# Patient Record
Sex: MALE | Race: WHITE | Employment: OTHER | URBAN - METROPOLITAN AREA
[De-identification: names, ages, dates, MRNs, and addresses within clinical notes are randomized per-mention and may not be internally consistent; named-entity substitution may affect disease eponyms.]

---

## 2017-03-15 ENCOUNTER — HOSPITAL ENCOUNTER (OUTPATIENT)
Dept: MRI IMAGING | Age: 82
Discharge: HOME | End: 2017-03-15
Attending: INTERNAL MEDICINE
Payer: MEDICARE

## 2017-03-15 DIAGNOSIS — M54.2 NECK PAIN: ICD-10-CM

## 2017-03-15 PROCEDURE — 72141 MRI NECK SPINE W/O DYE: CPT

## 2017-03-21 PROBLEM — M50.30 DDD (DEGENERATIVE DISC DISEASE), CERVICAL: Status: ACTIVE | Noted: 2017-03-21

## 2017-03-21 NOTE — PROGRESS NOTES
MRI cervical spine shows extensive arthritis. As discussed will have NS give opinion.       Please call patient

## 2017-03-22 NOTE — PROGRESS NOTES
spoke with patient and gave results per Dr. Mitra Heredia. Informed patient of referral to NS and that they are running behind in scheduling. Patient voiced understanding.

## 2017-03-29 ENCOUNTER — HOSPITAL ENCOUNTER (OUTPATIENT)
Dept: LAB | Age: 82
Discharge: HOME | End: 2017-03-29
Payer: MEDICARE

## 2017-03-29 DIAGNOSIS — D69.6 THROMBOCYTOPENIA, UNSPECIFIED (HCC): ICD-10-CM

## 2017-03-29 LAB
ALBUMIN SERPL BCP-MCNC: 3.9 G/DL (ref 3.2–4.6)
ALBUMIN/GLOB SERPL: 1.6 {RATIO} (ref 1.2–3.5)
ALP SERPL-CCNC: 69 U/L (ref 50–136)
ALT SERPL-CCNC: 29 U/L (ref 12–65)
ANION GAP BLD CALC-SCNC: 7 MMOL/L (ref 7–16)
AST SERPL W P-5'-P-CCNC: 22 U/L (ref 15–37)
BASOPHILS # BLD AUTO: 0 K/UL (ref 0–0.2)
BASOPHILS # BLD: 0 % (ref 0–2)
BILIRUB SERPL-MCNC: 1.6 MG/DL (ref 0.2–1.1)
BUN SERPL-MCNC: 14 MG/DL (ref 8–23)
CALCIUM SERPL-MCNC: 9.3 MG/DL (ref 8.3–10.4)
CHLORIDE SERPL-SCNC: 106 MMOL/L (ref 98–107)
CO2 SERPL-SCNC: 28 MMOL/L (ref 23–32)
CREAT SERPL-MCNC: 1.02 MG/DL (ref 0.8–1.5)
DIFFERENTIAL METHOD BLD: ABNORMAL
EOSINOPHIL # BLD: 0.1 K/UL (ref 0–0.8)
EOSINOPHIL NFR BLD: 3 % (ref 0.5–7.8)
ERYTHROCYTE [DISTWIDTH] IN BLOOD BY AUTOMATED COUNT: 13.4 % (ref 11.9–14.6)
GLOBULIN SER CALC-MCNC: 2.4 G/DL (ref 2.3–3.5)
GLUCOSE SERPL-MCNC: 106 MG/DL (ref 65–100)
HCT VFR BLD AUTO: 34.3 % (ref 41.1–50.3)
HGB BLD-MCNC: 12.3 G/DL (ref 13.6–17.2)
LDH SERPL L TO P-CCNC: 172 U/L (ref 110–210)
LYMPHOCYTES # BLD AUTO: 29 % (ref 13–44)
LYMPHOCYTES # BLD: 0.9 K/UL (ref 0.5–4.6)
MCH RBC QN AUTO: 33.2 PG (ref 26.1–32.9)
MCHC RBC AUTO-ENTMCNC: 35.9 G/DL (ref 31.4–35)
MCV RBC AUTO: 92.5 FL (ref 79.6–97.8)
MONOCYTES # BLD: 0.2 K/UL (ref 0.1–1.3)
MONOCYTES NFR BLD AUTO: 8 % (ref 4–12)
NEUTS SEG # BLD: 1.7 K/UL (ref 1.7–8.2)
NEUTS SEG NFR BLD AUTO: 59 % (ref 43–78)
NRBC # BLD: 0 K/UL (ref 0–0.2)
PLATELET # BLD AUTO: 80 K/UL (ref 150–450)
PMV BLD AUTO: 8.4 FL (ref 10.8–14.1)
POTASSIUM SERPL-SCNC: 4.5 MMOL/L (ref 3.5–5.1)
PROT SERPL-MCNC: 6.3 G/DL (ref 6.3–8.2)
RBC # BLD AUTO: 3.71 M/UL (ref 4.23–5.67)
SODIUM SERPL-SCNC: 141 MMOL/L (ref 136–145)
WBC # BLD AUTO: 2.9 K/UL (ref 4.3–11.1)

## 2017-03-29 PROCEDURE — 85025 COMPLETE CBC W/AUTO DIFF WBC: CPT | Performed by: INTERNAL MEDICINE

## 2017-03-29 PROCEDURE — 83615 LACTATE (LD) (LDH) ENZYME: CPT | Performed by: INTERNAL MEDICINE

## 2017-03-29 PROCEDURE — 80053 COMPREHEN METABOLIC PANEL: CPT | Performed by: INTERNAL MEDICINE

## 2017-03-29 PROCEDURE — 36415 COLL VENOUS BLD VENIPUNCTURE: CPT | Performed by: INTERNAL MEDICINE

## 2017-04-10 PROBLEM — J44.9 COPD (CHRONIC OBSTRUCTIVE PULMONARY DISEASE) (HCC): Status: ACTIVE | Noted: 2017-04-10

## 2017-04-11 ENCOUNTER — HOSPITAL ENCOUNTER (OUTPATIENT)
Dept: ULTRASOUND IMAGING | Age: 82
Discharge: HOME | End: 2017-04-11
Attending: INTERNAL MEDICINE
Payer: MEDICARE

## 2017-04-11 DIAGNOSIS — E04.1 THYROID NODULE: ICD-10-CM

## 2017-04-11 PROBLEM — E04.2 MULTIPLE THYROID NODULES: Status: ACTIVE | Noted: 2017-04-11

## 2017-04-11 PROCEDURE — 76536 US EXAM OF HEAD AND NECK: CPT

## 2017-04-11 NOTE — PROGRESS NOTES
Please call patient, several thyroid nodules present that need to be evaluated by endocrinology for biopsy. They are large enough that I think they should be looked at. A referral to Endocrinology will be made to get them checked.

## 2017-06-01 ENCOUNTER — HOSPITAL ENCOUNTER (OUTPATIENT)
Dept: PHYSICAL THERAPY | Age: 82
Discharge: HOME | End: 2017-06-01
Payer: MEDICARE

## 2017-06-01 PROCEDURE — G8982 BODY POS GOAL STATUS: HCPCS

## 2017-06-01 PROCEDURE — 97161 PT EVAL LOW COMPLEX 20 MIN: CPT

## 2017-06-01 PROCEDURE — G8981 BODY POS CURRENT STATUS: HCPCS

## 2017-06-01 PROCEDURE — 97110 THERAPEUTIC EXERCISES: CPT

## 2017-06-01 NOTE — PROGRESS NOTES
Ambulatory/Rehab Services H2 Model Falls Risk Assessment    Risk Factor Pts. ·   Confusion/Disorientation/Impulsivity  []    4 ·   Symptomatic Depression  []   2 ·   Altered Elimination  []   1 ·   Dizziness/Vertigo  []   1 ·   Gender (Male)  [x]   1 ·   Any administered antiepileptics (anticonvulsants):  []   2 ·   Any administered benzodiazepines:  []   1 ·   Visual Impairment (specify):  []   1 ·   Portable Oxygen Use  []   1 ·   Orthostatic ? BP  []   1 ·   History of Recent Falls (within 3 mos.)  []   5     Ability to Rise from Chair (choose one) Pts. ·   Ability to rise in a single movement  [x]   0 ·   Pushes up, successful in one attempt  []   1 ·   Multiple attempts, but successful  []   3 ·   Unable to rise without assistance  []   4   Total: (5 or greater = High Risk) 1     Falls Prevention Plan:   []                Physical Limitations to Exercise (specify):   []                Mobility Assistance Device (type):   []                Exercise/Equipment Adaptation (specify):    ©2010 [facility] [address] Patent #[id number].  Federal Law prohibits the replication, distribution or use without written permission from [email or web address]

## 2017-06-01 NOTE — PROGRESS NOTES
Maximino Cristina  : 1935 [facility] JAZMIN ROJAS  [facility], [address] [place] [address],[address], [place] [address].  [phone]   [phone]         OUTPATIENT PHYSICAL THERAPY:Initial Assessment 2017    ICD-10: Treatment Diagnosis: Pain in thoracic spine (M54.6), Pain in left shoulder (M25.512) , Abnormal posture (R29.3)  Precautions/Allergies:   Iodinated contrast media - oral and iv dye and Sulfa (sulfonamide antibiotics)   Fall Risk Score: 1 (? 5 = High Risk)  MD Orders: Evaluate and treat (17) MEDICAL/REFERRING DIAGNOSIS:  back pain   DATE OF ONSET: approx 1 year ago  REFERRING PHYSICIAN: [place], [id number] [facility] 28: 2017     INITIAL ASSESSMENT:  Mr. Marcin Sauer presents to PT with approximately 1 year history of midback/L shoulder blade pain. Patient's pain limits his endurance with prolonged standing and with prolonged sitting when working at a computer. Patient has positive impingement tests for L shoulder, point tenderness to L periscapular muscles, abnormal posture with increased kyphosis and will benefit from PT intervention to address these impairments and improve patient's quality of life and allow for full return to activity participation. PROBLEM LIST (Impacting functional limitations):  1. Increased Pain  2. Decreased Activity Tolerance  3. Decreased Flexibility/Joint Mobility INTERVENTIONS PLANNED:  1. Home Exercise Program (HEP)  2. Manual Therapy  3. Range of Motion (ROM)  4. Therapeutic Exercise/Strengthening   TREATMENT PLAN:  Effective Dates: 17 TO 8/10/17. Frequency/Duration: 2-3 visits per week for 10 weeks  GOALS: (Goals have been discussed and agreed upon with patient.)  Short-Term Functional Goals: Time Frame: 5 weeks  1. Patient will be independent with initial HEP to improve patient's posture. 2. Patient will report being able to stand for 30 minutes or greater without increased L scapula and upper back pain.   3. Patient will report being able to sit and work at computer for greater than 30 minutes without increased L scapula and upper back pain. Discharge Goals: Time Frame:10 weeks  1. Patient will report >2 weeks without onset of L scapula/upper back pain. 2. Patient will report no limitations with prolonged standing or sitting secondary to L scapula pain to facilitate return to full functional and recreational activity participation. 3. Patient will have no positive L shoulder impingement tests and be able to move L shoulder through 170 degrees of active abduction and active flexion without increased L shoulder discomfort. Rehabilitation Potential For Stated Goals: Good  Regarding Maximino Cristina's therapy, I certify that the treatment plan above will be carried out by a therapist or under their direction. Thank you for this referral,    Ashley Larson PT       Referring Physician Signature:     Remington Suazo MD              Date                    The information in this section was collected on 6/1/17  (except where otherwise noted). HISTORY:   History of Present Injury/Illness (Reason for Referral):  Patient reports that approximately 1 year ago he began developing L sided midback and L shoulder blade pain that will occasionally radiate to his neck and upper back. He initially was concerned it was a cardiac issue, but he has had a nuclear stress test and EKG, which showed no cardiac abnormalities. Patient has also had a cervical MRI which was unremarkable. Patient states that his shoulder blade pain limits him to approximately 10 minutes of prolonged, static standing. He has applied heat pack to the area, which helps, and it feels better to sit with a back support. He also feels pain when sitting at a desk and working on a computer for prolonged periods. Patient denies the pain worsening at any time of day, denies paresthesias, and reports that his pain has occurred with an insidious onset.    Past Medical History/Comorbidities:   Mr. 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 Mr. Jesus Barnes  has a past surgical history that includes cardiac surg procedure unlist (1991); cardiac surg procedure unlist (9/2003); cholecystectomy (9/2000); neurological procedure unlisted (12/1981); hernia repair (09/1993); gi (11/2003); lumbar laminectomy (5/2005); other surgical (05/2002); colonoscopy (02/28/2013); endoscopy (02/28/2013); vascular surgery procedure unlist (1991); orthopaedic (1/2006); orthopaedic (8/2005); orthopaedic (2005); heart catheterization (04/2015); other surgical (11/2014); other surgical; and orthopaedic (03/1955). Social History/Living Environment:    Patient lives with his spouse. He is retired. Prior Level of Function/Work/Activity:  Patient is independent with all activities and mobility. Dominant Side:         RIGHT  Current Medications:       Current Outpatient Prescriptions:     tiotropium-olodaterol (STIOLTO RESPIMAT) 2.5-2.5 mcg/actuation mist, Take 2 Puffs by inhalation daily. , Disp: 3 Inhaler, Rfl: 3    atorvastatin (LIPITOR) 40 mg tablet, Take 1 Tab by mouth daily. , Disp: 90 Tab, Rfl: 3    metoprolol tartrate (LOPRESSOR) 25 mg tablet, Take 1 Tab by mouth two (2) times a day., Disp: 180 Tab, Rfl: 3    amLODIPine (NORVASC) 5 mg tablet, Take 1 Tab by mouth daily. , Disp: 90 Tab, Rfl: 3    fluticasone (FLONASE) 50 mcg/actuation nasal spray, One spray to each nostril daily, Disp: 3 Bottle, Rfl: 3   albuterol sulfate 90 mcg/actuation aepb, Take 2 Puffs by inhalation every four (4) hours as needed. , Disp: , Rfl:     omeprazole (PRILOSEC) 20 mg capsule, Take 1 Cap by mouth daily as needed. , Disp: 90 Cap, Rfl: 3    aspirin 81 mg chewable tablet, Take 81 mg by mouth daily. , Disp: , Rfl:     finasteride (PROSCAR) 5 mg tablet, Take 5 mg by mouth daily. , Disp: , Rfl:     terazosin (HYTRIN) 10 mg capsule, Take 10 mg by mouth nightly., Disp: , Rfl:    Date Last Reviewed:  6/1/17   Number of Personal Factors/Comorbidities that affect the Plan of Care: 1-2: MODERATE COMPLEXITY   EXAMINATION:   Observation/Orthostatic Postural Assessment:          Patient has rounded shoulders, kyphotic posture with moderate forward head positioning. In supine, patient has greater distance from tip of acromion in R shoulder than L, indicating anterior rotation of R UE relative to L. Reduced motion/capsular tightness at R shoulder with grade III glenohumeral joint mobilizations. Palpation:          Pt has increased tenderness and tightness in L upper trapezius and L levator scapulae as well as L periscapular muscles along medial border of L scapula. L biceps tender to palpation. ROM:          R shoulder AROM: Flex 168 degrees,  deg, ER 70 deg (@ 45 deg shoulder abduction); IR WFL        L shoulder AROM: Flex 158 degrees,  deg (w/pain at end-range), ER 70 deg (@45 deg ABD), IR WFL        L shoulder PROM: Flex 161 w/end range pain;  (w/end-range pain)        Cervical ROM WFL all directions           Strength:          Shoulder flex: 5/5 B, Shoulder ABD 5/5 (discomfot on L), Shoulder ER 5/5 (w/discomfort on L), IR 5/5; biceps 5/5 B  Special Tests:          Positive Neer's, Positive Dasilva-Seamus on L (both negative on R); negative empty can/full can on L  Neurological Screen:        Dermatomes:  Normal B UE        Sensation: intact to light touch   Body Structures Involved:  1. Bones  2. Joints  3.  Muscles Body Functions Affected:  1. Sensory/Pain  2. Neuromusculoskeletal  3. Movement Related Activities and Participation Affected:  1. Self Care  2. Domestic Life  3. Interpersonal Interactions and Relationships   Number of elements (examined above) that affect the Plan of Care: 4+: HIGH COMPLEXITY   CLINICAL PRESENTATION:   Presentation: Stable and uncomplicated: LOW COMPLEXITY   CLINICAL DECISION MAKING:   Outcome Measure: Tool Used: Modified Oswestry Low Back Pain Questionnaire  Score:  Initial: 9/50  Most Recent: 9/50 (Date: 6/1/17)   Interpretation of Score: Each section is scored on a 0-5 scale, 5 representing the greatest disability. The scores of each section are added together for a total score of 50. Score 0 1-10 11-20 21-30 31-40 41-49 50   Modifier CH CI CJ CK CL CM CN     ? Changing and Maintaining Body Position:    [id number] - CURRENT STATUS: CJ - 20%-39% impaired, limited or restricted    - GOAL STATUS: CI - 1%-19% impaired, limited or restricted    - D/C STATUS:  ---------------To be determined---------------    Medical Necessity:   · Skilled intervention continues to be required due to discomfort with prolonged sitting and standing and abnormal posture. .  Reason for Services/Other Comments:  · Patient continues to require skilled intervention due to limtiations with activity tolerance secondary to increased pain with prolonged sitting and standing. .   Use of outcome tool(s) and clinical judgement create a POC that gives a: Clear prediction of patient's progress: LOW COMPLEXITY            TREATMENT:   (In addition to Assessment/Re-Assessment sessions the following treatments were rendered)  Pre-treatment Symptoms/Complaints:  Patient reports that his pain is mild today but that if he were to stand and talk he would have to 'fidget' and re-adjust his position secondary to onset of L shoulder blade pain, which would lead him to eventually need to sit.  Stated that in addition to this pain he also has thyroid issues and may have to have his thyroid removed. He stated that he also has pain when sitting for prolonged periods at computer desk. Pain: Initial:     none reported Post Session:  None at end of PT     Therapeutic Exercise: (12 Minutes):  Exercises per grid below to improve strength and posture. Required minimal verbal, manual and tactile cues to promote proper body posture and promote proper body mechanics. Progressed repetitions as indicated. Date:  6/1/17 Date:   Date:     Activity/Exercise Parameters Parameters Parameters   Supine chin tucks 1 x 10     Scapular retractions 1 x 10                                   HEP: Handout of the above exercises provided to patient. Patient performed exercises and demonstrated good understanding and performance of each. Treatment/Session Assessment:    · Response to Treatment:  Patient demonstrated postural deficits and some possible soft tissue irritation to L shoulder. Patient did not experience exacerbation of symptoms during PT evaluation. · Compliance with Program/Exercises: Will assess as treatment progresses. · Recommendations/Intent for next treatment session: \"Next visit will focus on improved posture, manual therapies to adjust point tenderness and improve R shoulder pain-free ROM. \".   Total Treatment Duration: 53 minutes  PT Patient Time In/Time Out  Time In: 1305  Time Out: Zeina 49, PT

## 2017-06-06 ENCOUNTER — HOSPITAL ENCOUNTER (OUTPATIENT)
Dept: PHYSICAL THERAPY | Age: 82
Discharge: HOME | End: 2017-06-06
Payer: MEDICARE

## 2017-06-06 PROCEDURE — 97110 THERAPEUTIC EXERCISES: CPT

## 2017-06-06 PROCEDURE — 97140 MANUAL THERAPY 1/> REGIONS: CPT

## 2017-06-06 NOTE — PROGRESS NOTES
Maximino Cristina  : 1935 [facility] at [facility] JAZMIN ROJAS  [address] [facility], [address] [place] [address],[address], 5[address] [facility]  [phone]   [phone]         OUTPATIENT PHYSICAL THERAPY:Daily Note 2017    ICD-10: Treatment Diagnosis: Pain in thoracic spine (M54.6), Pain in left shoulder (M25.512) , Abnormal posture (R29.3)  Precautions/Allergies:   Iodinated contrast media - oral and iv dye and Sulfa (sulfonamide antibiotics)   Fall Risk Score: 1 (? 5 = High Risk)  MD Orders: Evaluate and treat (17) MEDICAL/REFERRING DIAGNOSIS:  back pain   DATE OF ONSET: approx 1 year ago  REFERRING PHYSICIAN: Sweta Amin, [id number] y 28: 2017     INITIAL ASSESSMENT:  Mr. Jorje Parker presents to PT with approximately 1 year history of midback/L shoulder blade pain. Patient's pain limits his endurance with prolonged standing and with prolonged sitting when working at a computer. Patient has positive impingement tests for L shoulder, point tenderness to L periscapular muscles, abnormal posture with increased kyphosis and will benefit from PT intervention to address these impairments and improve patient's quality of life and allow for full return to activity participation. PROBLEM LIST (Impacting functional limitations):  1. Increased Pain  2. Decreased Activity Tolerance  3. Decreased Flexibility/Joint Mobility INTERVENTIONS PLANNED:  1. Home Exercise Program (HEP)  2. Manual Therapy  3. Range of Motion (ROM)  4. Therapeutic Exercise/Strengthening   TREATMENT PLAN:  Effective Dates: 17 TO 8/10/17. Frequency/Duration: 2-3 visits per week for 10 weeks  GOALS: (Goals have been discussed and agreed upon with patient.)  Short-Term Functional Goals: Time Frame: 5 weeks  1. Patient will be independent with initial HEP to improve patient's posture. 2. Patient will report being able to stand for 30 minutes or greater without increased L scapula and upper back pain.   3. Patient will report being able to sit and work at computer for greater than 30 minutes without increased L scapula and upper back pain. Discharge Goals: Time Frame:10 weeks  1. Patient will report >2 weeks without onset of L scapula/upper back pain. 2. Patient will report no limitations with prolonged standing or sitting secondary to L scapula pain to facilitate return to full functional and recreational activity participation. 3. Patient will have no positive L shoulder impingement tests and be able to move L shoulder through 170 degrees of active abduction and active flexion without increased L shoulder discomfort. Rehabilitation Potential For Stated Goals: Good  Regarding Maximino Cristina's therapy, I certify that the treatment plan above will be carried out by a therapist or under their direction. Thank you for this referral,    Milton Santos PT       Referring Physician Signature:     New Nickerson MD              Date                    The information in this section was collected on 6/1/17  (except where otherwise noted). HISTORY:   History of Present Injury/Illness (Reason for Referral):  Patient reports that approximately 1 year ago he began developing L sided midback and L shoulder blade pain that will occasionally radiate to his neck and upper back. He initially was concerned it was a cardiac issue, but he has had a nuclear stress test and EKG, which showed no cardiac abnormalities. Patient has also had a cervical MRI which was unremarkable. Patient states that his shoulder blade pain limits him to approximately 10 minutes of prolonged, static standing. He has applied heat pack to the area, which helps, and it feels better to sit with a back support. He also feels pain when sitting at a desk and working on a computer for prolonged periods. Patient denies the pain worsening at any time of day, denies paresthesias, and reports that his pain has occurred with an insidious onset.    Past Medical History/Comorbidities:   Mr. 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 Mr. Ioana Ortiz  has a past surgical history that includes cardiac surg procedure unlist (1991); cardiac surg procedure unlist (9/2003); cholecystectomy (9/2000); neurological procedure unlisted (12/1981); hernia repair (09/1993); gi (11/2003); lumbar laminectomy (5/2005); other surgical (05/2002); colonoscopy (02/28/2013); endoscopy (02/28/2013); vascular surgery procedure unlist (1991); orthopaedic (1/2006); orthopaedic (8/2005); orthopaedic (2005); heart catheterization (04/2015); other surgical (11/2014); other surgical; and orthopaedic (03/1955). Social History/Living Environment:    Patient lives with his spouse. He is retired. Prior Level of Function/Work/Activity:  Patient is independent with all activities and mobility. Dominant Side:         RIGHT  Current Medications:       Current Outpatient Prescriptions:     tiotropium-olodaterol (STIOLTO RESPIMAT) 2.5-2.5 mcg/actuation mist, Take 2 Puffs by inhalation daily. , Disp: 3 Inhaler, Rfl: 3    atorvastatin (LIPITOR) 40 mg tablet, Take 1 Tab by mouth daily. , Disp: 90 Tab, Rfl: 3    metoprolol tartrate (LOPRESSOR) 25 mg tablet, Take 1 Tab by mouth two (2) times a day., Disp: 180 Tab, Rfl: 3    amLODIPine (NORVASC) 5 mg tablet, Take 1 Tab by mouth daily. , Disp: 90 Tab, Rfl: 3    fluticasone (FLONASE) 50 mcg/actuation nasal spray, One spray to each nostril daily, Disp: 3 Bottle, Rfl: 3   albuterol sulfate 90 mcg/actuation aepb, Take 2 Puffs by inhalation every four (4) hours as needed. , Disp: , Rfl:     omeprazole (PRILOSEC) 20 mg capsule, Take 1 Cap by mouth daily as needed. , Disp: 90 Cap, Rfl: 3    aspirin 81 mg chewable tablet, Take 81 mg by mouth daily. , Disp: , Rfl:     finasteride (PROSCAR) 5 mg tablet, Take 5 mg by mouth daily. , Disp: , Rfl:     terazosin (HYTRIN) 10 mg capsule, Take 10 mg by mouth nightly., Disp: , Rfl:    Date Last Reviewed:  6/1/17   Number of Personal Factors/Comorbidities that affect the Plan of Care: 1-2: MODERATE COMPLEXITY   EXAMINATION:   Observation/Orthostatic Postural Assessment:          Patient has rounded shoulders, kyphotic posture with moderate forward head positioning. In supine, patient has greater distance from tip of acromion in R shoulder than L, indicating anterior rotation of R UE relative to L. Reduced motion/capsular tightness at R shoulder with grade III glenohumeral joint mobilizations. Palpation:          Pt has increased tenderness and tightness in L upper trapezius and L levator scapulae as well as L periscapular muscles along medial border of L scapula. L biceps tender to palpation. ROM:          R shoulder AROM: Flex 168 degrees,  deg, ER 70 deg (@ 45 deg shoulder abduction); IR WFL        L shoulder AROM: Flex 158 degrees,  deg (w/pain at end-range), ER 70 deg (@45 deg ABD), IR WFL        L shoulder PROM: Flex 161 w/end range pain;  (w/end-range pain)        Cervical ROM WFL all directions           Strength:          Shoulder flex: 5/5 B, Shoulder ABD 5/5 (discomfot on L), Shoulder ER 5/5 (w/discomfort on L), IR 5/5; biceps 5/5 B  Special Tests:          Positive Neer's, Positive Dasilva-Seamus on L (both negative on R); negative empty can/full can on L  Neurological Screen:        Dermatomes:  Normal B UE        Sensation: intact to light touch   Body Structures Involved:  1. Bones  2. Joints  3.  Muscles Body Functions Affected:  1. Sensory/Pain  2. Neuromusculoskeletal  3. Movement Related Activities and Participation Affected:  1. Self Care  2. Domestic Life  3. Interpersonal Interactions and Relationships   Number of elements (examined above) that affect the Plan of Care: 4+: HIGH COMPLEXITY   CLINICAL PRESENTATION:   Presentation: Stable and uncomplicated: LOW COMPLEXITY   CLINICAL DECISION MAKING:   Outcome Measure: Tool Used: Modified Oswestry Low Back Pain Questionnaire  Score:  Initial: 9/50  Most Recent: 9/50 (Date: 6/1/17)   Interpretation of Score: Each section is scored on a 0-5 scale, 5 representing the greatest disability. The scores of each section are added together for a total score of 50. Score 0 1-10 11-20 21-30 31-40 41-49 50   Modifier CH CI CJ CK CL CM CN     ? Changing and Maintaining Body Position:    [id number] - CURRENT STATUS: CJ - 20%-39% impaired, limited or restricted    - GOAL STATUS: CI - 1%-19% impaired, limited or restricted    - D/C STATUS:  ---------------To be determined---------------    Medical Necessity:   · Skilled intervention continues to be required due to discomfort with prolonged sitting and standing and abnormal posture. .  Reason for Services/Other Comments:  · Patient continues to require skilled intervention due to limtiations with activity tolerance secondary to increased pain with prolonged sitting and standing. .   Use of outcome tool(s) and clinical judgement create a POC that gives a: Clear prediction of patient's progress: LOW COMPLEXITY            TREATMENT:   (In addition to Assessment/Re-Assessment sessions the following treatments were rendered)  Pre-treatment Symptoms/Complaints:  Patient stated that he is feeling pretty good today. He did have one onset of pain since his initial evaluation that he stated was painful.   Pain: Initial:  0 out of 10    Post Session:  0 out of 10   Manual Therapy (15 minutes):  Scapular mobilizations to improve L scapular mobility and reduce discomfort; soft tissue mobilizations to L upper trapezius and L periscapular muscles to reduce tissue tightness and discomfort; manual stretching L upper trapezius to improve tissue extensibility      Therapeutic Exercise: ( 28 minutes):  Exercises per grid below to improve strength and posture. Required minimal verbal, manual and tactile cues to promote proper body posture and promote proper body mechanics. Progressed repetitions as indicated. Date:  6/1/17 Date:  6/6/17 Date:     Activity/Exercise Parameters Parameters Parameters   Supine chin tucks 1 x 10 1 x 10    Scapular retractions 1 x 10 Blue theraband; 2 x 15    Prone rows  5# 2 x 10    Posterior pelvic tilts  1  X 15                          Treatment/Session Assessment:    · Response to Treatment:  Patient tolerated therapy well with no increased discomfort during PT. Patient will benefit from continued postural improvements and scapular strengthening to reduce L scapular discomfort and improve L UE function. · Compliance with Program/Exercises: Will assess as treatment progresses. · Recommendations/Intent for next treatment session: \"Next visit will focus on improved posture, manual therapies to adjust point tenderness and improve R shoulder pain-free ROM. \".   Total Treatment Duration: 43 minutes   Time In:1050  Time Out: [address] [place], PT

## 2017-06-06 NOTE — PROGRESS NOTES
Maximino Cristina  : 1935 [facility] at [facility] JAZMIN ROJAS  [address] [facility], [address] [place] [address],[address], [place] [address].  [phone]   [phone]         OUTPATIENT PHYSICAL THERAPY:Daily Note 2017    ICD-10: Treatment Diagnosis: Pain in thoracic spine (M54.6), Pain in left shoulder (M25.512) , Abnormal posture (R29.3)  Precautions/Allergies:   Iodinated contrast media - oral and iv dye and Sulfa (sulfonamide antibiotics)   Fall Risk Score: 1 (? 5 = High Risk)  MD Orders: Evaluate and treat (17) MEDICAL/REFERRING DIAGNOSIS:  back pain   DATE OF ONSET: approx 1 year ago  REFERRING PHYSICIAN: Tod Rodriguez, [id number] y 28: 2017     INITIAL ASSESSMENT:  Mr. Tony Ibanez presents to PT with approximately 1 year history of midback/L shoulder blade pain. Patient's pain limits his endurance with prolonged standing and with prolonged sitting when working at a computer. Patient has positive impingement tests for L shoulder, point tenderness to L periscapular muscles, abnormal posture with increased kyphosis and will benefit from PT intervention to address these impairments and improve patient's quality of life and allow for full return to activity participation. PROBLEM LIST (Impacting functional limitations):  1. Increased Pain  2. Decreased Activity Tolerance  3. Decreased Flexibility/Joint Mobility INTERVENTIONS PLANNED:  1. Home Exercise Program (HEP)  2. Manual Therapy  3. Range of Motion (ROM)  4. Therapeutic Exercise/Strengthening   TREATMENT PLAN:  Effective Dates: 17 TO 8/10/17. Frequency/Duration: 2-3 visits per week for 10 weeks  GOALS: (Goals have been discussed and agreed upon with patient.)  Short-Term Functional Goals: Time Frame: 5 weeks  1. Patient will be independent with initial HEP to improve patient's posture. 2. Patient will report being able to stand for 30 minutes or greater without increased L scapula and upper back pain.   3. Patient will report being able to sit and work at computer for greater than 30 minutes without increased L scapula and upper back pain. Discharge Goals: Time Frame:10 weeks  1. Patient will report >2 weeks without onset of L scapula/upper back pain. 2. Patient will report no limitations with prolonged standing or sitting secondary to L scapula pain to facilitate return to full functional and recreational activity participation. 3. Patient will have no positive L shoulder impingement tests and be able to move L shoulder through 170 degrees of active abduction and active flexion without increased L shoulder discomfort. Rehabilitation Potential For Stated Goals: Good  Regarding Maximino Cristina's therapy, I certify that the treatment plan above will be carried out by a therapist or under their direction. Thank you for this referral,    Shekhar Burrell PT       Referring Physician Signature:     Mercedes Redding MD              Date                    The information in this section was collected on 6/1/17  (except where otherwise noted). HISTORY:   History of Present Injury/Illness (Reason for Referral):  Patient reports that approximately 1 year ago he began developing L sided midback and L shoulder blade pain that will occasionally radiate to his neck and upper back. He initially was concerned it was a cardiac issue, but he has had a nuclear stress test and EKG, which showed no cardiac abnormalities. Patient has also had a cervical MRI which was unremarkable. Patient states that his shoulder blade pain limits him to approximately 10 minutes of prolonged, static standing. He has applied heat pack to the area, which helps, and it feels better to sit with a back support. He also feels pain when sitting at a desk and working on a computer for prolonged periods. Patient denies the pain worsening at any time of day, denies paresthesias, and reports that his pain has occurred with an insidious onset.    Past Medical History/Comorbidities:   Mr. 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 Mr. Fabian Anderson  has a past surgical history that includes cardiac surg procedure unlist (1991); cardiac surg procedure unlist (9/2003); cholecystectomy (9/2000); neurological procedure unlisted (12/1981); hernia repair (09/1993); gi (11/2003); lumbar laminectomy (5/2005); other surgical (05/2002); colonoscopy (02/28/2013); endoscopy (02/28/2013); vascular surgery procedure unlist (1991); orthopaedic (1/2006); orthopaedic (8/2005); orthopaedic (2005); heart catheterization (04/2015); other surgical (11/2014); other surgical; and orthopaedic (03/1955). Social History/Living Environment:    Patient lives with his spouse. He is retired. Prior Level of Function/Work/Activity:  Patient is independent with all activities and mobility. Dominant Side:         RIGHT  Current Medications:       Current Outpatient Prescriptions:     tiotropium-olodaterol (STIOLTO RESPIMAT) 2.5-2.5 mcg/actuation mist, Take 2 Puffs by inhalation daily. , Disp: 3 Inhaler, Rfl: 3    atorvastatin (LIPITOR) 40 mg tablet, Take 1 Tab by mouth daily. , Disp: 90 Tab, Rfl: 3    metoprolol tartrate (LOPRESSOR) 25 mg tablet, Take 1 Tab by mouth two (2) times a day., Disp: 180 Tab, Rfl: 3    amLODIPine (NORVASC) 5 mg tablet, Take 1 Tab by mouth daily. , Disp: 90 Tab, Rfl: 3    fluticasone (FLONASE) 50 mcg/actuation nasal spray, One spray to each nostril daily, Disp: 3 Bottle, Rfl: 3   albuterol sulfate 90 mcg/actuation aepb, Take 2 Puffs by inhalation every four (4) hours as needed. , Disp: , Rfl:     omeprazole (PRILOSEC) 20 mg capsule, Take 1 Cap by mouth daily as needed. , Disp: 90 Cap, Rfl: 3    aspirin 81 mg chewable tablet, Take 81 mg by mouth daily. , Disp: , Rfl:     finasteride (PROSCAR) 5 mg tablet, Take 5 mg by mouth daily. , Disp: , Rfl:     terazosin (HYTRIN) 10 mg capsule, Take 10 mg by mouth nightly., Disp: , Rfl:    Date Last Reviewed:  6/1/17   Number of Personal Factors/Comorbidities that affect the Plan of Care: 1-2: MODERATE COMPLEXITY   EXAMINATION:   Observation/Orthostatic Postural Assessment:          Patient has rounded shoulders, kyphotic posture with moderate forward head positioning. In supine, patient has greater distance from tip of acromion in R shoulder than L, indicating anterior rotation of R UE relative to L. Reduced motion/capsular tightness at R shoulder with grade III glenohumeral joint mobilizations. Palpation:          Pt has increased tenderness and tightness in L upper trapezius and L levator scapulae as well as L periscapular muscles along medial border of L scapula. L biceps tender to palpation. ROM:          R shoulder AROM: Flex 168 degrees,  deg, ER 70 deg (@ 45 deg shoulder abduction); IR WFL        L shoulder AROM: Flex 158 degrees,  deg (w/pain at end-range), ER 70 deg (@45 deg ABD), IR WFL        L shoulder PROM: Flex 161 w/end range pain;  (w/end-range pain)        Cervical ROM WFL all directions           Strength:          Shoulder flex: 5/5 B, Shoulder ABD 5/5 (discomfot on L), Shoulder ER 5/5 (w/discomfort on L), IR 5/5; biceps 5/5 B  Special Tests:          Positive Neer's, Positive Dasilva-Seamus on L (both negative on R); negative empty can/full can on L  Neurological Screen:        Dermatomes:  Normal B UE        Sensation: intact to light touch   Body Structures Involved:  1. Bones  2. Joints  3.  Muscles Body Functions Affected:  1. Sensory/Pain  2. Neuromusculoskeletal  3. Movement Related Activities and Participation Affected:  1. Self Care  2. Domestic Life  3. Interpersonal Interactions and Relationships   Number of elements (examined above) that affect the Plan of Care: 4+: HIGH COMPLEXITY   CLINICAL PRESENTATION:   Presentation: Stable and uncomplicated: LOW COMPLEXITY   CLINICAL DECISION MAKING:   Outcome Measure: Tool Used: Modified Oswestry Low Back Pain Questionnaire  Score:  Initial: 9/50  Most Recent: 9/50 (Date: 6/1/17)   Interpretation of Score: Each section is scored on a 0-5 scale, 5 representing the greatest disability. The scores of each section are added together for a total score of 50. Score 0 1-10 11-20 21-30 31-40 41-49 50   Modifier CH CI CJ CK CL CM CN     ? Changing and Maintaining Body Position:    [id number] - CURRENT STATUS: CJ - 20%-39% impaired, limited or restricted    - GOAL STATUS: CI - 1%-19% impaired, limited or restricted    - D/C STATUS:  ---------------To be determined---------------    Medical Necessity:   · Skilled intervention continues to be required due to discomfort with prolonged sitting and standing and abnormal posture. .  Reason for Services/Other Comments:  · Patient continues to require skilled intervention due to limtiations with activity tolerance secondary to increased pain with prolonged sitting and standing. .   Use of outcome tool(s) and clinical judgement create a POC that gives a: Clear prediction of patient's progress: LOW COMPLEXITY            TREATMENT:   (In addition to Assessment/Re-Assessment sessions the following treatments were rendered)  Pre-treatment Symptoms/Complaints:  Patient reports that his pain is mild today but that if he were to stand and talk he would have to 'fidget' and re-adjust his position secondary to onset of L shoulder blade pain, which would lead him to eventually need to sit.  Stated that in addition to this pain he also has thyroid issues and may have to have his thyroid removed. He stated that he also has pain when sitting for prolonged periods at computer desk. Pain: Initial:     none reported Post Session:  None at end of PT     Therapeutic Exercise: ( ):  Exercises per grid below to improve strength and posture. Required minimal verbal, manual and tactile cues to promote proper body posture and promote proper body mechanics. Progressed repetitions as indicated. Date:  6/1/17 Date:   Date:     Activity/Exercise Parameters Parameters Parameters   Supine chin tucks 1 x 10     Scapular retractions 1 x 10                                   HEP: Handout of the above exercises provided to patient. Patient performed exercises and demonstrated good understanding and performance of each. Treatment/Session Assessment:    · Response to Treatment:  Patient demonstrated postural deficits and some possible soft tissue irritation to L shoulder. Patient did not experience exacerbation of symptoms during PT evaluation. · Compliance with Program/Exercises: Will assess as treatment progresses. · Recommendations/Intent for next treatment session: \"Next visit will focus on improved posture, manual therapies to adjust point tenderness and improve R shoulder pain-free ROM. \".   Total Treatment Duration: 53 minutes       Solange Kraft, PT

## 2017-06-08 ENCOUNTER — HOSPITAL ENCOUNTER (OUTPATIENT)
Dept: PHYSICAL THERAPY | Age: 82
Discharge: HOME | End: 2017-06-08
Payer: MEDICARE

## 2017-06-08 PROCEDURE — 97140 MANUAL THERAPY 1/> REGIONS: CPT

## 2017-06-08 PROCEDURE — 97110 THERAPEUTIC EXERCISES: CPT

## 2017-06-08 NOTE — PROGRESS NOTES
Maximino Cristina  : 1935 [facility] at [facility] JAZMIN ROJAS  [address] [facility], [address] [place] [address],[address], [address] [facility]  [phone]   [phone]         OUTPATIENT PHYSICAL THERAPY:Daily Note 2017    ICD-10: Treatment Diagnosis: Pain in thoracic spine (M54.6), Pain in left shoulder (M25.512) , Abnormal posture (R29.3)  Precautions/Allergies:   Iodinated contrast media - oral and iv dye and Sulfa (sulfonamide antibiotics)   Fall Risk Score: 1 (? 5 = High Risk)  MD Orders: Evaluate and treat (17) MEDICAL/REFERRING DIAGNOSIS:  back pain   DATE OF ONSET: approx 1 year ago  REFERRING PHYSICIAN: Luis Angel Humphries, [phone] [address] 28: 2017     INITIAL ASSESSMENT:  Mr. Doroteo Sharma presents to PT with approximately 1 year history of midback/L shoulder blade pain. Patient's pain limits his endurance with prolonged standing and with prolonged sitting when working at a computer. Patient has positive impingement tests for L shoulder, point tenderness to L periscapular muscles, abnormal posture with increased kyphosis and will benefit from PT intervention to address these impairments and improve patient's quality of life and allow for full return to activity participation. PROBLEM LIST (Impacting functional limitations):  1. Increased Pain  2. Decreased Activity Tolerance  3. Decreased Flexibility/Joint Mobility INTERVENTIONS PLANNED:  1. Home Exercise Program (HEP)  2. Manual Therapy  3. Range of Motion (ROM)  4. Therapeutic Exercise/Strengthening   TREATMENT PLAN:  Effective Dates: 17 TO 8/10/17. Frequency/Duration: 2-3 visits per week for 10 weeks  GOALS: (Goals have been discussed and agreed upon with patient.)  Short-Term Functional Goals: Time Frame: 5 weeks  1. Patient will be independent with initial HEP to improve patient's posture. 2. Patient will report being able to stand for 30 minutes or greater without increased L scapula and upper back pain.   3. Patient will report being able to sit and work at computer for greater than 30 minutes without increased L scapula and upper back pain. Discharge Goals: Time Frame:10 weeks  1. Patient will report >2 weeks without onset of L scapula/upper back pain. 2. Patient will report no limitations with prolonged standing or sitting secondary to L scapula pain to facilitate return to full functional and recreational activity participation. 3. Patient will have no positive L shoulder impingement tests and be able to move L shoulder through 170 degrees of active abduction and active flexion without increased L shoulder discomfort. Rehabilitation Potential For Stated Goals: Good  Regarding Maximino Cristina's therapy, I certify that the treatment plan above will be carried out by a therapist or under their direction. Thank you for this referral,    Allyn Halsted, PT       Referring Physician Signature:     Ella Nettles MD              Date                    The information in this section was collected on 6/1/17  (except where otherwise noted). HISTORY:   History of Present Injury/Illness (Reason for Referral):  Patient reports that approximately 1 year ago he began developing L sided midback and L shoulder blade pain that will occasionally radiate to his neck and upper back. He initially was concerned it was a cardiac issue, but he has had a nuclear stress test and EKG, which showed no cardiac abnormalities. Patient has also had a cervical MRI which was unremarkable. Patient states that his shoulder blade pain limits him to approximately 10 minutes of prolonged, static standing. He has applied heat pack to the area, which helps, and it feels better to sit with a back support. He also feels pain when sitting at a desk and working on a computer for prolonged periods. Patient denies the pain worsening at any time of day, denies paresthesias, and reports that his pain has occurred with an insidious onset.    Past Medical History/Comorbidities:   Mr. 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 Mr. Jerome Infante  has a past surgical history that includes cardiac surg procedure unlist (1991); cardiac surg procedure unlist (9/2003); cholecystectomy (9/2000); neurological procedure unlisted (12/1981); hernia repair (09/1993); gi (11/2003); lumbar laminectomy (5/2005); other surgical (05/2002); colonoscopy (02/28/2013); endoscopy (02/28/2013); vascular surgery procedure unlist (1991); orthopaedic (1/2006); orthopaedic (8/2005); orthopaedic (2005); heart catheterization (04/2015); other surgical (11/2014); other surgical; and orthopaedic (03/1955). Social History/Living Environment:    Patient lives with his spouse. He is retired. Prior Level of Function/Work/Activity:  Patient is independent with all activities and mobility. Dominant Side:         RIGHT  Current Medications:       Current Outpatient Prescriptions:     tiotropium-olodaterol (STIOLTO RESPIMAT) 2.5-2.5 mcg/actuation mist, Take 2 Puffs by inhalation daily. , Disp: 3 Inhaler, Rfl: 3    atorvastatin (LIPITOR) 40 mg tablet, Take 1 Tab by mouth daily. , Disp: 90 Tab, Rfl: 3    metoprolol tartrate (LOPRESSOR) 25 mg tablet, Take 1 Tab by mouth two (2) times a day., Disp: 180 Tab, Rfl: 3    amLODIPine (NORVASC) 5 mg tablet, Take 1 Tab by mouth daily. , Disp: 90 Tab, Rfl: 3    fluticasone (FLONASE) 50 mcg/actuation nasal spray, One spray to each nostril daily, Disp: 3 Bottle, Rfl: 3   albuterol sulfate 90 mcg/actuation aepb, Take 2 Puffs by inhalation every four (4) hours as needed. , Disp: , Rfl:     omeprazole (PRILOSEC) 20 mg capsule, Take 1 Cap by mouth daily as needed. , Disp: 90 Cap, Rfl: 3    aspirin 81 mg chewable tablet, Take 81 mg by mouth daily. , Disp: , Rfl:     finasteride (PROSCAR) 5 mg tablet, Take 5 mg by mouth daily. , Disp: , Rfl:     terazosin (HYTRIN) 10 mg capsule, Take 10 mg by mouth nightly., Disp: , Rfl:    Date Last Reviewed:  6/1/17   Number of Personal Factors/Comorbidities that affect the Plan of Care: 1-2: MODERATE COMPLEXITY   EXAMINATION:   Observation/Orthostatic Postural Assessment:          Patient has rounded shoulders, kyphotic posture with moderate forward head positioning. In supine, patient has greater distance from tip of acromion in R shoulder than L, indicating anterior rotation of R UE relative to L. Reduced motion/capsular tightness at R shoulder with grade III glenohumeral joint mobilizations. Palpation:          Pt has increased tenderness and tightness in L upper trapezius and L levator scapulae as well as L periscapular muscles along medial border of L scapula. L biceps tender to palpation. ROM:          R shoulder AROM: Flex 168 degrees,  deg, ER 70 deg (@ 45 deg shoulder abduction); IR WFL        L shoulder AROM: Flex 158 degrees,  deg (w/pain at end-range), ER 70 deg (@45 deg ABD), IR WFL        L shoulder PROM: Flex 161 w/end range pain;  (w/end-range pain)        Cervical ROM WFL all directions           Strength:          Shoulder flex: 5/5 B, Shoulder ABD 5/5 (discomfot on L), Shoulder ER 5/5 (w/discomfort on L), IR 5/5; biceps 5/5 B  Special Tests:          Positive Neer's, Positive Dasilva-Seamus on L (both negative on R); negative empty can/full can on L  Neurological Screen:        Dermatomes:  Normal B UE        Sensation: intact to light touch   Body Structures Involved:  1. Bones  2. Joints  3.  Muscles Body Functions Affected:  1. Sensory/Pain  2. Neuromusculoskeletal  3. Movement Related Activities and Participation Affected:  1. Self Care  2. Domestic Life  3. Interpersonal Interactions and Relationships   Number of elements (examined above) that affect the Plan of Care: 4+: HIGH COMPLEXITY   CLINICAL PRESENTATION:   Presentation: Stable and uncomplicated: LOW COMPLEXITY   CLINICAL DECISION MAKING:   Outcome Measure: Tool Used: Modified Oswestry Low Back Pain Questionnaire  Score:  Initial: 9/50  Most Recent: 9/50 (Date: 6/1/17)   Interpretation of Score: Each section is scored on a 0-5 scale, 5 representing the greatest disability. The scores of each section are added together for a total score of 50. Score 0 1-10 11-20 21-30 31-40 41-49 50   Modifier CH CI CJ CK CL CM CN     ? Changing and Maintaining Body Position:    [id number] - CURRENT STATUS: CJ - 20%-39% impaired, limited or restricted    - GOAL STATUS: CI - 1%-19% impaired, limited or restricted    - D/C STATUS:  ---------------To be determined---------------    Medical Necessity:   · Skilled intervention continues to be required due to discomfort with prolonged sitting and standing and abnormal posture. .  Reason for Services/Other Comments:  · Patient continues to require skilled intervention due to limtiations with activity tolerance secondary to increased pain with prolonged sitting and standing. .   Use of outcome tool(s) and clinical judgement create a POC that gives a: Clear prediction of patient's progress: LOW COMPLEXITY            TREATMENT:   (In addition to Assessment/Re-Assessment sessions the following treatments were rendered)  Pre-treatment Symptoms/Complaints:  Patient stated that he had increased pain on Wednesday after mowing his lawn.   Pain: Initial:  No pain upon arrival to PT    Post Session:  None reported at conclusion of PT   Manual Therapy (31 minutes):  Grade III-IV thoracic joint mobilizations to improve thoracic joint mobility, scapular mobilizations for improved scapular mobility, L shoulder PROM and grade III joint mobilizations (posterior and inferior) to improve L shoulder ROM, soft tissue mobilizations to improve tissue extensibility and reduce discomfort of periscapular muscles. Therapeutic Exercise: (18 Minutes):  Exercises per grid below to improve strength and posture. Required minimal verbal, manual and tactile cues to promote proper body posture and promote proper body mechanics. Progressed repetitions as indicated. Date:  6/1/17 Date:  6/7/17 Date:  6/8/17   Activity/Exercise Parameters Parameters Parameters   Supine chin tucks 1 x 10 1 x 10 -   Scapular retractions 1 x 10 Blue theraband; 2 x 15 Blue; 2 x 15 with elbows flexed, 2 x15 with elbows extended   Prone rows  5# 2 x 10 5# 2 x 15   Posterior pelvic tilts  1  X 15 1  x15   Bridging   1 x 10   Freemotion push/pull   7# 2 x 10 each           HEP: Provided for posterior pelvic tilts and bridging to improve patient's posture. Treatment/Session Assessment:    · Response to Treatment:  Patient demonstrates reduced thoracic joint mobility and has increased L periscapular muscle tightness. Patient needs continued PT to address thoracic stiffness. · Compliance with Program/Exercises: compliant  · Recommendations/Intent for next treatment session: \"Next visit will focus on improved posture, manual therapies to adjust point tenderness and improve R shoulder pain-free ROM. \".   Total Treatment Duration: 49 minutes  PT Patient Time In/Time Out  Time In: 8219  Time Out: ZANDER Santiago

## 2017-06-19 ENCOUNTER — HOSPITAL ENCOUNTER (OUTPATIENT)
Dept: PHYSICAL THERAPY | Age: 82
Discharge: HOME | End: 2017-06-19
Payer: MEDICARE

## 2017-06-19 PROCEDURE — 97140 MANUAL THERAPY 1/> REGIONS: CPT

## 2017-06-19 PROCEDURE — 97110 THERAPEUTIC EXERCISES: CPT

## 2017-06-19 NOTE — PROGRESS NOTES
Maximino Cristina  : 1935 Therapy Center at [facility] JAZMIN ROJAS  1101 [facility], [address] [place] [address],[address], [place] [address].  [phone]   [phone]         OUTPATIENT PHYSICAL THERAPY:Daily Note 2017    ICD-10: Treatment Diagnosis: Pain in thoracic spine (M54.6), Pain in left shoulder (M25.512) , Abnormal posture (R29.3)  Precautions/Allergies:   Iodinated contrast- oral and iv dye and Sulfa (sulfonamide antibiotics)   Fall Risk Score: 1 (? 5 = High Risk)  MD Orders: Evaluate and treat (17) MEDICAL/REFERRING DIAGNOSIS:  back pain   DATE OF ONSET: approx 1 year ago  REFERRING PHYSICIAN: Inderjit Parker [id number] [facility] 28: 2017     INITIAL ASSESSMENT:  Mr. Christy Ibrahim presents to PT with approximately 1 year history of midback/L shoulder blade pain. Patient's pain limits his endurance with prolonged standing and with prolonged sitting when working at a computer. Patient has positive impingement tests for L shoulder, point tenderness to L periscapular muscles, abnormal posture with increased kyphosis and will benefit from PT intervention to address these impairments and improve patient's quality of life and allow for full return to activity participation. PROBLEM LIST (Impacting functional limitations):  1. Increased Pain  2. Decreased Activity Tolerance  3. Decreased Flexibility/Joint Mobility INTERVENTIONS PLANNED:  1. Home Exercise Program (HEP)  2. Manual Therapy  3. Range of Motion (ROM)  4. Therapeutic Exercise/Strengthening   TREATMENT PLAN:  Effective Dates: 17 TO 8/10/17. Frequency/Duration: 2-3 visits per week for 10 weeks  GOALS: (Goals have been discussed and agreed upon with patient.)  Short-Term Functional Goals: Time Frame: 5 weeks  1. Patient will be independent with initial HEP to improve patient's posture. 2. Patient will report being able to stand for 30 minutes or greater without increased L scapula and upper back pain.   3. Patient will report being able to sit and work at computer for greater than 30 minutes without increased L scapula and upper back pain. Discharge Goals: Time Frame:10 weeks  1. Patient will report >2 weeks without onset of L scapula/upper back pain. 2. Patient will report no limitations with prolonged standing or sitting secondary to L scapula pain to facilitate return to full functional and recreational activity participation. 3. Patient will have no positive L shoulder impingement tests and be able to move L shoulder through 170 degrees of active abduction and active flexion without increased L shoulder discomfort. Rehabilitation Potential For Stated Goals: Good  Regarding Maximino Cristina's therapy, I certify that the treatment plan above will be carried out by a therapist or under their direction. Thank you for this referral,    Solange Kraft PT       Referring Physician Signature:     Kemi Rolle MD              Date                    The information in this section was collected on 6/1/17  (except where otherwise noted). HISTORY:   History of Present Injury/Illness (Reason for Referral):  Patient reports that approximately 1 year ago he began developing L sided midback and L shoulder blade pain that will occasionally radiate to his neck and upper back. He initially was concerned it was a cardiac issue, but he has had a nuclear stress test and EKG, which showed no cardiac abnormalities. Patient has also had a cervical MRI which was unremarkable. Patient states that his shoulder blade pain limits him to approximately 10 minutes of prolonged, static standing. He has applied heat pack to the area, which helps, and it feels better to sit with a back support. He also feels pain when sitting at a desk and working on a computer for prolonged periods. Patient denies the pain worsening at any time of day, denies paresthesias, and reports that his pain has occurred with an insidious onset.    Past Medical History/Comorbidities:   Mr. 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 Mr. Demian Morin  has a past surgical history that includes cardiac surg procedure unlist (1991); cardiac surg procedure unlist (9/2003); cholecystectomy (9/2000); neurological procedure unlisted (12/1981); hernia repair (09/1993); gi (11/2003); lumbar laminectomy (5/2005); other surgical (05/2002); colonoscopy (02/28/2013); endoscopy (02/28/2013); vascular surgery procedure unlist (1991); orthopaedic (1/2006); orthopaedic (8/2005); orthopaedic (2005); heart catheterization (04/2015); other surgical (11/2014); other surgical; and orthopaedic (03/1955). Social History/Living Environment:    Patient lives with his spouse. He is retired. Prior Level of Function/Work/Activity:  Patient is independent with all activities and mobility. Dominant Side:         RIGHT  Current Medications:       Current Outpatient Prescriptions:     tiotropium-olodaterol (STIOLTO RESPIMAT) 2.5-2.5 mcg/actuation mist, Take 2 Puffs by inhalation daily. , Disp: 3 Inhaler, Rfl: 3    atorvastatin (LIPITOR) 40 mg tablet, Take 1 Tab by mouth daily. , Disp: 90 Tab, Rfl: 3    metoprolol tartrate (LOPRESSOR) 25 mg tablet, Take 1 Tab by mouth two (2) times a day., Disp: 180 Tab, Rfl: 3    amLODIPine (NORVASC) 5 mg tablet, Take 1 Tab by mouth daily. , Disp: 90 Tab, Rfl: 3    fluticasone (FLONASE) 50 mcg/actuation nasal spray, One spray to each nostril daily, Disp: 3 Bottle, Rfl: 3   albuterol sulfate 90 mcg/actuation aepb, Take 2 Puffs by inhalation every four (4) hours as needed. , Disp: , Rfl:     omeprazole (PRILOSEC) 20 mg capsule, Take 1 Cap by mouth daily as needed. , Disp: 90 Cap, Rfl: 3    aspirin 81 mg chewable tablet, Take 81 mg by mouth daily. , Disp: , Rfl:     finasteride (PROSCAR) 5 mg tablet, Take 5 mg by mouth daily. , Disp: , Rfl:     terazosin (HYTRIN) 10 mg capsule, Take 10 mg by mouth nightly., Disp: , Rfl:    Date Last Reviewed:  6/1/17   Number of Personal Factors/Comorbidities that affect the Plan of Care: 1-2: MODERATE COMPLEXITY   EXAMINATION:   Observation/Orthostatic Postural Assessment:          Patient has rounded shoulders, kyphotic posture with moderate forward head positioning. In supine, patient has greater distance from tip of acromion in R shoulder than L, indicating anterior rotation of R UE relative to L. Reduced motion/capsular tightness at R shoulder with grade III glenohumeral joint mobilizations. Palpation:          Pt has increased tenderness and tightness in L upper trapezius and L levator scapulae as well as L periscapular muscles along medial border of L scapula. L biceps tender to palpation. ROM:          R shoulder AROM: Flex 168 degrees,  deg, ER 70 deg (@ 45 deg shoulder abduction); IR WFL        L shoulder AROM: Flex 158 degrees,  deg (w/pain at end-range), ER 70 deg (@45 deg ABD), IR WFL        L shoulder PROM: Flex 161 w/end range pain;  (w/end-range pain)        Cervical ROM WFL all directions           Strength:          Shoulder flex: 5/5 B, Shoulder ABD 5/5 (discomfot on L), Shoulder ER 5/5 (w/discomfort on L), IR 5/5; biceps 5/5 B  Special Tests:          Positive Neer's, Positive Dasilva-Seamus on L (both negative on R); negative empty can/full can on L  Neurological Screen:        Dermatomes:  Normal B UE        Sensation: intact to light touch   Body Structures Involved:  1. Bones  2. Joints  3.  Muscles Body Functions Affected:  1. Sensory/Pain  2. Neuromusculoskeletal  3. Movement Related Activities and Participation Affected:  1. Self Care  2. Domestic Life  3. Interpersonal Interactions and Relationships   Number of elements (examined above) that affect the Plan of Care: 4+: HIGH COMPLEXITY   CLINICAL PRESENTATION:   Presentation: Stable and uncomplicated: LOW COMPLEXITY   CLINICAL DECISION MAKING:   Outcome Measure: Tool Used: Modified Oswestry Low Back Pain Questionnaire  Score:  Initial: 9/50  Most Recent: 9/50 (Date: 6/1/17)   Interpretation of Score: Each section is scored on a 0-5 scale, 5 representing the greatest disability. The scores of each section are added together for a total score of 50. Score 0 1-10 11-20 21-30 31-40 41-49 50   Modifier CH CI CJ CK CL CM CN     ? Changing and Maintaining Body Position:    [id number] - CURRENT STATUS: CJ - 20%-39% impaired, limited or restricted    - GOAL STATUS: CI - 1%-19% impaired, limited or restricted    - D/C STATUS:  ---------------To be determined---------------    Medical Necessity:   · Skilled intervention continues to be required due to discomfort with prolonged sitting and standing and abnormal posture. .  Reason for Services/Other Comments:  · Patient continues to require skilled intervention due to limtiations with activity tolerance secondary to increased pain with prolonged sitting and standing. .   Use of outcome tool(s) and clinical judgement create a POC that gives a: Clear prediction of patient's progress: LOW COMPLEXITY            TREATMENT:   (In addition to Assessment/Re-Assessment sessions the following treatments were rendered)  Pre-treatment Symptoms/Complaints:  Patient stated that he may need a new La-Z-Boy as he reports that he has noticed that he tends to sit in it awkwardly which may be contributing to the discomfort in his back/L shoulder blade.  P  Pain: Initial:  No pain upon    Post Session:  None    Manual Therapy (29 minutes):  Grade III-IV thoracic joint mobilizations to improve thoracic joint mobility, scapular mobilizations for improved scapular mobility, L shoulder PROM and grade III joint mobilizations (posterior and inferior) to improve L shoulder ROM, soft tissue mobilizations to improve tissue extensibility and reduce discomfort of periscapular muscles. Therapeutic Exercise: (17 Minutes):  Exercises per grid below to improve strength and posture. Required minimal verbal, manual and tactile cues to promote proper body posture and promote proper body mechanics. Progressed repetitions as indicated. Date:  6/1/17 Date:  6/7/17 Date:  6/8/17 Date:   6/19/17   Activity/Exercise Parameters Parameters Parameters parameters   Supine chin tucks 1 x 10 1 x 10 - Standing 1 x 20   Scapular retractions 1 x 10 Blue theraband; 2 x 15 Blue; 2 x 15 with elbows flexed, 2 x15 with elbows extended Blue, 2 x 15 w/elbows flexed and 2 x 15 w/elbows extended   Prone rows  5# 2 x 10 5# 2 x 15 3# 2 x 15   Posterior pelvic tilts  1  X 15 1  x15 Performed supine swiss ball rolls 1 x 20   Bridging   1 x 10 -   Freemotion push/pull   7# 2 x 10 each Seated w/o resistance 2 x 15   Prone extensions    3# 2 x 15   Prone T's    2 x 15                     Treatment/Session Assessment:    · Response to Treatment:  Patient returned from [place] to visit family for first PT appointment since June 8th. Patient able to perform chin tucks with cervical retraction in standing for improved posture with minimal difficulty. Patient needs continued postural exercise to relieve symptoms. · Compliance with Program/Exercises: compliant  · Recommendations/Intent for next treatment session: \"Next visit will focus on improved posture, manual therapies to adjust point tenderness and improve R shoulder pain-free ROM. \".   Total Treatment Duration: 46 minutes  PT Patient Time In/Time Out  Time In: 1030  Time Out: Denton C[place],[address], PT

## 2017-06-22 ENCOUNTER — HOSPITAL ENCOUNTER (OUTPATIENT)
Dept: PHYSICAL THERAPY | Age: 82
Discharge: HOME | End: 2017-06-22
Payer: MEDICARE

## 2017-06-22 PROCEDURE — 97140 MANUAL THERAPY 1/> REGIONS: CPT

## 2017-06-22 PROCEDURE — 97110 THERAPEUTIC EXERCISES: CPT

## 2017-06-22 NOTE — PROGRESS NOTES
Maximino Cristina  : 1935 [facility] at [facility] JAZMIN ROJAS  [address] [facility], [address] [place] [address],[address], [place] [address].  [phone]   [phone]         OUTPATIENT PHYSICAL THERAPY:Daily Note 2017    ICD-10: Treatment Diagnosis: Pain in thoracic spine (M54.6), Pain in left shoulder (M25.512) , Abnormal posture (R29.3)  Precautions/Allergies:   Iodinated contrast- oral and iv dye and Sulfa (sulfonamide antibiotics)   Fall Risk Score: 1 (? 5 = High Risk)  MD Orders: Evaluate and treat (17) MEDICAL/REFERRING DIAGNOSIS:  back pain   DATE OF ONSET: approx 1 year ago  REFERRING PHYSICIAN: Tod Rodriguez, [id number] y 28: 2017     INITIAL ASSESSMENT:  Mr. Tony Ibanez presents to PT with approximately 1 year history of midback/L shoulder blade pain. Patient's pain limits his endurance with prolonged standing and with prolonged sitting when working at a computer. Patient has positive impingement tests for L shoulder, point tenderness to L periscapular muscles, abnormal posture with increased kyphosis and will benefit from PT intervention to address these impairments and improve patient's quality of life and allow for full return to activity participation. PROBLEM LIST (Impacting functional limitations):  1. Increased Pain  2. Decreased Activity Tolerance  3. Decreased Flexibility/Joint Mobility INTERVENTIONS PLANNED:  1. Home Exercise Program (HEP)  2. Manual Therapy  3. Range of Motion (ROM)  4. Therapeutic Exercise/Strengthening   TREATMENT PLAN:  Effective Dates: 17 TO 8/10/17. Frequency/Duration: 2-3 visits per week for 10 weeks  GOALS: (Goals have been discussed and agreed upon with patient.)  Short-Term Functional Goals: Time Frame: 5 weeks  1. Patient will be independent with initial HEP to improve patient's posture. 2. Patient will report being able to stand for 30 minutes or greater without increased L scapula and upper back pain.   3. Patient will report being able to sit and work at computer for greater than 30 minutes without increased L scapula and upper back pain. Discharge Goals: Time Frame:10 weeks  1. Patient will report >2 weeks without onset of L scapula/upper back pain. 2. Patient will report no limitations with prolonged standing or sitting secondary to L scapula pain to facilitate return to full functional and recreational activity participation. 3. Patient will have no positive L shoulder impingement tests and be able to move L shoulder through 170 degrees of active abduction and active flexion without increased L shoulder discomfort. Rehabilitation Potential For Stated Goals: Good  Regarding Maximino Cristina's therapy, I certify that the treatment plan above will be carried out by a therapist or under their direction. Thank you for this referral,    Heidy Cabrera PT       Referring Physician Signature:     Anitha Monroe MD              Date                    The information in this section was collected on 6/1/17  (except where otherwise noted). HISTORY:   History of Present Injury/Illness (Reason for Referral):  Patient reports that approximately 1 year ago he began developing L sided midback and L shoulder blade pain that will occasionally radiate to his neck and upper back. He initially was concerned it was a cardiac issue, but he has had a nuclear stress test and EKG, which showed no cardiac abnormalities. Patient has also had a cervical MRI which was unremarkable. Patient states that his shoulder blade pain limits him to approximately 10 minutes of prolonged, static standing. He has applied heat pack to the area, which helps, and it feels better to sit with a back support. He also feels pain when sitting at a desk and working on a computer for prolonged periods. Patient denies the pain worsening at any time of day, denies paresthesias, and reports that his pain has occurred with an insidious onset.    Past Medical History/Comorbidities:   Mr. 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 Mr. Charlene Hutn  has a past surgical history that includes cardiac surg procedure unlist (1991); cardiac surg procedure unlist (9/2003); cholecystectomy (9/2000); neurological procedure unlisted (12/1981); hernia repair (09/1993); gi (11/2003); lumbar laminectomy (5/2005); other surgical (05/2002); colonoscopy (02/28/2013); endoscopy (02/28/2013); vascular surgery procedure unlist (1991); orthopaedic (1/2006); orthopaedic (8/2005); orthopaedic (2005); heart catheterization (04/2015); other surgical (11/2014); other surgical; and orthopaedic (03/1955). Social History/Living Environment:    Patient lives with his spouse. He is retired. Prior Level of Function/Work/Activity:  Patient is independent with all activities and mobility. Dominant Side:         RIGHT  Current Medications:       Current Outpatient Prescriptions:     tiotropium-olodaterol (STIOLTO RESPIMAT) 2.5-2.5 mcg/actuation mist, Take 2 Puffs by inhalation daily. , Disp: 3 Inhaler, Rfl: 3    atorvastatin (LIPITOR) 40 mg tablet, Take 1 Tab by mouth daily. , Disp: 90 Tab, Rfl: 3    metoprolol tartrate (LOPRESSOR) 25 mg tablet, Take 1 Tab by mouth two (2) times a day., Disp: 180 Tab, Rfl: 3    amLODIPine (NORVASC) 5 mg tablet, Take 1 Tab by mouth daily. , Disp: 90 Tab, Rfl: 3    fluticasone (FLONASE) 50 mcg/actuation nasal spray, One spray to each nostril daily, Disp: 3 Bottle, Rfl: 3   albuterol sulfate 90 mcg/actuation aepb, Take 2 Puffs by inhalation every four (4) hours as needed. , Disp: , Rfl:     omeprazole (PRILOSEC) 20 mg capsule, Take 1 Cap by mouth daily as needed. , Disp: 90 Cap, Rfl: 3    aspirin 81 mg chewable tablet, Take 81 mg by mouth daily. , Disp: , Rfl:     finasteride (PROSCAR) 5 mg tablet, Take 5 mg by mouth daily. , Disp: , Rfl:     terazosin (HYTRIN) 10 mg capsule, Take 10 mg by mouth nightly., Disp: , Rfl:    Date Last Reviewed:  6/1/17   Number of Personal Factors/Comorbidities that affect the Plan of Care: 1-2: MODERATE COMPLEXITY   EXAMINATION:   Observation/Orthostatic Postural Assessment:          Patient has rounded shoulders, kyphotic posture with moderate forward head positioning. In supine, patient has greater distance from tip of acromion in R shoulder than L, indicating anterior rotation of R UE relative to L. Reduced motion/capsular tightness at R shoulder with grade III glenohumeral joint mobilizations. Palpation:          Pt has increased tenderness and tightness in L upper trapezius and L levator scapulae as well as L periscapular muscles along medial border of L scapula. L biceps tender to palpation. ROM:          R shoulder AROM: Flex 168 degrees,  deg, ER 70 deg (@ 45 deg shoulder abduction); IR WFL        L shoulder AROM: Flex 158 degrees,  deg (w/pain at end-range), ER 70 deg (@45 deg ABD), IR WFL        L shoulder PROM: Flex 161 w/end range pain;  (w/end-range pain)        Cervical ROM WFL all directions           Strength:          Shoulder flex: 5/5 B, Shoulder ABD 5/5 (discomfot on L), Shoulder ER 5/5 (w/discomfort on L), IR 5/5; biceps 5/5 B  Special Tests:          Positive Neer's, Positive Dasilva-Seamus on L (both negative on R); negative empty can/full can on L  Neurological Screen:        Dermatomes:  Normal B UE        Sensation: intact to light touch   Body Structures Involved:  1. Bones  2. Joints  3.  Muscles Body Functions Affected:  1. Sensory/Pain  2. Neuromusculoskeletal  3. Movement Related Activities and Participation Affected:  1. Self Care  2. Domestic Life  3. Interpersonal Interactions and Relationships   Number of elements (examined above) that affect the Plan of Care: 4+: HIGH COMPLEXITY   CLINICAL PRESENTATION:   Presentation: Stable and uncomplicated: LOW COMPLEXITY   CLINICAL DECISION MAKING:   Outcome Measure: Tool Used: Modified Oswestry Low Back Pain Questionnaire  Score:  Initial: 9/50  Most Recent: 9/50 (Date: 6/1/17)   Interpretation of Score: Each section is scored on a 0-5 scale, 5 representing the greatest disability. The scores of each section are added together for a total score of 50. Score 0 1-10 11-20 21-30 31-40 41-49 50   Modifier CH CI CJ CK CL CM CN     ? Changing and Maintaining Body Position:    [id number] - CURRENT STATUS: CJ - 20%-39% impaired, limited or restricted    - GOAL STATUS: CI - 1%-19% impaired, limited or restricted    - D/C STATUS:  ---------------To be determined---------------    Medical Necessity:   · Skilled intervention continues to be required due to discomfort with prolonged sitting and standing and abnormal posture. .  Reason for Services/Other Comments:  · Patient continues to require skilled intervention due to limtiations with activity tolerance secondary to increased pain with prolonged sitting and standing. .   Use of outcome tool(s) and clinical judgement create a POC that gives a: Clear prediction of patient's progress: LOW COMPLEXITY            TREATMENT:   (In addition to Assessment/Re-Assessment sessions the following treatments were rendered)  Pre-treatment Symptoms/Complaints:  Pt stated that he was sitting at computer for too long yesterday and felt pain in his shoulder blade.   Pain: Initial:  No pain at arrival to PT    Post Session:  None    Manual Therapy (21 minutes):  Grade III-IV thoracic joint mobilizations to improve thoracic joint mobility, scapular mobilizations for improved scapular mobility, L shoulder PROM and grade III joint mobilizations (posterior and inferior) to improve L shoulder ROM, soft tissue mobilizations to improve tissue extensibility of cervical paraspinals      Therapeutic Exercise: (28 Minutes):  Exercises per grid below to improve strength and posture. Required minimal verbal, manual and tactile cues to promote proper body posture and promote proper body mechanics. Progressed repetitions as indicated. Date:  6/1/17 Date:  6/7/17 Date:  6/8/17 Date:   6/19/17 Date:  6/22/17   Activity/Exercise Parameters Parameters Parameters parameters parameters   Supine chin tucks 1 x 10 1 x 10 - Standing 1 x 20 Supine 2 x 15   Scapular retractions 1 x 10 Blue theraband; 2 x 15 Blue; 2 x 15 with elbows flexed, 2 x15 with elbows extended Blue, 2 x 15 w/elbows flexed and 2 x 15 w/elbows extended Blue 2 x 15 with elbows flexed and with elbows extended   Prone rows  5# 2 x 10 5# 2 x 15 3# 2 x 15 5# 2 x 15   Posterior pelvic tilts  1  X 15 1  x15 Performed supine swiss ball rolls 1 x 20 Supine ball rolls 1 x 20 back/forth; side to side 1 x 20, 2 x 15 posterior pelvic tilt   Bridging   1 x 10 - -   Freemotion push/pull   7# 2 x 10 each Seated w/o resistance 2 x 15 Seated w/o resistance 2 x 15   Prone extensions    3# 2 x 15    Prone T's    2 x 15    Standing posture     Stood against wall with inion, scapula and buttocks against wall, 5 x               Treatment/Session Assessment:    · Response to Treatment:  Patient demonstrates improving postural control and ability to perform posterior pelvic tilts and chin tucks. Patient has made little to no progress with reports of symptoms. · Compliance with Program/Exercises: compliant  · Recommendations/Intent for next treatment session: \"Next visit will focus on improved posture, manual therapies to adjust point tenderness and improve R shoulder pain-free ROM. \".   Total Treatment Duration: 49 minutes  PT Patient Time In/Time Out  Time In: 1101  Time Out: [facility], PT

## 2017-06-26 ENCOUNTER — HOSPITAL ENCOUNTER (OUTPATIENT)
Dept: PHYSICAL THERAPY | Age: 82
Discharge: HOME | End: 2017-06-26
Payer: MEDICARE

## 2017-06-26 PROCEDURE — 97140 MANUAL THERAPY 1/> REGIONS: CPT

## 2017-06-26 PROCEDURE — 97110 THERAPEUTIC EXERCISES: CPT

## 2017-06-26 NOTE — PROGRESS NOTES
Maximino Cristina  : 1935 [facility] Center at [facility] JAZMIN ROJAS  [address] [facility], [address] [place] [address],[address], [place] [address].  [phone]   [phone]         OUTPATIENT PHYSICAL THERAPY:Daily Note 2017    ICD-10: Treatment Diagnosis: Pain in thoracic spine (M54.6), Pain in left shoulder (M25.512) , Abnormal posture (R29.3)  Precautions/Allergies:   Iodinated contrast- oral and iv dye and Sulfa (sulfonamide antibiotics)   Fall Risk Score: 1 (? 5 = High Risk)  MD Orders: Evaluate and treat (17) MEDICAL/REFERRING DIAGNOSIS:  back pain   DATE OF ONSET: approx 1 year ago  REFERRING PHYSICIAN: Estephania Hernandez, [id number] [facility] 28: 2017     INITIAL ASSESSMENT:  Mr. Demian Morin presents to PT with approximately 1 year history of midback/L shoulder blade pain. Patient's pain limits his endurance with prolonged standing and with prolonged sitting when working at a computer. Patient has positive impingement tests for L shoulder, point tenderness to L periscapular muscles, abnormal posture with increased kyphosis and will benefit from PT intervention to address these impairments and improve patient's quality of life and allow for full return to activity participation. PROBLEM LIST (Impacting functional limitations):  1. Increased Pain  2. Decreased Activity Tolerance  3. Decreased Flexibility/Joint Mobility INTERVENTIONS PLANNED:  1. Home Exercise Program (HEP)  2. Manual Therapy  3. Range of Motion (ROM)  4. Therapeutic Exercise/Strengthening   TREATMENT PLAN:  Effective Dates: 17 TO 8/10/17. Frequency/Duration: 2-3 visits per week for 10 weeks  GOALS: (Goals have been discussed and agreed upon with patient.)  Short-Term Functional Goals: Time Frame: 5 weeks  1. Patient will be independent with initial HEP to improve patient's posture. 2. Patient will report being able to stand for 30 minutes or greater without increased L scapula and upper back pain.   3. Patient will report being able to sit and work at computer for greater than 30 minutes without increased L scapula and upper back pain. Discharge Goals: Time Frame:10 weeks  1. Patient will report >2 weeks without onset of L scapula/upper back pain. 2. Patient will report no limitations with prolonged standing or sitting secondary to L scapula pain to facilitate return to full functional and recreational activity participation. 3. Patient will have no positive L shoulder impingement tests and be able to move L shoulder through 170 degrees of active abduction and active flexion without increased L shoulder discomfort. Rehabilitation Potential For Stated Goals: Good              The information in this section was collected on 6/1/17  (except where otherwise noted). HISTORY:   History of Present Injury/Illness (Reason for Referral):  Patient reports that approximately 1 year ago he began developing L sided midback and L shoulder blade pain that will occasionally radiate to his neck and upper back. He initially was concerned it was a cardiac issue, but he has had a nuclear stress test and EKG, which showed no cardiac abnormalities. Patient has also had a cervical MRI which was unremarkable. Patient states that his shoulder blade pain limits him to approximately 10 minutes of prolonged, static standing. He has applied heat pack to the area, which helps, and it feels better to sit with a back support. He also feels pain when sitting at a desk and working on a computer for prolonged periods. Patient denies the pain worsening at any time of day, denies paresthesias, and reports that his pain has occurred with an insidious onset. Past Medical History/Comorbidities:   Mr. Clint Torres  has a past medical history of Abdominal pain; Acute cervical radiculopathy; Aneurysm ([facility] [address] Arthritis; CAD (coronary artery disease); Cancer [facility]); COPD (chronic obstructive pulmonary disease) ([facility] [address]  Descending thoracic aortic aneurysm ([facility] [address] (10/17/14); GERD (gastroesophageal reflux disease); High cholesterol; Hypertension; Ill-defined condition; Mild degeneration of cervical intervertebral disc; MRSA (methicillin resistant Staphylococcus aureus); Multiple thyroid nodules (4/11/2017); Thrombocytopenia ([facility] [address] and Thyroid disease. Mr. Shiela Rivero  has a past surgical history that includes cardiac surg procedure unlist (1991); cardiac surg procedure unlist (9/2003); cholecystectomy (9/2000); neurological procedure unlisted (12/1981); hernia repair (09/1993); gi (11/2003); lumbar laminectomy (5/2005); other surgical (05/2002); colonoscopy (02/28/2013); endoscopy (02/28/2013); vascular surgery procedure unlist (1991); orthopaedic (1/2006); orthopaedic (8/2005); orthopaedic (2005); heart catheterization (04/2015); other surgical (11/2014); other surgical; and orthopaedic (03/1955). Social History/Living Environment:    Patient lives with his spouse. He is retired. Prior Level of Function/Work/Activity:  Patient is independent with all activities and mobility. Dominant Side:         RIGHT  Current Medications:       Current Outpatient Prescriptions:     tiotropium-olodaterol (STIOLTO RESPIMAT) 2.5-2.5 mcg/actuation mist, Take 2 Puffs by inhalation daily. , Disp: 3 Inhaler, Rfl: 3    atorvastatin (LIPITOR) 40 mg tablet, Take 1 Tab by mouth daily. , Disp: 90 Tab, Rfl: 3    metoprolol tartrate (LOPRESSOR) 25 mg tablet, Take 1 Tab by mouth two (2) times a day., Disp: 180 Tab, Rfl: 3    amLODIPine (NORVASC) 5 mg tablet, Take 1 Tab by mouth daily. , Disp: 90 Tab, Rfl: 3    fluticasone (FLONASE) 50 mcg/actuation nasal spray, One spray to each nostril daily, Disp: 3 Bottle, Rfl: 3    albuterol sulfate 90 mcg/actuation aepb, Take 2 Puffs by inhalation every four (4) hours as needed. , Disp: , Rfl:     omeprazole (PRILOSEC) 20 mg capsule, Take 1 Cap by mouth daily as needed. , Disp: 90 Cap, Rfl: 3    aspirin 81 mg chewable tablet, Take 81 mg by mouth daily. , Disp: , Rfl:    finasteride (PROSCAR) 5 mg tablet, Take 5 mg by mouth daily. , Disp: , Rfl:     terazosin (HYTRIN) 10 mg capsule, Take 10 mg by mouth nightly., Disp: , Rfl:    Date Last Reviewed:  6/1/17   Number of Personal Factors/Comorbidities that affect the Plan of Care: 1-2: MODERATE COMPLEXITY   EXAMINATION:   Observation/Orthostatic Postural Assessment:          Patient has rounded shoulders, kyphotic posture with moderate forward head positioning. In supine, patient has greater distance from tip of acromion in R shoulder than L, indicating anterior rotation of R UE relative to L. Reduced motion/capsular tightness at R shoulder with grade III glenohumeral joint mobilizations. Palpation:          Pt has increased tenderness and tightness in L upper trapezius and L levator scapulae as well as L periscapular muscles along medial border of L scapula. L biceps tender to palpation. ROM:          R shoulder AROM: Flex 168 degrees,  deg, ER 70 deg (@ 45 deg shoulder abduction); IR WFL        L shoulder AROM: Flex 158 degrees,  deg (w/pain at end-range), ER 70 deg (@45 deg ABD), IR WFL        L shoulder PROM: Flex 161 w/end range pain;  (w/end-range pain)        Cervical ROM WFL all directions           Strength:          Shoulder flex: 5/5 B, Shoulder ABD 5/5 (discomfot on L), Shoulder ER 5/5 (w/discomfort on L), IR 5/5; biceps 5/5 B  Special Tests:          Positive Neer's, Positive Dasilva-Seamus on L (both negative on R); negative empty can/full can on L  Neurological Screen:        Dermatomes:  Normal B UE        Sensation: intact to light touch   Body Structures Involved:  1. Bones  2. Joints  3. Muscles Body Functions Affected:  1. Sensory/Pain  2. Neuromusculoskeletal  3. Movement Related Activities and Participation Affected:  1. Self Care  2. Domestic Life  3.  Interpersonal Interactions and Relationships   Number of elements (examined above) that affect the Plan of Care: 4+: HIGH COMPLEXITY CLINICAL PRESENTATION:   Presentation: Stable and uncomplicated: LOW COMPLEXITY   CLINICAL DECISION MAKING:   Outcome Measure: Tool Used: Modified Oswestry Low Back Pain Questionnaire  Score:  Initial: 9/50  Most Recent: 9/50 (Date: 6/1/17)   Interpretation of Score: Each section is scored on a 0-5 scale, 5 representing the greatest disability. The scores of each section are added together for a total score of 50. Score 0 1-10 11-20 21-30 31-40 41-49 50   Modifier CH CI CJ CK CL CM CN     ? Changing and Maintaining Body Position:    [id number] - CURRENT STATUS: CJ - 20%-39% impaired, limited or restricted    - GOAL STATUS: CI - 1%-19% impaired, limited or restricted    - D/C STATUS:  ---------------To be determined---------------    Medical Necessity:   · Skilled intervention continues to be required due to discomfort with prolonged sitting and standing and abnormal posture. .  Reason for Services/Other Comments:  · Patient continues to require skilled intervention due to limtiations with activity tolerance secondary to increased pain with prolonged sitting and standing. .   Use of outcome tool(s) and clinical judgement create a POC that gives a: Clear prediction of patient's progress: LOW COMPLEXITY            TREATMENT:   (In addition to Assessment/Re-Assessment sessions the following treatments were rendered)  Pre-treatment Symptoms/Complaints:  Pt reports that he has no pain today and is doing well. Has had some difficulties with shoulder blade discomfort with prolonged computer use. Pain: Initial:  None upon arrival to PT    Post Session:  None    Manual Therapy (14 minutes):  Cervical manual traction, soft tissue mobilizations to cervical paraspinals and B upper trapezius to reduce muscle tightness and discomfort. Therapeutic Exercise: (28 Minutes):  Exercises per grid below to improve strength and posture.   Required minimal verbal, manual and tactile cues to promote proper body posture and promote proper body mechanics. Progressed repetitions as indicated. Date:  6/1/17 Date:  6/7/17 Date:  6/8/17 Date:   6/19/17 Date:  6/22/17 Date:  6/26/17   Activity/Exercise Parameters Parameters Parameters parameters parameters parameters   Supine chin tucks 1 x 10 1 x 10 - Standing 1 x 20 Supine 2 x 15 2 x 15   Scapular retractions 1 x 10 Blue theraband; 2 x 15 Blue; 2 x 15 with elbows flexed, 2 x15 with elbows extended Blue, 2 x 15 w/elbows flexed and 2 x 15 w/elbows extended Blue 2 x 15 with elbows flexed and with elbows extended Black 2 x 15 with elbows flexed   Prone rows  5# 2 x 10 5# 2 x 15 3# 2 x 15 5# 2 x 15 -   Posterior pelvic tilts  1  X 15 1  x15 Performed supine swiss ball rolls 1 x 20 Supine ball rolls 1 x 20 back/forth; side to side 1 x 20, 2 x 15 posterior pelvic tilt Supine ball rolls 1 x 20; posterior pelvic tilt 1 x 20   Bridging   1 x 10 - - -   Freemotion push/pull   7# 2 x 10 each Seated w/o resistance 2 x 15 Seated w/o resistance 2 x 15 Seated 2 x 15 w/o resistance   Prone extensions    3# 2 x 15  -   Prone T's    2 x 15  -   Standing posture     Stood against wall with inion, scapula and buttocks against wall, 5 x Wall stands with back of head, scapula and buttocks with posterior pelvic tilt x 8 with 10\" hold   Wall push up      2 x 10       Treatment/Session Assessment:    · Response to Treatment:  Patient improving with ability to achieve appropriate posture in standing and improved ability to perform chin tucks. Patient remains limited by on/off symptoms throughout daily activities however. Patient needs continued postural training to improve symptoms. · Compliance with Program/Exercises: compliant  · Recommendations/Intent for next treatment session: \"Next visit will focus on improved posture, manual therapies to adjust point tenderness and improve R shoulder pain-free ROM. \".   Total Treatment Duration: 42 minutes  PT Patient Time In/Time Out  Time In: 1033  Time Out: [address] [place] [address], PT

## 2017-06-28 ENCOUNTER — HOSPITAL ENCOUNTER (OUTPATIENT)
Dept: PHYSICAL THERAPY | Age: 82
Discharge: HOME | End: 2017-06-28
Payer: MEDICARE

## 2017-06-28 PROCEDURE — 97140 MANUAL THERAPY 1/> REGIONS: CPT

## 2017-06-28 PROCEDURE — 97110 THERAPEUTIC EXERCISES: CPT

## 2017-06-28 NOTE — PROGRESS NOTES
Maximino Cristina  : [facility] JAZMIN ROJAS  [address] [facility], [address] [place] [address],[address], [address] [facility]  [phone]   [phone]         OUTPATIENT PHYSICAL THERAPY:Daily Note 2017    ICD-10: Treatment Diagnosis: Pain in thoracic spine (M54.6), Pain in left shoulder (M25.512) , Abnormal posture (R29.3)  Precautions/Allergies:   Iodinated contrast- oral and iv dye and Sulfa (sulfonamide antibiotics)   Fall Risk Score: 1 (? 5 = High Risk)  MD Orders: Evaluate and treat (17) MEDICAL/REFERRING DIAGNOSIS:  back pain   DATE OF ONSET: approx 1 year ago  REFERRING PHYSICIAN: Kasandra Page, [address] [facility] 28: 2017     INITIAL ASSESSMENT:  Mr. Loida Viramontes presents to PT with approximately 1 year history of midback/L shoulder blade pain. Patient's pain limits his endurance with prolonged standing and with prolonged sitting when working at a computer. Patient has positive impingement tests for L shoulder, point tenderness to L periscapular muscles, abnormal posture with increased kyphosis and will benefit from PT intervention to address these impairments and improve patient's quality of life and allow for full return to activity participation. PROBLEM LIST (Impacting functional limitations):  1. Increased Pain  2. Decreased Activity Tolerance  3. Decreased Flexibility/Joint Mobility INTERVENTIONS PLANNED:  1. Home Exercise Program (HEP)  2. Manual Therapy  3. Range of Motion (ROM)  4. Therapeutic Exercise/Strengthening   TREATMENT PLAN:  Effective Dates: 17 TO 8/10/17. Frequency/Duration: 2-3 visits per week for 10 weeks  GOALS: (Goals have been discussed and agreed upon with patient.)  Short-Term Functional Goals: Time Frame: 5 weeks  1. Patient will be independent with initial HEP to improve patient's posture. 2. Patient will report being able to stand for 30 minutes or greater without increased L scapula and upper back pain.   3. Patient will report being able to sit and work at computer for greater than 30 minutes without increased L scapula and upper back pain. Discharge Goals: Time Frame:10 weeks  1. Patient will report >2 weeks without onset of L scapula/upper back pain. 2. Patient will report no limitations with prolonged standing or sitting secondary to L scapula pain to facilitate return to full functional and recreational activity participation. 3. Patient will have no positive L shoulder impingement tests and be able to move L shoulder through 170 degrees of active abduction and active flexion without increased L shoulder discomfort. Rehabilitation Potential For Stated Goals: Good              The information in this section was collected on 6/1/17  (except where otherwise noted). HISTORY:   History of Present Injury/Illness (Reason for Referral):  Patient reports that approximately 1 year ago he began developing L sided midback and L shoulder blade pain that will occasionally radiate to his neck and upper back. He initially was concerned it was a cardiac issue, but he has had a nuclear stress test and EKG, which showed no cardiac abnormalities. Patient has also had a cervical MRI which was unremarkable. Patient states that his shoulder blade pain limits him to approximately 10 minutes of prolonged, static standing. He has applied heat pack to the area, which helps, and it feels better to sit with a back support. He also feels pain when sitting at a desk and working on a computer for prolonged periods. Patient denies the pain worsening at any time of day, denies paresthesias, and reports that his pain has occurred with an insidious onset. Past Medical History/Comorbidities:   Mr. Marisel Benton  has a past medical history of Abdominal pain; Acute cervical radiculopathy; Aneurysm ([facility] [address] Arthritis; CAD (coronary artery disease); Cancer [facility]); COPD (chronic obstructive pulmonary disease) ([facility] [address]  Descending thoracic aortic aneurysm ([facility] [address] (10/17/14); GERD (gastroesophageal reflux disease); High cholesterol; Hypertension; Ill-defined condition; Mild degeneration of cervical intervertebral disc; MRSA (methicillin resistant Staphylococcus aureus); Multiple thyroid nodules (4/11/2017); Thrombocytopenia ([facility] [address] and Thyroid disease. Mr. Pao Lawson  has a past surgical history that includes cardiac surg procedure unlist (1991); cardiac surg procedure unlist (9/2003); cholecystectomy (9/2000); neurological procedure unlisted (12/1981); hernia repair (09/1993); gi (11/2003); lumbar laminectomy (5/2005); other surgical (05/2002); colonoscopy (02/28/2013); endoscopy (02/28/2013); vascular surgery procedure unlist (1991); orthopaedic (1/2006); orthopaedic (8/2005); orthopaedic (2005); heart catheterization (04/2015); other surgical (11/2014); other surgical; and orthopaedic (03/1955). Social History/Living Environment:    Patient lives with his spouse. He is retired. Prior Level of Function/Work/Activity:  Patient is independent with all activities and mobility. Dominant Side:         RIGHT  Current Medications:       Current Outpatient Prescriptions:     tiotropium-olodaterol (STIOLTO RESPIMAT) 2.5-2.5 mcg/actuation mist, Take 2 Puffs by inhalation daily. , Disp: 3 Inhaler, Rfl: 3    atorvastatin (LIPITOR) 40 mg tablet, Take 1 Tab by mouth daily. , Disp: 90 Tab, Rfl: 3    metoprolol tartrate (LOPRESSOR) 25 mg tablet, Take 1 Tab by mouth two (2) times a day., Disp: 180 Tab, Rfl: 3    amLODIPine (NORVASC) 5 mg tablet, Take 1 Tab by mouth daily. , Disp: 90 Tab, Rfl: 3    fluticasone (FLONASE) 50 mcg/actuation nasal spray, One spray to each nostril daily, Disp: 3 Bottle, Rfl: 3    albuterol sulfate 90 mcg/actuation aepb, Take 2 Puffs by inhalation every four (4) hours as needed. , Disp: , Rfl:     omeprazole (PRILOSEC) 20 mg capsule, Take 1 Cap by mouth daily as needed. , Disp: 90 Cap, Rfl: 3    aspirin 81 mg chewable tablet, Take 81 mg by mouth daily. , Disp: , Rfl:    finasteride (PROSCAR) 5 mg tablet, Take 5 mg by mouth daily. , Disp: , Rfl:     terazosin (HYTRIN) 10 mg capsule, Take 10 mg by mouth nightly., Disp: , Rfl:    Date Last Reviewed:  6/1/17   Number of Personal Factors/Comorbidities that affect the Plan of Care: 1-2: MODERATE COMPLEXITY   EXAMINATION:   Observation/Orthostatic Postural Assessment:          Patient has rounded shoulders, kyphotic posture with moderate forward head positioning. In supine, patient has greater distance from tip of acromion in R shoulder than L, indicating anterior rotation of R UE relative to L. Reduced motion/capsular tightness at R shoulder with grade III glenohumeral joint mobilizations. Palpation:          Pt has increased tenderness and tightness in L upper trapezius and L levator scapulae as well as L periscapular muscles along medial border of L scapula. L biceps tender to palpation. ROM:          R shoulder AROM: Flex 168 degrees,  deg, ER 70 deg (@ 45 deg shoulder abduction); IR WFL        L shoulder AROM: Flex 158 degrees,  deg (w/pain at end-range), ER 70 deg (@45 deg ABD), IR WFL        L shoulder PROM: Flex 161 w/end range pain;  (w/end-range pain)        Cervical ROM WFL all directions           Strength:          Shoulder flex: 5/5 B, Shoulder ABD 5/5 (discomfot on L), Shoulder ER 5/5 (w/discomfort on L), IR 5/5; biceps 5/5 B  Special Tests:          Positive Neer's, Positive Dasilva-Seamus on L (both negative on R); negative empty can/full can on L  Neurological Screen:        Dermatomes:  Normal B UE        Sensation: intact to light touch   Body Structures Involved:  1. Bones  2. Joints  3. Muscles Body Functions Affected:  1. Sensory/Pain  2. Neuromusculoskeletal  3. Movement Related Activities and Participation Affected:  1. Self Care  2. Domestic Life  3.  Interpersonal Interactions and Relationships   Number of elements (examined above) that affect the Plan of Care: 4+: HIGH COMPLEXITY CLINICAL PRESENTATION:   Presentation: Stable and uncomplicated: LOW COMPLEXITY   CLINICAL DECISION MAKING:   Outcome Measure: Tool Used: Modified Oswestry Low Back Pain Questionnaire  Score:  Initial: 9/50  Most Recent: 9/50 (Date: 6/1/17)   Interpretation of Score: Each section is scored on a 0-5 scale, 5 representing the greatest disability. The scores of each section are added together for a total score of 50. Score 0 1-10 11-20 21-30 31-40 41-49 50   Modifier CH CI CJ CK CL CM CN     ? Changing and Maintaining Body Position:    [id number] - CURRENT STATUS: CJ - 20%-39% impaired, limited or restricted    - GOAL STATUS: CI - 1%-19% impaired, limited or restricted    - D/C STATUS:  ---------------To be determined---------------    Medical Necessity:   · Skilled intervention continues to be required due to discomfort with prolonged sitting and standing and abnormal posture. .  Reason for Services/Other Comments:  · Patient continues to require skilled intervention due to limtiations with activity tolerance secondary to increased pain with prolonged sitting and standing. .   Use of outcome tool(s) and clinical judgement create a POC that gives a: Clear prediction of patient's progress: LOW COMPLEXITY            TREATMENT:   (In addition to Assessment/Re-Assessment sessions the following treatments were rendered)  Pre-treatment Symptoms/Complaints:  Pt states that he was moving a door and felt increased pain in shoulder blades/midback earlier today. Pain has since resolved. Pain: Initial:  None upon arrival to PT    Post Session:  None    Manual Therapy (16 minutes):  Grade III-IV thoracic joint posterior-anterior mobilizations for improved thoracic mobility, soft tissue mobilizations to L periscapular muscles and paraspinals for improved tissue extensiblity    Therapeutic Exercise: (26 Minutes):  Exercises per grid below to improve strength and posture.   Required minimal verbal, manual and tactile cues to promote proper body posture and promote proper body mechanics. Progressed repetitions as indicated. Date:  6/8/17 Date:   6/19/17 Date:  6/22/17 Date:  6/26/17 Date:  6/28/2017     Activity/Exercise Parameters parameters parameters parameters parameters   Supine chin tucks - Standing 1 x 20 Supine 2 x 15 2 x 15 -   Scapular retractions Blue; 2 x 15 with elbows flexed, 2 x15 with elbows extended Blue, 2 x 15 w/elbows flexed and 2 x 15 w/elbows extended Blue 2 x 15 with elbows flexed and with elbows extended Black 2 x 15 with elbows flexed Black 2 x 15 with flexed elbows, standing T's 2 x 15    Prone rows 5# 2 x 15 3# 2 x 15 5# 2 x 15 - -   Posterior pelvic tilts 1  x15 Performed supine swiss ball rolls 1 x 20 Supine ball rolls 1 x 20 back/forth; side to side 1 x 20, 2 x 15 posterior pelvic tilt Supine ball rolls 1 x 20; posterior pelvic tilt 1 x 20 -   Bridging 1 x 10 - - - -   Freemotion push/pull 7# 2 x 10 each Seated w/o resistance 2 x 15 Seated w/o resistance 2 x 15 Seated 2 x 15 w/o resistance Black bands, 2 x 15 each UE (alternating push/pull)   Prone extensions  3# 2 x 15  - -   Prone T's  2 x 15  - 2 x 15 2#   Standing posture   Stood against wall with inion, scapula and buttocks against wall, 5 x Wall stands with back of head, scapula and buttocks with posterior pelvic tilt x 8 with 10\" hold -   Wall push up    2 x 10 2 x 10   Straight arm lat pulldown     7# 2 x 10   Prone Y's     2 x 15               Treatment/Session Assessment:    · Response to Treatment:  Patient making minimal progress with treatment, as symptoms are not reproduced during PT and he is able to complete all exercises without discomfort. Patient continues to have impaired posture. · Compliance with Program/Exercises: compliant  · Recommendations/Intent for next treatment session: \"Next visit will focus on improved posture, manual therapies to adjust point tenderness and improve R shoulder pain-free ROM. \".   Total Treatment Duration: 46 minutes  PT Patient Time In/Time Out  Time In: 1430  Time Out: 1703 [facility],

## 2017-07-05 ENCOUNTER — HOSPITAL ENCOUNTER (OUTPATIENT)
Dept: PHYSICAL THERAPY | Age: 82
Discharge: HOME | End: 2017-07-05
Payer: MEDICARE

## 2017-07-05 PROCEDURE — 97110 THERAPEUTIC EXERCISES: CPT

## 2017-07-05 PROCEDURE — 97140 MANUAL THERAPY 1/> REGIONS: CPT

## 2017-07-06 NOTE — PROGRESS NOTES
Maxiimno Cristina  : 1935 [facility] JAZMIN ROJAS  [address] [facility], [address] [place] [address],[address], [place] [address].  [phone]   [phone]         OUTPATIENT PHYSICAL THERAPY:Daily Note 2017    ICD-10: Treatment Diagnosis: Pain in thoracic spine (M54.6), Pain in left shoulder (M25.512) , Abnormal posture (R29.3)  Precautions/Allergies:   Iodinated contrast- oral and iv dye and Sulfa (sulfonamide antibiotics)   Fall Risk Score: 1 (? 5 = High Risk)  MD Orders: Evaluate and treat (17) MEDICAL/REFERRING DIAGNOSIS:  back pain   DATE OF ONSET: approx 1 year ago  REFERRING PHYSICIAN: Edilberto Chua [address] [facility] 28: 2017     INITIAL ASSESSMENT:  Mr. Jennifer Manning presents to PT with approximately 1 year history of midback/L shoulder blade pain. Patient's pain limits his endurance with prolonged standing and with prolonged sitting when working at a computer. Patient has positive impingement tests for L shoulder, point tenderness to L periscapular muscles, abnormal posture with increased kyphosis and will benefit from PT intervention to address these impairments and improve patient's quality of life and allow for full return to activity participation. PROBLEM LIST (Impacting functional limitations):  1. Increased Pain  2. Decreased Activity Tolerance  3. Decreased Flexibility/Joint Mobility INTERVENTIONS PLANNED:  1. Home Exercise Program (HEP)  2. Manual Therapy  3. Range of Motion (ROM)  4. Therapeutic Exercise/Strengthening   TREATMENT PLAN:  Effective Dates: 17 TO 8/10/17. Frequency/Duration: 2-3 visits per week for 10 weeks  GOALS: (Goals have been discussed and agreed upon with patient.)  Short-Term Functional Goals: Time Frame: 5 weeks  1. Patient will be independent with initial HEP to improve patient's posture. 2. Patient will report being able to stand for 30 minutes or greater without increased L scapula and upper back pain.   3. Patient will report being able to sit and work at computer for greater than 30 minutes without increased L scapula and upper back pain. Discharge Goals: Time Frame:10 weeks  1. Patient will report >2 weeks without onset of L scapula/upper back pain. 2. Patient will report no limitations with prolonged standing or sitting secondary to L scapula pain to facilitate return to full functional and recreational activity participation. 3. Patient will have no positive L shoulder impingement tests and be able to move L shoulder through 170 degrees of active abduction and active flexion without increased L shoulder discomfort. Rehabilitation Potential For Stated Goals: Good              The information in this section was collected on 6/1/17  (except where otherwise noted). HISTORY:   History of Present Injury/Illness (Reason for Referral):  Patient reports that approximately 1 year ago he began developing L sided midback and L shoulder blade pain that will occasionally radiate to his neck and upper back. He initially was concerned it was a cardiac issue, but he has had a nuclear stress test and EKG, which showed no cardiac abnormalities. Patient has also had a cervical MRI which was unremarkable. Patient states that his shoulder blade pain limits him to approximately 10 minutes of prolonged, static standing. He has applied heat pack to the area, which helps, and it feels better to sit with a back support. He also feels pain when sitting at a desk and working on a computer for prolonged periods. Patient denies the pain worsening at any time of day, denies paresthesias, and reports that his pain has occurred with an insidious onset. Past Medical History/Comorbidities:   Mr. Thuy Monson  has a past medical history of Abdominal pain; Acute cervical radiculopathy; Aneurysm ([facility] [address] Arthritis; CAD (coronary artery disease); Cancer [facility]); COPD (chronic obstructive pulmonary disease) ([facility] [address]  Descending thoracic aortic aneurysm ([facility] [address] (10/17/14); GERD (gastroesophageal reflux disease); High cholesterol; Hypertension; Ill-defined condition; Mild degeneration of cervical intervertebral disc; MRSA (methicillin resistant Staphylococcus aureus); Multiple thyroid nodules (4/11/2017); Thrombocytopenia ([facility] [address] and Thyroid disease. Mr. Susana Velasquez  has a past surgical history that includes cardiac surg procedure unlist (1991); cardiac surg procedure unlist (9/2003); cholecystectomy (9/2000); neurological procedure unlisted (12/1981); hernia repair (09/1993); gi (11/2003); lumbar laminectomy (5/2005); other surgical (05/2002); colonoscopy (02/28/2013); endoscopy (02/28/2013); vascular surgery procedure unlist (1991); orthopaedic (1/2006); orthopaedic (8/2005); orthopaedic (2005); heart catheterization (04/2015); other surgical (11/2014); other surgical; and orthopaedic (03/1955). Social History/Living Environment:    Patient lives with his spouse. He is retired. Prior Level of Function/Work/Activity:  Patient is independent with all activities and mobility. Dominant Side:         RIGHT  Current Medications:       Current Outpatient Prescriptions:     tiotropium-olodaterol (STIOLTO RESPIMAT) 2.5-2.5 mcg/actuation mist, Take 2 Puffs by inhalation daily. , Disp: 3 Inhaler, Rfl: 3    atorvastatin (LIPITOR) 40 mg tablet, Take 1 Tab by mouth daily. , Disp: 90 Tab, Rfl: 3    metoprolol tartrate (LOPRESSOR) 25 mg tablet, Take 1 Tab by mouth two (2) times a day., Disp: 180 Tab, Rfl: 3    amLODIPine (NORVASC) 5 mg tablet, Take 1 Tab by mouth daily. , Disp: 90 Tab, Rfl: 3    fluticasone (FLONASE) 50 mcg/actuation nasal spray, One spray to each nostril daily, Disp: 3 Bottle, Rfl: 3    albuterol sulfate 90 mcg/actuation aepb, Take 2 Puffs by inhalation every four (4) hours as needed. , Disp: , Rfl:     omeprazole (PRILOSEC) 20 mg capsule, Take 1 Cap by mouth daily as needed. , Disp: 90 Cap, Rfl: 3    aspirin 81 mg chewable tablet, Take 81 mg by mouth daily. , Disp: , Rfl:    finasteride (PROSCAR) 5 mg tablet, Take 5 mg by mouth daily. , Disp: , Rfl:     terazosin (HYTRIN) 10 mg capsule, Take 10 mg by mouth nightly., Disp: , Rfl:    Date Last Reviewed:  6/1/17   Number of Personal Factors/Comorbidities that affect the Plan of Care: 1-2: MODERATE COMPLEXITY   EXAMINATION:   Observation/Orthostatic Postural Assessment:          Patient has rounded shoulders, kyphotic posture with moderate forward head positioning. In supine, patient has greater distance from tip of acromion in R shoulder than L, indicating anterior rotation of R UE relative to L. Reduced motion/capsular tightness at R shoulder with grade III glenohumeral joint mobilizations. Palpation:          Pt has increased tenderness and tightness in L upper trapezius and L levator scapulae as well as L periscapular muscles along medial border of L scapula. L biceps tender to palpation. ROM:          R shoulder AROM: Flex 168 degrees,  deg, ER 70 deg (@ 45 deg shoulder abduction); IR WFL        L shoulder AROM: Flex 158 degrees,  deg (w/pain at end-range), ER 70 deg (@45 deg ABD), IR WFL        L shoulder PROM: Flex 161 w/end range pain;  (w/end-range pain)        Cervical ROM WFL all directions           Strength:          Shoulder flex: 5/5 B, Shoulder ABD 5/5 (discomfot on L), Shoulder ER 5/5 (w/discomfort on L), IR 5/5; biceps 5/5 B  Special Tests:          Positive Neer's, Positive Dasilva-Seamus on L (both negative on R); negative empty can/full can on L  Neurological Screen:        Dermatomes:  Normal B UE        Sensation: intact to light touch   Body Structures Involved:  1. Bones  2. Joints  3. Muscles Body Functions Affected:  1. Sensory/Pain  2. Neuromusculoskeletal  3. Movement Related Activities and Participation Affected:  1. Self Care  2. Domestic Life  3.  Interpersonal Interactions and Relationships   Number of elements (examined above) that affect the Plan of Care: 4+: HIGH COMPLEXITY CLINICAL PRESENTATION:   Presentation: Stable and uncomplicated: LOW COMPLEXITY   CLINICAL DECISION MAKING:   Outcome Measure: Tool Used: Modified Oswestry Low Back Pain Questionnaire  Score:  Initial: 9/50  Most Recent: 9/50 (Date: 6/1/17)   Interpretation of Score: Each section is scored on a 0-5 scale, 5 representing the greatest disability. The scores of each section are added together for a total score of 50. Score 0 1-10 11-20 21-30 31-40 41-49 50   Modifier CH CI CJ CK CL CM CN     ? Changing and Maintaining Body Position:    [id number] - CURRENT STATUS: CJ - 20%-39% impaired, limited or restricted    - GOAL STATUS: CI - 1%-19% impaired, limited or restricted    - D/C STATUS:  ---------------To be determined---------------    Medical Necessity:   · Skilled intervention continues to be required due to discomfort with prolonged sitting and standing and abnormal posture. .  Reason for Services/Other Comments:  · Patient continues to require skilled intervention due to limtiations with activity tolerance secondary to increased pain with prolonged sitting and standing. .   Use of outcome tool(s) and clinical judgement create a POC that gives a: Clear prediction of patient's progress: LOW COMPLEXITY            TREATMENT:   (In addition to Assessment/Re-Assessment sessions the following treatments were rendered)  Pre-treatment Symptoms/Complaints:  Pt stated that he was doing rows with bands at home in his garage and experienced increased pain in L scapula. Also stated that he has been doing increased computer work and has been 'slouching'.   Pain: Initial:  5-6 out of 10    Post Session:  Did not provide number; stated that it felt better    Manual Therapy (19 minutes):  Grade III-IV thoracic joint posterior-anterior mobilizations for improved thoracic mobility, soft tissue mobilizations to L periscapular muscles and paraspinals for improved tissue extensiblity, grade III-IV L glenohumeral joint mobilizations (posterior and inferior) for improved L shoulder ROM, cervical manual traction for improved cervical tissue extensibility    Therapeutic Exercise: ( 26 minutes):  Exercises per grid below to improve strength and posture. Required minimal verbal, manual and tactile cues to promote proper body posture and promote proper body mechanics. Progressed repetitions as indicated.      Date:  6/8/17 Date:   6/19/17 Date:  6/22/17 Date:  6/26/17 Date:  6/28/17   Date  7/5/17   Activity/Exercise Parameters parameters parameters parameters parameters    Supine chin tucks - Standing 1 x 20 Supine 2 x 15 2 x 15 - -   Scapular retractions Blue; 2 x 15 with elbows flexed, 2 x15 with elbows extended Blue, 2 x 15 w/elbows flexed and 2 x 15 w/elbows extended Blue 2 x 15 with elbows flexed and with elbows extended Black 2 x 15 with elbows flexed Black 2 x 15 with flexed elbows, standing T's 2 x 15  -   Prone rows 5# 2 x 15 3# 2 x 15 5# 2 x 15 - - -   Posterior pelvic tilts 1  x15 Performed supine swiss ball rolls 1 x 20 Supine ball rolls 1 x 20 back/forth; side to side 1 x 20, 2 x 15 posterior pelvic tilt Supine ball rolls 1 x 20; posterior pelvic tilt 1 x 20 - Supine ball rolls 2 x 15   Bridging 1 x 10 - - - - -   Freemotion push/pull 7# 2 x 10 each Seated w/o resistance 2 x 15 Seated w/o resistance 2 x 15 Seated 2 x 15 w/o resistance Black bands, 2 x 15 each UE (alternating push/pull) -   Prone extensions  3# 2 x 15  - -    Prone T's  2 x 15  - 2 x 15 2#    Standing posture   Stood against wall with inion, scapula and buttocks against wall, 5 x Wall stands with back of head, scapula and buttocks with posterior pelvic tilt x 8 with 10\" hold - Wall stands with 3 point of contact; 5x; 3 point of contact with shoulder abduction x 5   Wall push up    2 x 10 2 x 10 -   Straight arm lat pulldown     7# 2 x 10 10# 2 x 15   Prone Y's     2 x 15 -                Treatment/Session Assessment:     · Response to Treatment:  Patient demonstrated increased muscle tightness/tenderness this PM in L scapula/periscapular muscles. Patient making minimal progress with PT.   · Compliance with Program/Exercises: compliant  · Recommendations/Intent for next treatment session: \"Next visit will focus on improved posture, manual therapies to adjust point tenderness and improve R shoulder pain-free ROM. \".   Total Treatment Duration: 45 minutes  PT Patient Time In/Time Out  Time In: 4494  Time Out: 1017 [facility],

## 2017-07-07 ENCOUNTER — HOSPITAL ENCOUNTER (OUTPATIENT)
Dept: PHYSICAL THERAPY | Age: 82
Discharge: HOME | End: 2017-07-07
Payer: MEDICARE

## 2017-07-07 PROCEDURE — 97140 MANUAL THERAPY 1/> REGIONS: CPT

## 2017-07-07 PROCEDURE — 97110 THERAPEUTIC EXERCISES: CPT

## 2017-07-07 NOTE — PROGRESS NOTES
Maximino Cristina  : [facility]  [address] [place] [address], [place] [address].  [phone]   [phone]         OUTPATIENT PHYSICAL THERAPY:Daily Note 2017    ICD-10: Treatment Diagnosis: Pain in thoracic spine (M54.6), Pain in left shoulder (M25.512) , Abnormal posture (R29.3)  Precautions/Allergies:   Iodinated contrast- oral and iv dye and Sulfa (sulfonamide antibiotics)   Fall Risk Score: 1 (? 5 = High Risk)  MD Orders: Evaluate and treat (17) MEDICAL/REFERRING DIAGNOSIS:  back pain   DATE OF ONSET: approx 1 year ago  REFERRING PHYSICIAN: Edilberto Chua [id number] [facility] 28: 2017     INITIAL ASSESSMENT:  Mr. Jennifer Manning presents to PT with approximately 1 year history of midback/L shoulder blade pain. Patient's pain limits his endurance with prolonged standing and with prolonged sitting when working at a computer. Patient has positive impingement tests for L shoulder, point tenderness to L periscapular muscles, abnormal posture with increased kyphosis and will benefit from PT intervention to address these impairments and improve patient's quality of life and allow for full return to activity participation. PROBLEM LIST (Impacting functional limitations):  1. Increased Pain  2. Decreased Activity Tolerance  3. Decreased Flexibility/Joint Mobility INTERVENTIONS PLANNED:  1. Home Exercise Program (HEP)  2. Manual Therapy  3. Range of Motion (ROM)  4. Therapeutic Exercise/Strengthening   TREATMENT PLAN:  Effective Dates: 17 TO 8/10/17. Frequency/Duration: 2-3 visits per week for 10 weeks  GOALS: (Goals have been discussed and agreed upon with patient.)  Short-Term Functional Goals: Time Frame: 5 weeks  1. Patient will be independent with initial HEP to improve patient's posture. 2. Patient will report being able to stand for 30 minutes or greater without increased L scapula and upper back pain.   3. Patient will report being able to sit and work at computer for greater than 30 minutes without increased L scapula and upper back pain. Discharge Goals: Time Frame:10 weeks  1. Patient will report >2 weeks without onset of L scapula/upper back pain. 2. Patient will report no limitations with prolonged standing or sitting secondary to L scapula pain to facilitate return to full functional and recreational activity participation. 3. Patient will have no positive L shoulder impingement tests and be able to move L shoulder through 170 degrees of active abduction and active flexion without increased L shoulder discomfort. Rehabilitation Potential For Stated Goals: Good              The information in this section was collected on 6/1/17  (except where otherwise noted). HISTORY:   History of Present Injury/Illness (Reason for Referral):  Patient reports that approximately 1 year ago he began developing L sided midback and L shoulder blade pain that will occasionally radiate to his neck and upper back. He initially was concerned it was a cardiac issue, but he has had a nuclear stress test and EKG, which showed no cardiac abnormalities. Patient has also had a cervical MRI which was unremarkable. Patient states that his shoulder blade pain limits him to approximately 10 minutes of prolonged, static standing. He has applied heat pack to the area, which helps, and it feels better to sit with a back support. He also feels pain when sitting at a desk and working on a computer for prolonged periods. Patient denies the pain worsening at any time of day, denies paresthesias, and reports that his pain has occurred with an insidious onset. Past Medical History/Comorbidities:   Mr. Moira Sawyer  has a past medical history of Abdominal pain; Acute cervical radiculopathy; Aneurysm ([facility] [address] Arthritis; CAD (coronary artery disease); [facility]); COPD (chronic obstructive pulmonary disease) ([facility] [address]  Descending thoracic aortic aneurysm ([facility] [address] (10/17/14); GERD (gastroesophageal reflux disease); High cholesterol; Hypertension; Ill-defined condition; Mild degeneration of cervical intervertebral disc; MRSA (methicillin resistant Staphylococcus aureus); Multiple thyroid nodules (4/11/2017); Thrombocytopenia ([address] and Thyroid disease. Mr. Ayesha Pierce  has a past surgical history that includes cardiac surg procedure unlist (1991); cardiac surg procedure unlist (9/2003); cholecystectomy (9/2000); neurological procedure unlisted (12/1981); hernia repair (09/1993); gi (11/2003); lumbar laminectomy (5/2005); other surgical (05/2002); colonoscopy (02/28/2013); endoscopy (02/28/2013); vascular surgery procedure unlist (1991); orthopaedic (1/2006); orthopaedic (8/2005); orthopaedic (2005); heart catheterization (04/2015); other surgical (11/2014); other surgical; and orthopaedic (03/1955). Social History/Living Environment:    Patient lives with his spouse. He is retired. Prior Level of Function/Work/Activity:  Patient is independent with all activities and mobility. Dominant Side:         RIGHT  Current Medications:       Current Outpatient Prescriptions:     tiotropium-olodaterol (STIOLTO RESPIMAT) 2.5-2.5 mcg/actuation mist, Take 2 Puffs by inhalation daily. , Disp: 3 Inhaler, Rfl: 3    atorvastatin (LIPITOR) 40 mg tablet, Take 1 Tab by mouth daily. , Disp: 90 Tab, Rfl: 3    metoprolol tartrate (LOPRESSOR) 25 mg tablet, Take 1 Tab by mouth two (2) times a day., Disp: 180 Tab, Rfl: 3    amLODIPine (NORVASC) 5 mg tablet, Take 1 Tab by mouth daily. , Disp: 90 Tab, Rfl: 3    fluticasone (FLONASE) 50 mcg/actuation nasal spray, One spray to each nostril daily, Disp: 3 Bottle, Rfl: 3    albuterol sulfate 90 mcg/actuation aepb, Take 2 Puffs by inhalation every four (4) hours as needed. , Disp: , Rfl:     omeprazole (PRILOSEC) 20 mg capsule, Take 1 Cap by mouth daily as needed. , Disp: 90 Cap, Rfl: 3    aspirin 81 mg chewable tablet, Take 81 mg by mouth daily. , Disp: , Rfl:    finasteride (PROSCAR) 5 mg tablet, Take 5 mg by mouth daily. , Disp: , Rfl:     terazosin (HYTRIN) 10 mg capsule, Take 10 mg by mouth nightly., Disp: , Rfl:    Date Last Reviewed:  6/1/17   Number of Personal Factors/Comorbidities that affect the Plan of Care: 1-2: MODERATE COMPLEXITY   EXAMINATION:   Observation/Orthostatic Postural Assessment:          Patient has rounded shoulders, kyphotic posture with moderate forward head positioning. In supine, patient has greater distance from tip of acromion in R shoulder than L, indicating anterior rotation of R UE relative to L. Reduced motion/capsular tightness at R shoulder with grade III glenohumeral joint mobilizations. Palpation:          Pt has increased tenderness and tightness in L upper trapezius and L levator scapulae as well as L periscapular muscles along medial border of L scapula. L biceps tender to palpation. ROM:          R shoulder AROM: Flex 168 degrees,  deg, ER 70 deg (@ 45 deg shoulder abduction); IR WFL        L shoulder AROM: Flex 158 degrees,  deg (w/pain at end-range), ER 70 deg (@45 deg ABD), IR WFL        L shoulder PROM: Flex 161 w/end range pain;  (w/end-range pain)        Cervical ROM WFL all directions           Strength:          Shoulder flex: 5/5 B, Shoulder ABD 5/5 (discomfot on L), Shoulder ER 5/5 (w/discomfort on L), IR 5/5; biceps 5/5 B  Special Tests:          Positive Neer's, Positive Dasilva-Seamus on L (both negative on R); negative empty can/full can on L  Neurological Screen:        Dermatomes:  Normal B UE        Sensation: intact to light touch   Body Structures Involved:  1. Bones  2. Joints  3. Muscles Body Functions Affected:  1. Sensory/Pain  2. Neuromusculoskeletal  3. Movement Related Activities and Participation Affected:  1. Self Care  2. Domestic Life  3.  Interpersonal Interactions and Relationships   Number of elements (examined above) that affect the Plan of Care: 4+: HIGH COMPLEXITY CLINICAL PRESENTATION:   Presentation: Stable and uncomplicated: LOW COMPLEXITY   CLINICAL DECISION MAKING:   Outcome Measure: Tool Used: Modified Oswestry Low Back Pain Questionnaire  Score:  Initial: 9/50  Most Recent: 9/50 (Date: 6/1/17)   Interpretation of Score: Each section is scored on a 0-5 scale, 5 representing the greatest disability. The scores of each section are added together for a total score of 50. Score 0 1-10 11-20 21-30 31-40 41-49 50   Modifier CH CI CJ CK CL CM CN     ? Changing and Maintaining Body Position:    [id number] - CURRENT STATUS: CJ - 20%-39% impaired, limited or restricted    - GOAL STATUS: CI - 1%-19% impaired, limited or restricted    - D/C STATUS:  ---------------To be determined---------------    Medical Necessity:   · Skilled intervention continues to be required due to discomfort with prolonged sitting and standing and abnormal posture. .  Reason for Services/Other Comments:  · Patient continues to require skilled intervention due to limtiations with activity tolerance secondary to increased pain with prolonged sitting and standing. .   Use of outcome tool(s) and clinical judgement create a POC that gives a: Clear prediction of patient's progress: LOW COMPLEXITY            TREATMENT:   (In addition to Assessment/Re-Assessment sessions the following treatments were rendered)  Pre-treatment Symptoms/Complaints:  Pt reports walking into [place] and having sharp pain in L side/upper back and into L chest muscle yesterday. Patient also reports increased L hip pain.   Pain: Initial:  5-6 out of 10    Post Session:  Did not provide number; stated that it felt better    Manual Therapy ( 14 minutes):  Grade III-IV thoracic joint posterior-anterior L unilateral mobilizations for improved thoracic mobility, soft tissue mobilizations to L periscapular muscles and paraspinals for improved tissue extensiblity, grade III-IV L glenohumeral joint mobilizations (posterior and inferior) for improved L shoulder ROM, cervical manual traction for improved cervical tissue extensibility    Therapeutic Exercise: ( 24 minutes):  Exercises per grid below to improve strength and posture. Required minimal verbal, manual and tactile cues to promote proper body posture and promote proper body mechanics. Progressed repetitions as indicated. Date:  6/8/17 Date:   6/19/17 Date:  6/22/17 Date:  6/26/17 Date:  6/28/17   Date  7/5/17 Date  7/7/17     Activity/Exercise Parameters parameters parameters parameters parameters     Supine chin tucks - Standing 1 x 20 Supine 2 x 15 2 x 15 - - -   Scapular retractions Blue; 2 x 15 with elbows flexed, 2 x15 with elbows extended Blue, 2 x 15 w/elbows flexed and 2 x 15 w/elbows extended Blue 2 x 15 with elbows flexed and with elbows extended Black 2 x 15 with elbows flexed Black 2 x 15 with flexed elbows, standing T's 2 x 15  - Black 2 x 15 with elbows extended   Prone rows 5# 2 x 15 3# 2 x 15 5# 2 x 15 - - - -   Posterior pelvic tilts 1  x15 Performed supine swiss ball rolls 1 x 20 Supine ball rolls 1 x 20 back/forth; side to side 1 x 20, 2 x 15 posterior pelvic tilt Supine ball rolls 1 x 20; posterior pelvic tilt 1 x 20 - Supine ball rolls 2 x 15 Supine ball rolls 2 x 15   Bridging 1 x 10 - - - - - -   Freemotion push/pull 7# 2 x 10 each Seated w/o resistance 2 x 15 Seated w/o resistance 2 x 15 Seated 2 x 15 w/o resistance Black bands, 2 x 15 each UE (alternating push/pull) - -   Prone extensions  3# 2 x 15  - -  -   Prone T's  2 x 15  - 2 x 15 2#  -   Standing posture   Stood against wall with inion, scapula and buttocks against wall, 5 x Wall stands with back of head, scapula and buttocks with posterior pelvic tilt x 8 with 10\" hold - Wall stands with 3 point of contact; 5x; 3 point of contact with shoulder abduction x 5 Wall stands as 7/5/17, 5 x.  Added shoulder abduction x 5    Wall push up    2 x 10 2 x 10 - -   Straight arm lat pulldown     7# 2 x 10 10# 2 x 15 -   Prone Y's     2 x 15 - -                 Treatment/Session Assessment:     · Response to Treatment:  Patient making minimal progress. Patient demonstrated SI rotation with leg length discrepancy which may contribute to shoulder discomfort. Muscle energy technique performed successfully to correct pelvic obliquity. Patient will benefit from continued core/trunk strengthening. · Compliance with Program/Exercises: compliant  · Recommendations/Intent for next treatment session: \"Next visit will focus on improved posture, manual therapies to adjust point tenderness and improve R shoulder pain-free ROM. \".   Total Treatment Duration: 48 minutes  PT Patient Time In/Time Out  Time In: 0863  Time Out: Renaldo Emery PT

## 2017-07-10 ENCOUNTER — HOSPITAL ENCOUNTER (OUTPATIENT)
Dept: PHYSICAL THERAPY | Age: 82
Discharge: HOME | End: 2017-07-10
Payer: MEDICARE

## 2017-07-10 PROCEDURE — G8982 BODY POS GOAL STATUS: HCPCS

## 2017-07-10 PROCEDURE — 97140 MANUAL THERAPY 1/> REGIONS: CPT

## 2017-07-10 PROCEDURE — G8981 BODY POS CURRENT STATUS: HCPCS

## 2017-07-10 NOTE — PROGRESS NOTES
Maximino Cristina  : 1935 [facility] at [facility] JAZMIN ROJAS  [address] [facility], [address] [place] [address],[address], [place] [address].  [phone]   [phone]         OUTPATIENT PHYSICAL THERAPY:Daily Note and Progress Report 7/10/2017    ICD-10: Treatment Diagnosis: Pain in thoracic spine (M54.6), Pain in left shoulder (M25.512) , Abnormal posture (R29.3)  Precautions/Allergies:   Iodinated contrast- oral and iv dye and Sulfa (sulfonamide antibiotics)   Fall Risk Score: 1 (? 5 = High Risk)  MD Orders: Evaluate and treat (17) MEDICAL/REFERRING DIAGNOSIS:  back pain   DATE OF ONSET: approx 1 year ago  REFERRING PHYSICIAN: Emil Ordonez MD  [address]: 17     INITIAL ASSESSMENT:  Mr. Raghu Hagan reports to PT with ongoing L scapular/mid back pain which limits patient when performing static standing or static standing. Patient has improved L shoulder AROM since beginning PT but continues to demonstrate L upper trapezius and L periscapular muscle tightness and discomfort. Patient will benefit from continued PT to further address the underlying cause for his ongoing discomfort. PROBLEM LIST (Impacting functional limitations):  1. Increased Pain  2. Decreased Activity Tolerance  3. Decreased Flexibility/Joint Mobility INTERVENTIONS PLANNED:  1. Home Exercise Program (HEP)  2. Manual Therapy  3. Range of Motion (ROM)  4. Therapeutic Exercise/Strengthening   TREATMENT PLAN:  Effective Dates: 17 TO 8/10/17. Frequency/Duration: 2-3 visits per week for 10 weeks  GOALS: (Goals have been discussed and agreed upon with patient.)  Short-Term Functional Goals: Time Frame: 5 weeks  1. Patient will be independent with initial HEP to improve patient's posture. MET 7-10-17  2. Patient will report being able to stand for 30 minutes or greater without increased L scapula and upper back pain. Ongoing 7/10/17; after approx 10 minutes and it will bother him  3.  Patient will report being able to sit and work at computer for greater than 30 minutes without increased L scapula and upper back pain. Ongoing 7-10-17  Discharge Goals: Time Frame:10 weeks  1. Patient will report >2 weeks without onset of L scapula/upper back pain. 2. Patient will report no limitations with prolonged standing or sitting secondary to L scapula pain to facilitate return to full functional and recreational activity participation. 3. Patient will have no positive L shoulder impingement tests and be able to move L shoulder through 170 degrees of active abduction and active flexion without increased L shoulder discomfort. Rehabilitation Potential For Stated Goals: Good  Regarding Maximino Cristina's therapy, I certify that the treatment plan above will be carried out by a therapist or under their direction. Thank you for this referral,    Raeann Suarez PT       Referring Physician Signature: Emil Ordonez MD          Date                        The information in this section was collected on 6/1/17  (except where otherwise noted). HISTORY:   History of Present Injury/Illness (Reason for Referral):  Patient reports that approximately 1 year ago he began developing L sided midback and L shoulder blade pain that will occasionally radiate to his neck and upper back. He initially was concerned it was a cardiac issue, but he has had a nuclear stress test and EKG, which showed no cardiac abnormalities. Patient has also had a cervical MRI which was unremarkable. Patient states that his shoulder blade pain limits him to approximately 10 minutes of prolonged, static standing. He has applied heat pack to the area, which helps, and it feels better to sit with a back support. He also feels pain when sitting at a desk and working on a computer for prolonged periods. Patient denies the pain worsening at any time of day, denies paresthesias, and reports that his pain has occurred with an insidious onset.    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 Mr. Demario Hunter  has a past surgical history that includes cardiac surg procedure unlist (1991); cardiac surg procedure unlist (9/2003); cholecystectomy (9/2000); neurological procedure unlisted (12/1981); hernia repair (09/1993); gi (11/2003); lumbar laminectomy (5/2005); other surgical (05/2002); colonoscopy (02/28/2013); endoscopy (02/28/2013); vascular surgery procedure unlist (1991); orthopaedic (1/2006); orthopaedic (8/2005); orthopaedic (2005); heart catheterization (04/2015); other surgical (11/2014); other surgical; and orthopaedic (03/1955). Social History/Living Environment:    Patient lives with his spouse. He is retired. Prior Level of Function/Work/Activity:  Patient is independent with all activities and mobility. Dominant Side:         RIGHT  Current Medications:       Current Outpatient Prescriptions:     tiotropium-olodaterol (STIOLTO RESPIMAT) 2.5-2.5 mcg/actuation mist, Take 2 Puffs by inhalation daily. , Disp: 3 Inhaler, Rfl: 3    atorvastatin (LIPITOR) 40 mg tablet, Take 1 Tab by mouth daily. , Disp: 90 Tab, Rfl: 3    metoprolol tartrate (LOPRESSOR) 25 mg tablet, Take 1 Tab by mouth two (2) times a day., Disp: 180 Tab, Rfl: 3    amLODIPine (NORVASC) 5 mg tablet, Take 1 Tab by mouth daily. , Disp: 90 Tab, Rfl: 3    fluticasone (FLONASE) 50 mcg/actuation nasal spray, One spray to each nostril daily, Disp: 3 Bottle, Rfl: 3    albuterol sulfate 90 mcg/actuation aepb, Take 2 Puffs by inhalation every four (4) hours as needed. , Disp: , Rfl:     omeprazole (PRILOSEC) 20 mg capsule, Take 1 Cap by mouth daily as needed. , Disp: 90 Cap, Rfl: 3    aspirin 81 mg chewable tablet, Take 81 mg by mouth daily. , Disp: , Rfl:     finasteride (PROSCAR) 5 mg tablet, Take 5 mg by mouth daily. , Disp: , Rfl:     terazosin (HYTRIN) 10 mg capsule, Take 10 mg by mouth nightly., Disp: , Rfl:    Date Last Reviewed:  7-10-17   Number of Personal Factors/Comorbidities that affect the Plan of Care: 1-2: MODERATE COMPLEXITY   EXAMINATION:   7-10-17    Observation/Orthostatic Postural Assessment:          Patient demonstrates rounded shoulders bilaterally, forward head posture  Palpation:        Tenderness and tightness to L upper trapezius and L periscapular musculature. ROM:          L AROM: flexion 170 degrees, abduction 178 degrees        Special Tests:          Negative Neers, Negative chapa-mary on L (NT on R); negative cervical compression and Spurling's to L and R  Neurological Screen:        Dermatomes:  Normal B UE        Sensation: intact to light touch   Body Structures Involved:  1. Bones  2. Joints  3. Muscles Body Functions Affected:  1. Sensory/Pain  2. Neuromusculoskeletal  3. Movement Related Activities and Participation Affected:  1. Self Care  2. Domestic Life  3. Interpersonal Interactions and Relationships   Number of elements (examined above) that affect the Plan of Care: 4+: HIGH COMPLEXITY   CLINICAL PRESENTATION:   Presentation: Stable and uncomplicated: LOW COMPLEXITY   CLINICAL DECISION MAKING:   Outcome Measure: Tool Used: Modified Oswestry Low Back Pain Questionnaire  Score:  Initial: 9/50  Most Recent: 13/50 (Date: 7-10-17)   Interpretation of Score: Each section is scored on a 0-5 scale, 5 representing the greatest disability. The scores of each section are added together for a total score of 50.     Score 0 1-10 11-20 21-30 31-40 41-49 50   Modifier CH CI CJ CK CL CM CN     ? Changing and Maintaining Body Position:    [id number] - CURRENT STATUS: CJ - 20%-39% impaired, limited or restricted    - GOAL STATUS: CI - 1%-19% impaired, limited or restricted    - D/C STATUS:  ---------------To be determined---------------    Medical Necessity:   · Skilled intervention continues to be required due to discomfort with prolonged sitting and standing and abnormal posture. .  Reason for Services/Other Comments:  · Patient continues to require skilled intervention due to limtiations with activity tolerance secondary to increased pain with prolonged sitting and standing. .   Use of outcome tool(s) and clinical judgement create a POC that gives a: Clear prediction of patient's progress: LOW COMPLEXITY            TREATMENT:   (In addition to Assessment/Re-Assessment sessions the following treatments were rendered)  Pre-treatment Symptoms/Complaints:  Pt states that he thinks his sitting posture in his recliner chair may be contributing to his pain. Reports that he continues to experience on/off L scapula pain. Pain: Initial:  None upon arrival    Post Session:  No pain at conclusion of PT   Manual Therapy ( 40 minutes): Soft tissue mobilizations to L upper trapezius and cervical musculature for improved tissue extensbility and reduced discomfort, L shoulder PROM for imporved L shoulder ROM. Manual cervical traction to improve tissue extensibility and reduce discomfort. Therapeutic Exercise: ( 0 minutes):  Not performed today.      Date:  6/8/17 Date:   6/19/17 Date:  6/22/17 Date:  6/26/17 Date:  6/28/17   Date  7/5/17 Date  7/7/17     Activity/Exercise Parameters parameters parameters parameters parameters     Supine chin tucks - Standing 1 x 20 Supine 2 x 15 2 x 15 - - -   Scapular retractions Blue; 2 x 15 with elbows flexed, 2 x15 with elbows extended Blue, 2 x 15 w/elbows flexed and 2 x 15 w/elbows extended Blue 2 x 15 with elbows flexed and with elbows extended Black 2 x 15 with elbows flexed Black 2 x 15 with flexed elbows, standing T's 2 x 15  - Black 2 x 15 with elbows extended   Prone rows 5# 2 x 15 3# 2 x 15 5# 2 x 15 - - - -   Posterior pelvic tilts 1  x15 Performed supine swiss ball rolls 1 x 20 Supine ball rolls 1 x 20 back/forth; side to side 1 x 20, 2 x 15 posterior pelvic tilt Supine ball rolls 1 x 20; posterior pelvic tilt 1 x 20 - Supine ball rolls 2 x 15 Supine ball rolls 2 x 15   Bridging 1 x 10 - - - - - -   Freemotion push/pull 7# 2 x 10 each Seated w/o resistance 2 x 15 Seated w/o resistance 2 x 15 Seated 2 x 15 w/o resistance Black bands, 2 x 15 each UE (alternating push/pull) - -   Prone extensions  3# 2 x 15  - -  -   Prone T's  2 x 15  - 2 x 15 2#  -   Standing posture   Stood against wall with inion, scapula and buttocks against wall, 5 x Wall stands with back of head, scapula and buttocks with posterior pelvic tilt x 8 with 10\" hold - Wall stands with 3 point of contact; 5x; 3 point of contact with shoulder abduction x 5 Wall stands as 7/5/17, 5 x. Added shoulder abduction x 5    Wall push up    2 x 10 2 x 10 - -   Straight arm lat pulldown     7# 2 x 10 10# 2 x 15 -   Prone Y's     2 x 15 - -                 Treatment/Session Assessment:     · Response to Treatment:  Patient continues to have limited improvement with original symptoms. Patient continues to be limited by  postural impairments and encouraged to avoid sitting in recliner chair and observe any effects on discomfort. Patient to see MD on 7-13-17 and will progress as MD orders following this follow-up visit. · Compliance with Program/Exercises: compliant  · Recommendations/Intent for next treatment session: \"Next visit will focus on improved posture, manual therapies to adjust point tenderness and improve R shoulder pain-free ROM. \".   Total Treatment Duration: 45 minutes (5 minutes for re-eval untimed)  PT Patient Time In/Time Out  Time In: 1028  Time Out: [address] [facility], PT

## 2017-07-12 ENCOUNTER — HOSPITAL ENCOUNTER (OUTPATIENT)
Dept: PHYSICAL THERAPY | Age: 82
Discharge: HOME | End: 2017-07-12
Payer: MEDICARE

## 2017-07-12 PROCEDURE — 97140 MANUAL THERAPY 1/> REGIONS: CPT

## 2017-07-12 PROCEDURE — 97110 THERAPEUTIC EXERCISES: CPT

## 2017-07-12 NOTE — PROGRESS NOTES
Maximino Cristina  : 1935 [facility] at [facility] JAZMIN ROJAS  [address] [facility], [address] [place] [address],[address], [place] [address].  [phone]   [phone]         OUTPATIENT PHYSICAL THERAPY:Daily Note 2017    ICD-10: Treatment Diagnosis: Pain in thoracic spine (M54.6), Pain in left shoulder (M25.512) , Abnormal posture (R29.3)  Precautions/Allergies:   Iodinated contrast- oral and iv dye and Sulfa (sulfonamide antibiotics)   Fall Risk Score: 1 (? 5 = High Risk)  MD Orders: Evaluate and treat (17) MEDICAL/REFERRING DIAGNOSIS:  back pain   DATE OF ONSET: approx 1 year ago  REFERRING PHYSICIAN: Carmen Luther MD  [address]: 17     INITIAL ASSESSMENT:  Mr. Armando Jane reports to PT with ongoing L scapular/mid back pain which limits patient when performing static standing or static standing. Patient has improved L shoulder AROM since beginning PT but continues to demonstrate L upper trapezius and L periscapular muscle tightness and discomfort. Patient will benefit from continued PT to further address the underlying cause for his ongoing discomfort. PROBLEM LIST (Impacting functional limitations):  1. Increased Pain  2. Decreased Activity Tolerance  3. Decreased Flexibility/Joint Mobility INTERVENTIONS PLANNED:  1. Home Exercise Program (HEP)  2. Manual Therapy  3. Range of Motion (ROM)  4. Therapeutic Exercise/Strengthening   TREATMENT PLAN:  Effective Dates: 17 TO 8/10/17. Frequency/Duration: 2-3 visits per week for 10 weeks  GOALS: (Goals have been discussed and agreed upon with patient.)  Short-Term Functional Goals: Time Frame: 5 weeks  1. Patient will be independent with initial HEP to improve patient's posture. MET 7-10-17  2. Patient will report being able to stand for 30 minutes or greater without increased L scapula and upper back pain. Ongoing 7/10/17; after approx 10 minutes and it will bother him  3.  Patient will report being able to sit and work at computer for greater than 30 minutes without increased L scapula and upper back pain. Ongoing 7-10-17  Discharge Goals: Time Frame:10 weeks  1. Patient will report >2 weeks without onset of L scapula/upper back pain. 2. Patient will report no limitations with prolonged standing or sitting secondary to L scapula pain to facilitate return to full functional and recreational activity participation. 3. Patient will have no positive L shoulder impingement tests and be able to move L shoulder through 170 degrees of active abduction and active flexion without increased L shoulder discomfort. Rehabilitation Potential For Stated Goals: Good                  The information in this section was collected on 6/1/17  (except where otherwise noted). HISTORY:   History of Present Injury/Illness (Reason for Referral):  Patient reports that approximately 1 year ago he began developing L sided midback and L shoulder blade pain that will occasionally radiate to his neck and upper back. He initially was concerned it was a cardiac issue, but he has had a nuclear stress test and EKG, which showed no cardiac abnormalities. Patient has also had a cervical MRI which was unremarkable. Patient states that his shoulder blade pain limits him to approximately 10 minutes of prolonged, static standing. He has applied heat pack to the area, which helps, and it feels better to sit with a back support. He also feels pain when sitting at a desk and working on a computer for prolonged periods. Patient denies the pain worsening at any time of day, denies paresthesias, and reports that his pain has occurred with an insidious onset. Past Medical History/Comorbidities:   Mr. 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 Mr. Monica Puckett  has a past surgical history that includes cardiac surg procedure unlist (1991); cardiac surg procedure unlist (9/2003); cholecystectomy (9/2000); neurological procedure unlisted (12/1981); hernia repair (09/1993); gi (11/2003); lumbar laminectomy (5/2005); other surgical (05/2002); colonoscopy (02/28/2013); endoscopy (02/28/2013); vascular surgery procedure unlist (1991); orthopaedic (1/2006); orthopaedic (8/2005); orthopaedic (2005); heart catheterization (04/2015); other surgical (11/2014); other surgical; and orthopaedic (03/1955). Social History/Living Environment:    Patient lives with his spouse. He is retired. Prior Level of Function/Work/Activity:  Patient is independent with all activities and mobility. Dominant Side:         RIGHT  Current Medications:       Current Outpatient Prescriptions:     tiotropium-olodaterol (STIOLTO RESPIMAT) 2.5-2.5 mcg/actuation mist, Take 2 Puffs by inhalation daily. , Disp: 3 Inhaler, Rfl: 3    atorvastatin (LIPITOR) 40 mg tablet, Take 1 Tab by mouth daily. , Disp: 90 Tab, Rfl: 3    metoprolol tartrate (LOPRESSOR) 25 mg tablet, Take 1 Tab by mouth two (2) times a day., Disp: 180 Tab, Rfl: 3    amLODIPine (NORVASC) 5 mg tablet, Take 1 Tab by mouth daily. , Disp: 90 Tab, Rfl: 3    fluticasone (FLONASE) 50 mcg/actuation nasal spray, One spray to each nostril daily, Disp: 3 Bottle, Rfl: 3    albuterol sulfate 90 mcg/actuation aepb, Take 2 Puffs by inhalation every four (4) hours as needed. , Disp: , Rfl:     omeprazole (PRILOSEC) 20 mg capsule, Take 1 Cap by mouth daily as needed. , Disp: 90 Cap, Rfl: 3    aspirin 81 mg chewable tablet, Take 81 mg by mouth daily. , Disp: , Rfl:     finasteride (PROSCAR) 5 mg tablet, Take 5 mg by mouth daily. , Disp: , Rfl:     terazosin (HYTRIN) 10 mg capsule, Take 10 mg by mouth nightly., Disp: , Rfl:    Date Last Reviewed:  7-10-17   Number of Personal Factors/Comorbidities that affect the Plan of Care: 1-2: MODERATE COMPLEXITY   EXAMINATION:   7-10-17    Observation/Orthostatic Postural Assessment:          Patient demonstrates rounded shoulders bilaterally, forward head posture  Palpation:        Tenderness and tightness to L upper trapezius and L periscapular musculature. ROM:          L AROM: flexion 170 degrees, abduction 178 degrees        Special Tests:          Negative Neers, Negative chapa-mary on L (NT on R); negative cervical compression and Spurling's to L and R  Neurological Screen:        Dermatomes:  Normal B UE        Sensation: intact to light touch   Body Structures Involved:  1. Bones  2. Joints  3. Muscles Body Functions Affected:  1. Sensory/Pain  2. Neuromusculoskeletal  3. Movement Related Activities and Participation Affected:  1. Self Care  2. Domestic Life  3. Interpersonal Interactions and Relationships   Number of elements (examined above) that affect the Plan of Care: 4+: HIGH COMPLEXITY   CLINICAL PRESENTATION:   Presentation: Stable and uncomplicated: LOW COMPLEXITY   CLINICAL DECISION MAKING:   Outcome Measure: Tool Used: Modified Oswestry Low Back Pain Questionnaire  Score:  Initial: 9/50  Most Recent: 13/50 (Date: 7-10-17)   Interpretation of Score: Each section is scored on a 0-5 scale, 5 representing the greatest disability. The scores of each section are added together for a total score of 50. Score 0 1-10 11-20 21-30 31-40 41-49 50   Modifier CH CI CJ CK CL CM CN     ?  Changing and Maintaining Body Position:    [id number] - CURRENT STATUS: CJ - 20%-39% impaired, limited or restricted    - GOAL STATUS: CI - 1%-19% impaired, limited or restricted    - D/C STATUS:  ---------------To be determined---------------    Medical Necessity: · Skilled intervention continues to be required due to discomfort with prolonged sitting and standing and abnormal posture. .  Reason for Services/Other Comments:  · Patient continues to require skilled intervention due to limtiations with activity tolerance secondary to increased pain with prolonged sitting and standing. .   Use of outcome tool(s) and clinical judgement create a POC that gives a: Clear prediction of patient's progress: LOW COMPLEXITY            TREATMENT:   (In addition to Assessment/Re-Assessment sessions the following treatments were rendered)  Pre-treatment Symptoms/Complaints:  Pt states that he thinks his sitting posture in his recliner chair may be contributing to his pain. Reports that he continues to experience on/off L scapula pain. Pain: Initial:  None upon arrival    Post Session:  No pain at conclusion of PT   Manual Therapy ( 33 minutes): Soft tissue mobilizations to L upper trapezius, L thoracic paraspinals/persicapular muscles and cervical musculature for improved tissue extensbility and reduced discomfort. Soft tissue mobilizations to L latissimus dorsi for reduced muscle tightness    Therapeutic Exercise: ( 15 minutes):  Exercises performed per grid below. Patient required verbal and manual cueing for performance of serratus punches.      Date:  6/8/17 Date:   6/19/17 Date:  6/22/17 Date:  6/26/17 Date:  6/28/17   Date  7/5/17 Date  7/7/17   Date  7-12-17   Activity/Exercise Parameters parameters parameters parameters parameters      Supine chin tucks - Standing 1 x 20 Supine 2 x 15 2 x 15 - - - 2 x 15   Scapular retractions Blue; 2 x 15 with elbows flexed, 2 x15 with elbows extended Blue, 2 x 15 w/elbows flexed and 2 x 15 w/elbows extended Blue 2 x 15 with elbows flexed and with elbows extended Black 2 x 15 with elbows flexed Black 2 x 15 with flexed elbows, standing T's 2 x 15  - Black 2 x 15 with elbows extended -   Prone rows 5# 2 x 15 3# 2 x 15 5# 2 x 15 - - - -    Posterior pelvic tilts 1  x15 Performed supine swiss ball rolls 1 x 20 Supine ball rolls 1 x 20 back/forth; side to side 1 x 20, 2 x 15 posterior pelvic tilt Supine ball rolls 1 x 20; posterior pelvic tilt 1 x 20 - Supine ball rolls 2 x 15 Supine ball rolls 2 x 15 Supine ball rolls 2 x 20   Bridging 1 x 10 - - - - - - -   Freemotion push/pull 7# 2 x 10 each Seated w/o resistance 2 x 15 Seated w/o resistance 2 x 15 Seated 2 x 15 w/o resistance Black bands, 2 x 15 each UE (alternating push/pull) - - Seated w/o resistance 2 x 20   Prone extensions  3# 2 x 15  - -  - -   Prone T's  2 x 15  - 2 x 15 2#  - -   Standing posture   Stood against wall with inion, scapula and buttocks against wall, 5 x Wall stands with back of head, scapula and buttocks with posterior pelvic tilt x 8 with 10\" hold - Wall stands with 3 point of contact; 5x; 3 point of contact with shoulder abduction x 5 Wall stands as 7/5/17, 5 x. Added shoulder abduction x 5  -   Wall push up    2 x 10 2 x 10 - - -   Straight arm lat pulldown     7# 2 x 10 10# 2 x 15 - -   Prone Y's     2 x 15 - - -   Serratus punches        Bilateral with cane, 2 x 15       Treatment/Session Assessment:     · Response to Treatment:  Patient continues to need postural strengthening and appears to be making some subjective improvements. Patient has palpable tightness in L thoracic paraspinals which he reports are sore with manual therapy interventions. · Compliance with Program/Exercises: compliant  · Recommendations/Intent for next treatment session: \"Next visit will focus on improved posture, manual therapies to adjust point tenderness and improve R shoulder pain-free ROM. \".   Total Treatment Duration: 48 minutes   PT Patient Time In/Time Out  Time In: 1026  Time Out: Jose 26, PT

## 2017-07-17 ENCOUNTER — HOSPITAL ENCOUNTER (OUTPATIENT)
Dept: PHYSICAL THERAPY | Age: 82
Discharge: HOME | End: 2017-07-17
Payer: MEDICARE

## 2017-07-17 PROCEDURE — 97110 THERAPEUTIC EXERCISES: CPT

## 2017-07-17 PROCEDURE — 97140 MANUAL THERAPY 1/> REGIONS: CPT

## 2017-07-17 NOTE — PROGRESS NOTES
Maximino Cristina  : 1935 [facility] at [facility] JAZMIN ROJAS  [address] [place], [address] [place] [address],[address], [facility] [address].  [phone]   [phone]         OUTPATIENT PHYSICAL THERAPY:Daily Note 2017    ICD-10: Treatment Diagnosis: Pain in thoracic spine (M54.6), Pain in left shoulder (M25.512) , Abnormal posture (R29.3)  Precautions/Allergies:   Iodinated contrast- oral and iv dye and Sulfa (sulfonamide antibiotics)   Fall Risk Score: 1 (? 5 = High Risk)  MD Orders: Evaluate and treat (17) MEDICAL/REFERRING DIAGNOSIS:  back pain   DATE OF ONSET: approx 1 year ago  REFERRING PHYSICIAN: Den Dixon MD  [address]: 17     INITIAL ASSESSMENT:  Mr. Karely Coley reports to PT with ongoing L scapular/mid back pain which limits patient when performing static standing or static standing. Patient has improved L shoulder AROM since beginning PT but continues to demonstrate L upper trapezius and L periscapular muscle tightness and discomfort. Patient will benefit from continued PT to further address the underlying cause for his ongoing discomfort. PROBLEM LIST (Impacting functional limitations):  1. Increased Pain  2. Decreased Activity Tolerance  3. Decreased Flexibility/Joint Mobility INTERVENTIONS PLANNED:  1. Home Exercise Program (HEP)  2. Manual Therapy  3. Range of Motion (ROM)  4. Therapeutic Exercise/Strengthening   TREATMENT PLAN:  Effective Dates: 17 TO 8/10/17. Frequency/Duration: 2-3 visits per week for 10 weeks  GOALS: (Goals have been discussed and agreed upon with patient.)  Short-Term Functional Goals: Time Frame: 5 weeks  1. Patient will be independent with initial HEP to improve patient's posture. MET 7-10-17  2. Patient will report being able to stand for 30 minutes or greater without increased L scapula and upper back pain. Ongoing 7/10/17; after approx 10 minutes and it will bother him  3.  Patient will report being able to sit and work at computer for greater than 30 minutes without increased L scapula and upper back pain. Ongoing 7-10-17  Discharge Goals: Time Frame:10 weeks  1. Patient will report >2 weeks without onset of L scapula/upper back pain. 2. Patient will report no limitations with prolonged standing or sitting secondary to L scapula pain to facilitate return to full functional and recreational activity participation. 3. Patient will have no positive L shoulder impingement tests and be able to move L shoulder through 170 degrees of active abduction and active flexion without increased L shoulder discomfort. Rehabilitation Potential For Stated Goals: Good                  The information in this section was collected on 6/1/17  (except where otherwise noted). HISTORY:   History of Present Injury/Illness (Reason for Referral):  Patient reports that approximately 1 year ago he began developing L sided midback and L shoulder blade pain that will occasionally radiate to his neck and upper back. He initially was concerned it was a cardiac issue, but he has had a nuclear stress test and EKG, which showed no cardiac abnormalities. Patient has also had a cervical MRI which was unremarkable. Patient states that his shoulder blade pain limits him to approximately 10 minutes of prolonged, static standing. He has applied heat pack to the area, which helps, and it feels better to sit with a back support. He also feels pain when sitting at a desk and working on a computer for prolonged periods. Patient denies the pain worsening at any time of day, denies paresthesias, and reports that his pain has occurred with an insidious onset. Past Medical History/Comorbidities:   Mr. 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 Mr. Susana Han  has a past surgical history that includes cardiac surg procedure unlist (1991); cardiac surg procedure unlist (9/2003); cholecystectomy (9/2000); neurological procedure unlisted (12/1981); hernia repair (09/1993); gi (11/2003); lumbar laminectomy (5/2005); other surgical (05/2002); colonoscopy (02/28/2013); endoscopy (02/28/2013); vascular surgery procedure unlist (1991); orthopaedic (1/2006); orthopaedic (8/2005); orthopaedic (2005); heart catheterization (04/2015); other surgical (11/2014); other surgical; and orthopaedic (03/1955). Social History/Living Environment:    Patient lives with his spouse. He is retired. Prior Level of Function/Work/Activity:  Patient is independent with all activities and mobility. Dominant Side:         RIGHT  Current Medications:       Current Outpatient Prescriptions:     tiotropium-olodaterol (STIOLTO RESPIMAT) 2.5-2.5 mcg/actuation mist, Take 2 Puffs by inhalation daily. , Disp: 3 Inhaler, Rfl: 3    atorvastatin (LIPITOR) 40 mg tablet, Take 1 Tab by mouth daily. , Disp: 90 Tab, Rfl: 3    metoprolol tartrate (LOPRESSOR) 25 mg tablet, Take 1 Tab by mouth two (2) times a day., Disp: 180 Tab, Rfl: 3    amLODIPine (NORVASC) 5 mg tablet, Take 1 Tab by mouth daily. , Disp: 90 Tab, Rfl: 3    fluticasone (FLONASE) 50 mcg/actuation nasal spray, One spray to each nostril daily, Disp: 3 Bottle, Rfl: 3    albuterol sulfate 90 mcg/actuation aepb, Take 2 Puffs by inhalation every four (4) hours as needed. , Disp: , Rfl:     omeprazole (PRILOSEC) 20 mg capsule, Take 1 Cap by mouth daily as needed. , Disp: 90 Cap, Rfl: 3    aspirin 81 mg chewable tablet, Take 81 mg by mouth daily. , Disp: , Rfl:     finasteride (PROSCAR) 5 mg tablet, Take 5 mg by mouth daily. , Disp: , Rfl:     terazosin (HYTRIN) 10 mg capsule, Take 10 mg by mouth nightly., Disp: , Rfl:    Date Last Reviewed:  7-10-17   Number of Personal Factors/Comorbidities that affect the Plan of Care: 1-2: MODERATE COMPLEXITY   EXAMINATION:   7-10-17    Observation/Orthostatic Postural Assessment:          Patient demonstrates rounded shoulders bilaterally, forward head posture  Palpation:        Tenderness and tightness to L upper trapezius and L periscapular musculature. ROM:          L AROM: flexion 170 degrees, abduction 178 degrees        Special Tests:          Negative Neers, Negative chapa-mary on L (NT on R); negative cervical compression and Spurling's to L and R  Neurological Screen:        Dermatomes:  Normal B UE        Sensation: intact to light touch   Body Structures Involved:  1. Bones  2. Joints  3. Muscles Body Functions Affected:  1. Sensory/Pain  2. Neuromusculoskeletal  3. Movement Related Activities and Participation Affected:  1. Self Care  2. Domestic Life  3. Interpersonal Interactions and Relationships   Number of elements (examined above) that affect the Plan of Care: 4+: HIGH COMPLEXITY   CLINICAL PRESENTATION:   Presentation: Stable and uncomplicated: LOW COMPLEXITY   CLINICAL DECISION MAKING:   Outcome Measure: Tool Used: Modified Oswestry Low Back Pain Questionnaire  Score:  Initial: 9/50  Most Recent: 13/50 (Date: 7-10-17)   Interpretation of Score: Each section is scored on a 0-5 scale, 5 representing the greatest disability. The scores of each section are added together for a total score of 50. Score 0 1-10 11-20 21-30 31-40 41-49 50   Modifier CH CI CJ CK CL CM CN     ?  Changing and Maintaining Body Position:    [id number] - CURRENT STATUS: CJ - 20%-39% impaired, limited or restricted    - GOAL STATUS: CI - 1%-19% impaired, limited or restricted    - D/C STATUS:  ---------------To be determined---------------    Medical Necessity: · Skilled intervention continues to be required due to discomfort with prolonged sitting and standing and abnormal posture. .  Reason for Services/Other Comments:  · Patient continues to require skilled intervention due to limtiations with activity tolerance secondary to increased pain with prolonged sitting and standing. .   Use of outcome tool(s) and clinical judgement create a POC that gives a: Clear prediction of patient's progress: LOW COMPLEXITY            TREATMENT:   (In addition to Assessment/Re-Assessment sessions the following treatments were rendered)  Pre-treatment Symptoms/Complaints:  Pt states that he had increased pain off and on over the weekend. Pt reported increased cramping in B LEs during PT intermittently. Pain: Initial:  None upon arrival    Post Session:  No pain at conclusion of PT   Manual Therapy ( 28 minutes): Soft tissue mobilizations to L upper trapezius, L thoracic paraspinals/persicapular muscles and cervical musculature for improved tissue extensbility and reduced discomfort. Side-lying lumbar mobilizations and soft tissue mobilizations to L paraspinals for reduced tightness and discomfort. Therapeutic Exercise: ( 15 minutes):  Exercises performed per grid below. Patient required verbal and manual cueing for performance of serratus punches.      Date:  6/26/17 Date:  6/28/17   Date  7/5/17 Date  7/7/17   Date  7-12-17 Date  7-17-17   Activity/Exercise parameters parameters       Supine chin tucks 2 x 15 - - - 2 x 15 -   Scapular retractions Black 2 x 15 with elbows flexed Black 2 x 15 with flexed elbows, standing T's 2 x 15  - Black 2 x 15 with elbows extended - -   Prone rows - - - -  Bentover 4# 2 x 15 each UE   Posterior pelvic tilts Supine ball rolls 1 x 20; posterior pelvic tilt 1 x 20 - Supine ball rolls 2 x 15 Supine ball rolls 2 x 15 Supine ball rolls 2 x 20 Supine ball rolls 2 x 30   Bridging - - - - - -   Freemotion push/pull Seated 2 x 15 w/o resistance Black bands, 2 x 15 each UE (alternating push/pull) - - Seated w/o resistance 2 x 20 Seated w/o resistance 2 x 20   Prone extensions - -  - - -   Prone T's - 2 x 15 2#  - - Bentover, 2 x 10    Standing posture Wall stands with back of head, scapula and buttocks with posterior pelvic tilt x 8 with 10\" hold - Wall stands with 3 point of contact; 5x; 3 point of contact with shoulder abduction x 5 Wall stands as 7/5/17, 5 x. Added shoulder abduction x 5  - -   Wall push up 2 x 10 2 x 10 - - - -   Straight arm lat pulldown  7# 2 x 10 10# 2 x 15 - - --   Prone Y's  2 x 15 - - -    Serratus punches     Bilateral with cane, 2 x 15 -   Lumbar trunk rotations      2 x 15 each       Treatment/Session Assessment:     · Response to Treatment:  Patient experienced cramping in B LEs during PT. Patient has palpable tenderness with lumbar paraspinals on L.  · Compliance with Program/Exercises: compliant  · Recommendations/Intent for next treatment session: \"Next visit will focus on improved posture, manual therapies to adjust point tenderness and improve R shoulder pain-free ROM. \".   Total Treatment Duration: 43 minutes   PT Patient Time In/Time Out  Time In: 1033  Time Out: [facility],[address], PT

## 2017-07-20 ENCOUNTER — HOSPITAL ENCOUNTER (OUTPATIENT)
Dept: PHYSICAL THERAPY | Age: 82
Discharge: HOME | End: 2017-07-20
Payer: MEDICARE

## 2017-07-20 PROCEDURE — 97140 MANUAL THERAPY 1/> REGIONS: CPT

## 2017-07-20 PROCEDURE — 97110 THERAPEUTIC EXERCISES: CPT

## 2017-07-20 NOTE — PROGRESS NOTES
Maximino Cristina  : 1935 [facility] at [facility] JAZMIN ROJAS  [address] [facility], [address] [place] [address],[address], [place] [address].  [phone]   [phone]         OUTPATIENT PHYSICAL THERAPY:Daily Note 2017    ICD-10: Treatment Diagnosis: Pain in thoracic spine (M54.6), Pain in left shoulder (M25.512) , Abnormal posture (R29.3)  Precautions/Allergies:   Iodinated contrast- oral and iv dye and Sulfa (sulfonamide antibiotics)   Fall Risk Score: 1 (? 5 = High Risk)  MD Orders: Evaluate and treat (17) MEDICAL/REFERRING DIAGNOSIS:  back pain   DATE OF ONSET: approx 1 year ago  REFERRING PHYSICIAN: Adriana Velasquez MD  [address]: 17     INITIAL ASSESSMENT:  Mr. Kris Kinney reports to PT with ongoing L scapular/mid back pain which limits patient when performing static standing or static standing. Patient has improved L shoulder AROM since beginning PT but continues to demonstrate L upper trapezius and L periscapular muscle tightness and discomfort. Patient will benefit from continued PT to further address the underlying cause for his ongoing discomfort. PROBLEM LIST (Impacting functional limitations):  1. Increased Pain  2. Decreased Activity Tolerance  3. Decreased Flexibility/Joint Mobility INTERVENTIONS PLANNED:  1. Home Exercise Program (HEP)  2. Manual Therapy  3. Range of Motion (ROM)  4. Therapeutic Exercise/Strengthening   TREATMENT PLAN:  Effective Dates: 17 TO 8/10/17. Frequency/Duration: 2-3 visits per week for 10 weeks  GOALS: (Goals have been discussed and agreed upon with patient.)  Short-Term Functional Goals: Time Frame: 5 weeks  1. Patient will be independent with initial HEP to improve patient's posture. MET 7-10-17  2. Patient will report being able to stand for 30 minutes or greater without increased L scapula and upper back pain. Ongoing 7/10/17; after approx 10 minutes and it will bother him  3.  Patient will report being able to sit and work at computer for greater than 30 minutes without increased L scapula and upper back pain. Ongoing 7-10-17  Discharge Goals: Time Frame:10 weeks  1. Patient will report >2 weeks without onset of L scapula/upper back pain. 2. Patient will report no limitations with prolonged standing or sitting secondary to L scapula pain to facilitate return to full functional and recreational activity participation. 3. Patient will have no positive L shoulder impingement tests and be able to move L shoulder through 170 degrees of active abduction and active flexion without increased L shoulder discomfort. Rehabilitation Potential For Stated Goals: Good                  The information in this section was collected on 6/1/17  (except where otherwise noted). HISTORY:   History of Present Injury/Illness (Reason for Referral):  Patient reports that approximately 1 year ago he began developing L sided midback and L shoulder blade pain that will occasionally radiate to his neck and upper back. He initially was concerned it was a cardiac issue, but he has had a nuclear stress test and EKG, which showed no cardiac abnormalities. Patient has also had a cervical MRI which was unremarkable. Patient states that his shoulder blade pain limits him to approximately 10 minutes of prolonged, static standing. He has applied heat pack to the area, which helps, and it feels better to sit with a back support. He also feels pain when sitting at a desk and working on a computer for prolonged periods. Patient denies the pain worsening at any time of day, denies paresthesias, and reports that his pain has occurred with an insidious onset. Past Medical History/Comorbidities:   Mr. Roro Camp  has a past medical history of Abdominal pain; Acute cervical radiculopathy; Aneurysm ([facility] [address] Arthritis; CAD (coronary artery disease); Cancer [facility]); COPD (chronic obstructive pulmonary disease) ([facility] [address]  Descending thoracic aortic aneurysm (HCC) (10/17/14); GERD (gastroesophageal reflux disease); High cholesterol; Hypertension; Ill-defined condition; Mild degeneration of cervical intervertebral disc; MRSA (methicillin resistant Staphylococcus aureus); Multiple thyroid nodules (4/11/2017); Thrombocytopenia ([address] and Thyroid disease. Mr. Vallorie Babinski  has a past surgical history that includes cardiac surg procedure unlist (1991); cardiac surg procedure unlist (9/2003); cholecystectomy (9/2000); neurological procedure unlisted (12/1981); hernia repair (09/1993); gi (11/2003); lumbar laminectomy (5/2005); other surgical (05/2002); colonoscopy (02/28/2013); endoscopy (02/28/2013); vascular surgery procedure unlist (1991); orthopaedic (1/2006); orthopaedic (8/2005); orthopaedic (2005); heart catheterization (04/2015); other surgical (11/2014); other surgical; and orthopaedic (03/1955). Social History/Living Environment:    Patient lives with his spouse. He is retired. Prior Level of Function/Work/Activity:  Patient is independent with all activities and mobility. Dominant Side:         RIGHT  Current Medications:       Current Outpatient Prescriptions:     tiotropium-olodaterol (STIOLTO RESPIMAT) 2.5-2.5 mcg/actuation mist, Take 2 Puffs by inhalation daily. , Disp: 3 Inhaler, Rfl: 3    atorvastatin (LIPITOR) 40 mg tablet, Take 1 Tab by mouth daily. , Disp: 90 Tab, Rfl: 3    metoprolol tartrate (LOPRESSOR) 25 mg tablet, Take 1 Tab by mouth two (2) times a day., Disp: 180 Tab, Rfl: 3    amLODIPine (NORVASC) 5 mg tablet, Take 1 Tab by mouth daily. , Disp: 90 Tab, Rfl: 3    fluticasone (FLONASE) 50 mcg/actuation nasal spray, One spray to each nostril daily, Disp: 3 Bottle, Rfl: 3    albuterol sulfate 90 mcg/actuation aepb, Take 2 Puffs by inhalation every four (4) hours as needed. , Disp: , Rfl:     omeprazole (PRILOSEC) 20 mg capsule, Take 1 Cap by mouth daily as needed. , Disp: 90 Cap, Rfl: 3    aspirin 81 mg chewable tablet, Take 81 mg by mouth daily. , Disp: , Rfl:     finasteride (PROSCAR) 5 mg tablet, Take 5 mg by mouth daily. , Disp: , Rfl:     terazosin (HYTRIN) 10 mg capsule, Take 10 mg by mouth nightly., Disp: , Rfl:    Date Last Reviewed:  7-10-17   Number of Personal Factors/Comorbidities that affect the Plan of Care: 1-2: MODERATE COMPLEXITY   EXAMINATION:   7-10-17    Observation/Orthostatic Postural Assessment:          Patient demonstrates rounded shoulders bilaterally, forward head posture  Palpation:        Tenderness and tightness to L upper trapezius and L periscapular musculature. ROM:          L AROM: flexion 170 degrees, abduction 178 degrees        Special Tests:          Negative Neers, Negative chapa-mary on L (NT on R); negative cervical compression and Spurling's to L and R  Neurological Screen:        Dermatomes:  Normal B UE        Sensation: intact to light touch   Body Structures Involved:  1. Bones  2. Joints  3. Muscles Body Functions Affected:  1. Sensory/Pain  2. Neuromusculoskeletal  3. Movement Related Activities and Participation Affected:  1. Self Care  2. Domestic Life  3. Interpersonal Interactions and Relationships   Number of elements (examined above) that affect the Plan of Care: 4+: HIGH COMPLEXITY   CLINICAL PRESENTATION:   Presentation: Stable and uncomplicated: LOW COMPLEXITY   CLINICAL DECISION MAKING:   Outcome Measure: Tool Used: Modified Oswestry Low Back Pain Questionnaire  Score:  Initial: 9/50  Most Recent: 13/50 (Date: 7-10-17)   Interpretation of Score: Each section is scored on a 0-5 scale, 5 representing the greatest disability. The scores of each section are added together for a total score of 50. Score 0 1-10 11-20 21-30 31-40 41-49 50   Modifier CH CI CJ CK CL CM CN     ?  Changing and Maintaining Body Position:    [id number] - CURRENT STATUS: CJ - 20%-39% impaired, limited or restricted    - GOAL STATUS: CI - 1%-19% impaired, limited or restricted    - D/C STATUS:  ---------------To be determined---------------    Medical Necessity: · Skilled intervention continues to be required due to discomfort with prolonged sitting and standing and abnormal posture. .  Reason for Services/Other Comments:  · Patient continues to require skilled intervention due to limtiations with activity tolerance secondary to increased pain with prolonged sitting and standing. .   Use of outcome tool(s) and clinical judgement create a POC that gives a: Clear prediction of patient's progress: LOW COMPLEXITY            TREATMENT:   (In addition to Assessment/Re-Assessment sessions the following treatments were rendered)  Pre-treatment Symptoms/Complaints:  Pt states that the pain move around some and is sometimes at the top of his L shoulder blade and is sometimes at the bottom. At its worst, he has pain with deep breaths. Pain: Initial:  None upon arrival    Post Session:  No pain at conclusion of PT   Manual Therapy ( 26 minutes): Soft tissue mobilizations to L upper trapezius, L thoracic paraspinals/persicapular muscles and cervical musculature for improved tissue extensbility and reduced discomfort. Side-lying scapular mobilizations to improve mobility of L scapula. Grade III posterior-anterior thoracic mobilizations to improve thoracic mobility. Manual cervical traction for improved tissue extensibility. Therapeutic Exercise: ( 18 minutes):  Exercises performed per grid below.  Patient required cues for proper hand placement when performing prone Ts     Date:  6/26/17 Date:  6/28/17   Date  7/5/17 Date  7/7/17   Date  7-12-17 Date  7-17-17 Date  7-20-17   Activity/Exercise parameters parameters        Supine chin tucks 2 x 15 - - - 2 x 15 -    Scapular retractions Black 2 x 15 with elbows flexed Black 2 x 15 with flexed elbows, standing T's 2 x 15  - Black 2 x 15 with elbows extended - -    Prone rows - - - -  Bentover 4# 2 x 15 each UE    Posterior pelvic tilts Supine ball rolls 1 x 20; posterior pelvic tilt 1 x 20 - Supine ball rolls 2 x 15 Supine ball rolls 2 x 15 Supine ball rolls 2 x 20 Supine ball rolls 2 x 30    Bridging - - - - - -    Freemotion push/pull Seated 2 x 15 w/o resistance Black bands, 2 x 15 each UE (alternating push/pull) - - Seated w/o resistance 2 x 20 Seated w/o resistance 2 x 20    Prone extensions - -  - - -    Prone T's - 2 x 15 2#  - - Bentover, 2 x 10  Prone, 2 x 15   Standing posture Wall stands with back of head, scapula and buttocks with posterior pelvic tilt x 8 with 10\" hold - Wall stands with 3 point of contact; 5x; 3 point of contact with shoulder abduction x 5 Wall stands as 7/5/17, 5 x. Added shoulder abduction x 5  - -    Wall push up 2 x 10 2 x 10 - - - - 2x15   Straight arm lat pulldown  7# 2 x 10 10# 2 x 15 - - --    Prone Y's  2 x 15 - - -  2 x 15   Serratus punches     Bilateral with cane, 2 x 15 -    Lumbar trunk rotations      2 x 15 each    Thoracic rotations       Arms crossed over chest 2x10   Lower trap wall slides       Yellow 1x10               Modalities (10 minutes): Gameready to L shoulder/scapula to reduce discomfort; x 10 min, 42 degrees with low compression    Treatment/Session Assessment:     · Response to Treatment:  Pt has palpable tightness and reports tenderness with palpation of periscapular muscles on L. Patient has had very minimal symptom change since beginning PT. Patient has decreased thoracic mobility as evidenced by posture and decreased mobility with P-A mobilizations. Patient reported increased discomfort in symptomatic area with wall push-ups. · Compliance with Program/Exercises: compliant  · Recommendations/Intent for next treatment session: \"Next visit will focus on improved posture, manual therapies to adjust point tenderness and improve R shoulder pain-free ROM. \".   Total Treatment Duration: 44 minutes (+10 minutes on ice)  PT Patient Time In/Time Out  Time In: 1510  Time Out: [facility], PT

## 2017-07-24 ENCOUNTER — HOSPITAL ENCOUNTER (OUTPATIENT)
Dept: PHYSICAL THERAPY | Age: 82
Discharge: HOME | End: 2017-07-24
Payer: MEDICARE

## 2017-07-24 PROCEDURE — 97110 THERAPEUTIC EXERCISES: CPT

## 2017-07-24 PROCEDURE — 97140 MANUAL THERAPY 1/> REGIONS: CPT

## 2017-07-24 NOTE — PROGRESS NOTES
Maximino Cristina  : 1935 [facility] at [facility] JAZMIN ROJAS  [address] [facility], [address] [place] [address],[address], [facility] [address].  [phone]   [phone]         OUTPATIENT PHYSICAL THERAPY:Daily Note 2017    ICD-10: Treatment Diagnosis: Pain in thoracic spine (M54.6), Pain in left shoulder (M25.512) , Abnormal posture (R29.3)  Precautions/Allergies:   Iodinated contrast- oral and iv dye and Sulfa (sulfonamide antibiotics)   Fall Risk Score: 1 (? 5 = High Risk)  MD Orders: Evaluate and treat (17) MEDICAL/REFERRING DIAGNOSIS:  back pain   DATE OF ONSET: approx 1 year ago  REFERRING PHYSICIAN: Melly Crowley MD  [address]: 17     INITIAL ASSESSMENT:  Mr. Amrita Mireles reports to PT with ongoing L scapular/mid back pain which limits patient when performing static standing or static standing. Patient has improved L shoulder AROM since beginning PT but continues to demonstrate L upper trapezius and L periscapular muscle tightness and discomfort. Patient will benefit from continued PT to further address the underlying cause for his ongoing discomfort. PROBLEM LIST (Impacting functional limitations):  1. Increased Pain  2. Decreased Activity Tolerance  3. Decreased Flexibility/Joint Mobility INTERVENTIONS PLANNED:  1. Home Exercise Program (HEP)  2. Manual Therapy  3. Range of Motion (ROM)  4. Therapeutic Exercise/Strengthening   TREATMENT PLAN:  Effective Dates: 17 TO 8/10/17. Frequency/Duration: 2-3 visits per week for 10 weeks  GOALS: (Goals have been discussed and agreed upon with patient.)  Short-Term Functional Goals: Time Frame: 5 weeks  1. Patient will be independent with initial HEP to improve patient's posture. MET 7-10-17  2. Patient will report being able to stand for 30 minutes or greater without increased L scapula and upper back pain. Ongoing 7/10/17; after approx 10 minutes and it will bother him  3.  Patient will report being able to sit and work at computer for greater than 30 minutes without increased L scapula and upper back pain. Ongoing 7-10-17  Discharge Goals: Time Frame:10 weeks  1. Patient will report >2 weeks without onset of L scapula/upper back pain. 2. Patient will report no limitations with prolonged standing or sitting secondary to L scapula pain to facilitate return to full functional and recreational activity participation. 3. Patient will have no positive L shoulder impingement tests and be able to move L shoulder through 170 degrees of active abduction and active flexion without increased L shoulder discomfort. Rehabilitation Potential For Stated Goals: Good                  The information in this section was collected on 6/1/17  (except where otherwise noted). HISTORY:   History of Present Injury/Illness (Reason for Referral):  Patient reports that approximately 1 year ago he began developing L sided midback and L shoulder blade pain that will occasionally radiate to his neck and upper back. He initially was concerned it was a cardiac issue, but he has had a nuclear stress test and EKG, which showed no cardiac abnormalities. Patient has also had a cervical MRI which was unremarkable. Patient states that his shoulder blade pain limits him to approximately 10 minutes of prolonged, static standing. He has applied heat pack to the area, which helps, and it feels better to sit with a back support. He also feels pain when sitting at a desk and working on a computer for prolonged periods. Patient denies the pain worsening at any time of day, denies paresthesias, and reports that his pain has occurred with an insidious onset. Past Medical History/Comorbidities:   Mr. Trina Sandhu  has a past medical history of Abdominal pain; Acute cervical radiculopathy; Aneurysm ([facility] [address] Arthritis; CAD (coronary artery disease); Cancer [facility]); COPD (chronic obstructive pulmonary disease) ([facility] [address]  Descending thoracic aortic aneurysm (HCC) (10/17/14); GERD (gastroesophageal reflux disease); High cholesterol; Hypertension; Ill-defined condition; Mild degeneration of cervical intervertebral disc; MRSA (methicillin resistant Staphylococcus aureus); Multiple thyroid nodules (4/11/2017); Thrombocytopenia ([address] and Thyroid disease. Mr. Pao Lawson  has a past surgical history that includes cardiac surg procedure unlist (1991); cardiac surg procedure unlist (9/2003); cholecystectomy (9/2000); neurological procedure unlisted (12/1981); hernia repair (09/1993); gi (11/2003); lumbar laminectomy (5/2005); other surgical (05/2002); colonoscopy (02/28/2013); endoscopy (02/28/2013); vascular surgery procedure unlist (1991); orthopaedic (1/2006); orthopaedic (8/2005); orthopaedic (2005); heart catheterization (04/2015); other surgical (11/2014); other surgical; and orthopaedic (03/1955). Social History/Living Environment:    Patient lives with his spouse. He is retired. Prior Level of Function/Work/Activity:  Patient is independent with all activities and mobility. Dominant Side:         RIGHT  Current Medications:       Current Outpatient Prescriptions:     tiotropium-olodaterol (STIOLTO RESPIMAT) 2.5-2.5 mcg/actuation mist, Take 2 Puffs by inhalation daily. , Disp: 3 Inhaler, Rfl: 3    atorvastatin (LIPITOR) 40 mg tablet, Take 1 Tab by mouth daily. , Disp: 90 Tab, Rfl: 3    metoprolol tartrate (LOPRESSOR) 25 mg tablet, Take 1 Tab by mouth two (2) times a day., Disp: 180 Tab, Rfl: 3    amLODIPine (NORVASC) 5 mg tablet, Take 1 Tab by mouth daily. , Disp: 90 Tab, Rfl: 3    fluticasone (FLONASE) 50 mcg/actuation nasal spray, One spray to each nostril daily, Disp: 3 Bottle, Rfl: 3    albuterol sulfate 90 mcg/actuation aepb, Take 2 Puffs by inhalation every four (4) hours as needed. , Disp: , Rfl:     omeprazole (PRILOSEC) 20 mg capsule, Take 1 Cap by mouth daily as needed. , Disp: 90 Cap, Rfl: 3    aspirin 81 mg chewable tablet, Take 81 mg by mouth daily. , Disp: , Rfl:     finasteride (PROSCAR) 5 mg tablet, Take 5 mg by mouth daily. , Disp: , Rfl:     terazosin (HYTRIN) 10 mg capsule, Take 10 mg by mouth nightly., Disp: , Rfl:    Date Last Reviewed:  7-10-17   Number of Personal Factors/Comorbidities that affect the Plan of Care: 1-2: MODERATE COMPLEXITY   EXAMINATION:   7-10-17    Observation/Orthostatic Postural Assessment:          Patient demonstrates rounded shoulders bilaterally, forward head posture  Palpation:        Tenderness and tightness to L upper trapezius and L periscapular musculature. ROM:          L AROM: flexion 170 degrees, abduction 178 degrees        Special Tests:          Negative Neers, Negative chapa-mary on L (NT on R); negative cervical compression and Spurling's to L and R  Neurological Screen:        Dermatomes:  Normal B UE        Sensation: intact to light touch   Body Structures Involved:  1. Bones  2. Joints  3. Muscles Body Functions Affected:  1. Sensory/Pain  2. Neuromusculoskeletal  3. Movement Related Activities and Participation Affected:  1. Self Care  2. Domestic Life  3. Interpersonal Interactions and Relationships   Number of elements (examined above) that affect the Plan of Care: 4+: HIGH COMPLEXITY   CLINICAL PRESENTATION:   Presentation: Stable and uncomplicated: LOW COMPLEXITY   CLINICAL DECISION MAKING:   Outcome Measure: Tool Used: Modified Oswestry Low Back Pain Questionnaire  Score:  Initial: 9/50  Most Recent: 13/50 (Date: 7-10-17)   Interpretation of Score: Each section is scored on a 0-5 scale, 5 representing the greatest disability. The scores of each section are added together for a total score of 50. Score 0 1-10 11-20 21-30 31-40 41-49 50   Modifier CH CI CJ CK CL CM CN     ?  Changing and Maintaining Body Position:    [id number] - CURRENT STATUS: CJ - 20%-39% impaired, limited or restricted    - GOAL STATUS: CI - 1%-19% impaired, limited or restricted    - D/C STATUS:  ---------------To be determined---------------    Medical Necessity: · Skilled intervention continues to be required due to discomfort with prolonged sitting and standing and abnormal posture. .  Reason for Services/Other Comments:  · Patient continues to require skilled intervention due to limtiations with activity tolerance secondary to increased pain with prolonged sitting and standing. .   Use of outcome tool(s) and clinical judgement create a POC that gives a: Clear prediction of patient's progress: LOW COMPLEXITY            TREATMENT:   (In addition to Assessment/Re-Assessment sessions the following treatments were rendered)  Pre-treatment Symptoms/Complaints:  Pt states that he had a \"rough weekend\". His pain is not in one spot but continues to 'come and go'. Pain: Initial:  None upon arrival    Post Session:  No pain at conclusion of PT   Manual Therapy ( 17 minutes): Soft tissue mobilizations to  L thoracic paraspinals/persicapular muscles  for improved tissue extensbility and reduced discomfort. Grade III posterior-anterior thoracic mobilizations to improve thoracic mobility. Therapeutic Exercise: ( 27 minutes):  Exercises performed per grid below. Patient required cues to round lower back during prayer stretch and for proper scapular muscle activation with quadruped push-up pluses to promote scapular abduction.      Date:  6/26/17 Date:  6/28/17   Date  7/5/17 Date  7/7/17   Date  7-12-17 Date  7-17-17 Date  7-20-17 Date  7-24-17   Activity/Exercise parameters parameters         Supine chin tucks 2 x 15 - - - 2 x 15 -     Scapular retractions Black 2 x 15 with elbows flexed Black 2 x 15 with flexed elbows, standing T's 2 x 15  - Black 2 x 15 with elbows extended - -     Prone rows - - - -  Bentover 4# 2 x 15 each UE  Prone 5# 2 x 15, L UE   Posterior pelvic tilts Supine ball rolls 1 x 20; posterior pelvic tilt 1 x 20 - Supine ball rolls 2 x 15 Supine ball rolls 2 x 15 Supine ball rolls 2 x 20 Supine ball rolls 2 x 30     Bridging - - - - - -     Freemotion push/pull Seated 2 x 15 w/o resistance Black bands, 2 x 15 each UE (alternating push/pull) - - Seated w/o resistance 2 x 20 Seated w/o resistance 2 x 20     Prone extensions - -  - - -     Prone T's - 2 x 15 2#  - - Bentover, 2 x 10  Prone, 2 x 15 L UE Prone 2#    Standing posture Wall stands with back of head, scapula and buttocks with posterior pelvic tilt x 8 with 10\" hold - Wall stands with 3 point of contact; 5x; 3 point of contact with shoulder abduction x 5 Wall stands as 7/5/17, 5 x. Added shoulder abduction x 5  - -     Wall push up 2 x 10 2 x 10 - - - - 2x15 In quadruped 2 x 10    Straight arm lat pulldown  7# 2 x 10 10# 2 x 15 - - --     Prone Y's  2 x 15 - - -  2 x 15    Serratus punches     Bilateral with cane, 2 x 15 -  2 x 15 2# L UE   Lumbar trunk rotations      2 x 15 each     Thoracic rotations       Arms crossed over chest 2x10 Arms crossed over chest, standing 2 x 10 ea   Lower trap wall slides       Yellow 1x10 -   Prayer stretch        5 x 5\"     Modalities (0 minutes): Not performed today secondary to no symptom irritation. Treatment/Session Assessment:     · Response to Treatment:  SpO2 96% with 56 bpm HR; vitals assessed secondary to patient report of increased pain on occasion with deep breaths in L scapula. Patient demonstrates good scapular mobility with push up movements to facilitate serratus anterior and with thoracic mobility drills. Patient did not experience any increased discomfort during therapy today. Pt advised to heat his L midback prior to activity. · Compliance with Program/Exercises: compliant  · Recommendations/Intent for next treatment session: \"Next visit will focus on improved posture, manual therapies to adjust point tenderness and improve R shoulder pain-free ROM. \".   Total Treatment Duration: 44 minutes  PT Patient Time In/Time Out  Time In: 1029  Time Out: [address] [facility], PT

## 2017-07-31 ENCOUNTER — HOSPITAL ENCOUNTER (OUTPATIENT)
Dept: PHYSICAL THERAPY | Age: 82
Discharge: HOME | End: 2017-07-31
Payer: MEDICARE

## 2017-07-31 PROCEDURE — 97140 MANUAL THERAPY 1/> REGIONS: CPT

## 2017-07-31 NOTE — PROGRESS NOTES
Maximino Cristina  : 1935 [facility] at [facility] JAZMIN ROJAS  [address] [facility], [address] [place] [address],[address], [place] [address].  [phone]   [phone]         OUTPATIENT PHYSICAL THERAPY:Daily Note 2017    ICD-10: Treatment Diagnosis: Pain in thoracic spine (M54.6), Pain in left shoulder (M25.512) , Abnormal posture (R29.3)  Precautions/Allergies:   Iodinated contrast- oral and iv dye and Sulfa (sulfonamide antibiotics)   Fall Risk Score: 1 (? 5 = High Risk)  MD Orders: Evaluate and treat (17) MEDICAL/REFERRING DIAGNOSIS:  back pain   DATE OF ONSET: approx 1 year ago  REFERRING PHYSICIAN: Hermelinda Bains MD  [address]: 17     INITIAL ASSESSMENT:  Mr. Quan Arita reports to PT with ongoing L scapular/mid back pain which limits patient when performing static standing or static standing. Patient has improved L shoulder AROM since beginning PT but continues to demonstrate L upper trapezius and L periscapular muscle tightness and discomfort. Patient will benefit from continued PT to further address the underlying cause for his ongoing discomfort. PROBLEM LIST (Impacting functional limitations):  1. Increased Pain  2. Decreased Activity Tolerance  3. Decreased Flexibility/Joint Mobility INTERVENTIONS PLANNED:  1. Home Exercise Program (HEP)  2. Manual Therapy  3. Range of Motion (ROM)  4. Therapeutic Exercise/Strengthening   TREATMENT PLAN:  Effective Dates: 17 TO 8/10/17. Frequency/Duration: 2-3 visits per week for 10 weeks  GOALS: (Goals have been discussed and agreed upon with patient.)  Short-Term Functional Goals: Time Frame: 5 weeks  1. Patient will be independent with initial HEP to improve patient's posture. MET 7-10-17  2. Patient will report being able to stand for 30 minutes or greater without increased L scapula and upper back pain. Ongoing 7/10/17; after approx 10 minutes and it will bother him  3.  Patient will report being able to sit and work at computer for greater than 30 minutes without increased L scapula and upper back pain. Ongoing 7-10-17  Discharge Goals: Time Frame:10 weeks  1. Patient will report >2 weeks without onset of L scapula/upper back pain. 2. Patient will report no limitations with prolonged standing or sitting secondary to L scapula pain to facilitate return to full functional and recreational activity participation. 3. Patient will have no positive L shoulder impingement tests and be able to move L shoulder through 170 degrees of active abduction and active flexion without increased L shoulder discomfort. Rehabilitation Potential For Stated Goals: Good                  The information in this section was collected on 6/1/17  (except where otherwise noted). HISTORY:   History of Present Injury/Illness (Reason for Referral):  Patient reports that approximately 1 year ago he began developing L sided midback and L shoulder blade pain that will occasionally radiate to his neck and upper back. He initially was concerned it was a cardiac issue, but he has had a nuclear stress test and EKG, which showed no cardiac abnormalities. Patient has also had a cervical MRI which was unremarkable. Patient states that his shoulder blade pain limits him to approximately 10 minutes of prolonged, static standing. He has applied heat pack to the area, which helps, and it feels better to sit with a back support. He also feels pain when sitting at a desk and working on a computer for prolonged periods. Patient denies the pain worsening at any time of day, denies paresthesias, and reports that his pain has occurred with an insidious onset. Past Medical History/Comorbidities:   Mr. Vicente Jones  has a past medical history of Abdominal pain; Acute cervical radiculopathy; Aneurysm ([facility] [address] Arthritis; CAD (coronary artery disease); Cancer [facility]); COPD (chronic obstructive pulmonary disease) ([facility] [address]  Descending thoracic aortic aneurysm (HCC) (10/17/14); GERD (gastroesophageal reflux disease); High cholesterol; Hypertension; Ill-defined condition; Mild degeneration of cervical intervertebral disc; MRSA (methicillin resistant Staphylococcus aureus); Multiple thyroid nodules (4/11/2017); Thrombocytopenia ([address] and Thyroid disease. Mr. Katia Early  has a past surgical history that includes cardiac surg procedure unlist (1991); cardiac surg procedure unlist (9/2003); cholecystectomy (9/2000); neurological procedure unlisted (12/1981); hernia repair (09/1993); gi (11/2003); lumbar laminectomy (5/2005); other surgical (05/2002); colonoscopy (02/28/2013); endoscopy (02/28/2013); vascular surgery procedure unlist (1991); orthopaedic (1/2006); orthopaedic (8/2005); orthopaedic (2005); heart catheterization (04/2015); other surgical (11/2014); other surgical; and orthopaedic (03/1955). Social History/Living Environment:    Patient lives with his spouse. He is retired. Prior Level of Function/Work/Activity:  Patient is independent with all activities and mobility. Dominant Side:         RIGHT  Current Medications:       Current Outpatient Prescriptions:     tiotropium-olodaterol (STIOLTO RESPIMAT) 2.5-2.5 mcg/actuation mist, Take 2 Puffs by inhalation daily. , Disp: 3 Inhaler, Rfl: 3    atorvastatin (LIPITOR) 40 mg tablet, Take 1 Tab by mouth daily. , Disp: 90 Tab, Rfl: 3    metoprolol tartrate (LOPRESSOR) 25 mg tablet, Take 1 Tab by mouth two (2) times a day., Disp: 180 Tab, Rfl: 3    amLODIPine (NORVASC) 5 mg tablet, Take 1 Tab by mouth daily. , Disp: 90 Tab, Rfl: 3    fluticasone (FLONASE) 50 mcg/actuation nasal spray, One spray to each nostril daily, Disp: 3 Bottle, Rfl: 3    albuterol sulfate 90 mcg/actuation aepb, Take 2 Puffs by inhalation every four (4) hours as needed. , Disp: , Rfl:     omeprazole (PRILOSEC) 20 mg capsule, Take 1 Cap by mouth daily as needed. , Disp: 90 Cap, Rfl: 3    aspirin 81 mg chewable tablet, Take 81 mg by mouth daily. , Disp: , Rfl:     finasteride (PROSCAR) 5 mg tablet, Take 5 mg by mouth daily. , Disp: , Rfl:     terazosin (HYTRIN) 10 mg capsule, Take 10 mg by mouth nightly., Disp: , Rfl:    Date Last Reviewed:  7-10-17   Number of Personal Factors/Comorbidities that affect the Plan of Care: 1-2: MODERATE COMPLEXITY   EXAMINATION:   7-10-17    Observation/Orthostatic Postural Assessment:          Patient demonstrates rounded shoulders bilaterally, forward head posture  Palpation:        Tenderness and tightness to L upper trapezius and L periscapular musculature. ROM:          L AROM: flexion 170 degrees, abduction 178 degrees        Special Tests:          Negative Neers, Negative chapa-mary on L (NT on R); negative cervical compression and Spurling's to L and R  Neurological Screen:        Dermatomes:  Normal B UE        Sensation: intact to light touch   Body Structures Involved:  1. Bones  2. Joints  3. Muscles Body Functions Affected:  1. Sensory/Pain  2. Neuromusculoskeletal  3. Movement Related Activities and Participation Affected:  1. Self Care  2. Domestic Life  3. Interpersonal Interactions and Relationships   Number of elements (examined above) that affect the Plan of Care: 4+: HIGH COMPLEXITY   CLINICAL PRESENTATION:   Presentation: Stable and uncomplicated: LOW COMPLEXITY   CLINICAL DECISION MAKING:   Outcome Measure: Tool Used: Modified Oswestry Low Back Pain Questionnaire  Score:  Initial: 9/50  Most Recent: 13/50 (Date: 7-10-17)   Interpretation of Score: Each section is scored on a 0-5 scale, 5 representing the greatest disability. The scores of each section are added together for a total score of 50. Score 0 1-10 11-20 21-30 31-40 41-49 50   Modifier CH CI CJ CK CL CM CN     ?  Changing and Maintaining Body Position:    [id number] - CURRENT STATUS: CJ - 20%-39% impaired, limited or restricted    - GOAL STATUS: CI - 1%-19% impaired, limited or restricted    - D/C STATUS:  ---------------To be determined---------------    Medical Necessity: · Skilled intervention continues to be required due to discomfort with prolonged sitting and standing and abnormal posture. .  Reason for Services/Other Comments:  · Patient continues to require skilled intervention due to limtiations with activity tolerance secondary to increased pain with prolonged sitting and standing. .   Use of outcome tool(s) and clinical judgement create a POC that gives a: Clear prediction of patient's progress: LOW COMPLEXITY            TREATMENT:   (In addition to Assessment/Re-Assessment sessions the following treatments were rendered)  Pre-treatment Symptoms/Complaints:  Pt stated that he had increased pain after cutting grass on Saturday; cutting grass seems to really aggravate his symptoms. Pt used heat which he felt relaxed it. Pain: Initial:  0/10    Post Session:  No pain at conclusion of PT   Manual Therapy ( 46 minutes): Grade III thoracic posterior-anterior joint mobilizations for improved thoracic mobility, soft tissue mobilizations to L teres major/minor, latissimus dorsi and thoracic parsapinals performed for reduced discomfort; cervical traction and L upper trapezius stretches performed for reduced discomfort; soft tissue mobilizations to cervical parsapinals for improved muscle tissue extensbility. Therapeutic Exercise: ( 0 minutes):  Not performed today.      Date  7/7/17   Date  7-12-17 Date  7-17-17 Date  7-20-17 Date  7-24-17 Date  7-26-17   Activity/Exercise         Supine chin tucks - 2 x 15 -      Scapular retractions Black 2 x 15 with elbows extended - -      Prone rows -  Bentover 4# 2 x 15 each UE  Prone 5# 2 x 15, L UE    Posterior pelvic tilts Supine ball rolls 2 x 15 Supine ball rolls 2 x 20 Supine ball rolls 2 x 30   Supine ball rolls 2 x 20   Bridging - - -      Freemotion push/pull - Seated w/o resistance 2 x 20 Seated w/o resistance 2 x 20      Prone extensions - - -      Prone T's - - Bentover, 2 x 10  Prone, 2 x 15 L UE Prone 2#     Standing posture Wall stands as 7/5/17, 5 x. Added shoulder abduction x 5  - -      Wall push up - - - 2x15 In quadruped 2 x 10     Straight arm lat pulldown - - --      Prone Y's - -  2 x 15     Serratus punches  Bilateral with cane, 2 x 15 -  2 x 15 2# L UE    Lumbar trunk rotations   2 x 15 each      Thoracic rotations    Arms crossed over chest 2x10 Arms crossed over chest, standing 2 x 10 ea Alternating arm push/pulls 2 x 20   Lower trap wall slides    Yellow 1x10 - Red 1 x 10   Prayer stretch     5 x 5\"      Modalities (0 minutes): Not performed today secondary to no symptom irritation. Treatment/Session Assessment:     · Response to Treatment: Pt experienced increased tightness and discomfort with palpation of L periscapular muscles and L teres major and minor. Treatment centered on manual treatment interventions to address tight and tender musculature this AM.  · Compliance with Program/Exercises: compliant  · Recommendations/Intent for next treatment session: \"Next visit will focus on assessing effectiveness of manual therapy interventions to teres major/minor and periscapular muscles. \".   Total Treatment Duration: 46 minutes  PT Patient Time In/Time Out  Time In: 1029  Time Out: [address], PT

## 2017-08-02 ENCOUNTER — HOSPITAL ENCOUNTER (OUTPATIENT)
Dept: PHYSICAL THERAPY | Age: 82
Discharge: HOME | End: 2017-08-02
Payer: MEDICARE

## 2017-08-02 PROCEDURE — 97110 THERAPEUTIC EXERCISES: CPT

## 2017-08-02 PROCEDURE — 97140 MANUAL THERAPY 1/> REGIONS: CPT

## 2017-08-02 NOTE — PROGRESS NOTES
Maximino Cristina  : 1935 [facility] JAZIMN ROJAS  [address] [facility], [address] [place] [address],[address], [place] [address].  [phone]   [phone]         OUTPATIENT PHYSICAL THERAPY:Daily Note 2017    ICD-10: Treatment Diagnosis: Pain in thoracic spine (M54.6), Pain in left shoulder (M25.512) , Abnormal posture (R29.3)  Precautions/Allergies:   Iodinated contrast- oral and iv dye and Sulfa (sulfonamide antibiotics)   Fall Risk Score: 1 (? 5 = High Risk)  MD Orders: Evaluate and treat (17) MEDICAL/REFERRING DIAGNOSIS:  back pain   DATE OF ONSET: approx 1 year ago  REFERRING PHYSICIAN: Carmen Luther MD  [address]: 17     INITIAL ASSESSMENT:  Mr. Armando Jane reports to PT with ongoing L scapular/mid back pain which limits patient when performing static standing or static standing. Patient has improved L shoulder AROM since beginning PT but continues to demonstrate L upper trapezius and L periscapular muscle tightness and discomfort. Patient will benefit from continued PT to further address the underlying cause for his ongoing discomfort. PROBLEM LIST (Impacting functional limitations):  1. Increased Pain  2. Decreased Activity Tolerance  3. Decreased Flexibility/Joint Mobility INTERVENTIONS PLANNED:  1. Home Exercise Program (HEP)  2. Manual Therapy  3. Range of Motion (ROM)  4. Therapeutic Exercise/Strengthening   TREATMENT PLAN:  Effective Dates: 17 TO 8/10/17. Frequency/Duration: 2-3 visits per week for 10 weeks  GOALS: (Goals have been discussed and agreed upon with patient.)  Short-Term Functional Goals: Time Frame: 5 weeks  1. Patient will be independent with initial HEP to improve patient's posture. MET 7-10-17  2. Patient will report being able to stand for 30 minutes or greater without increased L scapula and upper back pain. Ongoing 7/10/17; after approx 10 minutes and it will bother him  3.  Patient will report being able to sit and work at computer for greater than 30 minutes without increased L scapula and upper back pain. Ongoing 7-10-17  Discharge Goals: Time Frame:10 weeks  1. Patient will report >2 weeks without onset of L scapula/upper back pain. 2. Patient will report no limitations with prolonged standing or sitting secondary to L scapula pain to facilitate return to full functional and recreational activity participation. 3. Patient will have no positive L shoulder impingement tests and be able to move L shoulder through 170 degrees of active abduction and active flexion without increased L shoulder discomfort. Rehabilitation Potential For Stated Goals: Good                  The information in this section was collected on 6/1/17  (except where otherwise noted). HISTORY:   History of Present Injury/Illness (Reason for Referral):  Patient reports that approximately 1 year ago he began developing L sided midback and L shoulder blade pain that will occasionally radiate to his neck and upper back. He initially was concerned it was a cardiac issue, but he has had a nuclear stress test and EKG, which showed no cardiac abnormalities. Patient has also had a cervical MRI which was unremarkable. Patient states that his shoulder blade pain limits him to approximately 10 minutes of prolonged, static standing. He has applied heat pack to the area, which helps, and it feels better to sit with a back support. He also feels pain when sitting at a desk and working on a computer for prolonged periods. Patient denies the pain worsening at any time of day, denies paresthesias, and reports that his pain has occurred with an insidious onset. Past Medical History/Comorbidities:   Mr. 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 Mr. Thuy Monson  has a past surgical history that includes cardiac surg procedure unlist (1991); cardiac surg procedure unlist (9/2003); cholecystectomy (9/2000); neurological procedure unlisted (12/1981); hernia repair (09/1993); gi (11/2003); lumbar laminectomy (5/2005); other surgical (05/2002); colonoscopy (02/28/2013); endoscopy (02/28/2013); vascular surgery procedure unlist (1991); orthopaedic (1/2006); orthopaedic (8/2005); orthopaedic (2005); heart catheterization (04/2015); other surgical (11/2014); other surgical; and orthopaedic (03/1955). Social History/Living Environment:    Patient lives with his spouse. He is retired. Prior Level of Function/Work/Activity:  Patient is independent with all activities and mobility. Dominant Side:         RIGHT  Current Medications:       Current Outpatient Prescriptions:     tiotropium-olodaterol (STIOLTO RESPIMAT) 2.5-2.5 mcg/actuation mist, Take 2 Puffs by inhalation daily. , Disp: 3 Inhaler, Rfl: 3    atorvastatin (LIPITOR) 40 mg tablet, Take 1 Tab by mouth daily. , Disp: 90 Tab, Rfl: 3    metoprolol tartrate (LOPRESSOR) 25 mg tablet, Take 1 Tab by mouth two (2) times a day., Disp: 180 Tab, Rfl: 3    amLODIPine (NORVASC) 5 mg tablet, Take 1 Tab by mouth daily. , Disp: 90 Tab, Rfl: 3    fluticasone (FLONASE) 50 mcg/actuation nasal spray, One spray to each nostril daily, Disp: 3 Bottle, Rfl: 3    albuterol sulfate 90 mcg/actuation aepb, Take 2 Puffs by inhalation every four (4) hours as needed. , Disp: , Rfl:     omeprazole (PRILOSEC) 20 mg capsule, Take 1 Cap by mouth daily as needed. , Disp: 90 Cap, Rfl: 3    aspirin 81 mg chewable tablet, Take 81 mg by mouth daily. , Disp: , Rfl:     finasteride (PROSCAR) 5 mg tablet, Take 5 mg by mouth daily. , Disp: , Rfl:     terazosin (HYTRIN) 10 mg capsule, Take 10 mg by mouth nightly., Disp: , Rfl:    Date Last Reviewed:  7-10-17   Number of Personal Factors/Comorbidities that affect the Plan of Care: 1-2: MODERATE COMPLEXITY   EXAMINATION:   7-10-17    Observation/Orthostatic Postural Assessment:          Patient demonstrates rounded shoulders bilaterally, forward head posture  Palpation:        Tenderness and tightness to L upper trapezius and L periscapular musculature. ROM:          L AROM: flexion 170 degrees, abduction 178 degrees        Special Tests:          Negative Neers, Negative chapa-mary on L (NT on R); negative cervical compression and Spurling's to L and R  Neurological Screen:        Dermatomes:  Normal B UE        Sensation: intact to light touch   Body Structures Involved:  1. Bones  2. Joints  3. Muscles Body Functions Affected:  1. Sensory/Pain  2. Neuromusculoskeletal  3. Movement Related Activities and Participation Affected:  1. Self Care  2. Domestic Life  3. Interpersonal Interactions and Relationships   Number of elements (examined above) that affect the Plan of Care: 4+: HIGH COMPLEXITY   CLINICAL PRESENTATION:   Presentation: Stable and uncomplicated: LOW COMPLEXITY   CLINICAL DECISION MAKING:   Outcome Measure: Tool Used: Modified Oswestry Low Back Pain Questionnaire  Score:  Initial: 9/50  Most Recent: 13/50 (Date: 7-10-17)   Interpretation of Score: Each section is scored on a 0-5 scale, 5 representing the greatest disability. The scores of each section are added together for a total score of 50. Score 0 1-10 11-20 21-30 31-40 41-49 50   Modifier CH CI CJ CK CL CM CN     ?  Changing and Maintaining Body Position:    [id number] - CURRENT STATUS: CJ - 20%-39% impaired, limited or restricted    - GOAL STATUS: CI - 1%-19% impaired, limited or restricted    - D/C STATUS:  ---------------To be determined---------------    Medical Necessity: · Skilled intervention continues to be required due to discomfort with prolonged sitting and standing and abnormal posture. .  Reason for Services/Other Comments:  · Patient continues to require skilled intervention due to limtiations with activity tolerance secondary to increased pain with prolonged sitting and standing. .   Use of outcome tool(s) and clinical judgement create a POC that gives a: Clear prediction of patient's progress: LOW COMPLEXITY            TREATMENT:   (In addition to Assessment/Re-Assessment sessions the following treatments were rendered)e  Pre-treatment Symptoms/Complaints:  Pt stated that he felt that the soft tissue mobilizations performed at last treatment were beneficial, but that he aggravated it doing yardwork [email or web address]  Pain: Initial:  0/10    Post Session:  No pain at conclusion of PT   Manual Therapy ( 34 minutes): Grade III thoracic posterior-anterior joint mobilizations for improved thoracic mobility, soft tissue mobilizations to L teres major/minor, latissimus dorsi and thoracic parsapinals performed for reduced discomfort    Therapeutic Exercise: ( 10 minutes):  Not performed today. Date  7/7/17   Date  7-12-17 Date  7-17-17 Date  7-20-17 Date  7-24-17 Date  7-26-17 Date  8-2-17   Activity/Exercise          Supine chin tucks - 2 x 15 -       Scapular retractions Black 2 x 15 with elbows extended - -       Prone rows -  Bentover 4# 2 x 15 each UE  Prone 5# 2 x 15, L UE  8# 2 x 12, L UE   Posterior pelvic tilts Supine ball rolls 2 x 15 Supine ball rolls 2 x 20 Supine ball rolls 2 x 30   Supine ball rolls 2 x 20    Bridging - - -       Freemotion push/pull - Seated w/o resistance 2 x 20 Seated w/o resistance 2 x 20       Prone extensions - - -       Prone T's - - Bentover, 2 x 10  Prone, 2 x 15 L UE Prone 2#   L UE, 2 x 12   Standing posture Wall stands as 7/5/17, 5 x.  Added shoulder abduction x 5  - -       Wall push up - - - 2x15 In quadruped 2 x 10      Straight arm lat pulldown - - --       Prone Y's - -  2 x 15   L UE, 2 x 12   Serratus punches  Bilateral with cane, 2 x 15 -  2 x 15 2# L UE     Lumbar trunk rotations   2 x 15 each       Thoracic rotations    Arms crossed over chest 2x10 Arms crossed over chest, standing 2 x 10 ea Alternating arm push/pulls 2 x 20    Lower trap wall slides    Yellow 1x10 - Red 1 x 10    Prayer stretch     5 x 5\"       Modalities (0 minutes): Not performed today secondary to no symptom irritation. Treatment/Session Assessment:     · Response to Treatment: Pt has significant periscapular tightness along teres major/minor and latissimus dorsi. Patient able to perform therapeutic exercises without increase in discomfort. Patient has significantly more discomfort with palpation of L teres major/minor and latissimus dorsi than on R side. · Compliance with Program/Exercises: compliant  · Recommendations/Intent for next treatment session: \"Next visit will focus on recertification for continued PT intervention and assessing patient symptoms. \".   Total Treatment Duration: 44 minutes  PT Patient Time In/Time Out  Time In: 1030  Time Out: ZANDER Quach

## 2017-08-10 ENCOUNTER — HOSPITAL ENCOUNTER (OUTPATIENT)
Dept: PHYSICAL THERAPY | Age: 82
Discharge: HOME | End: 2017-08-10
Payer: MEDICARE

## 2017-08-10 PROCEDURE — 97110 THERAPEUTIC EXERCISES: CPT

## 2017-08-10 PROCEDURE — 97140 MANUAL THERAPY 1/> REGIONS: CPT

## 2017-08-10 PROCEDURE — G8982 BODY POS GOAL STATUS: HCPCS

## 2017-08-10 PROCEDURE — G8981 BODY POS CURRENT STATUS: HCPCS

## 2017-08-10 NOTE — PROGRESS NOTES
Maximino Cristina  : 1935 [facility] JAZMIN ROJAS  [address] [facility], [address] [place] [address],[address], [facility] [address]  [phone]   [phone]         OUTPATIENT PHYSICAL THERAPY:Daily Note, Progress Report and Recertification     ICD-10: Treatment Diagnosis: Pain in thoracic spine (M54.6), Pain in left shoulder (M25.512) , Abnormal posture (R29.3)  Precautions/Allergies:   Iodinated contrast- oral and iv dye and Sulfa (sulfonamide antibiotics)   Fall Risk Score: 1 (? 5 = High Risk)  MD Orders: Evaluate and treat (17) MEDICAL/REFERRING DIAGNOSIS:  back pain   DATE OF ONSET: approx 1 year ago  REFERRING PHYSICIAN: Rhiannon Hodge MD  [address]: 8/23/17     8-10-17 ASSESSMENT:  Mr. Ayesha Pierce continues to report ongoing discomfort in L mid-back that is worsened by prolonged standing and walking. Patient continues to have difficulty with scapular/thoracic pain that continues to reach around to his anterior chest wall on L side. Patient has improved with performance of therapeutic exercises and expressed interest in continuing physical therapy for improved postural strengthening and will benefit from continued therapy to better understand underlying causes of ongoing discomfort. PROBLEM LIST (Impacting functional limitations):  1. Increased Pain  2. Decreased Activity Tolerance  3. Decreased Flexibility/Joint Mobility INTERVENTIONS PLANNED:  1. Home Exercise Program (HEP)  2. Manual Therapy  3. Range of Motion (ROM)  4. Therapeutic Exercise/Strengthening   TREATMENT PLAN:  Effective Dates: 8-10-17 TO 10-19-17. Frequency/Duration: 2 visits per week for 10 weeks  GOALS: (Goals have been discussed and agreed upon with patient.)  Short-Term Functional Goals: Time Frame: 5 weeks  1. Patient will be independent with initial HEP to improve patient's posture. MET 7-10-17  2.  Patient will report being able to stand for 30 minutes or greater without increased L scapula and upper back pain. Ongoing 8/10/17  3. Patient will report being able to sit and work at computer for greater than 30 minutes without increased L scapula and upper back pain. Ongoing 8-10-17  Discharge Goals: Time Frame:10 weeks  1. Patient will report >2 weeks without onset of L scapula/upper back pain. Not met 8-10-17  2. Patient will report no limitations with prolonged standing or sitting secondary to L scapula pain to facilitate return to full functional and recreational activity participation. Not met 8-10-17  3. Patient will have no positive L shoulder impingement tests and be able to move L shoulder through 170 degrees of active abduction and active flexion without increased L shoulder discomfort. Not met for ROM; but met for special tests 8-10-17    Rehabilitation Potential For Stated Goals: Good      Regarding Maximino Cristina's therapy, I certify that the treatment plan above will be carried out by a therapist or under their direction. Thank you for this referral,      Boni Dunaway PT     Referring Physician Signature:     Nuha Cooper MD          Date                      The information in this section was collected on 6/1/17  (except where otherwise noted). HISTORY:   History of Present Injury/Illness (Reason for Referral):  Patient reports that approximately 1 year ago he began developing L sided midback and L shoulder blade pain that will occasionally radiate to his neck and upper back. He initially was concerned it was a cardiac issue, but he has had a nuclear stress test and EKG, which showed no cardiac abnormalities. Patient has also had a cervical MRI which was unremarkable. Patient states that his shoulder blade pain limits him to approximately 10 minutes of prolonged, static standing. He has applied heat pack to the area, which helps, and it feels better to sit with a back support. He also feels pain when sitting at a desk and working on a computer for prolonged periods.  Patient denies the pain worsening at any time of day, denies paresthesias, and reports that his pain has occurred with an insidious onset. Past Medical History/Comorbidities:   Mr. Quan Arita  has a past medical history of Abdominal pain; Acute cervical radiculopathy; Aneurysm ([facility] [address] Arthritis; CAD (coronary artery disease); Cancer [facility]); COPD (chronic obstructive pulmonary disease) (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 [address] and Thyroid disease. Mr. Quan Arita  has a past surgical history that includes cardiac surg procedure unlist (1991); cardiac surg procedure unlist (9/2003); cholecystectomy (9/2000); neurological procedure unlisted (12/1981); hernia repair (09/1993); gi (11/2003); lumbar laminectomy (5/2005); other surgical (05/2002); colonoscopy (02/28/2013); endoscopy (02/28/2013); vascular surgery procedure unlist (1991); orthopaedic (1/2006); orthopaedic (8/2005); orthopaedic (2005); heart catheterization (04/2015); other surgical (11/2014); other surgical; and orthopaedic (03/1955). Social History/Living Environment:    Patient lives with his spouse. He is retired. Prior Level of Function/Work/Activity:  Patient is independent with all activities and mobility. Dominant Side:         RIGHT  Current Medications:       Current Outpatient Prescriptions:     tiotropium-olodaterol (STIOLTO RESPIMAT) 2.5-2.5 mcg/actuation mist, Take 2 Puffs by inhalation daily. , Disp: 3 Inhaler, Rfl: 3    atorvastatin (LIPITOR) 40 mg tablet, Take 1 Tab by mouth daily. , Disp: 90 Tab, Rfl: 3    metoprolol tartrate (LOPRESSOR) 25 mg tablet, Take 1 Tab by mouth two (2) times a day., Disp: 180 Tab, Rfl: 3    amLODIPine (NORVASC) 5 mg tablet, Take 1 Tab by mouth daily. , Disp: 90 Tab, Rfl: 3    fluticasone (FLONASE) 50 mcg/actuation nasal spray, One spray to each nostril daily, Disp: 3 Bottle, Rfl: 3    albuterol sulfate 90 mcg/actuation aepb, Take 2 Puffs by inhalation every four (4) hours as needed. , Disp: , Rfl:     omeprazole (PRILOSEC) 20 mg capsule, Take 1 Cap by mouth daily as needed. , Disp: 90 Cap, Rfl: 3    aspirin 81 mg chewable tablet, Take 81 mg by mouth daily. , Disp: , Rfl:     finasteride (PROSCAR) 5 mg tablet, Take 5 mg by mouth daily. , Disp: , Rfl:     terazosin (HYTRIN) 10 mg capsule, Take 10 mg by mouth nightly., Disp: , Rfl:    Date Last Reviewed:  8-10-17   Number of Personal Factors/Comorbidities that affect the Plan of Care: 1-2: MODERATE COMPLEXITY   EXAMINATION:   8-10-17    Palpation:        Tenderness and tightness to L teres major, L teres minor, L latissimus dorsi. Normal sensation to light touch along thoracic dermatomes in region where patient experiences discomfort  ROM:          L AROM: flexion 170 degrees, abduction 178 degrees        Special Tests:    Negative special tests for L shoulder impingement  Neurological Screen:        Dermatomes:  Normal B UE        Sensation: intact to light touch   Body Structures Involved:  1. Bones  2. Joints  3. Muscles Body Functions Affected:  1. Sensory/Pain  2. Neuromusculoskeletal  3. Movement Related Activities and Participation Affected:  1. Self Care  2. Domestic Life  3. Interpersonal Interactions and Relationships   Number of elements (examined above) that affect the Plan of Care: 4+: HIGH COMPLEXITY   CLINICAL PRESENTATION:   Presentation: Stable and uncomplicated: LOW COMPLEXITY   CLINICAL DECISION MAKING:   Outcome Measure: Tool Used: Modified Oswestry Low Back Pain Questionnaire  Score:  Initial: 9/50  Most Recent: 13/50 (Date: 7-10-17)     Most Recent:  13/50 (Date: 8-10-17)   Interpretation of Score: Each section is scored on a 0-5 scale, 5 representing the greatest disability.   The scores of each section are added together for a total score of 50. Score 0 1-10 11-20 21-30 31-40 41-49 50   Modifier CH CI CJ CK CL CM CN     ? Changing and Maintaining Body Position:    [id number] - CURRENT STATUS: CJ - 20%-39% impaired, limited or restricted    - GOAL STATUS: CI - 1%-19% impaired, limited or restricted    - D/C STATUS:  ---------------To be determined---------------    Medical Necessity:   · Skilled intervention continues to be required due to discomfort with prolonged sitting and standing and abnormal posture. .  Reason for Services/Other Comments:  · Patient continues to require skilled intervention due to limtiations with activity tolerance secondary to increased pain with prolonged sitting and standing. .   Use of outcome tool(s) and clinical judgement create a POC that gives a: Clear prediction of patient's progress: LOW COMPLEXITY            TREATMENT:   (In addition to Assessment/Re-Assessment sessions the following treatments were rendered)e  Pre-treatment Symptoms/Complaints:  Pt stated that he had one \"bad spell\" over the weekend. Had flare up after going to the gym which extended from around L side from shoulder blade to his chest. Has appointment with cardiologist on August 14th. Has noticed tendency to have increased dizziness when standing from sitting position. Pain: Initial:  0/10    Post Session:  0/10   Manual Therapy ( 15 minutes): Soft tissue mobilizations to L teres major and teres minor, lattisimus dorsi and L thoracic paraspinals. Grade III thoracic mobilizations (P-A) for improved thoracic mobility.     Therapeutic Exercise: ( 27 minutes):  Exercises performed per grid below for improved thoracic mobility in order to reduce discomfort in L thoracic region and L anterior chest.     Date  7-20-17 Date  7-24-17 Date  7-26-17 Date  8-2-17 Date  8-10-17   Activity/Exercise        Supine chin tucks        Scapular retractions        Prone rows  Prone 5# 2 x 15, L UE  8# 2 x 12, L UE    Posterior pelvic tilts   Supine ball rolls 2 x 20  Ball rolls 2 x 20, PPT 2 x 10 with 5\" holds   Bridging        Freemotion push/pull        Prone extensions        Prone T's Prone, 2 x 15 L UE Prone 2#   L UE, 2 x 12    Standing posture        Wall push up 2x15 In quadruped 2 x 10       Straight arm lat pulldown        Prone Y's 2 x 15   L UE, 2 x 12    Serratus punches  2 x 15 2# L UE      Lumbar trunk rotations        Thoracic rotations Arms crossed over chest 2x10 Arms crossed over chest, standing 2 x 10 ea Alternating arm push/pulls 2 x 20     Lower trap wall slides Yellow 1x10 - Red 1 x 10     Prayer stretch  5 x 5\"      Cat/camel     2 x 10 with cues for improved thoracic rounding     Modalities (0 minutes): Not performed today secondary to no symptom irritation. Treatment/Session Assessment:     · Response to Treatment: Pt experienced no discomfort with performance of therapeutic exercises today. Patient demonstrated improved performance with posterior pelvic tilts. Patient demonstrated normal sensation in T2-T5 dermatomes. · Compliance with Program/Exercises: compliant  · Recommendations/Intent for next treatment session: \"Next visit will focus on assessing etiology of symptoms further. patient will be undergoing surgery for thyroid on 8/24 and may have to move and change appointments around this. \".   Total Treatment Duration: 42 minutes (+7 minutes for re-eval tests and measures)  PT Patient Time In/Time Out  Time In: 3819  Time Out: [address] [facility], PT

## 2017-08-15 ENCOUNTER — HOSPITAL ENCOUNTER (OUTPATIENT)
Dept: PHYSICAL THERAPY | Age: 82
Discharge: HOME | End: 2017-08-15
Payer: MEDICARE

## 2017-08-15 PROCEDURE — 97110 THERAPEUTIC EXERCISES: CPT

## 2017-08-15 PROCEDURE — 97140 MANUAL THERAPY 1/> REGIONS: CPT

## 2017-08-15 NOTE — PROGRESS NOTES
Maximino Cristina  : 1935 [facility] JAZMIN ROJAS  [address] [facility], [address] [place] [address],[address], [facility] [address].  [phone]   [phone]         OUTPATIENT PHYSICAL THERAPY:Daily Note 8/15/2017    ICD-10: Treatment Diagnosis: Pain in thoracic spine (M54.6), Pain in left shoulder (M25.512) , Abnormal posture (R29.3)  Precautions/Allergies:   Iodinated contrast- oral and iv dye and Sulfa (sulfonamide antibiotics)   Fall Risk Score: 1 (? 5 = High Risk)  MD Orders: Evaluate and treat (17) MEDICAL/REFERRING DIAGNOSIS:  back pain   DATE OF ONSET: approx 1 year ago  REFERRING PHYSICIAN: Reyna Dupont MD  [address] [facility] [address]: 8/23/17     8-10-17 ASSESSMENT:  Mr. Trina Sandhu continues to report ongoing discomfort in L mid-back that is worsened by prolonged standing and walking. Patient continues to have difficulty with scapular/thoracic pain that continues to reach around to his anterior chest wall on L side. Patient has improved with performance of therapeutic exercises and expressed interest in continuing physical therapy for improved postural strengthening and will benefit from continued therapy to better understand underlying causes of ongoing discomfort. PROBLEM LIST (Impacting functional limitations):  1. Increased Pain  2. Decreased Activity Tolerance  3. Decreased Flexibility/Joint Mobility INTERVENTIONS PLANNED:  1. Home Exercise Program (HEP)  2. Manual Therapy  3. Range of Motion (ROM)  4. Therapeutic Exercise/Strengthening   TREATMENT PLAN:  Effective Dates: 8-10-17 TO 10-19-17. Frequency/Duration: 2 visits per week for 10 weeks  GOALS: (Goals have been discussed and agreed upon with patient.)  Short-Term Functional Goals: Time Frame: 5 weeks  1. Patient will be independent with initial HEP to improve patient's posture. MET 7-10-17  2. Patient will report being able to stand for 30 minutes or greater without increased L scapula and upper back pain.  Ongoing 8/10/17  3. Patient will report being able to sit and work at computer for greater than 30 minutes without increased L scapula and upper back pain. Ongoing 8-10-17  Discharge Goals: Time Frame:10 weeks  1. Patient will report >2 weeks without onset of L scapula/upper back pain. Not met 8-10-17  2. Patient will report no limitations with prolonged standing or sitting secondary to L scapula pain to facilitate return to full functional and recreational activity participation. Not met 8-10-17  3. Patient will have no positive L shoulder impingement tests and be able to move L shoulder through 170 degrees of active abduction and active flexion without increased L shoulder discomfort. Not met for ROM; but met for special tests 8-10-17    Rehabilitation Potential For Stated Goals: Good      Regarding Maximino Cristina's therapy, I certify that the treatment plan above will be carried out by a therapist or under their direction. Thank you for this referral,      Víctor Krishnamurthy PT     Referring Physician Signature:     Gayle Ventura MD          Date                      The information in this section was collected on 6/1/17  (except where otherwise noted). HISTORY:   History of Present Injury/Illness (Reason for Referral):  Patient reports that approximately 1 year ago he began developing L sided midback and L shoulder blade pain that will occasionally radiate to his neck and upper back. He initially was concerned it was a cardiac issue, but he has had a nuclear stress test and EKG, which showed no cardiac abnormalities. Patient has also had a cervical MRI which was unremarkable. Patient states that his shoulder blade pain limits him to approximately 10 minutes of prolonged, static standing. He has applied heat pack to the area, which helps, and it feels better to sit with a back support. He also feels pain when sitting at a desk and working on a computer for prolonged periods.  Patient denies the pain worsening at any time of day, denies paresthesias, and reports that his pain has occurred with an insidious onset. Past Medical History/Comorbidities:   Mr. 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 Mr. Vicente Jones  has a past surgical history that includes cardiac surg procedure unlist (1991); cardiac surg procedure unlist (9/2003); cholecystectomy (9/2000); neurological procedure unlisted (12/1981); hernia repair (09/1993); gi (11/2003); lumbar laminectomy (5/2005); other surgical (05/2002); colonoscopy (02/28/2013); endoscopy (02/28/2013); vascular surgery procedure unlist (1991); orthopaedic (1/2006); orthopaedic (8/2005); orthopaedic (2005); heart catheterization (04/2015); other surgical (11/2014); other surgical; and orthopaedic (03/1955). Social History/Living Environment:    Patient lives with his spouse. He is retired. Prior Level of Function/Work/Activity:  Patient is independent with all activities and mobility. Dominant Side:         RIGHT  Current Medications:       Current Outpatient Prescriptions:     tiotropium-olodaterol (STIOLTO RESPIMAT) 2.5-2.5 mcg/actuation mist, Take 2 Puffs by inhalation daily. , Disp: 3 Inhaler, Rfl: 3    atorvastatin (LIPITOR) 40 mg tablet, Take 1 Tab by mouth daily. , Disp: 90 Tab, Rfl: 3    metoprolol tartrate (LOPRESSOR) 25 mg tablet, Take 1 Tab by mouth two (2) times a day., Disp: 180 Tab, Rfl: 3    amLODIPine (NORVASC) 5 mg tablet, Take 1 Tab by mouth daily. , Disp: 90 Tab, Rfl: 3   fluticasone (FLONASE) 50 mcg/actuation nasal spray, One spray to each nostril daily, Disp: 3 Bottle, Rfl: 3    albuterol sulfate 90 mcg/actuation aepb, Take 2 Puffs by inhalation every four (4) hours as needed. , Disp: , Rfl:     omeprazole (PRILOSEC) 20 mg capsule, Take 1 Cap by mouth daily as needed. , Disp: 90 Cap, Rfl: 3    aspirin 81 mg chewable tablet, Take 81 mg by mouth daily. , Disp: , Rfl:     finasteride (PROSCAR) 5 mg tablet, Take 5 mg by mouth daily. , Disp: , Rfl:     terazosin (HYTRIN) 10 mg capsule, Take 10 mg by mouth nightly., Disp: , Rfl:    Date Last Reviewed:  8-10-17   Number of Personal Factors/Comorbidities that affect the Plan of Care: 1-2: MODERATE COMPLEXITY   EXAMINATION:   8-10-17    Palpation:        Tenderness and tightness to L teres major, L teres minor, L latissimus dorsi. Normal sensation to light touch along thoracic dermatomes in region where patient experiences discomfort  ROM:          L AROM: flexion 170 degrees, abduction 178 degrees        Special Tests:    Negative special tests for L shoulder impingement  Neurological Screen:        Dermatomes:  Normal B UE        Sensation: intact to light touch   Body Structures Involved:  1. Bones  2. Joints  3. Muscles Body Functions Affected:  1. Sensory/Pain  2. Neuromusculoskeletal  3. Movement Related Activities and Participation Affected:  1. Self Care  2. Domestic Life  3. Interpersonal Interactions and Relationships   Number of elements (examined above) that affect the Plan of Care: 4+: HIGH COMPLEXITY   CLINICAL PRESENTATION:   Presentation: Stable and uncomplicated: LOW COMPLEXITY   CLINICAL DECISION MAKING:   Outcome Measure: Tool Used: Modified Oswestry Low Back Pain Questionnaire  Score:  Initial: 9/50  Most Recent: 13/50 (Date: 7-10-17)     Most Recent:  13/50 (Date: 8-10-17)   Interpretation of Score: Each section is scored on a 0-5 scale, 5 representing the greatest disability.   The scores of each section are added together for a total score of 50. Score 0 1-10 11-20 21-30 31-40 41-49 50   Modifier CH CI CJ CK CL CM CN     ? Changing and Maintaining Body Position:    [id number] - CURRENT STATUS: CJ - 20%-39% impaired, limited or restricted    - GOAL STATUS: CI - 1%-19% impaired, limited or restricted    - D/C STATUS:  ---------------To be determined---------------    Medical Necessity:   · Skilled intervention continues to be required due to discomfort with prolonged sitting and standing and abnormal posture. .  Reason for Services/Other Comments:  · Patient continues to require skilled intervention due to limtiations with activity tolerance secondary to increased pain with prolonged sitting and standing. .   Use of outcome tool(s) and clinical judgement create a POC that gives a: Clear prediction of patient's progress: LOW COMPLEXITY            TREATMENT:   (In addition to Assessment/Re-Assessment sessions the following treatments were rendered)e  Pre-treatment Symptoms/Complaints:  Pt stated that he had onset of 'cramping' sensation in back since last PT treatment. Stated that his cardiologist evaluated him and found no issues. Stated that he was told previously that he may have cervical stenosis. Pain: Initial:  0/10    Post Session:  0/10   Manual Therapy ( 17 minutes): Cervical manual traction for improved cervical and upper thoracic joint distraction; soft tissue mobilizations to thoracic paraspinals and L periscapular muscles.     Therapeutic Exercise: ( 28 minutes):  Exercises performed per grid below for improved thoracic mobility in order to reduce discomfort in L thoracic region and L anterior chest.     Date  7-20-17 Date  7-24-17 Date  7-26-17 Date  8-2-17 Date  8-10-17 Date  8-15-17   Activity/Exercise         Supine chin tucks         Scapular retractions         Prone rows  Prone 5# 2 x 15, L UE  8# 2 x 12, L UE     Posterior pelvic tilts   Supine ball rolls 2 x 20  Ball rolls 2 x 20, PPT 2 x 10 with 5\" holds PPT x 15, ball rolls x 20, hip adductor ball squeezes x 20   Bridging         Freemotion push/pull         Prone extensions         Prone T's Prone, 2 x 15 L UE Prone 2#   L UE, 2 x 12  B 2 x 10    Standing posture         Wall push up 2x15 In quadruped 2 x 10        Straight arm lat pulldown         Prone Y's 2 x 15   L UE, 2 x 12     Serratus punches  2 x 15 2# L UE       Lumbar trunk rotations      With feet on ball, x 15 ea side   Thoracic rotations Arms crossed over chest 2x10 Arms crossed over chest, standing 2 x 10 ea Alternating arm push/pulls 2 x 20   Alternating arm push/pulls 2 x 20   Lower trap wall slides Yellow 1x10 - Red 1 x 10      Prayer stretch  5 x 5\"       Cat/camel     2 x 10 with cues for improved thoracic rounding    Thoracic extensions      Supine on half roll at mid back w/B shoulder wand flexion for increased thoracic mobility 2 x 10   Prone Y's      B 2 x 10     Modalities (0 minutes): Not performed today secondary to no symptom irritation. Treatment/Session Assessment:     · Response to Treatment: Pt denies discomfort with therapeutic exercises today. Patient continues to have minimal changes in symptoms but reports less upper thoracic/cervical discomfort than he previously exhibited. · Compliance with Program/Exercises: compliant  · Recommendations/Intent for next treatment session: \"Next visit will focus on assessing any changes in symptoms following increased focus on periscapular muscles and posture today. patient will be undergoing surgery for thyroid on 8/24 and may have to move and change appointments around this. \".   Total Treatment Duration: 45 minutes   PT Patient Time In/Time Out  Time In: 1015  Time Out: [facility], PT

## 2017-08-18 ENCOUNTER — HOSPITAL ENCOUNTER (OUTPATIENT)
Dept: PHYSICAL THERAPY | Age: 82
Discharge: HOME | End: 2017-08-18
Payer: MEDICARE

## 2017-08-18 PROCEDURE — 97110 THERAPEUTIC EXERCISES: CPT

## 2017-08-18 NOTE — PROGRESS NOTES
Maximino Cristina  : 1935 [facility] JAZMIN ROJAS  [address] [facility], [address] [place] [address],[address], [place] [address].  [phone]   [phone]         OUTPATIENT PHYSICAL THERAPY:Daily Note 2017    ICD-10: Treatment Diagnosis: Pain in thoracic spine (M54.6), Pain in left shoulder (M25.512) , Abnormal posture (R29.3)  Precautions/Allergies:   Iodinated contrast- oral and iv dye and Sulfa (sulfonamide antibiotics)   Fall Risk Score: 1 (? 5 = High Risk)  MD Orders: Evaluate and treat (17) MEDICAL/REFERRING DIAGNOSIS:  back pain   DATE OF ONSET: approx 1 year ago  REFERRING PHYSICIAN: Leslie Castro MD  [address] [facility] [address]: 8/23/17     8-10-17 ASSESSMENT:  Mr. Ernestine Sunshine continues to report ongoing discomfort in L mid-back that is worsened by prolonged standing and walking. Patient continues to have difficulty with scapular/thoracic pain that continues to reach around to his anterior chest wall on L side. Patient has improved with performance of therapeutic exercises and expressed interest in continuing physical therapy for improved postural strengthening and will benefit from continued therapy to better understand underlying causes of ongoing discomfort. PROBLEM LIST (Impacting functional limitations):  1. Increased Pain  2. Decreased Activity Tolerance  3. Decreased Flexibility/Joint Mobility INTERVENTIONS PLANNED:  1. Home Exercise Program (HEP)  2. Manual Therapy  3. Range of Motion (ROM)  4. Therapeutic Exercise/Strengthening   TREATMENT PLAN:  Effective Dates: 8-10-17 TO 10-19-17. Frequency/Duration: 2 visits per week for 10 weeks  GOALS: (Goals have been discussed and agreed upon with patient.)  Short-Term Functional Goals: Time Frame: 5 weeks  1. Patient will be independent with initial HEP to improve patient's posture. MET 7-10-17  2. Patient will report being able to stand for 30 minutes or greater without increased L scapula and upper back pain.  Ongoing 8/10/17  3. Patient will report being able to sit and work at computer for greater than 30 minutes without increased L scapula and upper back pain. Ongoing 8-10-17  Discharge Goals: Time Frame:10 weeks  1. Patient will report >2 weeks without onset of L scapula/upper back pain. Not met 8-10-17  2. Patient will report no limitations with prolonged standing or sitting secondary to L scapula pain to facilitate return to full functional and recreational activity participation. Not met 8-10-17  3. Patient will have no positive L shoulder impingement tests and be able to move L shoulder through 170 degrees of active abduction and active flexion without increased L shoulder discomfort. Not met for ROM; but met for special tests 8-10-17    Rehabilitation Potential For Stated Goals: Good      Regarding Maximino Cristina's therapy, I certify that the treatment plan above will be carried out by a therapist or under their direction. Thank you for this referral,      Kvng Armstrong PT     Referring Physician Signature:     Leslie Castro MD          Date                      The information in this section was collected on 6/1/17  (except where otherwise noted). HISTORY:   History of Present Injury/Illness (Reason for Referral):  Patient reports that approximately 1 year ago he began developing L sided midback and L shoulder blade pain that will occasionally radiate to his neck and upper back. He initially was concerned it was a cardiac issue, but he has had a nuclear stress test and EKG, which showed no cardiac abnormalities. Patient has also had a cervical MRI which was unremarkable. Patient states that his shoulder blade pain limits him to approximately 10 minutes of prolonged, static standing. He has applied heat pack to the area, which helps, and it feels better to sit with a back support. He also feels pain when sitting at a desk and working on a computer for prolonged periods.  Patient denies the pain worsening at any time of day, denies paresthesias, and reports that his pain has occurred with an insidious onset. Past Medical History/Comorbidities:   Mr. 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 Mr. Shani Hudson  has a past surgical history that includes cardiac surg procedure unlist (1991); cardiac surg procedure unlist (9/2003); cholecystectomy (9/2000); neurological procedure unlisted (12/1981); hernia repair (09/1993); gi (11/2003); lumbar laminectomy (5/2005); other surgical (05/2002); colonoscopy (02/28/2013); endoscopy (02/28/2013); vascular surgery procedure unlist (1991); orthopaedic (1/2006); orthopaedic (8/2005); orthopaedic (2005); heart catheterization (04/2015); other surgical (11/2014); other surgical; and orthopaedic (03/1955). Social History/Living Environment:    Patient lives with his spouse. He is retired. Prior Level of Function/Work/Activity:  Patient is independent with all activities and mobility. Dominant Side:         RIGHT  Current Medications:       Current Outpatient Prescriptions:     tiotropium-olodaterol (STIOLTO RESPIMAT) 2.5-2.5 mcg/actuation mist, Take 2 Puffs by inhalation daily. , Disp: 3 Inhaler, Rfl: 3    atorvastatin (LIPITOR) 40 mg tablet, Take 1 Tab by mouth daily. , Disp: 90 Tab, Rfl: 3    metoprolol tartrate (LOPRESSOR) 25 mg tablet, Take 1 Tab by mouth two (2) times a day., Disp: 180 Tab, Rfl: 3    amLODIPine (NORVASC) 5 mg tablet, Take 1 Tab by mouth daily. , Disp: 90 Tab, Rfl: 3   fluticasone (FLONASE) 50 mcg/actuation nasal spray, One spray to each nostril daily, Disp: 3 Bottle, Rfl: 3    albuterol sulfate 90 mcg/actuation aepb, Take 2 Puffs by inhalation every four (4) hours as needed. , Disp: , Rfl:     omeprazole (PRILOSEC) 20 mg capsule, Take 1 Cap by mouth daily as needed. , Disp: 90 Cap, Rfl: 3    aspirin 81 mg chewable tablet, Take 81 mg by mouth daily. , Disp: , Rfl:     finasteride (PROSCAR) 5 mg tablet, Take 5 mg by mouth daily. , Disp: , Rfl:     terazosin (HYTRIN) 10 mg capsule, Take 10 mg by mouth nightly., Disp: , Rfl:    Date Last Reviewed:  8-10-17   Number of Personal Factors/Comorbidities that affect the Plan of Care: 1-2: MODERATE COMPLEXITY   EXAMINATION:   8-10-17    Palpation:        Tenderness and tightness to L teres major, L teres minor, L latissimus dorsi. Normal sensation to light touch along thoracic dermatomes in region where patient experiences discomfort  ROM:          L AROM: flexion 170 degrees, abduction 178 degrees        Special Tests:    Negative special tests for L shoulder impingement  Neurological Screen:        Dermatomes:  Normal B UE        Sensation: intact to light touch   Body Structures Involved:  1. Bones  2. Joints  3. Muscles Body Functions Affected:  1. Sensory/Pain  2. Neuromusculoskeletal  3. Movement Related Activities and Participation Affected:  1. Self Care  2. Domestic Life  3. Interpersonal Interactions and Relationships   Number of elements (examined above) that affect the Plan of Care: 4+: HIGH COMPLEXITY   CLINICAL PRESENTATION:   Presentation: Stable and uncomplicated: LOW COMPLEXITY   CLINICAL DECISION MAKING:   Outcome Measure: Tool Used: Modified Oswestry Low Back Pain Questionnaire  Score:  Initial: 9/50  Most Recent: 13/50 (Date: 7-10-17)     Most Recent:  13/50 (Date: 8-10-17)   Interpretation of Score: Each section is scored on a 0-5 scale, 5 representing the greatest disability.   The scores of each section are added together for a total score of 50. Score 0 1-10 11-20 21-30 31-40 41-49 50   Modifier CH CI CJ CK CL CM CN     ? Changing and Maintaining Body Position:    [id number] - CURRENT STATUS: CJ - 20%-39% impaired, limited or restricted    - GOAL STATUS: CI - 1%-19% impaired, limited or restricted    - D/C STATUS:  ---------------To be determined---------------    Medical Necessity:   · Skilled intervention continues to be required due to discomfort with prolonged sitting and standing and abnormal posture. .  Reason for Services/Other Comments:  · Patient continues to require skilled intervention due to limtiations with activity tolerance secondary to increased pain with prolonged sitting and standing. .   Use of outcome tool(s) and clinical judgement create a POC that gives a: Clear prediction of patient's progress: LOW COMPLEXITY            TREATMENT:   (In addition to Assessment/Re-Assessment sessions the following treatments were rendered)  Pre-treatment Symptoms/Complaints:  Pt stated that helped move a recliner into his house a few days ago and did not experience any pain, but did later that day when standing at home. Patient   Pain: Initial:  0/10    Post Session:  0/10   Manual Therapy ( 0 minutes): none performed today    Therapeutic Exercise: ( 32 minutes):  Exercises performed per grid below for improved thoracic mobility and to relieve discomfort in L upper back. 6 minutes on UBE untimed.      Date  7-20-17 Date  7-24-17 Date  7-26-17 Date  8-2-17 Date  8-10-17 Date  8-15-17 Date  8-18-17   Activity/Exercise          Supine chin tucks          Scapular retractions          Prone rows  Prone 5# 2 x 15, L UE  8# 2 x 12, L UE      Posterior pelvic tilts   Supine ball rolls 2 x 20  Ball rolls 2 x 20, PPT 2 x 10 with 5\" holds PPT x 15, ball rolls x 20, hip adductor ball squeezes x 20 PPT 2 x 15   Bridging          Freemotion push/pull          Prone extensions          Prone T's Prone, 2 x 15 L UE Prone 2#   L UE, 2 x 12  B 2 x 10     Standing posture          Wall push up 2x15 In quadruped 2 x 10      1 x 20   Straight arm lat pulldown          Prone Y's 2 x 15   L UE, 2 x 12      Serratus punches  2 x 15 2# L UE        Lumbar trunk rotations      With feet on ball, x 15 ea side 2 x 20 ea   Thoracic rotations Arms crossed over chest 2x10 Arms crossed over chest, standing 2 x 10 ea Alternating arm push/pulls 2 x 20   Alternating arm push/pulls 2 x 20    Lower trap wall slides Yellow 1x10 - Red 1 x 10       Prayer stretch  5 x 5\"     10 x 10\"   Cat/camel     2 x 10 with cues for improved thoracic rounding     Thoracic extensions      Supine on half roll at mid back w/B shoulder wand flexion for increased thoracic mobility 2 x 10    Prone Y's      B 2 x 10    Single knee to chest stretch       10 x 10\" each   Upper body ergometer       3 min forward, 3 min backward               Modalities (0 minutes): Not performed today secondary to no symptom irritation. Treatment/Session Assessment:     · Response to Treatment: Pt did not experience any discomfort with therapeutic exercises today. Patient has significant bruising to L UE to forearm. Patient does not know how it occurred. · Compliance with Program/exercises: compliant  · Recommendations/Intent for next treatment session: \"Patient cancelled visits for next week as he will have surgery and will speak with his referring physician regarding continued PT and potential for imaging to thoracic spine. \".   Total Treatment Duration: 32 minutes   PT Patient Time In/Time Out  Time In: 1015  Time Out: [address] [facility],

## 2017-08-21 ENCOUNTER — APPOINTMENT (OUTPATIENT)
Dept: PHYSICAL THERAPY | Age: 82
End: 2017-08-21
Payer: MEDICARE

## 2017-08-23 ENCOUNTER — APPOINTMENT (OUTPATIENT)
Dept: PHYSICAL THERAPY | Age: 82
End: 2017-08-23
Payer: MEDICARE

## 2017-10-10 PROBLEM — J44.9 COPD (CHRONIC OBSTRUCTIVE PULMONARY DISEASE) (HCC): Chronic | Status: ACTIVE | Noted: 2017-04-10

## 2017-10-18 NOTE — PROGRESS NOTES
Maximino Cristina  : 1935 [facility] JAZMIN ROJAS  [address] [facility], [address] [place] [address],[address], [place] [address].  [phone]   [phone]         OUTPATIENT PHYSICAL THERAPY:Discontinuation Summary 10/18/2017    ICD-10: Treatment Diagnosis: Pain in thoracic spine (M54.6), Pain in left shoulder (M25.512) , Abnormal posture (R29.3)  Precautions/Allergies:   Iodinated contrast- oral and iv dye and Sulfa (sulfonamide antibiotics)   Fall Risk Score: 1 (? 5 = High Risk)  MD Orders: Evaluate and treat (17) MEDICAL/REFERRING DIAGNOSIS:  back pain   DATE OF ONSET: approx 1 year ago  REFERRING PHYSICIAN: Avtar Fofana MD  [address]: 17      DISCONTINUATION ASSESSMENT:   Maximino Critsina has been seen in physical therapy from 17 to 17 for 20 visits. Treatment has been discontinued at this time due to Expiration of plan of care without further referral by ordering physician. The below goals were met prior to discontinuation. Some goals were not met due to lingering nature of thoracic pain symptoms. Thank you for this referral.        PROBLEM LIST (Impacting functional limitations):  1. Increased Pain  2. Decreased Activity Tolerance  3. Decreased Flexibility/Joint Mobility INTERVENTIONS PLANNED:  1. Home Exercise Program (HEP)  2. Manual Therapy  3. Range of Motion (ROM)  4. Therapeutic Exercise/Strengthening     GOALS: (Goals have been discussed and agreed upon with patient.)  Short-Term Functional Goals: Time Frame: 5 weeks  1. Patient will be independent with initial HEP to improve patient's posture. MET  2. Patient will report being able to stand for 30 minutes or greater without increased L scapula and upper back pain. Not met  3. Patient will report being able to sit and work at computer for greater than 30 minutes without increased L scapula and upper back pain. Not met  Discharge Goals: Time Frame:10 weeks  1.  Patient will report >2 weeks without onset of L scapula/upper back pain. Not met  2. Patient will report no limitations with prolonged standing or sitting secondary to L scapula pain to facilitate return to full functional and recreational activity participation. Not met   3. Patient will have no positive L shoulder impingement tests and be able to move L shoulder through 170 degrees of active abduction and active flexion without increased L shoulder discomfort. Not met for ROM; but met for special tests    Tashi Viera, PT   10/18/2017                The information in this section was collected on 6/1/17  (except where otherwise noted). HISTORY:   History of Present Injury/Illness (Reason for Referral):  Patient reports that approximately 1 year ago he began developing L sided midback and L shoulder blade pain that will occasionally radiate to his neck and upper back. He initially was concerned it was a cardiac issue, but he has had a nuclear stress test and EKG, which showed no cardiac abnormalities. Patient has also had a cervical MRI which was unremarkable. Patient states that his shoulder blade pain limits him to approximately 10 minutes of prolonged, static standing. He has applied heat pack to the area, which helps, and it feels better to sit with a back support. He also feels pain when sitting at a desk and working on a computer for prolonged periods. Patient denies the pain worsening at any time of day, denies paresthesias, and reports that his pain has occurred with an insidious onset. Past Medical History/Comorbidities:   Mr. 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 Mr. Jason Devries  has a past surgical history that includes cardiac surg procedure unlist (1991); cardiac surg procedure unlist (9/2003); cholecystectomy (9/2000); neurological procedure unlisted (12/1981); hernia repair (09/1993); gi (11/2003); lumbar laminectomy (5/2005); other surgical (05/2002); colonoscopy (02/28/2013); endoscopy (02/28/2013); vascular surgery procedure unlist (1991); orthopaedic (1/2006); orthopaedic (8/2005); orthopaedic (2005); heart catheterization (04/2015); other surgical (11/2014); other surgical; orthopaedic (03/1955); and thyroidectomy (08/24/2017). Social History/Living Environment:    Patient lives with his spouse. He is retired. Prior Level of Function/Work/Activity:  Patient is independent with all activities and mobility. Dominant Side:         RIGHT  Current Medications:       Current Outpatient Prescriptions:     levothyroxine (SYNTHROID) 125 mcg tablet, Take  by mouth Daily (before breakfast). , Disp: , Rfl:     fluticasone (FLONASE) 50 mcg/actuation nasal spray, One spray to each nostril daily, Disp: 3 Bottle, Rfl: 3    tiotropium-olodaterol (STIOLTO RESPIMAT) 2.5-2.5 mcg/actuation mist, Take 2 Puffs by inhalation daily. , Disp: 3 Inhaler, Rfl: 3    atorvastatin (LIPITOR) 40 mg tablet, Take 1 Tab by mouth daily. , Disp: 90 Tab, Rfl: 3    metoprolol tartrate (LOPRESSOR) 25 mg tablet, Take 1 Tab by mouth two (2) times a day., Disp: 180 Tab, Rfl: 3    amLODIPine (NORVASC) 5 mg tablet, Take 1 Tab by mouth daily. , Disp: 90 Tab, Rfl: 3    albuterol sulfate 90 mcg/actuation aepb, Take 2 Puffs by inhalation every four (4) hours as needed. , Disp: , Rfl:     omeprazole (PRILOSEC) 20 mg capsule, Take 1 Cap by mouth daily as needed. , Disp: 90 Cap, Rfl: 3    aspirin 81 mg chewable tablet, Take 81 mg by mouth daily. , Disp: , Rfl:     finasteride (PROSCAR) 5 mg tablet, Take 5 mg by mouth daily. , Disp: , Rfl:     terazosin (HYTRIN) 10 mg capsule, Take 10 mg by mouth nightly., Disp: , Rfl:    Date Last Reviewed:  8-10-17   Number of Personal Factors/Comorbidities that affect the Plan of Care: 1-2: MODERATE COMPLEXITY   EXAMINATION:   8-10-17    Palpation:        Tenderness and tightness to L teres major, L teres minor, L latissimus dorsi. Normal sensation to light touch along thoracic dermatomes in region where patient experiences discomfort  ROM:          L AROM: flexion 170 degrees, abduction 178 degrees        Special Tests:    Negative special tests for L shoulder impingement  Neurological Screen:        Dermatomes:  Normal B UE        Sensation: intact to light touch   Body Structures Involved:  1. Bones  2. Joints  3. Muscles Body Functions Affected:  1. Sensory/Pain  2. Neuromusculoskeletal  3. Movement Related Activities and Participation Affected:  1. Self Care  2. Domestic Life  3. Interpersonal Interactions and Relationships   Number of elements (examined above) that affect the Plan of Care: 4+: HIGH COMPLEXITY   CLINICAL PRESENTATION:   Presentation: Stable and uncomplicated: LOW COMPLEXITY   CLINICAL DECISION MAKING:   Outcome Measure: Tool Used: Modified Oswestry Low Back Pain Questionnaire  Score:  Initial: 9/50  Most Recent: 13/50 (Date: 7-10-17)     Most Recent:  13/50 (Date: 8-10-17)   Interpretation of Score: Each section is scored on a 0-5 scale, 5 representing the greatest disability. The scores of each section are added together for a total score of 50. Score 0 1-10 11-20 21-30 31-40 41-49 50   Modifier CH CI CJ CK CL CM CN     ?  Changing and Maintaining Body Position:    [id number] - CURRENT STATUS: CJ - 20%-39% impaired, limited or restricted    - GOAL STATUS: CI - 1%-19% impaired, limited or restricted    - D/C STATUS:  ---------------To be determined---------------    Medical Necessity:   · No longer recommended as patient is being discontinued from current PT treatment  Reason for Services/Other Comments:  · No longer needs skilled PT services as current episode is being discontinued   Use of outcome tool(s) and clinical judgement create a POC that gives a: Clear prediction of patient's progress: LOW COMPLEXITY

## 2017-10-27 PROBLEM — E89.0 POSTSURGICAL HYPOTHYROIDISM: Status: ACTIVE | Noted: 2017-04-11

## 2018-04-23 ENCOUNTER — HOSPITAL ENCOUNTER (OUTPATIENT)
Dept: LAB | Age: 83
Discharge: HOME | End: 2018-04-23
Payer: MEDICARE

## 2018-04-23 DIAGNOSIS — D69.6 THROMBOCYTOPENIA, UNSPECIFIED (HCC): ICD-10-CM

## 2018-04-23 LAB
ALBUMIN SERPL-MCNC: 4.1 G/DL (ref 3.2–4.6)
ALBUMIN/GLOB SERPL: 1.6 {RATIO} (ref 1.2–3.5)
ALP SERPL-CCNC: 70 U/L (ref 50–136)
ALT SERPL-CCNC: 28 U/L (ref 12–65)
ANION GAP SERPL CALC-SCNC: 6 MMOL/L (ref 7–16)
AST SERPL-CCNC: 25 U/L (ref 15–37)
BASOPHILS # BLD: 0 K/UL (ref 0–0.2)
BASOPHILS NFR BLD: 1 % (ref 0–2)
BILIRUB SERPL-MCNC: 2 MG/DL (ref 0.2–1.1)
BUN SERPL-MCNC: 18 MG/DL (ref 8–23)
CALCIUM SERPL-MCNC: 8.9 MG/DL (ref 8.3–10.4)
CHLORIDE SERPL-SCNC: 99 MMOL/L (ref 98–107)
CO2 SERPL-SCNC: 28 MMOL/L (ref 21–32)
CREAT SERPL-MCNC: 1.05 MG/DL (ref 0.8–1.5)
DIFFERENTIAL METHOD BLD: ABNORMAL
EOSINOPHIL # BLD: 0.1 K/UL (ref 0–0.8)
EOSINOPHIL NFR BLD: 2 % (ref 0.5–7.8)
ERYTHROCYTE [DISTWIDTH] IN BLOOD BY AUTOMATED COUNT: 13.5 % (ref 11.9–14.6)
GLOBULIN SER CALC-MCNC: 2.5 G/DL (ref 2.3–3.5)
GLUCOSE SERPL-MCNC: 104 MG/DL (ref 65–100)
HCT VFR BLD AUTO: 31.9 % (ref 41.1–50.3)
HGB BLD-MCNC: 11.9 G/DL (ref 13.6–17.2)
LDH SERPL L TO P-CCNC: 195 U/L (ref 110–210)
LYMPHOCYTES # BLD: 0.8 K/UL (ref 0.5–4.6)
LYMPHOCYTES NFR BLD: 23 % (ref 13–44)
MCH RBC QN AUTO: 36 PG (ref 26.1–32.9)
MCHC RBC AUTO-ENTMCNC: 37.3 G/DL (ref 31.4–35)
MCV RBC AUTO: 96.4 FL (ref 79.6–97.8)
MONOCYTES # BLD: 0.3 K/UL (ref 0.1–1.3)
MONOCYTES NFR BLD: 9 % (ref 4–12)
NEUTS SEG # BLD: 2.3 K/UL (ref 1.7–8.2)
NEUTS SEG NFR BLD: 67 % (ref 43–78)
NRBC # BLD: 0 K/UL (ref 0–0.2)
PLATELET # BLD AUTO: 97 K/UL (ref 150–450)
PMV BLD AUTO: 8.4 FL (ref 10.8–14.1)
POTASSIUM SERPL-SCNC: 4.4 MMOL/L (ref 3.5–5.1)
PROT SERPL-MCNC: 6.6 G/DL (ref 6.3–8.2)
RBC # BLD AUTO: 3.31 M/UL (ref 4.23–5.67)
SODIUM SERPL-SCNC: 133 MMOL/L (ref 136–145)
WBC # BLD AUTO: 3.5 K/UL (ref 4.3–11.1)

## 2018-04-23 PROCEDURE — 36415 COLL VENOUS BLD VENIPUNCTURE: CPT | Performed by: INTERNAL MEDICINE

## 2018-04-23 PROCEDURE — 83615 LACTATE (LD) (LDH) ENZYME: CPT | Performed by: INTERNAL MEDICINE

## 2018-04-23 PROCEDURE — 80053 COMPREHEN METABOLIC PANEL: CPT | Performed by: INTERNAL MEDICINE

## 2018-04-23 PROCEDURE — 85025 COMPLETE CBC W/AUTO DIFF WBC: CPT | Performed by: INTERNAL MEDICINE

## 2018-05-10 ENCOUNTER — HOSPITAL ENCOUNTER (OUTPATIENT)
Dept: LAB | Age: 83
Discharge: HOME | End: 2018-05-10
Payer: MEDICARE

## 2018-05-10 DIAGNOSIS — I10 ESSENTIAL HYPERTENSION WITH GOAL BLOOD PRESSURE LESS THAN 130/85: ICD-10-CM

## 2018-05-10 DIAGNOSIS — I25.709 CORONARY ARTERY DISEASE INVOLVING CORONARY BYPASS GRAFT OF NATIVE HEART WITH ANGINA PECTORIS (HCC): ICD-10-CM

## 2018-05-10 DIAGNOSIS — J44.9 CHRONIC OBSTRUCTIVE PULMONARY DISEASE, UNSPECIFIED COPD TYPE (HCC): Chronic | ICD-10-CM

## 2018-05-10 DIAGNOSIS — E78.5 DYSLIPIDEMIA: ICD-10-CM

## 2018-05-10 LAB
ANION GAP SERPL CALC-SCNC: 2 MMOL/L
BASOPHILS # BLD: 0 K/UL (ref 0–0.2)
BASOPHILS NFR BLD: 0 % (ref 0–2)
BUN SERPL-MCNC: 15 MG/DL (ref 8–23)
CALCIUM SERPL-MCNC: 9 MG/DL (ref 8.3–10.4)
CHLORIDE SERPL-SCNC: 106 MMOL/L (ref 98–107)
CO2 SERPL-SCNC: 30 MMOL/L (ref 21–32)
CREAT SERPL-MCNC: 1 MG/DL (ref 0.8–1.5)
DIFFERENTIAL METHOD BLD: ABNORMAL
EOSINOPHIL # BLD: 0.1 K/UL (ref 0–0.8)
EOSINOPHIL NFR BLD: 2 % (ref 0.5–7.8)
ERYTHROCYTE [DISTWIDTH] IN BLOOD BY AUTOMATED COUNT: 13.5 % (ref 11.9–14.6)
GLUCOSE SERPL-MCNC: 84 MG/DL (ref 65–100)
HCT VFR BLD AUTO: 34.6 % (ref 41.1–50.3)
HGB BLD-MCNC: 12.5 G/DL (ref 13.6–17.2)
INR PPP: 1
LYMPHOCYTES # BLD: 0.7 K/UL (ref 0.5–4.6)
LYMPHOCYTES NFR BLD: 21 % (ref 13–44)
MCH RBC QN AUTO: 35.8 PG (ref 26.1–32.9)
MCHC RBC AUTO-ENTMCNC: 36.1 G/DL (ref 31.4–35)
MCV RBC AUTO: 99.1 FL (ref 79.6–97.8)
MONOCYTES # BLD: 0.4 K/UL (ref 0.1–1.3)
MONOCYTES NFR BLD: 11 % (ref 4–12)
NEUTS SEG # BLD: 2.4 K/UL (ref 1.7–8.2)
NEUTS SEG NFR BLD: 66 % (ref 43–78)
PLATELET # BLD AUTO: 110 K/UL (ref 150–450)
PMV BLD AUTO: 8.5 FL (ref 10.8–14.1)
POTASSIUM SERPL-SCNC: 4.3 MMOL/L (ref 3.5–5.1)
PROTHROMBIN TIME: 13.9 SEC (ref 11.5–14.5)
RBC # BLD AUTO: 3.49 M/UL (ref 4.23–5.67)
SODIUM SERPL-SCNC: 138 MMOL/L (ref 136–145)
WBC # BLD AUTO: 3.5 K/UL (ref 4.3–11.1)

## 2018-05-10 PROCEDURE — 85610 PROTHROMBIN TIME: CPT | Performed by: INTERNAL MEDICINE

## 2018-05-10 PROCEDURE — 85025 COMPLETE CBC W/AUTO DIFF WBC: CPT | Performed by: INTERNAL MEDICINE

## 2018-05-10 PROCEDURE — 80048 BASIC METABOLIC PNL TOTAL CA: CPT | Performed by: INTERNAL MEDICINE

## 2018-05-10 PROCEDURE — 36415 COLL VENOUS BLD VENIPUNCTURE: CPT | Performed by: INTERNAL MEDICINE

## 2018-05-23 NOTE — PROGRESS NOTES
Called to pre-assess for [facility] poss with Dr Olimpia Ramsey , Scheduled 8-13-47. No answer & message left.

## 2018-05-23 NOTE — PROGRESS NOTES
Patient pre-assessment complete for [facility] poss with Dr Clark Gomez scheduled for 18 at 8am, arrival time 6am. Patient verified using . Patient instructed to bring all home medications in labeled bottles on the day of procedure. NPO status reinforced. Patient informed to take a full dose aspirin 325mg  or 81 mg x 4 on the day of procedure. . Instructed they can take all other medications excluding vitamins & supplements. Patient verbalizes understanding of all instructions & denies any questions at this time.

## 2018-05-24 ENCOUNTER — HOSPITAL ENCOUNTER (OUTPATIENT)
Dept: CARDIAC CATH/INVASIVE PROCEDURES | Age: 83
Discharge: HOME | End: 2018-05-24
Attending: INTERNAL MEDICINE | Admitting: INTERNAL MEDICINE
Payer: MEDICARE

## 2018-05-24 VITALS
SYSTOLIC BLOOD PRESSURE: 173 MMHG | DIASTOLIC BLOOD PRESSURE: 81 MMHG | TEMPERATURE: 98.1 F | HEIGHT: 71 IN | RESPIRATION RATE: 16 BRPM | OXYGEN SATURATION: 95 % | BODY MASS INDEX: 30.38 KG/M2 | WEIGHT: 217 LBS | HEART RATE: 56 BPM

## 2018-05-24 LAB
ALBUMIN SERPL-MCNC: 3.7 G/DL (ref 3.2–4.6)
ALBUMIN/GLOB SERPL: 1.4 {RATIO} (ref 1.2–3.5)
ALP SERPL-CCNC: 62 U/L (ref 50–136)
ALT SERPL-CCNC: 23 U/L (ref 12–65)
ANION GAP SERPL CALC-SCNC: 8 MMOL/L (ref 7–16)
AST SERPL-CCNC: 22 U/L (ref 15–37)
ATRIAL RATE: 60 BPM
BILIRUB SERPL-MCNC: 1.9 MG/DL (ref 0.2–1.1)
BUN SERPL-MCNC: 16 MG/DL (ref 8–23)
CALCIUM SERPL-MCNC: 8.7 MG/DL (ref 8.3–10.4)
CALCULATED P AXIS, ECG09: 50 DEGREES
CALCULATED R AXIS, ECG10: -13 DEGREES
CALCULATED T AXIS, ECG11: 80 DEGREES
CHLORIDE SERPL-SCNC: 105 MMOL/L (ref 98–107)
CO2 SERPL-SCNC: 26 MMOL/L (ref 21–32)
CREAT SERPL-MCNC: 0.95 MG/DL (ref 0.8–1.5)
DIAGNOSIS, 93000: NORMAL
ERYTHROCYTE [DISTWIDTH] IN BLOOD BY AUTOMATED COUNT: 13.6 % (ref 11.9–14.6)
GLOBULIN SER CALC-MCNC: 2.6 G/DL (ref 2.3–3.5)
GLUCOSE SERPL-MCNC: 97 MG/DL (ref 65–100)
HCT VFR BLD AUTO: 32.4 % (ref 41.1–50.3)
HGB BLD-MCNC: 11.7 G/DL (ref 13.6–17.2)
INR PPP: 1.1
MCH RBC QN AUTO: 34.5 PG (ref 26.1–32.9)
MCHC RBC AUTO-ENTMCNC: 36.1 G/DL (ref 31.4–35)
MCV RBC AUTO: 95.6 FL (ref 79.6–97.8)
P-R INTERVAL, ECG05: 246 MS
PLATELET # BLD AUTO: 96 K/UL (ref 150–450)
PMV BLD AUTO: 9.2 FL (ref 10.8–14.1)
POTASSIUM SERPL-SCNC: 3.9 MMOL/L (ref 3.5–5.1)
PROT SERPL-MCNC: 6.3 G/DL (ref 6.3–8.2)
PROTHROMBIN TIME: 13.9 SEC (ref 11.5–14.5)
Q-T INTERVAL, ECG07: 444 MS
QRS DURATION, ECG06: 94 MS
QTC CALCULATION (BEZET), ECG08: 444 MS
RBC # BLD AUTO: 3.39 M/UL (ref 4.23–5.67)
SODIUM SERPL-SCNC: 139 MMOL/L (ref 136–145)
VENTRICULAR RATE, ECG03: 60 BPM
WBC # BLD AUTO: 3 K/UL (ref 4.3–11.1)

## 2018-05-24 PROCEDURE — 99153 MOD SED SAME PHYS/QHP EA: CPT

## 2018-05-24 PROCEDURE — 77030019569 HC BND COMPR RAD TERU -B

## 2018-05-24 PROCEDURE — 93459 L HRT ART/GRFT ANGIO: CPT

## 2018-05-24 PROCEDURE — C1760 CLOSURE DEV, VASC: HCPCS

## 2018-05-24 PROCEDURE — 99152 MOD SED SAME PHYS/QHP 5/>YRS: CPT

## 2018-05-24 PROCEDURE — 77030013687 HC GD NDL BARD -B

## 2018-05-24 PROCEDURE — 85610 PROTHROMBIN TIME: CPT | Performed by: INTERNAL MEDICINE

## 2018-05-24 PROCEDURE — C1769 GUIDE WIRE: HCPCS

## 2018-05-24 PROCEDURE — 77030004534 HC CATH ANGI DX INFN CARD -A

## 2018-05-24 PROCEDURE — 74011250636 HC RX REV CODE- 250/636: Performed by: INTERNAL MEDICINE

## 2018-05-24 PROCEDURE — 80053 COMPREHEN METABOLIC PANEL: CPT | Performed by: INTERNAL MEDICINE

## 2018-05-24 PROCEDURE — 74011636320 HC RX REV CODE- 636/320: Performed by: INTERNAL MEDICINE

## 2018-05-24 PROCEDURE — 74011250636 HC RX REV CODE- 250/636

## 2018-05-24 PROCEDURE — 74011000250 HC RX REV CODE- 250: Performed by: INTERNAL MEDICINE

## 2018-05-24 PROCEDURE — 76937 US GUIDE VASCULAR ACCESS: CPT

## 2018-05-24 PROCEDURE — 77030004558 HC CATH ANGI DX SUPR TORQ CARD -A

## 2018-05-24 PROCEDURE — 77030004559 HC CATH ANGI DX SUPT CARD -B

## 2018-05-24 PROCEDURE — 85027 COMPLETE CBC AUTOMATED: CPT | Performed by: INTERNAL MEDICINE

## 2018-05-24 PROCEDURE — C1894 INTRO/SHEATH, NON-LASER: HCPCS

## 2018-05-24 PROCEDURE — 93005 ELECTROCARDIOGRAM TRACING: CPT | Performed by: INTERNAL MEDICINE

## 2018-05-24 RX ORDER — MIDAZOLAM HYDROCHLORIDE 1 MG/ML
.5-5 INJECTION, SOLUTION INTRAMUSCULAR; INTRAVENOUS
Status: DISCONTINUED | OUTPATIENT
Start: 2018-05-24 | End: 2018-05-24 | Stop reason: HOSPADM

## 2018-05-24 RX ORDER — GUAIFENESIN 100 MG/5ML
81-324 LIQUID (ML) ORAL ONCE
Status: DISCONTINUED | OUTPATIENT
Start: 2018-05-24 | End: 2018-05-24 | Stop reason: HOSPADM

## 2018-05-24 RX ORDER — SODIUM CHLORIDE 9 MG/ML
75 INJECTION, SOLUTION INTRAVENOUS CONTINUOUS
Status: DISCONTINUED | OUTPATIENT
Start: 2018-05-24 | End: 2018-05-24 | Stop reason: HOSPADM

## 2018-05-24 RX ORDER — HEPARIN SODIUM 200 [USP'U]/100ML
3 INJECTION, SOLUTION INTRAVENOUS CONTINUOUS
Status: DISCONTINUED | OUTPATIENT
Start: 2018-05-24 | End: 2018-05-24 | Stop reason: HOSPADM

## 2018-05-24 RX ORDER — HYDROCORTISONE SODIUM SUCCINATE 100 MG/2ML
100 INJECTION, POWDER, FOR SOLUTION INTRAMUSCULAR; INTRAVENOUS ONCE
Status: COMPLETED | OUTPATIENT
Start: 2018-05-24 | End: 2018-05-24

## 2018-05-24 RX ORDER — DIAZEPAM 5 MG/1
5 TABLET ORAL ONCE
Status: DISCONTINUED | OUTPATIENT
Start: 2018-05-24 | End: 2018-05-24 | Stop reason: HOSPADM

## 2018-05-24 RX ORDER — FENTANYL CITRATE 50 UG/ML
25-100 INJECTION, SOLUTION INTRAMUSCULAR; INTRAVENOUS
Status: DISCONTINUED | OUTPATIENT
Start: 2018-05-24 | End: 2018-05-24 | Stop reason: HOSPADM

## 2018-05-24 RX ORDER — HYDROCODONE BITARTRATE AND ACETAMINOPHEN 5; 325 MG/1; MG/1
1 TABLET ORAL
Status: DISCONTINUED | OUTPATIENT
Start: 2018-05-24 | End: 2018-05-24 | Stop reason: HOSPADM

## 2018-05-24 RX ORDER — SODIUM CHLORIDE 0.9 % (FLUSH) 0.9 %
5-10 SYRINGE (ML) INJECTION AS NEEDED
Status: DISCONTINUED | OUTPATIENT
Start: 2018-05-24 | End: 2018-05-24 | Stop reason: HOSPADM

## 2018-05-24 RX ORDER — LIDOCAINE HYDROCHLORIDE 20 MG/ML
1-20 INJECTION, SOLUTION INFILTRATION; PERINEURAL
Status: DISCONTINUED | OUTPATIENT
Start: 2018-05-24 | End: 2018-05-24 | Stop reason: HOSPADM

## 2018-05-24 RX ORDER — ONDANSETRON 2 MG/ML
4 INJECTION INTRAMUSCULAR; INTRAVENOUS
Status: DISCONTINUED | OUTPATIENT
Start: 2018-05-24 | End: 2018-05-24 | Stop reason: HOSPADM

## 2018-05-24 RX ORDER — ACETAMINOPHEN 325 MG/1
650 TABLET ORAL
Status: DISCONTINUED | OUTPATIENT
Start: 2018-05-24 | End: 2018-05-24 | Stop reason: HOSPADM

## 2018-05-24 RX ORDER — SODIUM CHLORIDE 0.9 % (FLUSH) 0.9 %
5-10 SYRINGE (ML) INJECTION EVERY 8 HOURS
Status: DISCONTINUED | OUTPATIENT
Start: 2018-05-24 | End: 2018-05-24 | Stop reason: HOSPADM

## 2018-05-24 RX ADMIN — MIDAZOLAM HYDROCHLORIDE 2 MG: 1 INJECTION, SOLUTION INTRAMUSCULAR; INTRAVENOUS at 08:50

## 2018-05-24 RX ADMIN — LIDOCAINE HYDROCHLORIDE 140 MG: 20 INJECTION, SOLUTION INFILTRATION; PERINEURAL at 08:30

## 2018-05-24 RX ADMIN — FENTANYL CITRATE 25 MCG: 50 INJECTION, SOLUTION INTRAMUSCULAR; INTRAVENOUS at 08:24

## 2018-05-24 RX ADMIN — HEPARIN SODIUM 2 ML: 10000 INJECTION, SOLUTION INTRAVENOUS; SUBCUTANEOUS at 08:53

## 2018-05-24 RX ADMIN — HEPARIN SODIUM 3 ML/HR: 200 INJECTION, SOLUTION INTRAVENOUS at 08:21

## 2018-05-24 RX ADMIN — MIDAZOLAM HYDROCHLORIDE 2 MG: 1 INJECTION, SOLUTION INTRAMUSCULAR; INTRAVENOUS at 08:24

## 2018-05-24 RX ADMIN — HYDROCORTISONE SODIUM SUCCINATE 100 MG: 100 INJECTION, POWDER, FOR SOLUTION INTRAMUSCULAR; INTRAVENOUS at 07:46

## 2018-05-24 RX ADMIN — LIDOCAINE HYDROCHLORIDE 60 MG: 20 INJECTION, SOLUTION INFILTRATION; PERINEURAL at 08:50

## 2018-05-24 RX ADMIN — IOPAMIDOL 190 ML: 755 INJECTION, SOLUTION INTRAVENOUS at 09:07

## 2018-05-24 NOTE — PROGRESS NOTES
Patient up to bedside, vital signs stable. Patient ambulated to bathroom without difficulty. Patient voided without difficulty. 1119 Discharge instructions and home medications reviewed with patient. Time allowed for questions and answers. 1130  Peripheral IV site dc'd without difficulty with tip intact. 1140 Patient discharged to home with family.

## 2018-05-24 NOTE — PROGRESS NOTES
TR band release completed; no bleeding no hematoma noted; site cleaned with chloraprep and opsite applied using sterile technique. +2 left radial noted

## 2018-05-24 NOTE — PROGRESS NOTES
Report received from Berta Elizondo Cath Lab RN. Procedural findings communicated. Intra procedural  medication administration reviewed. Progression of care discussed. Patient received into [id number] [facility] 6 post sheath removal.     Access site without bleeding or swelling yes    Dressing dry and intact yes    Patient instructed to limit movement to right lower extremity and left upper extremity.     Routine post procedural vital signs and site assessment initiated yes

## 2018-05-24 NOTE — PROCEDURES
Brief Cardiac Procedure Note    Patient: Maximino Whiting MRN: [id number]  SSN: [id number]    YOB: 1935  Age: [de-identified] y.o. Sex: male      Date of Procedure: 5/24/2018     Pre-procedure Diagnosis: Typical Angina    Post-procedure Diagnosis: Coronary Artery Disease    Procedure: Left Heart Catheterization    Brief Description of Procedure: As above    Performed By: Justyn Haskins MD     Assistants: None    Anesthesia: Moderate Sedation    Estimated Blood Loss: Less than 10 mL      Specimens: None    Implants: None    Findings: Patent grafts. Small vessel disease. Normal LV function. Difficult cath due to aortic and subclavian tortuosity. Left radial and right femoral access. Mynx closure. Complications: None    Recommendations: Continue medical therapy.     Signed By: Justyn Haskins MD     May 24, 2018

## 2018-05-24 NOTE — DISCHARGE INSTRUCTIONS
HEART CATHETERIZATION/ANGIOGRAPHY DISCHARGE INSTRUCTIONS    1. Check puncture site frequently for swelling or bleeding. If there is any bleeding, lie down and apply pressure over the area with a clean towel or washcloth. If bleeding does not stop quickly, call 911 and hold pressure until EMS arrives. Notify your doctor for any redness, swelling, drainage, or oozing from the puncture site. Notify your doctor for any fever or chills. 2. If the extremity becomes cold, numb, or painful call Dr. Olimpia Ramsey at [phone].  3. Activity should be limited for the next 48 hours. Climb stairs as little as possible and avoid any stooping, bending, or strenuous activity for 48 hours. No heavy lifting (anything over 10 pounds) for 3 days. No strenuous activity with left arm until left wrist site heals. 4. You may resume your usual diet. Drink more fluids than usual.  5. Have a responsible person drive you home and stay with you for at least 24 hours after your heart catheterization/angiography. 6. Remove bandage from your left wrist and right groin in 24 hours. You may shower in 24 hours. No tub baths, hot tubs, or swimming for 1 week. Do not place any lotions, creams, powders, or ointments over puncture site for 1 week. Keep your left wrist and right groin puncture sites clean, dry, and open to air until sites heal.  I have read the above instructions and have had the opportunity to ask questions.       Patient: ________________________   Date: 5/24/2018    Witness: _______________________   Date: 5/24/2018

## 2018-05-24 NOTE — PROGRESS NOTES
Pt arrived, ambulated to room with no visible problems, planned C for Dr Jake Rivera. Consent signed, Procedure discussed with pt all questions answered voiced understanding. Medications and history discussed with pt.     Pt prepped per ordersThe patient has a fraility score of 4-VULNERABLE, based on ability to complete ADLs without problems but experiencing heaviness in chest with exertion      Patient took Aspirin 324mg  today at 0430 prior to arrival.

## 2018-05-24 NOTE — PROGRESS NOTES
TRANSFER - OUT REPORT:    Verbal report given to kathya rn(name) on Maximino Jin  being transferred to cpru(unit) for routine progression of care       Report consisted of patients Situation, Background, Assessment and   Recommendations(SBAR). Information from the following report(s) SBAR was reviewed with the receiving nurse. Lines:   Peripheral IV 05/24/18 Left Forearm (Active)        Opportunity for questions and clarification was provided.       Patient transported with:   Registered Nurse   [facility] Dr Karely Kunz  No intervention  Left wrist tr band 12ml  Right groin mynx closure  No bleeding or hematoma  Versed 4mg  Fentanyl 25mcg

## 2018-05-24 NOTE — PROCEDURES
[address]  MR#: [id number]  : 1935  ACCOUNT #: [de-identified]   DATE OF SERVICE: 2018    PROCEDURES:    1. Left heart catheterization, selective coronary aortography, left ventriculogram via the right femoral and left radial approach. 2.  Saphenous vein graft arteriography. 3.  Left internal mammary artery arteriography. INDICATION:  Recurrent exertional chest pain in a patient on appropriate antianginal therapy. Anginal-limiting lifestyle. The patient currently on Lopressor 25 and amlodipine 5 mg a day. TECHNICAL FACTORS:  After informed consent was obtained, the patient brought to cardiac catheterization lab. The right femoral area was prepped and draped in the usual sterile fashion. Using [employer] ultrasound, the right common femoral artery was identified. A 6-[place] Terumo slender sheath was placed under ultrasound guidance. A JR4 catheter was used to selectively engage the ostium of the right coronary artery. Selective injections in various projections were performed. A multipurpose catheter was used to engage the sequential saphenous vein graft to right PDA and posterolateral, sequential saphenous vein graft to ramus and obtuse marginal and saphenous vein graft to diagonal.  Selective injections in various projections were performed. A JL5 catheter was used to selectively engage the ostium of the left main coronary artery. Selective injection in various projections were performed. A pigtail catheter was used to cross the aortic valve and enter the left ventricle. Hemodynamic measurements and left ventriculogram were obtained. Left ventricular aortic pressure gradient was obtained by pullback technique. The LIMA could not be selectively engaged from the right femoral artery due to significant subclavian tortuosity and recent thoracic endograft placement. Catheter manipulation was not possible.   At this point, the left radial artery was prepped and draped in the usual sterile fashion. Using a modified Seldinger technique and a micropuncture kit, the left radial artery was entered. A 6-[place] Terumo slender sheath was placed without difficulty. A radial cocktail consisting of 2000 units of heparin, 2 mg of verapamil and 200 mcg of nitroglycerin was administered. A 5-[place] LIMA catheter was used to selectively engage the ostium of the MARTINEZ to LAD. Selective injections in various projections were performed. At the conclusion of the diagnostic procedure, a right femoral arteriogram was performed that showed that the sheath was in the right common femoral artery. A 6-[place] Mynx vascular closure device was deployed by standard technique. Good hemostasis was achieved. The radial sheath was removed and a pneumatic band was placed with excellent hemostasis. SEDATION:  The patient received 4 mg of Versed and 25 mcg of fentanyl for moderate supervised conscious sedation. Sedation start time was 8:24, with the procedure completion time of 9:02. Sedation was administered by Gene Bradford RN, under my supervision. HEMODYNAMICS:  1. Aortic pressure 157/68. 2.  Left ventricular end-diastolic pressure is 10.  3. There was no significant gradient across the aortic valve. CONTRAST:  Isovue 190. ANGIOGRAPHIC RESULTS:  1. Left main coronary artery:  Large caliber vessel. Heavily disease. Severe calcification in the mid distal segment with 50% stenosis. 2.  LAD:  Heavily diseased with 100% proximal stenosis. 3.  Left circumflex:  Medium caliber vessel proximally, becomes smaller caliber in the distal vessel. There is a 40% mid stenosis. 4.  First obtuse marginal versus ramus:  100% occluded mid segment. 5.  Second obtuse marginal:  There is competitive filling from the saphenous vein graft. 6.  Right coronary artery is heavily diseased with severe calcification.   70% proximal, 80% mid and 100% distal occlusion. BYPASS GRAFT ANATOMY:  1. Saphenous vein graft to the right posterolateral and right PDA. Large caliber vessel. 20% proximal and 20% mid stenosis. It attaches to a small caliber right posterolateral branch, and then the ongoing vein graft has a 40% mid stenosis and attached to a very small caliber distal PDA. 2.  Saphenous vein graft to diagonal:  Large caliber vessel. 20% to 30% proximal.  Attached to first diagonal artery, which is patent distal to the anastomosis and retrograde fills the proximal LAD. 3.  Sequential saphenous vein graft to ramus and first obtuse marginal:  Large caliber vessel. 10% to 20% mid stenosis. It attached to a small caliber ramus versus first obtuse marginal, and then a medium caliber second obtuse marginal.  Both vessels are patent distal to the anastomosis. 4.  LIMA to LAD was engaged from the left radial approach. Left subclavian is very tortuous. The LIMA is small caliber but is patent. Attached to the distal LAD. The distal LAD contains 40% stenosis. The apical LAD contains a 70% stenosis. 5.  Left ventriculogram performed in the GLASER projection shows normal left ventricular systolic function, EF 13% to 60%. No focal segmental wall motion abnormalities. Aortic root is nondilated. CONCLUSIONS:  1. Severe multivessel coronary artery disease. 2.  Six of 6 bypass grafts patent. 3.  Normal left ventricular systolic function. 4.  Small vessel disease. PLAN:  Ongoing medical therapy. No targets for revascularization via surgical or percutaneous approach.       MD MARYLIN Jeronimo / DAYRON  D: 05/24/2018 09:19     T: 05/24/2018 10:04  JOB #: [id number]  CC: Thang Rice MD

## 2018-05-24 NOTE — IP AVS SNAPSHOT
[address] 
[phone] Patient: Cirss Lind MRN: [id number] CZE:[id number]/[id number] Discharge Summary 5/24/2018 Maximino Dominguez MRN[de-identified]  [id number] Admission Information Provider Pager Service Admission Date Expected D/C Date Clare Guthrie MD  CARDIAC CATH LAB 5/24/2018 Actual LOS Patient Class 0 days OUTPATIENT Follow-up Information Follow up With Details Comments Contact Info Clare Guthrie MD  Friday, June 29, 2018, at 12:15 p.m. [address] [facility] [address] 
[phone] My Medications ASK your physician about these medications Instructions Each Dose to Equal  
 Morning Noon Evening Bedtime  
 amLODIPine 5 mg tablet Commonly known as:  Reese Burkett Your last dose was: Your next dose is: TAKE 1 TABLET BY MOUTH  DAILY  
     
   
   
   
  
 aspirin 81 mg chewable tablet Your last dose was: Your next dose is: Take 81 mg by mouth daily. 81 mg  
    
   
   
   
  
 atorvastatin 40 mg tablet Commonly known as:  LIPITOR Your last dose was: Your next dose is: TAKE 1 TABLET BY MOUTH  DAILY  
     
   
   
   
  
 esomeprazole 40 mg capsule Commonly known as:  Roselyn Ramona Your last dose was: Your next dose is: Take  by mouth daily. finasteride 5 mg tablet Commonly known as:  PROSCAR Your last dose was: Your next dose is: Take 5 mg by mouth daily. 5 mg  
    
   
   
   
  
 fluticasone 50 mcg/actuation nasal spray Commonly known as:  Tamamadeo Brownlee Your last dose was: Your next dose is: One spray to each nostril daily  
     
   
   
   
  
 gabapentin 100 mg capsule Commonly known as:  NEURONTIN Your last dose was: Your next dose is:    
   
   
 200-400 mg three (3) times daily. 200-400 mg  
    
   
   
   
  
 metoprolol tartrate 25 mg tablet Commonly known as:  LOPRESSOR Your last dose was: Your next dose is: TAKE 1 TABLET BY MOUTH TWO  TIMES DAILY PROAIR HFA 90 mcg/actuation inhaler Generic drug:  albuterol Your last dose was: Your next dose is: Take 2 Puffs by inhalation every four (4) hours as needed for Wheezing. 2 Puff SYNTHROID 137 mcg tablet Generic drug:  levothyroxine Your last dose was: Your next dose is: Take 137 mcg by mouth Daily (before breakfast). 137 mcg  
    
   
   
   
  
 terazosin 10 mg capsule Commonly known as:  HYTRIN Your last dose was: Your next dose is: Take 10 mg by mouth nightly. 10 mg  
    
   
   
   
  
 tiotropium-olodaterol 2.5-2.5 mcg/actuation Mist  
Commonly known as:  Everlyn Conn Your last dose was: Your next dose is: Take 2 Puffs by inhalation daily. 2 Puff General Information Please provide this summary of care documentation to your next provider. Allergies Unspecified:  Iodinated Contrast- Oral And Iv Dye;  Sulfa (Sulfonamide Antibiotics) Current Immunizations  Reviewed on 5/1/2018 Name Date Influenza High Dose Vaccine PF 8/31/2017, 8/23/2016 Influenza Vaccine 9/9/2015, 9/1/2014 Pneumococcal Conjugate (PCV-13) 6/25/2015 Pneumococcal Polysaccharide (PPSV-23) 3/2/2017, 6/25/2015 Tdap 8/1/2010 Discharge Instructions Discharge Instructions HEART CATHETERIZATION/ANGIOGRAPHY DISCHARGE INSTRUCTIONS 1. Check puncture site frequently for swelling or bleeding.  If there is any bleeding, lie down and apply pressure over the area with a clean towel or washcloth. If bleeding does not stop quickly, call 911 and hold pressure until EMS arrives. Notify your doctor for any redness, swelling, drainage, or oozing from the puncture site. Notify your doctor for any fever or chills. 2. If the extremity becomes cold, numb, or painful call Dr. Detsiny Cunningham at [phone]. 
3. Activity should be limited for the next 48 hours. Climb stairs as little as possible and avoid any stooping, bending, or strenuous activity for 48 hours. No heavy lifting (anything over 10 pounds) for 3 days. No strenuous activity with left arm until left wrist site heals. 4. You may resume your usual diet. Drink more fluids than usual. 
5. Have a responsible person drive you home and stay with you for at least 24 hours after your heart catheterization/angiography. 6. Remove bandage from your left wrist and right groin in 24 hours. You may shower in 24 hours. No tub baths, hot tubs, or swimming for 1 week. Do not place any lotions, creams, powders, or ointments over puncture site for 1 week. Keep your left wrist and right groin puncture sites clean, dry, and open to air until sites heal. 
I have read the above instructions and have had the opportunity to ask questions. Patient: ________________________   Date: 5/24/2018 Witness: _______________________   Date: 5/24/2018 Discharge Orders None  
  
` Patient Signature:  ____________________________________________________________ Date:  ____________________________________________________________  
  
 Janice Glover Provider Signature:  ____________________________________________________________ Date:  ____________________________________________________________

## 2019-01-22 ENCOUNTER — HOSPITAL ENCOUNTER (OUTPATIENT)
Dept: MRI IMAGING | Age: 84
Discharge: HOME | End: 2019-01-22
Attending: INTERNAL MEDICINE
Payer: MEDICARE

## 2019-01-22 DIAGNOSIS — G89.29 CHRONIC LEFT-SIDED THORACIC BACK PAIN: ICD-10-CM

## 2019-01-22 DIAGNOSIS — M54.6 CHRONIC LEFT-SIDED THORACIC BACK PAIN: ICD-10-CM

## 2019-01-22 PROCEDURE — 72146 MRI CHEST SPINE W/O DYE: CPT

## 2019-02-01 ENCOUNTER — HOSPITAL ENCOUNTER (OUTPATIENT)
Dept: GENERAL RADIOLOGY | Age: 84
Discharge: HOME | End: 2019-02-01
Attending: NURSE PRACTITIONER
Payer: MEDICARE

## 2019-02-01 PROCEDURE — 73080 X-RAY EXAM OF ELBOW: CPT

## 2019-04-30 ENCOUNTER — HOSPITAL ENCOUNTER (OUTPATIENT)
Dept: LAB | Age: 84
Discharge: HOME | End: 2019-04-30
Payer: MEDICARE

## 2019-04-30 DIAGNOSIS — D69.6 THROMBOCYTOPENIA, UNSPECIFIED (HCC): ICD-10-CM

## 2019-04-30 LAB
ALBUMIN SERPL-MCNC: 4 G/DL (ref 3.2–4.6)
ALBUMIN/GLOB SERPL: 1.5 {RATIO} (ref 1.2–3.5)
ALP SERPL-CCNC: 66 U/L (ref 50–136)
ALT SERPL-CCNC: 27 U/L (ref 12–65)
ANION GAP SERPL CALC-SCNC: 5 MMOL/L (ref 7–16)
AST SERPL-CCNC: 20 U/L (ref 15–37)
BASOPHILS # BLD: 0 K/UL (ref 0–0.2)
BASOPHILS NFR BLD: 0 % (ref 0–2)
BILIRUB SERPL-MCNC: 1.8 MG/DL (ref 0.2–1.1)
BUN SERPL-MCNC: 17 MG/DL (ref 8–23)
CALCIUM SERPL-MCNC: 9.1 MG/DL (ref 8.3–10.4)
CHLORIDE SERPL-SCNC: 104 MMOL/L (ref 98–107)
CO2 SERPL-SCNC: 28 MMOL/L (ref 21–32)
CREAT SERPL-MCNC: 1.07 MG/DL (ref 0.8–1.5)
DIFFERENTIAL METHOD BLD: ABNORMAL
EOSINOPHIL # BLD: 0.1 K/UL (ref 0–0.8)
EOSINOPHIL NFR BLD: 2 % (ref 0.5–7.8)
ERYTHROCYTE [DISTWIDTH] IN BLOOD BY AUTOMATED COUNT: 13.5 % (ref 11.9–14.6)
GLOBULIN SER CALC-MCNC: 2.7 G/DL (ref 2.3–3.5)
GLUCOSE SERPL-MCNC: 132 MG/DL (ref 65–100)
HCT VFR BLD AUTO: 33.1 % (ref 41.1–50.3)
HGB BLD-MCNC: 11.9 G/DL (ref 13.6–17.2)
IMM GRANULOCYTES # BLD AUTO: 0 K/UL (ref 0–0.5)
IMM GRANULOCYTES NFR BLD AUTO: 0 % (ref 0–5)
LDH SERPL L TO P-CCNC: 165 U/L (ref 110–210)
LYMPHOCYTES # BLD: 0.7 K/UL (ref 0.5–4.6)
LYMPHOCYTES NFR BLD: 23 % (ref 13–44)
MCH RBC QN AUTO: 35.2 PG (ref 26.1–32.9)
MCHC RBC AUTO-ENTMCNC: 36 G/DL (ref 31.4–35)
MCV RBC AUTO: 97.9 FL (ref 79.6–97.8)
MONOCYTES # BLD: 0.3 K/UL (ref 0.1–1.3)
MONOCYTES NFR BLD: 9 % (ref 4–12)
NEUTS SEG # BLD: 1.9 K/UL (ref 1.7–8.2)
NEUTS SEG NFR BLD: 66 % (ref 43–78)
NRBC # BLD: 0 K/UL (ref 0–0.2)
PLATELET # BLD AUTO: 91 K/UL (ref 150–450)
PMV BLD AUTO: 8.5 FL (ref 9.4–12.3)
POTASSIUM SERPL-SCNC: 4.2 MMOL/L (ref 3.5–5.1)
PROT SERPL-MCNC: 6.7 G/DL (ref 6.3–8.2)
RBC # BLD AUTO: 3.38 M/UL (ref 4.23–5.67)
SODIUM SERPL-SCNC: 137 MMOL/L (ref 136–145)
WBC # BLD AUTO: 2.9 K/UL (ref 4.3–11.1)

## 2019-04-30 PROCEDURE — 85025 COMPLETE CBC W/AUTO DIFF WBC: CPT

## 2019-04-30 PROCEDURE — 80053 COMPREHEN METABOLIC PANEL: CPT

## 2019-04-30 PROCEDURE — 83615 LACTATE (LD) (LDH) ENZYME: CPT

## 2019-04-30 PROCEDURE — 36415 COLL VENOUS BLD VENIPUNCTURE: CPT

## 2019-05-06 ENCOUNTER — HOSPITAL ENCOUNTER (OUTPATIENT)
Dept: GENERAL RADIOLOGY | Age: 84
Discharge: HOME | End: 2019-05-06
Payer: MEDICARE

## 2019-05-06 DIAGNOSIS — M54.6 CHRONIC LEFT-SIDED THORACIC BACK PAIN: ICD-10-CM

## 2019-05-06 DIAGNOSIS — R07.89 CHEST WALL PAIN: ICD-10-CM

## 2019-05-06 DIAGNOSIS — G89.29 CHRONIC LEFT-SIDED THORACIC BACK PAIN: ICD-10-CM

## 2019-05-06 PROCEDURE — 71100 X-RAY EXAM RIBS UNI 2 VIEWS: CPT

## 2019-05-06 PROCEDURE — 71046 X-RAY EXAM CHEST 2 VIEWS: CPT

## 2019-06-14 ENCOUNTER — APPOINTMENT (OUTPATIENT)
Dept: CT IMAGING | Age: 84
DRG: 329 | End: 2019-06-14
Attending: EMERGENCY MEDICINE
Payer: MEDICARE

## 2019-06-14 ENCOUNTER — HOSPITAL ENCOUNTER (INPATIENT)
Age: 84
LOS: 12 days | Discharge: HOME | DRG: 329 | End: 2019-06-26
Attending: EMERGENCY MEDICINE | Admitting: SURGERY
Payer: MEDICARE

## 2019-06-14 ENCOUNTER — ANESTHESIA (OUTPATIENT)
Dept: SURGERY | Age: 84
DRG: 329 | End: 2019-06-14
Payer: MEDICARE

## 2019-06-14 ENCOUNTER — ANESTHESIA EVENT (OUTPATIENT)
Dept: SURGERY | Age: 84
DRG: 329 | End: 2019-06-14
Payer: MEDICARE

## 2019-06-14 DIAGNOSIS — R10.9 ACUTE ABDOMINAL PAIN: Primary | ICD-10-CM

## 2019-06-14 DIAGNOSIS — K56.609 SMALL BOWEL OBSTRUCTION (HCC): ICD-10-CM

## 2019-06-14 PROBLEM — K63.89 PNEUMATOSIS INTESTINALIS: Status: ACTIVE | Noted: 2019-06-14

## 2019-06-14 PROBLEM — K43.2 VENTRAL INCISIONAL HERNIA: Status: ACTIVE | Noted: 2019-06-14

## 2019-06-14 PROBLEM — D72.829 LEUKOCYTOSIS: Status: ACTIVE | Noted: 2019-06-14

## 2019-06-14 PROBLEM — R10.84 GENERALIZED ABDOMINAL PAIN: Status: ACTIVE | Noted: 2019-06-14

## 2019-06-14 PROBLEM — D69.6 THROMBOCYTOPENIA (HCC): Status: ACTIVE | Noted: 2019-06-14

## 2019-06-14 LAB
ABO + RH BLD: NORMAL
ALBUMIN SERPL-MCNC: 4 G/DL (ref 3.2–4.6)
ALBUMIN/GLOB SERPL: 1.5 {RATIO} (ref 1.2–3.5)
ALP SERPL-CCNC: 65 U/L (ref 50–136)
ALT SERPL-CCNC: 27 U/L (ref 12–65)
ANION GAP SERPL CALC-SCNC: 6 MMOL/L (ref 7–16)
AST SERPL-CCNC: 20 U/L (ref 15–37)
BASOPHILS # BLD: 0 K/UL (ref 0–0.2)
BASOPHILS NFR BLD: 0 % (ref 0–2)
BILIRUB SERPL-MCNC: 1.5 MG/DL (ref 0.2–1.1)
BLOOD GROUP ANTIBODIES SERPL: NORMAL
BUN SERPL-MCNC: 21 MG/DL (ref 8–23)
CALCIUM SERPL-MCNC: 9 MG/DL (ref 8.3–10.4)
CHLORIDE SERPL-SCNC: 100 MMOL/L (ref 98–107)
CO2 SERPL-SCNC: 29 MMOL/L (ref 21–32)
CREAT SERPL-MCNC: 1.04 MG/DL (ref 0.8–1.5)
DIFFERENTIAL METHOD BLD: ABNORMAL
EOSINOPHIL # BLD: 0.1 K/UL (ref 0–0.8)
EOSINOPHIL NFR BLD: 3 % (ref 0.5–7.8)
ERYTHROCYTE [DISTWIDTH] IN BLOOD BY AUTOMATED COUNT: 13.4 % (ref 11.9–14.6)
GLOBULIN SER CALC-MCNC: 2.6 G/DL (ref 2.3–3.5)
GLUCOSE SERPL-MCNC: 134 MG/DL (ref 65–100)
HCT VFR BLD AUTO: 35 % (ref 41.1–50.3)
HGB BLD-MCNC: 12.5 G/DL (ref 13.6–17.2)
IMM GRANULOCYTES # BLD AUTO: 0 K/UL (ref 0–0.5)
IMM GRANULOCYTES NFR BLD AUTO: 0 % (ref 0–5)
LYMPHOCYTES # BLD: 0.8 K/UL (ref 0.5–4.6)
LYMPHOCYTES NFR BLD: 19 % (ref 13–44)
MCH RBC QN AUTO: 34.8 PG (ref 26.1–32.9)
MCHC RBC AUTO-ENTMCNC: 35.7 G/DL (ref 31.4–35)
MCV RBC AUTO: 97.5 FL (ref 79.6–97.8)
MONOCYTES # BLD: 0.3 K/UL (ref 0.1–1.3)
MONOCYTES NFR BLD: 8 % (ref 4–12)
NEUTS SEG # BLD: 2.9 K/UL (ref 1.7–8.2)
NEUTS SEG NFR BLD: 70 % (ref 43–78)
NRBC # BLD: 0 K/UL (ref 0–0.2)
PLATELET # BLD AUTO: 82 K/UL (ref 150–450)
PMV BLD AUTO: 8.6 FL (ref 9.4–12.3)
POTASSIUM SERPL-SCNC: 4 MMOL/L (ref 3.5–5.1)
PROT SERPL-MCNC: 6.6 G/DL (ref 6.3–8.2)
RBC # BLD AUTO: 3.59 M/UL (ref 4.23–5.6)
SODIUM SERPL-SCNC: 135 MMOL/L (ref 136–145)
SPECIMEN EXP DATE BLD: NORMAL
WBC # BLD AUTO: 4.2 K/UL (ref 4.3–11.1)

## 2019-06-14 PROCEDURE — 86900 BLOOD TYPING SEROLOGIC ABO: CPT

## 2019-06-14 PROCEDURE — 88307 TISSUE EXAM BY PATHOLOGIST: CPT

## 2019-06-14 PROCEDURE — 76060000042 HC ANESTHESIA 5.5 TO 6 HR: Performed by: SURGERY

## 2019-06-14 PROCEDURE — 85025 COMPLETE CBC W/AUTO DIFF WBC: CPT

## 2019-06-14 PROCEDURE — 77030020263 HC SOL INJ SOD CL0.9% LFCR 1000ML

## 2019-06-14 PROCEDURE — 96375 TX/PRO/DX INJ NEW DRUG ADDON: CPT | Performed by: EMERGENCY MEDICINE

## 2019-06-14 PROCEDURE — 74011250637 HC RX REV CODE- 250/637: Performed by: SURGERY

## 2019-06-14 PROCEDURE — 77030011266 HC ELECTRD BLD INSL COVD -A: Performed by: SURGERY

## 2019-06-14 PROCEDURE — 74011250636 HC RX REV CODE- 250/636: Performed by: EMERGENCY MEDICINE

## 2019-06-14 PROCEDURE — 65610000001 HC ROOM ICU GENERAL

## 2019-06-14 PROCEDURE — 77030018836 HC SOL IRR NACL ICUM -A: Performed by: SURGERY

## 2019-06-14 PROCEDURE — 77030036731 HC STPLR ENDOSC J&J -F: Performed by: SURGERY

## 2019-06-14 PROCEDURE — 77030039425 HC BLD LARYNG TRULITE DISP TELE -A: Performed by: ANESTHESIOLOGY

## 2019-06-14 PROCEDURE — 74011250636 HC RX REV CODE- 250/636

## 2019-06-14 PROCEDURE — 77030011278 HC ELECTRD LIG IMPT COVD -F: Performed by: SURGERY

## 2019-06-14 PROCEDURE — 77030009979 HC RELD STPLR TCR J&J -C: Performed by: SURGERY

## 2019-06-14 PROCEDURE — 74011250636 HC RX REV CODE- 250/636: Performed by: NURSE PRACTITIONER

## 2019-06-14 PROCEDURE — 77030002966 HC SUT PDS J&J -A: Performed by: SURGERY

## 2019-06-14 PROCEDURE — 77030020255 HC SOL INJ LR 1000ML BG

## 2019-06-14 PROCEDURE — 77030003029 HC SUT VCRL J&J -B: Performed by: SURGERY

## 2019-06-14 PROCEDURE — 77030019940 HC BLNKT HYPOTHRM STRY -B: Performed by: ANESTHESIOLOGY

## 2019-06-14 PROCEDURE — 0WPG0JZ REMOVAL OF SYNTHETIC SUBSTITUTE FROM PERITONEAL CAVITY, OPEN APPROACH: ICD-10-PCS | Performed by: SURGERY

## 2019-06-14 PROCEDURE — 77030031139 HC SUT VCRL2 J&J -A: Performed by: SURGERY

## 2019-06-14 PROCEDURE — 76010000178 HC OR TIME 5.5 TO 6 HR INTENSV-TIER 1: Performed by: SURGERY

## 2019-06-14 PROCEDURE — 77030034849: Performed by: SURGERY

## 2019-06-14 PROCEDURE — 77030032490 HC SLV COMPR SCD KNE COVD -B: Performed by: SURGERY

## 2019-06-14 PROCEDURE — 96366 THER/PROPH/DIAG IV INF ADDON: CPT | Performed by: EMERGENCY MEDICINE

## 2019-06-14 PROCEDURE — 0DQ80ZZ REPAIR SMALL INTESTINE, OPEN APPROACH: ICD-10-PCS | Performed by: SURGERY

## 2019-06-14 PROCEDURE — 80053 COMPREHEN METABOLIC PANEL: CPT

## 2019-06-14 PROCEDURE — 74011000258 HC RX REV CODE- 258: Performed by: SURGERY

## 2019-06-14 PROCEDURE — 0DN80ZZ RELEASE SMALL INTESTINE, OPEN APPROACH: ICD-10-PCS | Performed by: SURGERY

## 2019-06-14 PROCEDURE — 77030008467 HC STPLR SKN COVD -B: Performed by: SURGERY

## 2019-06-14 PROCEDURE — 77030037088 HC TUBE ENDOTRACH ORAL NSL COVD-A: Performed by: ANESTHESIOLOGY

## 2019-06-14 PROCEDURE — 0DB80ZZ EXCISION OF SMALL INTESTINE, OPEN APPROACH: ICD-10-PCS | Performed by: SURGERY

## 2019-06-14 PROCEDURE — 77030019908 HC STETH ESOPH SIMS -A: Performed by: ANESTHESIOLOGY

## 2019-06-14 PROCEDURE — 96365 THER/PROPH/DIAG IV INF INIT: CPT | Performed by: EMERGENCY MEDICINE

## 2019-06-14 PROCEDURE — 74176 CT ABD & PELVIS W/O CONTRAST: CPT

## 2019-06-14 PROCEDURE — 77030008771 HC TU NG SALEM SUMP -A: Performed by: EMERGENCY MEDICINE

## 2019-06-14 PROCEDURE — 77030011265 HC ELECTRD BLD HEX COVD -A: Performed by: SURGERY

## 2019-06-14 PROCEDURE — 0DC80ZZ EXTIRPATION OF MATTER FROM SMALL INTESTINE, OPEN APPROACH: ICD-10-PCS | Performed by: SURGERY

## 2019-06-14 PROCEDURE — 74011000250 HC RX REV CODE- 250

## 2019-06-14 PROCEDURE — 77030013567 HC DRN WND RESERV BARD -A: Performed by: SURGERY

## 2019-06-14 PROCEDURE — 0WQF0ZZ REPAIR ABDOMINAL WALL, OPEN APPROACH: ICD-10-PCS | Performed by: SURGERY

## 2019-06-14 PROCEDURE — 74011250636 HC RX REV CODE- 250/636: Performed by: SURGERY

## 2019-06-14 PROCEDURE — 77030002996 HC SUT SLK J&J -A: Performed by: SURGERY

## 2019-06-14 PROCEDURE — 76210000006 HC OR PH I REC 0.5 TO 1 HR: Performed by: SURGERY

## 2019-06-14 PROCEDURE — 99285 EMERGENCY DEPT VISIT HI MDM: CPT | Performed by: EMERGENCY MEDICINE

## 2019-06-14 RX ORDER — ROCURONIUM BROMIDE 10 MG/ML
INJECTION, SOLUTION INTRAVENOUS AS NEEDED
Status: DISCONTINUED | OUTPATIENT
Start: 2019-06-14 | End: 2019-06-14 | Stop reason: HOSPADM

## 2019-06-14 RX ORDER — ONDANSETRON 2 MG/ML
4 INJECTION INTRAMUSCULAR; INTRAVENOUS
Status: COMPLETED | OUTPATIENT
Start: 2019-06-14 | End: 2019-06-14

## 2019-06-14 RX ORDER — FAMOTIDINE 10 MG/ML
20 INJECTION INTRAVENOUS EVERY 12 HOURS
Status: DISCONTINUED | OUTPATIENT
Start: 2019-06-14 | End: 2019-06-25

## 2019-06-14 RX ORDER — SODIUM CHLORIDE 0.9 % (FLUSH) 0.9 %
5-40 SYRINGE (ML) INJECTION AS NEEDED
Status: DISCONTINUED | OUTPATIENT
Start: 2019-06-14 | End: 2019-06-14 | Stop reason: HOSPADM

## 2019-06-14 RX ORDER — SODIUM CHLORIDE, SODIUM LACTATE, POTASSIUM CHLORIDE, CALCIUM CHLORIDE 600; 310; 30; 20 MG/100ML; MG/100ML; MG/100ML; MG/100ML
INJECTION, SOLUTION INTRAVENOUS
Status: DISCONTINUED | OUTPATIENT
Start: 2019-06-14 | End: 2019-06-14 | Stop reason: HOSPADM

## 2019-06-14 RX ORDER — ACETAMINOPHEN 500 MG
1000 TABLET ORAL
Status: DISCONTINUED | OUTPATIENT
Start: 2019-06-14 | End: 2019-06-26 | Stop reason: HOSPADM

## 2019-06-14 RX ORDER — HYDROMORPHONE HYDROCHLORIDE 2 MG/ML
0.5 INJECTION, SOLUTION INTRAMUSCULAR; INTRAVENOUS; SUBCUTANEOUS
Status: COMPLETED | OUTPATIENT
Start: 2019-06-14 | End: 2019-06-14

## 2019-06-14 RX ORDER — LIDOCAINE HYDROCHLORIDE 20 MG/ML
INJECTION, SOLUTION EPIDURAL; INFILTRATION; INTRACAUDAL; PERINEURAL AS NEEDED
Status: DISCONTINUED | OUTPATIENT
Start: 2019-06-14 | End: 2019-06-14 | Stop reason: HOSPADM

## 2019-06-14 RX ORDER — SODIUM CHLORIDE, SODIUM LACTATE, POTASSIUM CHLORIDE, CALCIUM CHLORIDE 600; 310; 30; 20 MG/100ML; MG/100ML; MG/100ML; MG/100ML
75 INJECTION, SOLUTION INTRAVENOUS CONTINUOUS
Status: DISCONTINUED | OUTPATIENT
Start: 2019-06-14 | End: 2019-06-14 | Stop reason: HOSPADM

## 2019-06-14 RX ORDER — HYDROMORPHONE HYDROCHLORIDE 2 MG/ML
0.5 INJECTION, SOLUTION INTRAMUSCULAR; INTRAVENOUS; SUBCUTANEOUS
Status: DISCONTINUED | OUTPATIENT
Start: 2019-06-14 | End: 2019-06-14 | Stop reason: HOSPADM

## 2019-06-14 RX ORDER — SODIUM CHLORIDE, SODIUM LACTATE, POTASSIUM CHLORIDE, CALCIUM CHLORIDE 600; 310; 30; 20 MG/100ML; MG/100ML; MG/100ML; MG/100ML
125 INJECTION, SOLUTION INTRAVENOUS CONTINUOUS
Status: DISCONTINUED | OUTPATIENT
Start: 2019-06-14 | End: 2019-06-17

## 2019-06-14 RX ORDER — MORPHINE SULFATE 10 MG/ML
2-5 INJECTION, SOLUTION INTRAMUSCULAR; INTRAVENOUS
Status: DISCONTINUED | OUTPATIENT
Start: 2019-06-14 | End: 2019-06-20

## 2019-06-14 RX ORDER — ACETAMINOPHEN 10 MG/ML
INJECTION, SOLUTION INTRAVENOUS AS NEEDED
Status: DISCONTINUED | OUTPATIENT
Start: 2019-06-14 | End: 2019-06-14 | Stop reason: HOSPADM

## 2019-06-14 RX ORDER — SODIUM CHLORIDE, SODIUM LACTATE, POTASSIUM CHLORIDE, CALCIUM CHLORIDE 600; 310; 30; 20 MG/100ML; MG/100ML; MG/100ML; MG/100ML
200 INJECTION, SOLUTION INTRAVENOUS ONCE
Status: COMPLETED | OUTPATIENT
Start: 2019-06-14 | End: 2019-06-14

## 2019-06-14 RX ORDER — OXYCODONE AND ACETAMINOPHEN 5; 325 MG/1; MG/1
1 TABLET ORAL AS NEEDED
Status: DISCONTINUED | OUTPATIENT
Start: 2019-06-14 | End: 2019-06-14 | Stop reason: HOSPADM

## 2019-06-14 RX ORDER — HYDROMORPHONE HYDROCHLORIDE 2 MG/ML
INJECTION, SOLUTION INTRAMUSCULAR; INTRAVENOUS; SUBCUTANEOUS AS NEEDED
Status: DISCONTINUED | OUTPATIENT
Start: 2019-06-14 | End: 2019-06-14 | Stop reason: HOSPADM

## 2019-06-14 RX ORDER — SODIUM CHLORIDE 0.9 % (FLUSH) 0.9 %
5-40 SYRINGE (ML) INJECTION EVERY 8 HOURS
Status: DISCONTINUED | OUTPATIENT
Start: 2019-06-14 | End: 2019-06-14 | Stop reason: HOSPADM

## 2019-06-14 RX ORDER — PROPOFOL 10 MG/ML
INJECTION, EMULSION INTRAVENOUS AS NEEDED
Status: DISCONTINUED | OUTPATIENT
Start: 2019-06-14 | End: 2019-06-14 | Stop reason: HOSPADM

## 2019-06-14 RX ORDER — ONDANSETRON 2 MG/ML
4 INJECTION INTRAMUSCULAR; INTRAVENOUS
Status: DISCONTINUED | OUTPATIENT
Start: 2019-06-14 | End: 2019-06-26 | Stop reason: HOSPADM

## 2019-06-14 RX ORDER — NALOXONE HYDROCHLORIDE 0.4 MG/ML
0.2 INJECTION, SOLUTION INTRAMUSCULAR; INTRAVENOUS; SUBCUTANEOUS AS NEEDED
Status: DISCONTINUED | OUTPATIENT
Start: 2019-06-14 | End: 2019-06-14 | Stop reason: HOSPADM

## 2019-06-14 RX ORDER — DEXAMETHASONE SODIUM PHOSPHATE 4 MG/ML
INJECTION, SOLUTION INTRA-ARTICULAR; INTRALESIONAL; INTRAMUSCULAR; INTRAVENOUS; SOFT TISSUE AS NEEDED
Status: DISCONTINUED | OUTPATIENT
Start: 2019-06-14 | End: 2019-06-14 | Stop reason: HOSPADM

## 2019-06-14 RX ORDER — ONDANSETRON 2 MG/ML
INJECTION INTRAMUSCULAR; INTRAVENOUS AS NEEDED
Status: DISCONTINUED | OUTPATIENT
Start: 2019-06-14 | End: 2019-06-14 | Stop reason: HOSPADM

## 2019-06-14 RX ORDER — CEFAZOLIN SODIUM 1 G/3ML
INJECTION, POWDER, FOR SOLUTION INTRAMUSCULAR; INTRAVENOUS AS NEEDED
Status: DISCONTINUED | OUTPATIENT
Start: 2019-06-14 | End: 2019-06-14 | Stop reason: HOSPADM

## 2019-06-14 RX ORDER — SODIUM CHLORIDE 9 MG/ML
15 INJECTION, SOLUTION INTRAVENOUS ONCE
Status: COMPLETED | OUTPATIENT
Start: 2019-06-14 | End: 2019-06-14

## 2019-06-14 RX ORDER — FENTANYL CITRATE 50 UG/ML
INJECTION, SOLUTION INTRAMUSCULAR; INTRAVENOUS AS NEEDED
Status: DISCONTINUED | OUTPATIENT
Start: 2019-06-14 | End: 2019-06-14 | Stop reason: HOSPADM

## 2019-06-14 RX ADMIN — SODIUM CHLORIDE, SODIUM LACTATE, POTASSIUM CHLORIDE, AND CALCIUM CHLORIDE 125 ML/HR: 600; 310; 30; 20 INJECTION, SOLUTION INTRAVENOUS at 17:50

## 2019-06-14 RX ADMIN — DEXAMETHASONE SODIUM PHOSPHATE 10 MG: 4 INJECTION, SOLUTION INTRA-ARTICULAR; INTRALESIONAL; INTRAMUSCULAR; INTRAVENOUS; SOFT TISSUE at 09:52

## 2019-06-14 RX ADMIN — SODIUM CHLORIDE 1000 ML: 900 INJECTION, SOLUTION INTRAVENOUS at 03:37

## 2019-06-14 RX ADMIN — ROCURONIUM BROMIDE 10 MG: 10 INJECTION, SOLUTION INTRAVENOUS at 12:27

## 2019-06-14 RX ADMIN — SODIUM CHLORIDE 15 ML/HR: 900 INJECTION, SOLUTION INTRAVENOUS at 18:00

## 2019-06-14 RX ADMIN — MORPHINE SULFATE 4 MG: 10 INJECTION, SOLUTION INTRAMUSCULAR; INTRAVENOUS at 22:28

## 2019-06-14 RX ADMIN — SODIUM CHLORIDE, SODIUM LACTATE, POTASSIUM CHLORIDE, AND CALCIUM CHLORIDE 200 ML/HR: 600; 310; 30; 20 INJECTION, SOLUTION INTRAVENOUS at 05:30

## 2019-06-14 RX ADMIN — PIPERACILLIN SODIUM,TAZOBACTAM SODIUM 3.38 G: 3; .375 INJECTION, POWDER, FOR SOLUTION INTRAVENOUS at 17:46

## 2019-06-14 RX ADMIN — ROCURONIUM BROMIDE 20 MG: 10 INJECTION, SOLUTION INTRAVENOUS at 10:56

## 2019-06-14 RX ADMIN — PIPERACILLIN SODIUM,TAZOBACTAM SODIUM 3.38 G: 3; .375 INJECTION, POWDER, FOR SOLUTION INTRAVENOUS at 08:35

## 2019-06-14 RX ADMIN — ROCURONIUM BROMIDE 10 MG: 10 INJECTION, SOLUTION INTRAVENOUS at 13:07

## 2019-06-14 RX ADMIN — HYDROMORPHONE HYDROCHLORIDE 0.2 MG: 2 INJECTION, SOLUTION INTRAMUSCULAR; INTRAVENOUS; SUBCUTANEOUS at 14:22

## 2019-06-14 RX ADMIN — FENTANYL CITRATE 50 MCG: 50 INJECTION, SOLUTION INTRAMUSCULAR; INTRAVENOUS at 10:54

## 2019-06-14 RX ADMIN — PIPERACILLIN SODIUM,TAZOBACTAM SODIUM 3.38 G: 3; .375 INJECTION, POWDER, FOR SOLUTION INTRAVENOUS at 23:23

## 2019-06-14 RX ADMIN — PROPOFOL 150 MG: 10 INJECTION, EMULSION INTRAVENOUS at 09:22

## 2019-06-14 RX ADMIN — FENTANYL CITRATE 50 MCG: 50 INJECTION, SOLUTION INTRAMUSCULAR; INTRAVENOUS at 11:25

## 2019-06-14 RX ADMIN — ROCURONIUM BROMIDE 20 MG: 10 INJECTION, SOLUTION INTRAVENOUS at 10:32

## 2019-06-14 RX ADMIN — WHITE PETROLATUM MINERAL OIL: 150; 830 OINTMENT OPHTHALMIC at 20:05

## 2019-06-14 RX ADMIN — ACETAMINOPHEN 1000 MG: 10 INJECTION, SOLUTION INTRAVENOUS at 13:06

## 2019-06-14 RX ADMIN — LIDOCAINE HYDROCHLORIDE 80 MG: 20 INJECTION, SOLUTION EPIDURAL; INFILTRATION; INTRACAUDAL; PERINEURAL at 09:22

## 2019-06-14 RX ADMIN — CEFAZOLIN SODIUM 2 G: 1 INJECTION, POWDER, FOR SOLUTION INTRAMUSCULAR; INTRAVENOUS at 12:35

## 2019-06-14 RX ADMIN — FENTANYL CITRATE 100 MCG: 50 INJECTION, SOLUTION INTRAMUSCULAR; INTRAVENOUS at 09:22

## 2019-06-14 RX ADMIN — SODIUM CHLORIDE, SODIUM LACTATE, POTASSIUM CHLORIDE, CALCIUM CHLORIDE: 600; 310; 30; 20 INJECTION, SOLUTION INTRAVENOUS at 10:35

## 2019-06-14 RX ADMIN — HYDROMORPHONE HYDROCHLORIDE 0.5 MG: 2 INJECTION INTRAMUSCULAR; INTRAVENOUS; SUBCUTANEOUS at 04:04

## 2019-06-14 RX ADMIN — SODIUM CHLORIDE, SODIUM LACTATE, POTASSIUM CHLORIDE, CALCIUM CHLORIDE: 600; 310; 30; 20 INJECTION, SOLUTION INTRAVENOUS at 09:15

## 2019-06-14 RX ADMIN — ONDANSETRON 4 MG: 2 INJECTION INTRAMUSCULAR; INTRAVENOUS at 14:17

## 2019-06-14 RX ADMIN — ROCURONIUM BROMIDE 50 MG: 10 INJECTION, SOLUTION INTRAVENOUS at 09:22

## 2019-06-14 RX ADMIN — SODIUM CHLORIDE, SODIUM LACTATE, POTASSIUM CHLORIDE, CALCIUM CHLORIDE: 600; 310; 30; 20 INJECTION, SOLUTION INTRAVENOUS at 09:24

## 2019-06-14 RX ADMIN — FAMOTIDINE 20 MG: 10 INJECTION, SOLUTION INTRAVENOUS at 08:34

## 2019-06-14 RX ADMIN — ROCURONIUM BROMIDE 10 MG: 10 INJECTION, SOLUTION INTRAVENOUS at 10:08

## 2019-06-14 RX ADMIN — HYDROMORPHONE HYDROCHLORIDE 0.4 MG: 2 INJECTION, SOLUTION INTRAMUSCULAR; INTRAVENOUS; SUBCUTANEOUS at 13:05

## 2019-06-14 RX ADMIN — SODIUM CHLORIDE, SODIUM LACTATE, POTASSIUM CHLORIDE, CALCIUM CHLORIDE: 600; 310; 30; 20 INJECTION, SOLUTION INTRAVENOUS at 12:58

## 2019-06-14 RX ADMIN — HYDROMORPHONE HYDROCHLORIDE 0.4 MG: 2 INJECTION, SOLUTION INTRAMUSCULAR; INTRAVENOUS; SUBCUTANEOUS at 14:07

## 2019-06-14 RX ADMIN — FAMOTIDINE 20 MG: 10 INJECTION, SOLUTION INTRAVENOUS at 20:06

## 2019-06-14 RX ADMIN — ONDANSETRON 4 MG: 2 INJECTION INTRAMUSCULAR; INTRAVENOUS at 03:36

## 2019-06-14 NOTE — BRIEF OP NOTE
BRIEF OPERATIVE NOTE    Date of Procedure:  6/14/2019    Preoperative Diagnosis:   SBO with pneumatosis on CT   Age 82  S/p open AAA repair  S/p TAA stent  S/p expl lap for SBO  Ventral Hernia Incarceration    Postoperative Diagnosis:   same    Dense intra-abd fibrosis   Acutely and chronically ischemic small bowel  Small bowel Bezoar (Large quantity of impacted slaw)  Acute and chronic small bowel ischemia    Procedure:   Expl lap with approx 5hr enterolysis and multiple enterorrhaphies   85cm small bowel resection and anast  Bezoar removal from small bowel  Drain placement  Repair of Incarcerated ventral hernia (no mesh)  Removal of peritoneal foreign bodies (old dacron and prolene suture material from prior surgery)    Surgeon(s) and Role:     * Soraya Montalvo MD - Primary  Anesthesia: General  Estimated Blood Loss: 400cc  Specimens:   ID Type Source Tests Collected by Time Destination   1 : Ischemic small bowel Fresh Small Bowel  Soraya Montalvo MD 6/14/2019 1257 Pathology     Findings: see op note   Complications: none  Implants: * No implants in log *

## 2019-06-14 NOTE — PERIOP NOTES
TRANSFER - OUT REPORT:    Verbal report given to Rebecca PATEL(name) on Maximino Mitchell  being transferred to [facility](unit) for routine progression of care       Report consisted of patients Situation, Background, Assessment and   Recommendations(SBAR). Information from the following report(s) SBAR, Kardex, OR Summary, Intake/Output, MAR and Cardiac Rhythm NSR was reviewed with the receiving nurse. Lines:   Peripheral IV 06/14/19 Right Forearm (Active)   Site Assessment Clean, dry, & intact 6/14/2019  3:35 PM   Phlebitis Assessment 0 6/14/2019  3:35 PM   Infiltration Assessment 0 6/14/2019  3:35 PM   Dressing Status Clean, dry, & intact 6/14/2019  3:35 PM   Dressing Type Transparent;Tape 6/14/2019  3:35 PM   Hub Color/Line Status Green; Infusing 6/14/2019  3:35 PM       Peripheral IV 06/14/19 Left Antecubital (Active)   Site Assessment Clean, dry, & intact 6/14/2019  3:35 PM   Phlebitis Assessment 0 6/14/2019  3:35 PM   Infiltration Assessment 0 6/14/2019  3:35 PM   Dressing Status Clean, dry, & intact 6/14/2019  3:35 PM   Dressing Type Transparent;Tape 6/14/2019  3:35 PM   Hub Color/Line Status Pink;Capped 6/14/2019  3:35 PM        Opportunity for questions and clarification was provided.       Patient transported with:   Monitor  O2 @ 3 liters

## 2019-06-14 NOTE — ED TRIAGE NOTES
Pt c/o abd pain since yesterday afternoon, states it feels like the last time he had a bowel obstruction.

## 2019-06-14 NOTE — ED PROVIDER NOTES
26-year-old male 12 hour history waxing and waning abdominal pain that worsened at 8 PM.  Nausea but no real vomiting. No diarrhea bleeding. History of bowel obstruction is feels similar. History of cholecystectomy, previous bowel obstruction, aneurysm repair. No fever chest pain shortness of breath. No back pain    The history is provided by the patient. Abdominal Pain    This is a new problem. The problem occurs constantly. The problem has not changed since onset. The pain is located in the generalized abdominal region. The quality of the pain is dull and cramping. The pain is moderate. Associated symptoms include nausea. Pertinent negatives include no fever, no diarrhea, no flatus, no hematochezia, no melena, no vomiting, no constipation, no dysuria, no chest pain and no back pain. Nothing worsens the pain. The pain is relieved by nothing. The patient's surgical history includes cholecystectomy. 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   Left lobe: At least 4 heterogeneous nodules in the left lobe. The largest measures 2.9 x 1.1 x 2.4 cm in the lower pole, with mild peripheral color Doppler flow and possible tiny internal calcifications.     Thrombocytopenia (HCC)     marrow normal slightly big spleen low mpv    Thyroid disease     hypothyroidism       Past Surgical History:   Procedure Laterality Date    CARDIAC SURG PROCEDURE UNLIST  1991    AAA    CARDIAC SURG PROCEDURE UNLIST  9/2003     6 bypass surgeries    HX CHOLECYSTECTOMY  9/2000    HX COLONOSCOPY  02/28/2013    10 polyps/ fu in 3 yrs    HX ENDOSCOPY  02/28/2013    1 biopsy    HX GI  11/2003    small bowel obstruction    HX HEART CATHETERIZATION  04/2015    HX HEENT      thyroidectomy    HX HERNIA REPAIR  09/1993    Insisional    HX LUMBAR LAMINECTOMY  5/2005    L3, L4    HX ORTHOPAEDIC  1/2006    right total knee replacement    HX ORTHOPAEDIC  8/2005     left knee replacement    HX ORTHOPAEDIC  2005    laminectomy L3-l4    HX ORTHOPAEDIC  03/1955    right knee surgery    HX OTHER SURGICAL  05/2002    upper endoscopic retrograde cholangiopancreatogram    HX OTHER SURGICAL  11/2014    removal squamous cell carcinoma    HX OTHER SURGICAL      MRSA lower back -3/2005    HX THYROIDECTOMY  08/24/2017    Dr Renata Stevens    HX THYROIDECTOMY  08/24/2017    Dr. Shannen Amador  12/1981     ruptured disc in back    VASCULAR SURGERY PROCEDURE UNLIST  1991    abd aortic aneurysm repair         Family History:   Problem Relation Age of Onset    Heart Disease Father     Cancer Mother         colon    Cancer Sister     Cancer Maternal Aunt     Cancer Paternal Grandmother     Diabetes Paternal Grandmother     Thyroid Disease Neg Hx     Thyroid Cancer Neg Hx        Social History     Socioeconomic History    Marital status:      Spouse name: Not on file    Number of children: Not on file    Years of education: Not on file    Highest education level: Not on file   Occupational History    Not on file   Social Needs    Financial resource strain: Not on file    Food insecurity:     Worry: Not on file     Inability: Not on file    Transportation needs:     Medical: Not on file Non-medical: Not on file   Tobacco Use    Smoking status: Former Smoker     Packs/day: 1.00     Years: 25.00     Pack years: 25.00     Last attempt to quit: 12/3/1987     Years since quittin.5    Smokeless tobacco: Never Used   Substance and Sexual Activity    Alcohol use: Yes     Comment: occasional glass wine    Drug use: No    Sexual activity: Not on file   Lifestyle    Physical activity:     Days per week: Not on file     Minutes per session: Not on file    Stress: Not on file   Relationships    Social connections:     Talks on phone: Not on file     Gets together: Not on file     Attends Rastafari service: Not on file     Active member of club or organization: Not on file     Attends meetings of clubs or organizations: Not on file     Relationship status: Not on file    Intimate partner violence:     Fear of current or ex partner: Not on file     Emotionally abused: Not on file     Physically abused: Not on file     Forced sexual activity: Not on file   Other Topics Concern    Not on file   Social History Narrative    Not on file         ALLERGIES: Diphenhydramine hcl; Iodinated contrast- oral and iv dye; and Sulfa (sulfonamide antibiotics)    Review of Systems   Constitutional: Negative for chills and fever. Respiratory: Negative for cough and shortness of breath. Cardiovascular: Negative for chest pain and palpitations. Gastrointestinal: Positive for abdominal pain and nausea. Negative for constipation, diarrhea, flatus, hematochezia, melena and vomiting. Genitourinary: Negative for dysuria and flank pain. Musculoskeletal: Negative for back pain and neck pain. Skin: Negative for color change and rash. Neurological: Negative for syncope. All other systems reviewed and are negative. Vitals:    19 0323 19 0324   BP: 168/73    Pulse:  74   Resp:  18   SpO2:  99%            Physical Exam   Constitutional: He is oriented to person, place, and time.  He appears well-developed and well-nourished. No distress. HENT:   Head: Normocephalic and atraumatic. Right Ear: External ear normal.   Left Ear: External ear normal.   Mouth/Throat: Oropharynx is clear and moist. No oropharyngeal exudate. Eyes: Pupils are equal, round, and reactive to light. Conjunctivae and EOM are normal.   Neck: Normal range of motion. Neck supple. Cardiovascular: Normal rate, regular rhythm and intact distal pulses. No murmur heard. Pulmonary/Chest: Breath sounds normal. No respiratory distress. Abdominal: Soft. He exhibits distension. He exhibits no mass. Bowel sounds are increased. There is generalized tenderness. There is no guarding. No hernia. Tinkling bowel sounds   Neurological: He is alert and oriented to person, place, and time. Gait normal.   Nl speech   Skin: Skin is warm and dry. Psychiatric: He has a normal mood and affect. His speech is normal.   Nursing note and vitals reviewed. MDM  Number of Diagnoses or Management Options  Diagnosis management comments: Suspect bowel obstruction. Pain and nausea control.   CT scan       Amount and/or Complexity of Data Reviewed  Clinical lab tests: ordered and reviewed  Tests in the radiology section of CPT®: ordered and reviewed  Independent visualization of images, tracings, or specimens: yes    Risk of Complications, Morbidity, and/or Mortality  Presenting problems: moderate  Diagnostic procedures: minimal  Management options: low    Patient Progress  Patient progress: stable         Procedures    Results Include:    Recent Results (from the past 24 hour(s))   METABOLIC PANEL, COMPREHENSIVE    Collection Time: 06/14/19  3:35 AM   Result Value Ref Range    Sodium 135 (L) 136 - 145 mmol/L    Potassium 4.0 3.5 - 5.1 mmol/L    Chloride 100 98 - 107 mmol/L    CO2 29 21 - 32 mmol/L    Anion gap 6 (L) 7 - 16 mmol/L    Glucose 134 (H) 65 - 100 mg/dL    BUN 21 8 - 23 MG/DL    Creatinine 1.04 0.8 - 1.5 MG/DL    GFR est AA >60 >60 ml/min/1.73m2    GFR est non-AA >60 >60 ml/min/1.73m2    Calcium 9.0 8.3 - 10.4 MG/DL    Bilirubin, total 1.5 (H) 0.2 - 1.1 MG/DL    ALT (SGPT) 27 12 - 65 U/L    AST (SGOT) 20 15 - 37 U/L    Alk. phosphatase 65 50 - 136 U/L    Protein, total 6.6 6.3 - 8.2 g/dL    Albumin 4.0 3.2 - 4.6 g/dL    Globulin 2.6 2.3 - 3.5 g/dL    A-G Ratio 1.5 1.2 - 3.5     CBC WITH AUTOMATED DIFF    Collection Time: 06/14/19  3:35 AM   Result Value Ref Range    WBC 4.2 (L) 4.3 - 11.1 K/uL    RBC 3.59 (L) 4.23 - 5.6 M/uL    HGB 12.5 (L) 13.6 - 17.2 g/dL    HCT 35.0 (L) 41.1 - 50.3 %    MCV 97.5 79.6 - 97.8 FL    MCH 34.8 (H) 26.1 - 32.9 PG    MCHC 35.7 (H) 31.4 - 35.0 g/dL    RDW 13.4 11.9 - 14.6 %    PLATELET 82 (L) 299 - 450 K/uL    MPV 8.6 (L) 9.4 - 12.3 FL    ABSOLUTE NRBC 0.00 0.0 - 0.2 K/uL    DF AUTOMATED      NEUTROPHILS 70 43 - 78 %    LYMPHOCYTES 19 13 - 44 %    MONOCYTES 8 4.0 - 12.0 %    EOSINOPHILS 3 0.5 - 7.8 %    BASOPHILS 0 0.0 - 2.0 %    IMMATURE GRANULOCYTES 0 0.0 - 5.0 %    ABS. NEUTROPHILS 2.9 1.7 - 8.2 K/UL    ABS. LYMPHOCYTES 0.8 0.5 - 4.6 K/UL    ABS. MONOCYTES 0.3 0.1 - 1.3 K/UL    ABS. EOSINOPHILS 0.1 0.0 - 0.8 K/UL    ABS. BASOPHILS 0.0 0.0 - 0.2 K/UL    ABS. IMM. GRANS. 0.0 0.0 - 0.5 K/UL     Ct Abd Pelv Wo Cont    Result Date: 6/14/2019  EXAM: Noncontrast CT abdomen and pelvis. INDICATION: Abdominal pain. COMPARISON: October 20, 2011. TECHNIQUE: Axial CT images of the abdomen and pelvis were obtained without IV contrast. Radiation dose reduction techniques were used for this study. Our CT scanners use one or all of the following:  Automated exposure control, adjustment of the mA or kV according to patient size, iterative reconstruction. FINDINGS: - Liver: Within normal limits. - Gallbladder and bile ducts: The gallbladder is absent, without biliary tract dilatation. - Spleen: Within normal limits. - Urinary tract: Again noted is a 5.3 cm right kidney cyst. The left kidney is unremarkable.  No urinary tract stone or hydronephrosis is identified. There are bilateral inguinal hernias, containing fat on the left and a small portion of the urinary bladder on the right. - Adrenals: Within normal limits. - Pancreas: Within normal limits. - Gastrointestinal tract: In the umbilicus region there is a ventral 2.8 cm fat-containing hernia, with adjacent stranding which may relate to incarceration. There is a mid small bowel obstruction, whose transition point is not clearly identified, but may lie in the region of the ventral hernia although no bowel is seen within the hernia itself. There are a few questionable areas of small bowel wall pneumatosis. The colon is unremarkable except for diverticulosis. No free air or free fluid is seen. - Retroperitoneum: Within normal limits. - Peritoneal cavity and abdominal wall: Within normal limits. - Pelvis: Within normal limits. - Spine/bones: No acute process. - Other comments: Partially visualized is a descending thoracic aortic stent graft. IMPRESSION: Mid small bowel obstruction. The transition point is not clearly identified, but may lie in the region of a small periumbilical fat-containing ventral hernia which may be incarcerated given the associated fat stranding. Of note, no bowel is seen within the hernia. Additionally, there are a few questionable areas of small bowel wall pneumatosis concerning for ischemia. ER physician notified. Discussed with general surgery. IV fluids and pain control. They will see him after first surgery this morning.

## 2019-06-14 NOTE — ANESTHESIA POSTPROCEDURE EVALUATION
Procedure(s):  EXPLORATORY LAPAROTOMY   Mult enterorrhaphies approx 85cm small bowel resect and anast drain placement hernia repair (no mesh) removal peritoneal FB Removal of Bozoar materail in SB.    general    Anesthesia Post Evaluation      Multimodal analgesia: multimodal analgesia used between 6 hours prior to anesthesia start to PACU discharge  Patient location during evaluation: PACU  Patient participation: complete - patient participated  Level of consciousness: awake  Pain management: adequate  Airway patency: patent  Cardiovascular status: acceptable  Respiratory status: acceptable  Hydration status: acceptable  Post anesthesia nausea and vomiting:  none      Vitals Value Taken Time   /79 6/14/2019  3:35 PM   Temp 37.3 °C (99.1 °F) 6/14/2019  2:49 PM   Pulse 78 6/14/2019  3:35 PM   Resp 16 6/14/2019  3:35 PM   SpO2 96 % 6/14/2019  3:35 PM

## 2019-06-14 NOTE — ANESTHESIA PREPROCEDURE EVALUATION
Relevant Problems   No relevant active problems       Anesthetic History   No history of anesthetic complications            Review of Systems / Medical History  Patient summary reviewed and pertinent labs reviewed    Pulmonary    COPD               Neuro/Psych   Within defined limits           Cardiovascular    Hypertension: well controlled          CAD, CABG and hyperlipidemia      Comments: 1990s s/p open AAA in [place]  2003 CABG x 6  2016 s/p thoracic aneurysm stent [facility]       GI/Hepatic/Renal     GERD          Comments: Exp lap for free air Endo/Other      Hypothyroidism: well controlled  Arthritis     Other Findings              Physical Exam    Airway  Mallampati: II  TM Distance: 4 - 6 cm  Neck ROM: normal range of motion   Mouth opening: Normal     Cardiovascular    Rhythm: regular           Dental    Dentition: Edentulous     Pulmonary  Breath sounds clear to auscultation               Abdominal  GI exam deferred       Other Findings            Anesthetic Plan    ASA: 3, emergent  Anesthesia type: general            Anesthetic plan and risks discussed with: Patient

## 2019-06-14 NOTE — H&P
H&P/Consult Note/Progress Note/Office Note:   Maximino Fox  MRN: [id number]  URY:4/87/5536  Age:83 y.o.    HPI: Luis Fernando Bar is a 82 y.o. male who came tot he ER on 6/14/19   with a 1 day h/o progressive, intermittent, generalized abd pain without radiation which worsened acutely around 8PM on 6/13/19  Nothing made pain better or worse. +Nausea; -V  No diarrhea or bleeding. No F/C/chest pain back pain    1990s s/p open AAA in [place] s/p lap eloise in [place]  S/p expl lap for SBO (no resection) [place] CABG x 6 2016 s/p thoracic aneurysm stent at [facility]    Pertinent negatives include no fever, no diarrhea, no flatus, no hematochezia, no melena, no vomiting, no constipation, no dysuria, no chest pain and no back pain. Surgery was consulted after below CT without contrast showed SBO findings concerning for pneumatosis       4/13/16 s/p colonoscopy; Dr Doreen Padgett:  SESSILE SERRATED POLYPS. FRAGMENT OF BENIGN COLONIC MUCOSA. Electronically signed out on 4/14/2016 11:51 by Silke Nice MD   Microscopic Description   The biopsy demonstrates serrated hypermucinous colonic glands imbedded in a nonfibrotic lamina propria with focal crypt dilatation and widening of the crypt bases. An additional fragment demonstrates no adenomatous or hyperplastic change. Dr. Oliverio Jorge concurs. 6/14/19 CT abd/pelvis without contrast  - Liver: Within normal limits. - Gallbladder and bile ducts: The gallbladder is absent, without biliary tract  dilatation.  - Spleen: Within normal limits. - Urinary tract: Again noted is a 5.3 cm right kidney cyst. The left kidney is  unremarkable. No urinary tract stone or hydronephrosis is identified. There are  bilateral inguinal hernias, containing fat on the left and a small portion of  the urinary bladder on the right. - Adrenals: Within normal limits. - Pancreas: Within normal limits. - Gastrointestinal tract:  In the umbilicus region there is a ventral 2.8 cm  fat-containing hernia, with adjacent stranding which may relate to  incarceration. There is a mid small bowel obstruction, whose transition point is  not clearly identified, but may lie in the region of the ventral hernia although  no bowel is seen within the hernia itself. There are a few questionable areas of  small bowel wall pneumatosis. The colon is unremarkable except for  diverticulosis. No free air or free fluid is seen. - Retroperitoneum: Within normal limits. - Peritoneal cavity and abdominal wall: Within normal limits. - Pelvis: Within normal limits. - Spine/bones: No acute process. - Other comments: Partially visualized is a descending thoracic aortic stent  graft.     IMPRESSION: Mid small bowel obstruction. The transition point is not clearly  identified, but may lie in the region of a small periumbilical fat-containing  ventral hernia which may be incarcerated given the associated fat stranding. Of  note, no bowel is seen within the hernia. Additionally, there are a few  questionable areas of small bowel wall pneumatosis concerning for ischemia. ER  physician notified.             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   Left lobe:  At least 4 heterogeneous nodules in the left lobe. The largest measures 2.9 x 1.1 x 2.4 cm in the lower pole, with mild peripheral color Doppler flow and possible tiny internal calcifications.  Thrombocytopenia ([address]     marrow normal slightly big spleen low mpv    Thyroid disease     hypothyroidism     Past Surgical History:   Procedure Laterality Date    CARDIAC SURG PROCEDURE UNLIST  1991    AAA    CARDIAC SURG PROCEDURE UNLIST  9/2003     6 bypass surgeries    HX CHOLECYSTECTOMY  9/2000    HX COLONOSCOPY  02/28/2013    10 polyps/ fu in 3 yrs    HX ENDOSCOPY  02/28/2013    1 biopsy    HX GI  11/2003    small bowel obstruction    HX HEART CATHETERIZATION  04/2015    HX HEENT      thyroidectomy    HX HERNIA REPAIR  09/1993    Insisional    HX LUMBAR LAMINECTOMY  5/2005    L3, L4    HX ORTHOPAEDIC  1/2006    right total knee replacement    HX ORTHOPAEDIC  8/2005     left knee replacement    HX ORTHOPAEDIC  2005    laminectomy L3-l4    HX ORTHOPAEDIC  03/1955    right knee surgery    HX OTHER SURGICAL  05/2002    upper endoscopic retrograde cholangiopancreatogram    HX OTHER SURGICAL  11/2014    removal squamous cell carcinoma    HX OTHER SURGICAL      MRSA lower back -3/2005    HX THYROIDECTOMY  08/24/2017    Dr Brian Schneider    HX THYROIDECTOMY  08/24/2017    Dr. Debbi Triplett  12/1981     ruptured disc in back   [facility]    abd aortic aneurysm repair     Current Facility-Administered Medications   Medication Dose Route Frequency    piperacillin-tazobactam (ZOSYN) 3.375 g in 0.9% sodium chloride (MBP/ADV) 100 mL  3.375 g IntraVENous Q8H    famotidine (PF) (PEPCID) injection 20 mg  20 mg IntraVENous Q12H     Current Outpatient Medications   Medication Sig    albuterol sulfate (PROAIR RESPICLICK) 90 mcg/actuation aepb Take 2 Puffs by inhalation four (4) times daily as needed (Wheeze).     levothyroxine (SYNTHROID) 150 mcg tablet Take by mouth Daily (before breakfast).  omeprazole (PRILOSEC) 40 mg capsule Take 40 mg by mouth daily.  raNITIdine (ZANTAC) 150 mg tablet Take 150 mg by mouth nightly.  atorvastatin (LIPITOR) 40 mg tablet TAKE 1 TABLET BY MOUTH  DAILY    fluticasone (FLONASE) 50 mcg/actuation nasal spray 2 Sprays by Both Nostrils route daily.  amLODIPine (NORVASC) 5 mg tablet TAKE 1 TABLET BY MOUTH  DAILY    metoprolol tartrate (LOPRESSOR) 25 mg tablet TAKE 1 TABLET BY MOUTH TWO  TIMES DAILY    gabapentin (NEURONTIN) 100 mg capsule 200-400 mg three (3) times daily.  tiotropium-olodaterol (STIOLTO RESPIMAT) 2.5-2.5 mcg/actuation mist Take 2 Puffs by inhalation daily.  aspirin 81 mg chewable tablet Take 81 mg by mouth daily.  finasteride (PROSCAR) 5 mg tablet Take 5 mg by mouth daily.  terazosin (HYTRIN) 10 mg capsule Take 10 mg by mouth nightly. Diphenhydramine hcl;  Iodinated contrast- oral and iv dye; and Sulfa (sulfonamide antibiotics)  Social History     Socioeconomic History    Marital status:      Spouse name: Not on file    Number of children: Not on file    Years of education: Not on file    Highest education level: Not on file   Tobacco Use    Smoking status: Former Smoker     Packs/day: 1.00     Years: 25.00     Pack years: 25.00     Last attempt to quit: 12/3/1987     Years since quittin.5    Smokeless tobacco: Never Used   Substance and Sexual Activity    Alcohol use: Yes     Comment: occasional glass wine    Drug use: No     Social History     Tobacco Use   Smoking Status Former Smoker    Packs/day: 1.00    Years: 25.00    Pack years: 25.00    Last attempt to quit: 12/3/1987    Years since quittin.5   Smokeless Tobacco Never Used     Family History   Problem Relation Age of Onset    Heart Disease Father     Cancer Mother         colon    Cancer Sister     Cancer Maternal Aunt     Cancer Paternal Grandmother     Diabetes Paternal Grandmother     Thyroid Disease Neg Hx     Thyroid Cancer Neg Hx      ROS: The patient has no difficulty with chest pain or shortness of breath. No fever or chills. Comprehensive review of systems was otherwise unremarkable except as noted above. Physical Exam:   Visit Vitals  /63   Pulse 74   Resp 18   Ht 6' (1.829 m)   Wt 213 lb (96.6 kg)   SpO2 94%   BMI 28.89 kg/m²     Vitals:    06/14/19 0400 06/14/19 0527 06/14/19 0530 06/14/19 0600   BP: (!) 151/96 (!) 162/109 163/73 126/63   Pulse:       Resp:       SpO2: 97%  98% 94%   Weight:       Height:         No intake/output data recorded. 06/12 1901 - 06/14 0700  In: 1000 [I.V.:1000]  Out: -     Constitutional: Alert, oriented, cooperative patient in no acute distress; appears stated age    Eyes:Sclera are clear. EOMs intact  ENMT: no external lesions gross hearing normal; no obvious neck masses, no ear or lip lesions, nares normal; +NGT  CV: RRR. Normal perfusion  Resp: No JVD. Breathing is  non-labored; no audible wheezing. GI: tight and distended despite NGT; medicated with narcotics but reports pain is coming back; Large healed fibrotic midline incision     Musculoskeletal: unremarkable with normal function. No embolic signs or cyanosis.   Palpable pedal pulses bilat   Neuro:  Oriented; moves all 4; no focal deficits  Psychiatric: normal affect and mood, no memory impairment    Recent vitals (if inpt):  Patient Vitals for the past 24 hrs:   BP Pulse Resp SpO2 Height Weight   06/14/19 0600 126/63   94 %     06/14/19 0530 163/73   98 %     06/14/19 0527 (!) 162/109        06/14/19 0400 (!) 151/96   97 %     06/14/19 0354     6' (1.829 m) 213 lb (96.6 kg)   06/14/19 0330 146/80   98 %     06/14/19 0324  74 18 99 %     06/14/19 0323 168/73            Labs:  Recent Labs     06/14/19  0335   WBC 4.2*   HGB 12.5*   PLT 82*   *   K 4.0      CO2 29   BUN 21   CREA 1.04   *   TBILI 1.5*   SGOT 20   ALT 27   AP 65       Lab Results   Component Value Date/Time    WBC 4.2 (L) 06/14/2019 03:35 AM    HGB 12.5 (L) 06/14/2019 03:35 AM    PLATELET 82 (L) 30/12/2629 03:35 AM    Sodium 135 (L) 06/14/2019 03:35 AM    Potassium 4.0 06/14/2019 03:35 AM    Chloride 100 06/14/2019 03:35 AM    CO2 29 06/14/2019 03:35 AM    BUN 21 06/14/2019 03:35 AM    Creatinine 1.04 06/14/2019 03:35 AM    Glucose 134 (H) 06/14/2019 03:35 AM    INR 1.1 05/24/2018 07:05 AM    aPTT 26.5 04/07/2015 04:25 PM    Bilirubin, total 1.5 (H) 06/14/2019 03:35 AM    AST (SGOT) 20 06/14/2019 03:35 AM    ALT (SGPT) 27 06/14/2019 03:35 AM    Alk. phosphatase 65 06/14/2019 03:35 AM    Troponin-I, Qt. <0.01 04/06/2017 10:58 AM       CT Results  (Last 48 hours)               06/14/19 0426  CT ABD PELV WO CONT Final result    Impression:  IMPRESSION: Mid small bowel obstruction. The transition point is not clearly   identified, but may lie in the region of a small periumbilical fat-containing   ventral hernia which may be incarcerated given the associated fat stranding. Of   note, no bowel is seen within the hernia. Additionally, there are a few   questionable areas of small bowel wall pneumatosis concerning for ischemia. ER   physician notified. Narrative:  EXAM: Noncontrast CT abdomen and pelvis. INDICATION: Abdominal pain. COMPARISON: October 20, 2011. TECHNIQUE: Axial CT images of the abdomen and pelvis were obtained without IV   contrast. Radiation dose reduction techniques were used for this study. Our CT   scanners use one or all of the following:  Automated exposure control,   adjustment of the mA or kV according to patient size, iterative reconstruction. FINDINGS:       - Liver: Within normal limits. - Gallbladder and bile ducts: The gallbladder is absent, without biliary tract   dilatation.   - Spleen: Within normal limits. - Urinary tract: Again noted is a 5.3 cm right kidney cyst. The left kidney is   unremarkable.  No urinary tract stone or hydronephrosis is identified. There are   bilateral inguinal hernias, containing fat on the left and a small portion of   the urinary bladder on the right. - Adrenals: Within normal limits. - Pancreas: Within normal limits. - Gastrointestinal tract: In the umbilicus region there is a ventral 2.8 cm   fat-containing hernia, with adjacent stranding which may relate to   incarceration. There is a mid small bowel obstruction, whose transition point is   not clearly identified, but may lie in the region of the ventral hernia although   no bowel is seen within the hernia itself. There are a few questionable areas of   small bowel wall pneumatosis. The colon is unremarkable except for   diverticulosis. No free air or free fluid is seen. - Retroperitoneum: Within normal limits. - Peritoneal cavity and abdominal wall: Within normal limits. - Pelvis: Within normal limits. - Spine/bones: No acute process. - Other comments: Partially visualized is a descending thoracic aortic stent   graft. chest X-ray      I reviewed recent labs, recent radiologic studies, and pertinent records including other doctor notes if needed. I independently reviewed radiology images for studies I described above or studies I have ordered.    Admission date (for inpatients): 6/14/2019   * No surgery found *  Procedure(s):  LAPAROTOMY EXPLORATORY // BOWEL OBSTRUCTION WITH PNEUMATOSIS    ASSESSMENT/PLAN:  Problem List  Date Reviewed: 6/14/2019          Codes Class Noted    SBO (small bowel obstruction) ([facility] ICD-10-CM: Z07.737  ICD-9-CM: 560.9  6/14/2019        Generalized abdominal pain ICD-10-CM: R10.84  ICD-9-CM: 789.07  6/14/2019        Pneumatosis intestinalis ICD-10-CM: K63.89  ICD-9-CM: 569.89  6/14/2019        Thrombocytopenia ([facility] ICD-10-CM: D69.6  ICD-9-CM: 287.5  6/14/2019        Ventral incisional hernia ICD-10-CM: K43.2  ICD-9-CM: 553.21  6/14/2019        Hypertension ICD-10-CM: I10  ICD-9-CM: 401.9  Unknown        High cholesterol ICD-10-CM: E78.00  ICD-9-CM: 272.0  Unknown        GERD (gastroesophageal reflux disease) ICD-10-CM: K21.9  ICD-9-CM: 530.81  Unknown        Arthritis ICD-10-CM: M19.90  ICD-9-CM: 716.90  Unknown        Postsurgical hypothyroidism ICD-10-CM: E89.0  ICD-9-CM: 244.0  4/11/2017        Chronic obstructive pulmonary disease (HCC) (Chronic) ICD-10-CM: J44.9  ICD-9-CM: 343  4/10/2017        DDD (degenerative disc disease), cervical ICD-10-CM: M50.30  ICD-9-CM: 722.4  3/21/2017        S/P CABG (coronary artery bypass graft) ICD-10-CM: Z95.1  ICD-9-CM: V45.81  12/29/2016        Coronary artery disease of bypass graft of native heart with stable angina pectoris [facility]) ICD-10-CM: B51.213  ICD-9-CM: 414.05, 413.9  8/23/2016    Overview Addendum 6/29/2018 12:32 PM by Hakeem Clark MD     1. CABG (9/2003): Transmyocardial laser revascularization to the inferior wall of the left ventricle. Coronary artery bypass grafting x6. LIMA to LAD: Sequential SVG to ramus and obtuse marginal.  Sequential SVG to posterolateral and right PDA, SVG to diagonal.  2.  Cardiolite( 11/2012): Diaphragmatic attenuation. No ischemia. Normal LV systolic function. EF 63%. 3.  Stress Cardiolite (1/16/15):  EF 52%. Normal perfusion. 4.  LHC (4/13/15):  6 of 6 bypass grafts patent. Small vessel disease involving the apical LAD. Normal LV systolic function. LVEDP 27  5. LHC (5/24/18): Severe multivessel CAD. 6 of 6 grafts patent. Small vessel disease not amendable to PCI. EF 55-60%.               Chronic seasonal allergic rhinitis due to pollen (Chronic) ICD-10-CM: J30.1  ICD-9-CM: 477.0  8/23/2016        Dyslipidemia ICD-10-CM: E78.5  ICD-9-CM: 272.4  8/23/2016        Essential hypertension with goal blood pressure less than 130/85 ICD-10-CM: I10  ICD-9-CM: 401.9  8/23/2016        Diverticulitis of colon (without mention of hemorrhage)(562.11) ICD-10-CM: K57.32  ICD-9-CM: 562.11  12/3/2013 AAA [facility]abdominal aortic aneurysm) [facility] ICD-10-CM: I71.4  ICD-9-CM: 441.4  12/3/2013        DDD (degenerative disc disease), lumbar ICD-10-CM: M51.36  ICD-9-CM: 722.52  12/3/2013        Renal cyst, right ICD-10-CM: N28.1  ICD-9-CM: 753.10  12/3/2013        Leukopenia ICD-10-CM: D72.819  ICD-9-CM: 288.50  11/13/2012    Overview Addendum 11/19/2012 11:54 AM by Betsy Israel       There are no focal lesions in the liver or spleen. 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 ICD-10-CM: D69.6  ICD-9-CM: 287.5  11/13/2012            Active Problems: Thrombocytopenia, unspecified ([facility] (11/13/2012)      SBO (small bowel obstruction) ([facility] (6/14/2019)      Generalized abdominal pain (6/14/2019)      Pneumatosis intestinalis (6/14/2019)      Thrombocytopenia ([address] (6/14/2019)      Ventral incisional hernia (6/14/2019)       SBO with CT findings concerning for pneumatosis  I reviewed his CT images. Radiology has concerns for pneumatosis  His abd is very tight and tender despite NGT and narcotics    I offered expl lap and possible bowel resection and discussed possible hernia repair also  I discussed the patient's condition with the patient at length and treatment options. I discussed risks of surgery in language the patient could understand including bleeding, infection, need for further surgery, abscess, fistula, SBO, bowel injury, fistula, need for further surgery, blood clots, MI, stroke, renal failure, respiratory failure, ventilatory dependence, and death. The patient voiced understanding of all this and all questions were answered. Alternatives to surgery were discussed also and risks of the alternatives. The patient requested that we proceed with surgery. Informed consent was obtained.      OR notified for emergent surgery      Ventral Hernia on CT  Likely Repair without mesh    Thrombocytopenia  Chronic problem  Monitor  Transfuse if needed    Abd pain  Intervention for the SBO  Prn pain meds               I have personally performed a face-to-face diagnostic evaluation and management  service on this patient. I have independently seen the patient. I have independently obtained the above history from the patient/family. I have independently examined the patient with above findings. I have independently reviewed data/labs for this patient and developed the above plan of care (MDM). Signed: Brandie Jimenez.  Juana Marroquin MD, FACS

## 2019-06-14 NOTE — PROGRESS NOTES
TRANSFER - IN REPORT:    Verbal report received from [address] on [address]  being received from PACU for routine progression of care      Report consisted of patients Situation, Background, Assessment and   Recommendations(SBAR). Information from the following report(s) SBAR, Kardex, Procedure Summary, Intake/Output, MAR, Recent Results, Med Rec Status and Cardiac Rhythm NSR was reviewed with the receiving nurse. Opportunity for questions and clarification was provided. Assessment completed upon patients arrival to unit and care assumed.

## 2019-06-15 LAB
ANION GAP SERPL CALC-SCNC: 7 MMOL/L (ref 7–16)
BUN SERPL-MCNC: 18 MG/DL (ref 8–23)
CALCIUM SERPL-MCNC: 8.1 MG/DL (ref 8.3–10.4)
CHLORIDE SERPL-SCNC: 104 MMOL/L (ref 98–107)
CO2 SERPL-SCNC: 26 MMOL/L (ref 21–32)
CREAT SERPL-MCNC: 1 MG/DL (ref 0.8–1.5)
ERYTHROCYTE [DISTWIDTH] IN BLOOD BY AUTOMATED COUNT: 13.7 % (ref 11.9–14.6)
GLUCOSE SERPL-MCNC: 156 MG/DL (ref 65–100)
HCT VFR BLD AUTO: 29.3 % (ref 41.1–50.3)
HGB BLD-MCNC: 10.3 G/DL (ref 13.6–17.2)
MCH RBC QN AUTO: 34.8 PG (ref 26.1–32.9)
MCHC RBC AUTO-ENTMCNC: 35.2 G/DL (ref 31.4–35)
MCV RBC AUTO: 99 FL (ref 79.6–97.8)
NRBC # BLD: 0 K/UL (ref 0–0.2)
PLATELET # BLD AUTO: 70 K/UL (ref 150–450)
PMV BLD AUTO: 8.7 FL (ref 9.4–12.3)
POTASSIUM SERPL-SCNC: 4.4 MMOL/L (ref 3.5–5.1)
RBC # BLD AUTO: 2.96 M/UL (ref 4.23–5.6)
SODIUM SERPL-SCNC: 137 MMOL/L (ref 136–145)
WBC # BLD AUTO: 7.5 K/UL (ref 4.3–11.1)

## 2019-06-15 PROCEDURE — 77030027138 HC INCENT SPIROMETER -A

## 2019-06-15 PROCEDURE — 74011000258 HC RX REV CODE- 258: Performed by: SURGERY

## 2019-06-15 PROCEDURE — 83735 ASSAY OF MAGNESIUM: CPT

## 2019-06-15 PROCEDURE — 85027 COMPLETE CBC AUTOMATED: CPT

## 2019-06-15 PROCEDURE — 86580 TB INTRADERMAL TEST: CPT | Performed by: SURGERY

## 2019-06-15 PROCEDURE — 84443 ASSAY THYROID STIM HORMONE: CPT

## 2019-06-15 PROCEDURE — 85025 COMPLETE CBC W/AUTO DIFF WBC: CPT

## 2019-06-15 PROCEDURE — 84484 ASSAY OF TROPONIN QUANT: CPT

## 2019-06-15 PROCEDURE — 36415 COLL VENOUS BLD VENIPUNCTURE: CPT

## 2019-06-15 PROCEDURE — 80048 BASIC METABOLIC PNL TOTAL CA: CPT

## 2019-06-15 PROCEDURE — 77030020255 HC SOL INJ LR 1000ML BG

## 2019-06-15 PROCEDURE — 65610000001 HC ROOM ICU GENERAL

## 2019-06-15 PROCEDURE — 83605 ASSAY OF LACTIC ACID: CPT

## 2019-06-15 PROCEDURE — 74011250636 HC RX REV CODE- 250/636: Performed by: NURSE PRACTITIONER

## 2019-06-15 PROCEDURE — 74011000250 HC RX REV CODE- 250: Performed by: SURGERY

## 2019-06-15 PROCEDURE — 74011250636 HC RX REV CODE- 250/636: Performed by: SURGERY

## 2019-06-15 PROCEDURE — 74011000302 HC RX REV CODE- 302: Performed by: SURGERY

## 2019-06-15 PROCEDURE — 77010033678 HC OXYGEN DAILY

## 2019-06-15 PROCEDURE — 84100 ASSAY OF PHOSPHORUS: CPT

## 2019-06-15 RX ORDER — ENOXAPARIN SODIUM 100 MG/ML
40 INJECTION SUBCUTANEOUS EVERY 24 HOURS
Status: DISCONTINUED | OUTPATIENT
Start: 2019-06-16 | End: 2019-06-15

## 2019-06-15 RX ORDER — METOPROLOL TARTRATE 5 MG/5ML
5 INJECTION INTRAVENOUS ONCE
Status: COMPLETED | OUTPATIENT
Start: 2019-06-15 | End: 2019-06-15

## 2019-06-15 RX ORDER — DILTIAZEM HYDROCHLORIDE 5 MG/ML
5 INJECTION INTRAVENOUS ONCE
Status: COMPLETED | OUTPATIENT
Start: 2019-06-16 | End: 2019-06-16

## 2019-06-15 RX ORDER — DILTIAZEM HCL/D5W 125 MG/125
0-15 PLASTIC BAG, INJECTION (ML) INTRAVENOUS
Status: DISCONTINUED | OUTPATIENT
Start: 2019-06-16 | End: 2019-06-20

## 2019-06-15 RX ORDER — ALBUTEROL SULFATE 0.83 MG/ML
1.25 SOLUTION RESPIRATORY (INHALATION)
Status: DISCONTINUED | OUTPATIENT
Start: 2019-06-15 | End: 2019-06-16

## 2019-06-15 RX ORDER — HEPARIN SODIUM 5000 [USP'U]/ML
5000 INJECTION, SOLUTION INTRAVENOUS; SUBCUTANEOUS EVERY 8 HOURS
Status: DISCONTINUED | OUTPATIENT
Start: 2019-06-16 | End: 2019-06-18

## 2019-06-15 RX ADMIN — METOPROLOL TARTRATE 5 MG: 5 INJECTION INTRAVENOUS at 22:48

## 2019-06-15 RX ADMIN — PIPERACILLIN SODIUM,TAZOBACTAM SODIUM 3.38 G: 3; .375 INJECTION, POWDER, FOR SOLUTION INTRAVENOUS at 22:48

## 2019-06-15 RX ADMIN — MORPHINE SULFATE 5 MG: 10 INJECTION, SOLUTION INTRAMUSCULAR; INTRAVENOUS at 15:04

## 2019-06-15 RX ADMIN — MORPHINE SULFATE 4 MG: 10 INJECTION, SOLUTION INTRAMUSCULAR; INTRAVENOUS at 06:10

## 2019-06-15 RX ADMIN — SODIUM CHLORIDE, SODIUM LACTATE, POTASSIUM CHLORIDE, AND CALCIUM CHLORIDE 125 ML/HR: 600; 310; 30; 20 INJECTION, SOLUTION INTRAVENOUS at 01:32

## 2019-06-15 RX ADMIN — PIPERACILLIN SODIUM,TAZOBACTAM SODIUM 3.38 G: 3; .375 INJECTION, POWDER, FOR SOLUTION INTRAVENOUS at 15:06

## 2019-06-15 RX ADMIN — MORPHINE SULFATE 4 MG: 10 INJECTION, SOLUTION INTRAMUSCULAR; INTRAVENOUS at 21:34

## 2019-06-15 RX ADMIN — FAMOTIDINE 20 MG: 10 INJECTION, SOLUTION INTRAVENOUS at 21:10

## 2019-06-15 RX ADMIN — TUBERCULIN PURIFIED PROTEIN DERIVATIVE 5 UNITS: 5 INJECTION, SOLUTION INTRADERMAL at 12:06

## 2019-06-15 RX ADMIN — MORPHINE SULFATE 4 MG: 10 INJECTION, SOLUTION INTRAMUSCULAR; INTRAVENOUS at 17:42

## 2019-06-15 RX ADMIN — SODIUM CHLORIDE, SODIUM LACTATE, POTASSIUM CHLORIDE, AND CALCIUM CHLORIDE 125 ML/HR: 600; 310; 30; 20 INJECTION, SOLUTION INTRAVENOUS at 17:25

## 2019-06-15 RX ADMIN — FAMOTIDINE 20 MG: 10 INJECTION, SOLUTION INTRAVENOUS at 09:34

## 2019-06-15 RX ADMIN — MORPHINE SULFATE 4 MG: 10 INJECTION, SOLUTION INTRAMUSCULAR; INTRAVENOUS at 09:37

## 2019-06-15 RX ADMIN — PIPERACILLIN SODIUM,TAZOBACTAM SODIUM 3.38 G: 3; .375 INJECTION, POWDER, FOR SOLUTION INTRAVENOUS at 06:49

## 2019-06-15 NOTE — PROGRESS NOTES
Called pharmacy regarding IV synthroid dose. They stated there was a hold and med could not be started until tomorrow.

## 2019-06-15 NOTE — PROGRESS NOTES
Bedside, Verbal and Written shift change report given to [place] Corinne (oncoming nurse) by Iris Whaley (offgoing nurse). Report included the following information SBAR, Kardex, Procedure Summary, Intake/Output, MAR, Recent Results, Med Rec Status, Cardiac Rhythm SR and Alarm Parameters .  \

## 2019-06-15 NOTE — PROGRESS NOTES
Pt complaining of abdominal and back pain. Abdominal pain described as aching and sore. Back pain centered between scapulae and sharp. Pt notes hx of surgery in that area. Rates pain 8/10. Administered 4mg morphine per PRN order.

## 2019-06-15 NOTE — PROGRESS NOTES
Notified Dr. Germaine Pineda that patients platelets were 70 this morning. OK with her to switch to SQ heparin per pharmacist recommendation.

## 2019-06-15 NOTE — PROGRESS NOTES
Claribel Knowles MD   Bariatric & Advanced Laparoscopic Surgery & Endoscopy  [facility] [address], [address] [facility]  Phone [phone]   Fax [phone]      Date of visit: 6/15/2019          Name: Anna Nieves      MRN: [id number]       : 1935       Age: 82 y.o. Sex: male          Subjective:     Maximino Mccall is a 82 y.o. male s/p Procedure(s):  EXPLORATORY LAPAROTOMY   Mult enterorrhaphies approx 85cm small bowel resect and anast drain placement hernia repair (no mesh) removal peritoneal FB Removal of Bozoar materail in SB 1 Day Post-Op    06/15/2019 - Felling better this AM. Talkative, oriented. Laying in bed comfortably. Complaining of some gas in his abdomen but no major pain. UMER drain in place with bloody output. MEDS:    Current Facility-Administered Medications   Medication    [START ON 2019] levothyroxine (SYNTHROID) injection 75 mcg    [START ON 2019] enoxaparin (LOVENOX) injection 40 mg    albuterol (PROVENTIL VENTOLIN) nebulizer solution 1.25 mg    piperacillin-tazobactam (ZOSYN) 3.375 g in 0.9% sodium chloride (MBP/ADV) 100 mL    famotidine (PF) (PEPCID) injection 20 mg    morphine 10 mg/ml injection 2-5 mg    acetaminophen (TYLENOL) tablet 1,000 mg    ondansetron (ZOFRAN) injection 4 mg    lactated Ringers infusion    white petrolatum-mineral oil (AKWA TEARS) 83-15 % ophthalmic ointment       ALLERGIES:       Allergies   Allergen Reactions    Diphenhydramine Hcl Other (comments)     Hyperactivity, anxiousness    Iodinated Contrast- Oral And Iv Dye Rash     Only dye used in eye drops at eye doctors office. Pt states that the contrast was an injection into the arm at his eye doctors but has not had a problem with any contrast since then.      Sulfa (Sulfonamide Antibiotics) Rash       I/O:      Intake/Output Summary (Last 24 hours) at 6/15/2019 0833  Last data filed at 6/15/2019 0629  Gross per 24 hour   Intake 5894.25 ml   Output 2300 ml   Net 3594.25 ml           Physical Exam:     Visit Vitals  /62   Pulse 82   Temp 98.8 °F (37.1 °C)   Resp 16   Ht 6' (1.829 m)   Wt 213 lb (96.6 kg)   SpO2 96%   BMI 28.89 kg/m²       General:  Well-developed, well-nourished, no distress. Psych:  Cooperative, good insight and judgement. Neuro:  Alert, oriented to person, place and time. HEENT:  Normocephalic, atraumatic. Sclera clear. Lungs:  Unlabored breathing. Symmetrical chest expansion. Heart:  Regular rate and rhythm. No JVD. Abdomen:  Soft, mild tenderness at incision sites as expected. Distended. UMER drain inleft side with bloody output. Midline incision covered with dressing - minimal fluid through. Extremities:  Extremities normal, atraumatic, no cyanosis or edema. Skin:  Skin color, texture, turgor normal. No rashes. Labs: All recent labs were reviewed. Normal WBC. Hgb 10.3 from 12.5 - monitor. Thrombocytopenia. Cr stable.   Recent Results (from the past 24 hour(s))   TYPE & SCREEN    Collection Time: 06/14/19  9:40 AM   Result Value Ref Range    Crossmatch Expiration 06/17/2019     ABO/Rh(D) A POSITIVE     Antibody screen NEG    CBC W/O DIFF    Collection Time: 06/15/19  3:38 AM   Result Value Ref Range    WBC 7.5 4.3 - 11.1 K/uL    RBC 2.96 (L) 4.23 - 5.6 M/uL    HGB 10.3 (L) 13.6 - 17.2 g/dL    HCT 29.3 (L) 41.1 - 50.3 %    MCV 99.0 (H) 79.6 - 97.8 FL    MCH 34.8 (H) 26.1 - 32.9 PG    MCHC 35.2 (H) 31.4 - 35.0 g/dL    RDW 13.7 11.9 - 14.6 %    PLATELET 70 (L) 927 - 450 K/uL    MPV 8.7 (L) 9.4 - 12.3 FL    ABSOLUTE NRBC 0.00 0.0 - 0.2 K/uL   METABOLIC PANEL, BASIC    Collection Time: 06/15/19  3:38 AM   Result Value Ref Range    Sodium 137 136 - 145 mmol/L    Potassium 4.4 3.5 - 5.1 mmol/L    Chloride 104 98 - 107 mmol/L    CO2 26 21 - 32 mmol/L    Anion gap 7 7 - 16 mmol/L    Glucose 156 (H) 65 - 100 mg/dL    BUN 18 8 - 23 MG/DL    Creatinine 1.00 0.8 - 1.5 MG/DL    GFR est AA >60 >60 ml/min/1.73m2    GFR est non-AA >60 >60 ml/min/1.73m2    Calcium 8.1 (L) 8.3 - 10.4 MG/DL         Assessment/Plan:  Maximino Motta is a 82 y.o. male s/p Procedure(s):  EXPLORATORY LAPAROTOMY   Mult enterorrhaphies approx 85cm small bowel resect and anast drain placement hernia repair (no mesh) removal peritoneal FB Removal of Bozoar materail in SB    Pain control  DIET NPO - Cont NGT to LWIS for now - 50 cc output  Await return of bowel function - no flatus or BM as of now  Wound - C/D/I, remove dressing tomorrow  UOP good, Cr stable  Cont zosyn   Hgb 10.3 - monitor, HDS  UMER drain bloody 275 cc - monitor any change in output  OOB as tolerated to chair  DVT proph - SCD's or Sequential Compression Device, Lovenox  GI Proph - Pepcid BID      Signed: Torin Fine MD  Bariatric & Minimally Invasive Surgery  6/15/2019 8:33 AM

## 2019-06-15 NOTE — PROGRESS NOTES
Problem: Falls - Risk of  Goal: *Absence of Falls  Description  Document Wilbert Reyna Fall Risk and appropriate interventions in the flowsheet. 6/15/2019 0058 by [employer]  Outcome: Progressing Towards Goal  6/15/2019 0048 by Finis Million  Outcome: Progressing Towards Goal     Problem: Patient Education: Go to Patient Education Activity  Goal: Patient/Family Education  6/15/2019 0058 by Tere Trejo  Outcome: Progressing Towards Goal  6/15/2019 0048 by Finis Million  Outcome: Progressing Towards Goal     Problem: Pressure Injury - Risk of  Goal: *Prevention of pressure injury  Description  Document Tim Scale and appropriate interventions in the flowsheet.   6/15/2019 0058 by Jaclyn PEREZ  Outcome: Progressing Towards Goal  6/15/2019 0048 by Finis Million  Outcome: Progressing Towards Goal     Problem: Patient Education: Go to Patient Education Activity  Goal: Patient/Family Education  6/15/2019 0058 by Jaclyn PEREZ  Outcome: Progressing Towards Goal  6/15/2019 0048 by Finis Million  Outcome: Progressing Towards Goal     Problem: Infection - Risk of, Surgical Site Infection  Goal: *Absence of surgical site infection signs and symptoms  Outcome: Progressing Towards Goal     Problem: Patient Education: Go to Patient Education Activity  Goal: Patient/Family Education  Outcome: Progressing Towards Goal

## 2019-06-15 NOTE — PROGRESS NOTES
Bedside shift change report given to Taye Lin (oncoming nurse) by Terell Faith (offgoing nurse). Report included the following information SBAR, Kardex, OR Summary, Intake/Output, MAR, Recent Results and Cardiac Rhythm NSR. Pt assessment completed. Pt in bed alert and oriented. Incision site inspected and dressing drainage noted. UMER drain charged and patient. Pt states he has moderate soreness pain in abdomen. Pt in NAD and VSS.

## 2019-06-15 NOTE — PROGRESS NOTES
Bedside shift change report given to George Gastelum (oncoming nurse) by Deepali Cook RN (offgoing nurse). Report included the following information SBAR, OR Summary, Intake/Output, MAR, Recent Results and Cardiac Rhythm ST. Pt assessment completed. Pt in bed, alert and oriented. Incision site inspected. No new drainage. UMER charged and patent with sanguinous drainage. Bowel sounds active in all quadrants. Lung fields clear. Pt states has moderate to strong \"gas\" pain located around the midline inguinal area.

## 2019-06-15 NOTE — PROGRESS NOTES
Pt complaining of \"gas pain\" in incision area. Dressing drainage unchanged. Abdomen tender to touch but patient states its not painful. Administered 4 mg morphine per PRN order.

## 2019-06-15 NOTE — PROGRESS NOTES
Care Management Interventions  PCP Verified by CM: Yes  Mode of Transport at Discharge: Other (see comment)  Transition of Care Consult (CM Consult): Other  Current Support Network: Own Home, Lives with Spouse  Confirm Follow Up Transport: Family  Plan discussed with Pt/Family/Caregiver: Yes  Freedom of Choice Offered: Yes  Discharge Location  Discharge Placement: Home with family assistance  Visited with pt regarding plans for discharge, pt lives at home with wife/inde with ADL's, had cane & walker if needed/is current with PCP, PPD orders placed. PT will be ordered when more appropriate.

## 2019-06-15 NOTE — PROGRESS NOTES
06/15/19 0749   Oxygen Therapy   O2 Sat (%) 96 %   Pulse via Oximetry 84 beats per minute   O2 Device Nasal cannula   O2 Flow Rate (L/min) 2 l/min   decreased oxygen from 3 to 2 lpm

## 2019-06-16 PROBLEM — I48.91 ATRIAL FIBRILLATION WITH RAPID VENTRICULAR RESPONSE (HCC): Status: ACTIVE | Noted: 2019-06-16

## 2019-06-16 LAB
ANION GAP SERPL CALC-SCNC: 5 MMOL/L (ref 7–16)
BASOPHILS # BLD: 0 K/UL (ref 0–0.2)
BASOPHILS NFR BLD: 0 % (ref 0–2)
BUN SERPL-MCNC: 17 MG/DL (ref 8–23)
CALCIUM SERPL-MCNC: 8.3 MG/DL (ref 8.3–10.4)
CHLORIDE SERPL-SCNC: 105 MMOL/L (ref 98–107)
CO2 SERPL-SCNC: 28 MMOL/L (ref 21–32)
CREAT SERPL-MCNC: 0.96 MG/DL (ref 0.8–1.5)
DIFFERENTIAL METHOD BLD: ABNORMAL
EOSINOPHIL # BLD: 0 K/UL (ref 0–0.8)
EOSINOPHIL NFR BLD: 1 % (ref 0.5–7.8)
ERYTHROCYTE [DISTWIDTH] IN BLOOD BY AUTOMATED COUNT: 13.6 % (ref 11.9–14.6)
GLUCOSE SERPL-MCNC: 118 MG/DL (ref 65–100)
HCT VFR BLD AUTO: 28.8 % (ref 41.1–50.3)
HGB BLD-MCNC: 10.2 G/DL (ref 13.6–17.2)
IMM GRANULOCYTES # BLD AUTO: 0 K/UL (ref 0–0.5)
IMM GRANULOCYTES NFR BLD AUTO: 1 % (ref 0–5)
LACTATE SERPL-SCNC: 0.9 MMOL/L (ref 0.4–2)
LYMPHOCYTES # BLD: 0.4 K/UL (ref 0.5–4.6)
LYMPHOCYTES NFR BLD: 7 % (ref 13–44)
MAGNESIUM SERPL-MCNC: 1.9 MG/DL (ref 1.8–2.4)
MCH RBC QN AUTO: 35.2 PG (ref 26.1–32.9)
MCHC RBC AUTO-ENTMCNC: 35.4 G/DL (ref 31.4–35)
MCV RBC AUTO: 99.3 FL (ref 79.6–97.8)
MM INDURATION POC: 0 MM (ref 0–5)
MONOCYTES # BLD: 0.3 K/UL (ref 0.1–1.3)
MONOCYTES NFR BLD: 6 % (ref 4–12)
NEUTS SEG # BLD: 5.1 K/UL (ref 1.7–8.2)
NEUTS SEG NFR BLD: 86 % (ref 43–78)
NRBC # BLD: 0 K/UL (ref 0–0.2)
PHOSPHATE SERPL-MCNC: 2.6 MG/DL (ref 2.3–3.7)
PLATELET # BLD AUTO: 70 K/UL (ref 150–450)
PMV BLD AUTO: 8.5 FL (ref 9.4–12.3)
POTASSIUM SERPL-SCNC: 4.1 MMOL/L (ref 3.5–5.1)
PPD POC: NEGATIVE NEGATIVE
RBC # BLD AUTO: 2.9 M/UL (ref 4.23–5.6)
SODIUM SERPL-SCNC: 138 MMOL/L (ref 136–145)
TROPONIN I SERPL-MCNC: 0.02 NG/ML (ref 0.02–0.05)
TSH SERPL DL<=0.005 MIU/L-ACNC: 5.51 UIU/ML
WBC # BLD AUTO: 6 K/UL (ref 4.3–11.1)

## 2019-06-16 PROCEDURE — 77010033678 HC OXYGEN DAILY

## 2019-06-16 PROCEDURE — 74011250636 HC RX REV CODE- 250/636: Performed by: HOSPITALIST

## 2019-06-16 PROCEDURE — 65610000001 HC ROOM ICU GENERAL

## 2019-06-16 PROCEDURE — 74011000258 HC RX REV CODE- 258: Performed by: SURGERY

## 2019-06-16 PROCEDURE — 94640 AIRWAY INHALATION TREATMENT: CPT

## 2019-06-16 PROCEDURE — 77030020255 HC SOL INJ LR 1000ML BG

## 2019-06-16 PROCEDURE — 74011000250 HC RX REV CODE- 250: Performed by: HOSPITALIST

## 2019-06-16 PROCEDURE — 74011000250 HC RX REV CODE- 250: Performed by: SURGERY

## 2019-06-16 PROCEDURE — 74011250636 HC RX REV CODE- 250/636: Performed by: SURGERY

## 2019-06-16 PROCEDURE — 74011250636 HC RX REV CODE- 250/636: Performed by: NURSE PRACTITIONER

## 2019-06-16 PROCEDURE — 74011000250 HC RX REV CODE- 250: Performed by: INTERNAL MEDICINE

## 2019-06-16 RX ORDER — ALBUTEROL SULFATE 0.83 MG/ML
SOLUTION RESPIRATORY (INHALATION)
Status: DISCONTINUED
Start: 2019-06-16 | End: 2019-06-16 | Stop reason: WASHOUT

## 2019-06-16 RX ORDER — METOPROLOL TARTRATE 25 MG/1
25 TABLET, FILM COATED ORAL 2 TIMES DAILY
Status: DISCONTINUED | OUTPATIENT
Start: 2019-06-16 | End: 2019-06-16

## 2019-06-16 RX ORDER — METOPROLOL TARTRATE 5 MG/5ML
5 INJECTION INTRAVENOUS EVERY 6 HOURS
Status: DISCONTINUED | OUTPATIENT
Start: 2019-06-16 | End: 2019-06-26 | Stop reason: HOSPADM

## 2019-06-16 RX ORDER — LORAZEPAM 2 MG/ML
1 INJECTION INTRAMUSCULAR ONCE
Status: COMPLETED | OUTPATIENT
Start: 2019-06-17 | End: 2019-06-17

## 2019-06-16 RX ORDER — MAGNESIUM SULFATE 1 G/100ML
INJECTION INTRAVENOUS
Status: ACTIVE
Start: 2019-06-16 | End: 2019-06-16

## 2019-06-16 RX ORDER — ALBUTEROL SULFATE 0.83 MG/ML
2.5 SOLUTION RESPIRATORY (INHALATION)
Status: DISCONTINUED | OUTPATIENT
Start: 2019-06-16 | End: 2019-06-26 | Stop reason: HOSPADM

## 2019-06-16 RX ORDER — MAGNESIUM SULFATE 1 G/100ML
1 INJECTION INTRAVENOUS ONCE
Status: COMPLETED | OUTPATIENT
Start: 2019-06-16 | End: 2019-06-16

## 2019-06-16 RX ADMIN — METOPROLOL TARTRATE 5 MG: 5 INJECTION INTRAVENOUS at 17:16

## 2019-06-16 RX ADMIN — PIPERACILLIN SODIUM,TAZOBACTAM SODIUM 3.38 G: 3; .375 INJECTION, POWDER, FOR SOLUTION INTRAVENOUS at 23:45

## 2019-06-16 RX ADMIN — HEPARIN SODIUM 5000 UNITS: 5000 INJECTION INTRAVENOUS; SUBCUTANEOUS at 15:02

## 2019-06-16 RX ADMIN — SODIUM CHLORIDE, SODIUM LACTATE, POTASSIUM CHLORIDE, AND CALCIUM CHLORIDE 125 ML/HR: 600; 310; 30; 20 INJECTION, SOLUTION INTRAVENOUS at 19:06

## 2019-06-16 RX ADMIN — MORPHINE SULFATE 3 MG: 10 INJECTION, SOLUTION INTRAMUSCULAR; INTRAVENOUS at 07:05

## 2019-06-16 RX ADMIN — HEPARIN SODIUM 5000 UNITS: 5000 INJECTION INTRAVENOUS; SUBCUTANEOUS at 23:45

## 2019-06-16 RX ADMIN — Medication 5 MG/HR: at 10:31

## 2019-06-16 RX ADMIN — PIPERACILLIN SODIUM,TAZOBACTAM SODIUM 3.38 G: 3; .375 INJECTION, POWDER, FOR SOLUTION INTRAVENOUS at 08:27

## 2019-06-16 RX ADMIN — ALBUTEROL SULFATE 2.5 MG: 2.5 SOLUTION RESPIRATORY (INHALATION) at 10:35

## 2019-06-16 RX ADMIN — METOPROLOL TARTRATE 5 MG: 5 INJECTION INTRAVENOUS at 23:45

## 2019-06-16 RX ADMIN — MAGNESIUM SULFATE HEPTAHYDRATE 1 G: 1 INJECTION, SOLUTION INTRAVENOUS at 01:11

## 2019-06-16 RX ADMIN — FAMOTIDINE 20 MG: 10 INJECTION, SOLUTION INTRAVENOUS at 08:26

## 2019-06-16 RX ADMIN — ONDANSETRON 4 MG: 2 INJECTION INTRAMUSCULAR; INTRAVENOUS at 10:31

## 2019-06-16 RX ADMIN — SODIUM CHLORIDE 500 ML: 900 INJECTION, SOLUTION INTRAVENOUS at 02:00

## 2019-06-16 RX ADMIN — METOPROLOL TARTRATE 5 MG: 5 INJECTION INTRAVENOUS at 11:34

## 2019-06-16 RX ADMIN — HEPARIN SODIUM 5000 UNITS: 5000 INJECTION INTRAVENOUS; SUBCUTANEOUS at 08:26

## 2019-06-16 RX ADMIN — PIPERACILLIN SODIUM,TAZOBACTAM SODIUM 3.38 G: 3; .375 INJECTION, POWDER, FOR SOLUTION INTRAVENOUS at 15:02

## 2019-06-16 RX ADMIN — Medication 5 MG/HR: at 00:02

## 2019-06-16 RX ADMIN — DILTIAZEM HYDROCHLORIDE 5 MG: 5 INJECTION INTRAVENOUS at 00:01

## 2019-06-16 RX ADMIN — MORPHINE SULFATE 4 MG: 10 INJECTION, SOLUTION INTRAMUSCULAR; INTRAVENOUS at 05:29

## 2019-06-16 RX ADMIN — FAMOTIDINE 20 MG: 10 INJECTION, SOLUTION INTRAVENOUS at 21:17

## 2019-06-16 RX ADMIN — ONDANSETRON 4 MG: 2 INJECTION INTRAMUSCULAR; INTRAVENOUS at 14:59

## 2019-06-16 RX ADMIN — SODIUM CHLORIDE, SODIUM LACTATE, POTASSIUM CHLORIDE, AND CALCIUM CHLORIDE 125 ML/HR: 600; 310; 30; 20 INJECTION, SOLUTION INTRAVENOUS at 01:51

## 2019-06-16 NOTE — CONSULTS
Hospitalist H&P/Consult Note     Admit Date:  2019  3:14 AM   Name:  Rajinder Greene   Age:  82 y.o.  :  1935   MRN:  [id number]   PCP:  Victor Manuel Russell MD  Treatment Team: Attending Provider: Karen Garza MD; Consulting Provider: Karen Garza MD; Utilization Review: Arnol Moy RN; Care Manager: Elif Dai RN; Consulting Provider: Allegra Palmer MD    HPI:   Patient is an 81 y/o male with hx CAD, CABG, COPD, HTN, HLD who underwent expl lap yesterday for SBO. He went into new onset rapid afib tonight at approx 1020pm with rate to 150s-160s. He denies chest pain or shortness of breath. He believes he may have had an episode of afib when he had his 6 v CABG in . Hospitalist service consulted for new onset rapid atrial fibrillation. 10 systems reviewed and negative except as noted in HPI. 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   Left lobe: At least 4 heterogeneous nodules in the left lobe. The largest measures 2.9 x 1.1 x 2.4 cm in the lower pole, with mild peripheral color Doppler flow and possible tiny internal calcifications.     Thrombocytopenia (HCC)     marrow normal slightly big spleen low mpv    Thyroid disease     hypothyroidism      Past Surgical History:   Procedure Laterality Date    CARDIAC SURG PROCEDURE UNLIST      AAA    CARDIAC SURG PROCEDURE UNLIST  2003     6 bypass surgeries    HX CHOLECYSTECTOMY  2000    HX COLONOSCOPY  2013    10 polyps/ fu in 3 yrs    HX ENDOSCOPY  2013    1 biopsy    HX GI  2003    small bowel obstruction    HX HEART CATHETERIZATION  2015    HX HEENT      thyroidectomy    HX HERNIA REPAIR  1993    Insisional    HX LUMBAR LAMINECTOMY  2005    L3, L4    HX ORTHOPAEDIC  2006    right total knee replacement    HX ORTHOPAEDIC  2005     left knee replacement    HX ORTHOPAEDIC      laminectomy L3-l4    HX ORTHOPAEDIC  1955    right knee surgery    HX OTHER SURGICAL  2002    upper endoscopic retrograde cholangiopancreatogram    HX OTHER SURGICAL  2014    removal squamous cell carcinoma    HX OTHER SURGICAL      MRSA lower back -3/2005    HX THYROIDECTOMY  2017    Dr Kaushal Schaefer    HX THYROIDECTOMY  2017    Dr. Pat Martínez  1981     ruptured disc in back   [address]    abd aortic aneurysm repair      Prior to Admission Medications   Prescriptions Last Dose Informant Patient Reported? Taking? albuterol sulfate (PROAIR RESPICLICK) 90 mcg/actuation aepb   No No   Sig: Take 2 Puffs by inhalation four (4) times daily as needed (Wheeze). amLODIPine (NORVASC) 5 mg tablet   No No   Sig: TAKE 1 TABLET BY MOUTH  DAILY   aspirin 81 mg chewable tablet   Yes No   Sig: Take 81 mg by mouth daily. atorvastatin (LIPITOR) 40 mg tablet   No No   Sig: TAKE 1 TABLET BY MOUTH  DAILY   finasteride (PROSCAR) 5 mg tablet   Yes No   Sig: Take 5 mg by mouth daily. fluticasone (FLONASE) 50 mcg/actuation nasal spray   No No   Si Sprays by Both Nostrils route daily.    gabapentin (NEURONTIN) 100 mg capsule   Yes No   Si-400 mg three (3) times daily. levothyroxine (SYNTHROID) 150 mcg tablet   Yes No   Sig: Take  by mouth Daily (before breakfast). metoprolol tartrate (LOPRESSOR) 25 mg tablet   No No   Sig: TAKE 1 TABLET BY MOUTH TWO  TIMES DAILY   omeprazole (PRILOSEC) 40 mg capsule   Yes No   Sig: Take 40 mg by mouth daily. raNITIdine (ZANTAC) 150 mg tablet   Yes No   Sig: Take 150 mg by mouth nightly. terazosin (HYTRIN) 10 mg capsule   Yes No   Sig: Take 10 mg by mouth nightly. tiotropium-olodaterol (STIOLTO RESPIMAT) 2.5-2.5 mcg/actuation mist   No No   Sig: Take 2 Puffs by inhalation daily. Facility-Administered Medications: None     Allergies   Allergen Reactions    Diphenhydramine Hcl Other (comments)     Hyperactivity, anxiousness    Iodinated Contrast- Oral And Iv Dye Rash     Only dye used in eye drops at eye doctors office. Pt states that the contrast was an injection into the arm at his eye doctors but has not had a problem with any contrast since then.      Sulfa (Sulfonamide Antibiotics) Rash      Social History     Tobacco Use    Smoking status: Former Smoker     Packs/day: 1.00     Years: 25.00     Pack years: 25.00     Last attempt to quit: 12/3/1987     Years since quittin.5    Smokeless tobacco: Never Used   Substance Use Topics    Alcohol use: Yes     Comment: occasional glass wine      Family History   Problem Relation Age of Onset    Heart Disease Father     Cancer Mother         colon    Cancer Sister     Cancer Maternal Aunt     Cancer Paternal Grandmother     Diabetes Paternal Grandmother     Thyroid Disease Neg Hx     Thyroid Cancer Neg Hx       Immunization History   Administered Date(s) Administered    Influenza High Dose Vaccine PF 2016, 2017, 2018    Influenza Vaccine 2014, 2015    Pneumococcal Conjugate (PCV-13) 2015, 2015    Pneumococcal Polysaccharide (PPSV-23) 2015, 2017, 2017    Pneumococcal Vaccine (Unspecified Type) 03/02/2017    TB Skin Test (PPD) Intradermal 06/15/2019    Tdap 08/01/2010       Objective:     Patient Vitals for the past 24 hrs:   Temp Pulse Resp BP SpO2   06/16/19 0015  (!) 116 13 90/60 93 %   06/16/19 0013  (!) 131 15 (!) 75/59 93 %   06/16/19 0001 98.7 °F (37.1 °C) (!) 121 13 108/63 95 %   06/15/19 2345  (!) 145 (!) 35 98/66 95 %   06/15/19 2338  (!) 162 17 (!) 85/64 95 %   06/15/19 2335  (!) 132 21 (!) 75/60 94 %   06/15/19 2334  (!) 142 12 90/60 96 %   06/15/19 2300  (!) 114 12 99/64 94 %   06/15/19 2254  (!) 150 17 122/50 94 %   06/15/19 2227  (!) 127 18 136/70 95 %   06/15/19 2159  100 14 112/70 95 %   06/15/19 2138  (!) 112 19  95 %   06/15/19 2059  93 14 125/69 97 %   06/15/19 1959  96 13 119/65 96 %   06/15/19 1859 98.7 °F (37.1 °C) 93 14 140/69 96 %   06/15/19 1759  96 13 113/60 96 %   06/15/19 1659  93 14 122/63 96 %   06/15/19 1605  88 14 104/58 96 %   06/15/19 1515 98.9 °F (37.2 °C)       06/15/19 1459  95 21 146/72 96 %   06/15/19 1359  86 21 130/67 97 %   06/15/19 1259  93 14 125/68 96 %   06/15/19 1159  90 (!) 33 124/71 96 %   06/15/19 1111 98.7 °F (37.1 °C)       06/15/19 1059  88 22 120/70 97 %   06/15/19 0959  92 20 131/73 96 %   06/15/19 0859  88 15 125/60 95 %   06/15/19 0759  82 16 123/62 96 %   06/15/19 0749     96 %   06/15/19 0714 98.8 °F (37.1 °C)       06/15/19 0659  89 14 137/69 97 %   06/15/19 0559  89 25 134/58 97 %   06/15/19 0504  86 13 133/64 97 %   06/15/19 0459  79 17 133/64 96 %   06/15/19 0359 98.6 °F (37 °C) 83 16 131/64 97 %   06/15/19 0259  80 14 112/67 97 %   06/15/19 0159  84 21 117/63 97 %     Oxygen Therapy  O2 Sat (%): 93 % (06/16/19 0015)  Pulse via Oximetry: 122 beats per minute (06/16/19 0015)  O2 Device: Nasal cannula (06/16/19 0001)  O2 Flow Rate (L/min): 2 l/min (06/16/19 0001)    Intake/Output Summary (Last 24 hours) at 6/16/2019 0111  Last data filed at 6/15/2019 1859  Gross per 24 hour   Intake 3510.92 ml   Output 2300 ml   Net 1210.92 ml       Physical Exam:  General:    Well nourished. Alert. NG tube   Eyes:   Normal sclera. Extraocular movements intact. ENT:  Normocephalic, atraumatic. Moist mucous membranes  CV:   Irreg, irreg  Lungs:  CTAB. No wheezing, rhonchi, or rales. Abdomen: Post-op bandaged  Extremities: Warm and dry. No cyanosis or edema. SCDs   Neurologic: CN II-XII grossly intact. Sensation intact. Skin:     No rashes or jaundice. No wounds. Psych:  Normal mood and affect. I reviewed the labs, imaging, EKGs, telemetry, and other studies done this admission.   Data Review:   Recent Results (from the past 24 hour(s))   CBC W/O DIFF    Collection Time: 06/15/19  3:38 AM   Result Value Ref Range    WBC 7.5 4.3 - 11.1 K/uL    RBC 2.96 (L) 4.23 - 5.6 M/uL    HGB 10.3 (L) 13.6 - 17.2 g/dL    HCT 29.3 (L) 41.1 - 50.3 %    MCV 99.0 (H) 79.6 - 97.8 FL    MCH 34.8 (H) 26.1 - 32.9 PG    MCHC 35.2 (H) 31.4 - 35.0 g/dL    RDW 13.7 11.9 - 14.6 %    PLATELET 70 (L) 530 - 450 K/uL    MPV 8.7 (L) 9.4 - 12.3 FL    ABSOLUTE NRBC 0.00 0.0 - 0.2 K/uL   METABOLIC PANEL, BASIC    Collection Time: 06/15/19  3:38 AM   Result Value Ref Range    Sodium 137 136 - 145 mmol/L    Potassium 4.4 3.5 - 5.1 mmol/L    Chloride 104 98 - 107 mmol/L    CO2 26 21 - 32 mmol/L    Anion gap 7 7 - 16 mmol/L    Glucose 156 (H) 65 - 100 mg/dL    BUN 18 8 - 23 MG/DL    Creatinine 1.00 0.8 - 1.5 MG/DL    GFR est AA >60 >60 ml/min/1.73m2    GFR est non-AA >60 >60 ml/min/1.73m2    Calcium 8.1 (L) 8.3 - 10.4 MG/DL   TROPONIN I    Collection Time: 06/15/19 11:52 PM   Result Value Ref Range    Troponin-I, Qt. 0.02 0.02 - 0.05 NG/ML   CBC WITH AUTOMATED DIFF    Collection Time: 06/15/19 11:52 PM   Result Value Ref Range    WBC 6.0 4.3 - 11.1 K/uL    RBC 2.90 (L) 4.23 - 5.6 M/uL    HGB 10.2 (L) 13.6 - 17.2 g/dL    HCT 28.8 (L) 41.1 - 50.3 %    MCV 99.3 (H) 79.6 - 97.8 FL    MCH 35.2 (H) 26.1 - 32.9 PG    MCHC 35.4 (H) 31.4 - 35.0 g/dL    RDW 13.6 11.9 - 14.6 %    PLATELET 70 (L) 537 - 450 K/uL    MPV 8.5 (L) 9.4 - 12.3 FL    ABSOLUTE NRBC 0.00 0.0 - 0.2 K/uL    DF AUTOMATED      NEUTROPHILS 86 (H) 43 - 78 %    LYMPHOCYTES 7 (L) 13 - 44 %    MONOCYTES 6 4.0 - 12.0 %    EOSINOPHILS 1 0.5 - 7.8 %    BASOPHILS 0 0.0 - 2.0 %    IMMATURE GRANULOCYTES 1 0.0 - 5.0 %    ABS. NEUTROPHILS 5.1 1.7 - 8.2 K/UL    ABS. LYMPHOCYTES 0.4 (L) 0.5 - 4.6 K/UL    ABS. MONOCYTES 0.3 0.1 - 1.3 K/UL    ABS. EOSINOPHILS 0.0 0.0 - 0.8 K/UL    ABS. BASOPHILS 0.0 0.0 - 0.2 K/UL    ABS. IMM. GRANS. 0.0 0.0 - 0.5 K/UL   LACTIC ACID    Collection Time: 06/15/19 11:52 PM   Result Value Ref Range    Lactic acid 0.9 0.4 - 2.0 MMOL/L   METABOLIC PANEL, BASIC    Collection Time: 06/15/19 11:52 PM   Result Value Ref Range    Sodium 138 136 - 145 mmol/L    Potassium 4.1 3.5 - 5.1 mmol/L    Chloride 105 98 - 107 mmol/L    CO2 28 21 - 32 mmol/L    Anion gap 5 (L) 7 - 16 mmol/L    Glucose 118 (H) 65 - 100 mg/dL    BUN 17 8 - 23 MG/DL    Creatinine 0.96 0.8 - 1.5 MG/DL    GFR est AA >60 >60 ml/min/1.73m2    GFR est non-AA >60 >60 ml/min/1.73m2    Calcium 8.3 8.3 - 10.4 MG/DL   TSH 3RD GENERATION    Collection Time: 06/15/19 11:52 PM   Result Value Ref Range    TSH 5.510 uIU/mL   MAGNESIUM    Collection Time: 06/15/19 11:52 PM   Result Value Ref Range    Magnesium 1.9 1.8 - 2.4 mg/dL   PHOSPHORUS    Collection Time: 06/15/19 11:52 PM   Result Value Ref Range    Phosphorus 2.6 2.3 - 3.7 MG/DL       Imaging Studies:  CXR Results  (Last 48 hours)    None        CT Results  (Last 48 hours)               06/14/19 0426  CT ABD PELV WO CONT Final result    Impression:  IMPRESSION: Mid small bowel obstruction. The transition point is not clearly   identified, but may lie in the region of a small periumbilical fat-containing   ventral hernia which may be incarcerated given the associated fat stranding. Of   note, no bowel is seen within the hernia.  Additionally, there are a few   questionable areas of small bowel wall pneumatosis concerning for ischemia. ER   physician notified. Narrative:  EXAM: Noncontrast CT abdomen and pelvis. INDICATION: Abdominal pain. COMPARISON: October 20, 2011. TECHNIQUE: Axial CT images of the abdomen and pelvis were obtained without IV   contrast. Radiation dose reduction techniques were used for this study. Our CT   scanners use one or all of the following:  Automated exposure control,   adjustment of the mA or kV according to patient size, iterative reconstruction. FINDINGS:       - Liver: Within normal limits. - Gallbladder and bile ducts: The gallbladder is absent, without biliary tract   dilatation.   - Spleen: Within normal limits. - Urinary tract: Again noted is a 5.3 cm right kidney cyst. The left kidney is   unremarkable. No urinary tract stone or hydronephrosis is identified. There are   bilateral inguinal hernias, containing fat on the left and a small portion of   the urinary bladder on the right. - Adrenals: Within normal limits. - Pancreas: Within normal limits. - Gastrointestinal tract: In the umbilicus region there is a ventral 2.8 cm   fat-containing hernia, with adjacent stranding which may relate to   incarceration. There is a mid small bowel obstruction, whose transition point is   not clearly identified, but may lie in the region of the ventral hernia although   no bowel is seen within the hernia itself. There are a few questionable areas of   small bowel wall pneumatosis. The colon is unremarkable except for   diverticulosis. No free air or free fluid is seen. - Retroperitoneum: Within normal limits. - Peritoneal cavity and abdominal wall: Within normal limits. - Pelvis: Within normal limits. - Spine/bones: No acute process. - Other comments: Partially visualized is a descending thoracic aortic stent   graft.                  Assessment and Plan:     [facility] Problems as of 6/16/2019 Date Reviewed: 6/14/2019          Codes Class Noted - Resolved POA    Small bowel obstruction ([facility] ICD-10-CM: G09.296  ICD-9-CM: 560.9  6/14/2019 - Present Yes        Atrial fibrillation with rapid ventricular response ([facility] ICD-10-CM: I48.91  ICD-9-CM: 427.31  6/16/2019 - Present No        SBO (small bowel obstruction) ([facility] ICD-10-CM: F12.794  ICD-9-CM: 560.9  6/14/2019 - Present Yes        Generalized abdominal pain ICD-10-CM: R10.84  ICD-9-CM: 789.07  6/14/2019 - Present Yes        Pneumatosis intestinalis ICD-10-CM: K63.89  ICD-9-CM: 569.89  6/14/2019 - Present Yes        Thrombocytopenia ([facility] ICD-10-CM: D69.6  ICD-9-CM: 287.5  6/14/2019 - Present Yes        Ventral incisional hernia ICD-10-CM: K43.2  ICD-9-CM: 553.21  6/14/2019 - Present Yes        Chronic obstructive pulmonary disease ([facility] (Chronic) ICD-10-CM: J44.9  ICD-9-CM: 670  4/10/2017 - Present Yes        Coronary artery disease of bypass graft of native heart with stable angina pectoris ([facility] ICD-10-CM: I25.708  ICD-9-CM: 414.05, 413.9  8/23/2016 - Present Yes    Overview Addendum 6/29/2018 12:32 PM by Alfonso Trotter MD     1. CABG (9/2003): Transmyocardial laser revascularization to the inferior wall of the left ventricle. Coronary artery bypass grafting x6. LIMA to LAD: Sequential SVG to ramus and obtuse marginal.  Sequential SVG to posterolateral and right PDA, SVG to diagonal.  2.  Cardiolite( 11/2012): Diaphragmatic attenuation. No ischemia. Normal LV systolic function. EF 63%. 3.  Stress Cardiolite (1/16/15):  EF 52%. Normal perfusion. 4.  LHC (4/13/15):  6 of 6 bypass grafts patent. Small vessel disease involving the apical LAD. Normal LV systolic function. LVEDP 27  5. LHC (5/24/18): Severe multivessel CAD. 6 of 6 grafts patent. Small vessel disease not amendable to PCI. EF 55-60%.               Thrombocytopenia, unspecified ([facility] ICD-10-CM: D69.6  ICD-9-CM: 287.5  11/13/2012 - Present Yes PLAN:  · Hospitalist service consulted for new onset rapid afib  · Patient denies any chest pain or shortness of bereath  · Obtain stat EKG to r/o ischemia  · cardizem Gtt for rate control  · No full anticoagulation due to recent extensive surgery  · Obtain STAT labs including troponin, CBC, BMP, Mg, TSH  · He has SCDs and SQ heparin for DVT prophylaxis  · Obtain echocardiogram in am  · Will follow      Estimated LOS:  Per primary team    Signed:  Shyann Julian MD

## 2019-06-16 NOTE — PROGRESS NOTES
Patient is calm  Alert and in good spirits  [place] family at bedside    Patient/family were receptive to  presence  Very engaging in conversation    Encouraged  Assured them of our prayers  They were thankful    Aquilino Sterling, staff Matt barnhart 05, [address] [facility]  /   [email or web address]

## 2019-06-16 NOTE — PROGRESS NOTES
Torin Fine MD   Bariatric & Advanced Laparoscopic Surgery & Endoscopy  [address] [place] [address] [place] [address]  Phone [phone]   Fax [phone]      Date of visit: 2019          Name: Alex Chopra      MRN: [id number]       : 1935       Age: 82 y.o. Sex: male          Subjective:     Maximino Motta is a 82 y.o. male s/p Procedure(s):  EXPLORATORY LAPAROTOMY   Mult enterorrhaphies approx 85cm small bowel resect and anast drain placement hernia repair (no mesh) removal peritoneal FB Removal of Bozoar materail in SB 1 Day Post-Op    06/15/2019 - Feeling better this AM. Talkative, oriented. Laying in bed comfortably. Complaining of some gas in his abdomen but no major pain. UMER drain in place with bloody output.     2019 - Feeling good this AM. Playing on his cellphone in bed. Went into Afib with RVR overnight. Hospitalist consulted and started on Cardizem drip. No flatus or BM. MEDS:    Current Facility-Administered Medications   Medication    [START ON 2019] levothyroxine (SYNTHROID) injection 75 mcg    albuterol (PROVENTIL VENTOLIN) nebulizer solution 1.25 mg    heparin (porcine) injection 5,000 Units    tuberculin injection 5 Units    dilTIAZem (CARDIZEM) 125 mg/125 mL (1 mg/mL) dextrose 5% infusion    piperacillin-tazobactam (ZOSYN) 3.375 g in 0.9% sodium chloride (MBP/ADV) 100 mL    famotidine (PF) (PEPCID) injection 20 mg    morphine 10 mg/ml injection 2-5 mg    acetaminophen (TYLENOL) tablet 1,000 mg    ondansetron (ZOFRAN) injection 4 mg    lactated Ringers infusion    white petrolatum-mineral oil (AKWA TEARS) 83-15 % ophthalmic ointment       ALLERGIES:       Allergies   Allergen Reactions    Diphenhydramine Hcl Other (comments)     Hyperactivity, anxiousness    Iodinated Contrast- Oral And Iv Dye Rash     Only dye used in eye drops at eye doctors office.  Pt states that the contrast was an injection into the arm at his eye doctors but has not had a problem with any contrast since then.  Sulfa (Sulfonamide Antibiotics) Rash       I/O:      Intake/Output Summary (Last 24 hours) at 6/16/2019 0838  Last data filed at 6/16/2019 0715  Gross per 24 hour   Intake 1642.67 ml   Output 2685 ml   Net -1042.33 ml           Physical Exam:     Visit Vitals  /56   Pulse 86   Temp 98.5 °F (36.9 °C)   Resp 24   Ht 6' (1.829 m)   Wt 213 lb (96.6 kg)   SpO2 95%   BMI 28.89 kg/m²       General:  Well-developed, well-nourished, no distress. Psych:  Cooperative, good insight and judgement. Neuro:  Alert, oriented to person, place and time. HEENT:  Normocephalic, atraumatic. Sclera clear. Lungs:  Unlabored breathing. Symmetrical chest expansion. Heart:  Regular rate and rhythm. No JVD. Abdomen:  Soft, mild tenderness at incision sites as expected. Distended. UMER drain in left side with bloody output. Midline incision clean covered with dressing - minimal fluid through. Extremities:  Extremities normal, atraumatic, no cyanosis or edema. Skin:  Skin color, texture, turgor normal. No rashes. Labs: All recent labs were reviewed. Normal WBC. Hgb 10.3 from 12.5 - monitor. Thrombocytopenia. Cr stable.   Recent Results (from the past 24 hour(s))   TROPONIN I    Collection Time: 06/15/19 11:52 PM   Result Value Ref Range    Troponin-I, Qt. 0.02 0.02 - 0.05 NG/ML   CBC WITH AUTOMATED DIFF    Collection Time: 06/15/19 11:52 PM   Result Value Ref Range    WBC 6.0 4.3 - 11.1 K/uL    RBC 2.90 (L) 4.23 - 5.6 M/uL    HGB 10.2 (L) 13.6 - 17.2 g/dL    HCT 28.8 (L) 41.1 - 50.3 %    MCV 99.3 (H) 79.6 - 97.8 FL    MCH 35.2 (H) 26.1 - 32.9 PG    MCHC 35.4 (H) 31.4 - 35.0 g/dL    RDW 13.6 11.9 - 14.6 %    PLATELET 70 (L) 110 - 450 K/uL    MPV 8.5 (L) 9.4 - 12.3 FL    ABSOLUTE NRBC 0.00 0.0 - 0.2 K/uL    DF AUTOMATED      NEUTROPHILS 86 (H) 43 - 78 %    LYMPHOCYTES 7 (L) 13 - 44 %    MONOCYTES 6 4.0 - 12.0 %    EOSINOPHILS 1 0.5 - 7.8 % BASOPHILS 0 0.0 - 2.0 %    IMMATURE GRANULOCYTES 1 0.0 - 5.0 %    ABS. NEUTROPHILS 5.1 1.7 - 8.2 K/UL    ABS. LYMPHOCYTES 0.4 (L) 0.5 - 4.6 K/UL    ABS. MONOCYTES 0.3 0.1 - 1.3 K/UL    ABS. EOSINOPHILS 0.0 0.0 - 0.8 K/UL    ABS. BASOPHILS 0.0 0.0 - 0.2 K/UL    ABS. IMM.  GRANS. 0.0 0.0 - 0.5 K/UL   LACTIC ACID    Collection Time: 06/15/19 11:52 PM   Result Value Ref Range    Lactic acid 0.9 0.4 - 2.0 MMOL/L   METABOLIC PANEL, BASIC    Collection Time: 06/15/19 11:52 PM   Result Value Ref Range    Sodium 138 136 - 145 mmol/L    Potassium 4.1 3.5 - 5.1 mmol/L    Chloride 105 98 - 107 mmol/L    CO2 28 21 - 32 mmol/L    Anion gap 5 (L) 7 - 16 mmol/L    Glucose 118 (H) 65 - 100 mg/dL    BUN 17 8 - 23 MG/DL    Creatinine 0.96 0.8 - 1.5 MG/DL    GFR est AA >60 >60 ml/min/1.73m2    GFR est non-AA >60 >60 ml/min/1.73m2    Calcium 8.3 8.3 - 10.4 MG/DL   TSH 3RD GENERATION    Collection Time: 06/15/19 11:52 PM   Result Value Ref Range    TSH 5.510 uIU/mL   MAGNESIUM    Collection Time: 06/15/19 11:52 PM   Result Value Ref Range    Magnesium 1.9 1.8 - 2.4 mg/dL   PHOSPHORUS    Collection Time: 06/15/19 11:52 PM   Result Value Ref Range    Phosphorus 2.6 2.3 - 3.7 MG/DL         Assessment/Plan:  Gene Lawanda Fabry is a 82 y.o. male s/p Procedure(s):  EXPLORATORY LAPAROTOMY   Mult enterorrhaphies approx 85cm small bowel resect and anast drain placement hernia repair (no mesh) removal peritoneal FB Removal of Bozoar materail in SB    Pain control  DIET NPO - Cont NGT to LWIS for now - 10 cc output  Await return of bowel function - no flatus or BM as of now  Wound - C/D/I, leave beatriz - can change top dressing PRN  UOP good, Cr stable  Cont zosyn   Hgb 10.2 - monitor, HDS  Thrombocytopenia - stable 70  UMER drain bloody 250 cc - monitor any change in output  OOB as tolerated to chair  DVT proph - SCD's or Sequential Compression Device, Heparin due to thrombocytopenia  GI Proph - Pepcid BID  Cont ICU care      Signed: Sarika Busby Cristhian Solano MD  Bariatric & Minimally Invasive Surgery  6/16/2019 8:33 AM

## 2019-06-16 NOTE — PROGRESS NOTES
Bedside shift change report given to Yosi Kaiser (oncoming nurse) by Corby PATEL (offgoing nurse). Report included the following information SBAR, Kardex, Procedure Summary, Intake/Output, MAR, Recent Results and Cardiac Rhythm SR. Pt awake and alert in bed. Pt restless stating he cannot get comfortable and asking to be repositioned multiple times. Also states he would like to be able to sleep tonight. Pt states L eye itches. Relieved by eye ointment. Lung sounds clear, bowel sounds present and active in all quadrants. Incision dressing has small amount of old drainage. UMER drain charged and patent with 10ml serosanguinous drainage. Pulses palpable +2 in all extremities.

## 2019-06-16 NOTE — PROGRESS NOTES
Bedside, Verbal and Written shift change report given to [place] Corinne (oncoming nurse) by Ladi Collins (offgoing nurse). Report included the following information SBAR, ED Summary, OR Summary, Procedure Summary, Intake/Output, MAR, Recent Results, Med Rec Status, Cardiac Rhythm SR and Alarm Parameters .

## 2019-06-16 NOTE — PROGRESS NOTES
Pt converted from NSR to AFib with RVR. MD notified. New orders received for metoprolol 5 mg and consult to [address] [place] recommended cardiology be consulted. Dr Clem Ordonez notified of recommendation and agreed.

## 2019-06-16 NOTE — PROGRESS NOTES
Hospitalist Progress Note    2019  Admit Date: 2019  3:14 AM   NAME: Rajinder Greene   :  1935   MRN:  [id number]   Attending: Karen Garza MD  PCP:  Victor Manuel Russell MD    SUBJECTIVE:   Patient is an 83 y/o male with hx CAD, CABG, COPD, HTN, HLD who underwent expl lap yesterday on  for SBO. He went into new onset rapid afib tonight at approx 1020pm with rate to 150s-160s. He denies chest pain or shortness of breath. He believes he may have had an episode of afib when he had his 6 v CABG in . Hospitalist service consulted for new onset rapid atrial fibrillation. - seen with wife and family members at bedside. He is in good spirits and denies concerns. Cardizem infusing at low rate, but he converted back to sinus rhythm early this AM.  He denies chest pain or shortness of breath.   Has not had flatus or BM yet      Review of Systems negative with exception of pertinent positives noted above  PHYSICAL EXAM     Visit Vitals  /56   Pulse 88   Temp 98.5 °F (36.9 °C)   Resp 23   Ht 6' (1.829 m)   Wt 96.6 kg (213 lb)   SpO2 95%   BMI 28.89 kg/m²      Temp (24hrs), Av.7 °F (37.1 °C), Min:98.5 °F (36.9 °C), Max:98.9 °F (37.2 °C)    Patient Vitals for the past 24 hrs:   Temp Pulse Resp BP SpO2   19 1230  81 19 108/64 95 %   19 1222  82 29 133/63 95 %   19 1200  74 11 126/63 97 %   19 1145  77 19 118/54 95 %   19 1130  83 18 125/44 94 %   19 1115 98.5 °F (36.9 °C) 85 16 109/54 93 %   19 1100  81 15 111/52 94 %   19 1045  87 28 106/59 95 %   19 1035     95 %   19 1030  88 23 101/56 97 %   19 1015  88 25 136/58    19 1000  74 21 100/56 95 %   19 0945  79 13 119/66 93 %   19 0930  73 11 108/61 94 %   19 0915  75 14 102/59 95 %   19 0900  75 12 108/62 95 %   19 0845  74 12 117/52 95 %   19 0830  86 24 128/56 95 %   19 0816     94 % 06/16/19 0815  85 22 114/54 94 %   06/16/19 0800  80 15 102/57 95 %   06/16/19 0745  80 12 98/62 96 %   06/16/19 0730  79 12 90/55 94 %   06/16/19 0715 98.5 °F (36.9 °C) 81 22 122/57 95 %   06/16/19 0700  86 15 98/63 95 %   06/16/19 0600  86 13 102/54 93 %   06/16/19 0545  97 13 (!) 83/58 92 %   06/16/19 0530  (!) 108 16 97/64 93 %   06/16/19 0515  (!) 110 14 108/58 94 %   06/16/19 0504  (!) 129 14 96/55 95 %   06/16/19 0500  (!) 101 15 96/55 93 %   06/16/19 0445  95 13 105/61 95 %   06/16/19 0430  100 14 104/57 94 %   06/16/19 0415 98.9 °F (37.2 °C) (!) 104 14 90/67 95 %   06/16/19 0400  (!) 108 13 (!) 83/50 94 %   06/16/19 0350  (!) 114 13 96/59 94 %   06/16/19 0303  (!) 101 14 91/61 94 %   06/16/19 0245  (!) 115 18 (!) 87/64 95 %   06/16/19 0230  (!) 117 13 93/60 95 %   06/16/19 0215  (!) 149 12 100/55 95 %   06/16/19 0200  (!) 126 21 (!) 87/65 95 %   06/16/19 0145  (!) 127 15 99/57 95 %   06/16/19 0130  (!) 149 14 100/60 95 %   06/16/19 0115  (!) 154 11 (!) 85/71 96 %   06/16/19 0100  (!) 137 13 (!) 76/57 98 %   06/16/19 0045  (!) 140 23 (!) 85/60 94 %   06/16/19 0030  (!) 123 14 (!) 80/51 94 %   06/16/19 0015  (!) 116 13 90/60 93 %   06/16/19 0013  (!) 131 15 (!) 75/59 93 %   06/16/19 0001 98.7 °F (37.1 °C) (!) 121 13 108/63 95 %   06/15/19 2345  (!) 145 (!) 35 98/66 95 %   06/15/19 2338  (!) 162 17 (!) 85/64 95 %   06/15/19 2335  (!) 132 21 (!) 75/60 94 %   06/15/19 2334  (!) 142 12 90/60 96 %   06/15/19 2300  (!) 114 12 99/64 94 %   06/15/19 2254  (!) 150 17 122/50 94 %   06/15/19 2227  (!) 127 18 136/70 95 %   06/15/19 2159  100 14 112/70 95 %   06/15/19 2138  (!) 112 19  95 %   06/15/19 2059  93 14 125/69 97 %   06/15/19 1959  96 13 119/65 96 %   06/15/19 1859 98.7 °F (37.1 °C) 93 14 140/69 96 %   06/15/19 1759  96 13 113/60 96 %   06/15/19 1659  93 14 122/63 96 %   06/15/19 1605  88 14 104/58 96 %   06/15/19 1515 98.9 °F (37.2 °C)       06/15/19 1459  95 21 146/72 96 %   06/15/19 1359  86 21 130/67 97 %   06/15/19 1259  93 14 125/68 96 %       Oxygen Therapy  O2 Sat (%): 95 % (06/16/19 1035)  Pulse via Oximetry: 84 beats per minute (06/16/19 1035)  O2 Device: Nasal cannula (06/16/19 1035)  O2 Flow Rate (L/min): 2 l/min (06/16/19 1035)  FIO2 (%): 28 % (06/16/19 1035)    Intake/Output Summary (Last 24 hours) at 6/16/2019 1108  Last data filed at 6/16/2019 0907  Gross per 24 hour   Intake 1642.67 ml   Output 2635 ml   Net -992.33 ml      General: No acute distress    Lungs:  CTA Bilaterally. Heart:  Regular rate and rhythm,  No murmur, rub, or gallop  Abdomen: Soft, Non distended, Non tender, Positive bowel sounds  Extremities: No cyanosis, clubbing or edema  Neurologic:  No focal deficits    Recent Results (from the past 24 hour(s))   TROPONIN I    Collection Time: 06/15/19 11:52 PM   Result Value Ref Range    Troponin-I, Qt. 0.02 0.02 - 0.05 NG/ML   CBC WITH AUTOMATED DIFF    Collection Time: 06/15/19 11:52 PM   Result Value Ref Range    WBC 6.0 4.3 - 11.1 K/uL    RBC 2.90 (L) 4.23 - 5.6 M/uL    HGB 10.2 (L) 13.6 - 17.2 g/dL    HCT 28.8 (L) 41.1 - 50.3 %    MCV 99.3 (H) 79.6 - 97.8 FL    MCH 35.2 (H) 26.1 - 32.9 PG    MCHC 35.4 (H) 31.4 - 35.0 g/dL    RDW 13.6 11.9 - 14.6 %    PLATELET 70 (L) 607 - 450 K/uL    MPV 8.5 (L) 9.4 - 12.3 FL    ABSOLUTE NRBC 0.00 0.0 - 0.2 K/uL    DF AUTOMATED      NEUTROPHILS 86 (H) 43 - 78 %    LYMPHOCYTES 7 (L) 13 - 44 %    MONOCYTES 6 4.0 - 12.0 %    EOSINOPHILS 1 0.5 - 7.8 %    BASOPHILS 0 0.0 - 2.0 %    IMMATURE GRANULOCYTES 1 0.0 - 5.0 %    ABS. NEUTROPHILS 5.1 1.7 - 8.2 K/UL    ABS. LYMPHOCYTES 0.4 (L) 0.5 - 4.6 K/UL    ABS. MONOCYTES 0.3 0.1 - 1.3 K/UL    ABS. EOSINOPHILS 0.0 0.0 - 0.8 K/UL    ABS. BASOPHILS 0.0 0.0 - 0.2 K/UL    ABS. IMM.  GRANS. 0.0 0.0 - 0.5 K/UL   LACTIC ACID    Collection Time: 06/15/19 11:52 PM   Result Value Ref Range    Lactic acid 0.9 0.4 - 2.0 MMOL/L   METABOLIC PANEL, BASIC    Collection Time: 06/15/19 11:52 PM   Result Value Ref Range    Sodium 138 136 - 145 mmol/L    Potassium 4.1 3.5 - 5.1 mmol/L    Chloride 105 98 - 107 mmol/L    CO2 28 21 - 32 mmol/L    Anion gap 5 (L) 7 - 16 mmol/L    Glucose 118 (H) 65 - 100 mg/dL    BUN 17 8 - 23 MG/DL    Creatinine 0.96 0.8 - 1.5 MG/DL    GFR est AA >60 >60 ml/min/1.73m2    GFR est non-AA >60 >60 ml/min/1.73m2    Calcium 8.3 8.3 - 10.4 MG/DL   TSH 3RD GENERATION    Collection Time: 06/15/19 11:52 PM   Result Value Ref Range    TSH 5.510 uIU/mL   MAGNESIUM    Collection Time: 06/15/19 11:52 PM   Result Value Ref Range    Magnesium 1.9 1.8 - 2.4 mg/dL   PHOSPHORUS    Collection Time: 06/15/19 11:52 PM   Result Value Ref Range    Phosphorus 2.6 2.3 - 3.7 MG/DL   PLEASE READ & DOCUMENT PPD TEST IN 24 HRS    Collection Time: 06/16/19 12:06 PM   Result Value Ref Range    PPD Negative Negative    mm Induration 0 0 - 5 mm         ASSESSMENT      Hospital Problems as of 6/16/2019 Date Reviewed: 6/14/2019          Codes Class Noted - Resolved POA    Atrial fibrillation with rapid ventricular response (HCC) ICD-10-CM: I48.91  ICD-9-CM: 427.31  6/16/2019 - Present No        SBO (small bowel obstruction) ([facility]) ICD-10-CM: T38.692  ICD-9-CM: 560.9  6/14/2019 - Present Yes        Generalized abdominal pain ICD-10-CM: R10.84  ICD-9-CM: 789.07  6/14/2019 - Present Yes        Pneumatosis intestinalis ICD-10-CM: K63.89  ICD-9-CM: 569.89  6/14/2019 - Present Yes        Thrombocytopenia (HCC) ICD-10-CM: D69.6  ICD-9-CM: 287.5  6/14/2019 - Present Yes        Ventral incisional hernia ICD-10-CM: K43.2  ICD-9-CM: 553.21  6/14/2019 - Present Yes        Small bowel obstruction ([address] ICD-10-CM: F60.554  ICD-9-CM: 560.9  6/14/2019 - Present Yes        Chronic obstructive pulmonary disease (HCC) (Chronic) ICD-10-CM: J44.9  ICD-9-CM: 921  4/10/2017 - Present Yes        Coronary artery disease of bypass graft of native heart with stable angina pectoris (HCC) ICD-10-CM: I25.708  ICD-9-CM: 414.05, 413.9 8/23/2016 - Present Yes    Overview Addendum 6/29/2018 12:32 PM by Arcola Nissen, MD     1. CABG (9/2003): Transmyocardial laser revascularization to the inferior wall of the left ventricle. Coronary artery bypass grafting x6. LIMA to LAD: Sequential SVG to ramus and obtuse marginal.  Sequential SVG to posterolateral and right PDA, SVG to diagonal.  2.  Cardiolite( 11/2012): Diaphragmatic attenuation. No ischemia. Normal LV systolic function. EF 63%. 3.  Stress Cardiolite (1/16/15):  EF 52%. Normal perfusion. 4.  LHC (4/13/15):  6 of 6 bypass grafts patent. Small vessel disease involving the apical LAD. Normal LV systolic function. LVEDP 27  5. LHC (5/24/18): Severe multivessel CAD. 6 of 6 grafts patent. Small vessel disease not amendable to PCI. EF 55-60%. Thrombocytopenia, unspecified ([facility] ICD-10-CM: D69.6  ICD-9-CM: 287.5  11/13/2012 - Present Yes            Plan:  · A fib with RVR  · Likely combination from post-op stress and not taking PO beta-blocker due to being NPO  · Start lopressor 5 mg IV q 6 hours and transition off cardizem  · When taking PO, resume home dose lopressor  · Follow up echo  · No anticoagulation for now due to recent surgery  · Consider cardiology consult if a fib returns      · SBO- post-op day 2- per general surgery    · Thromboyctopenia- chronic, monitor    DVT Prophylaxis: Heparin per general surgery    Signed By: Leopoldo Parish Shara Lyons, MD     June 16, 2019

## 2019-06-16 NOTE — PROGRESS NOTES
Primary RN providing direct patient care. Spoke with Dr. Adilia Kellogg regarding the suggestion by cardiology that hospitalist be consulted as hospitalist is in house. Dr. Adilia Kellogg agreed. Dr. Jelly Martínez called and updated on patient's condition. Orders received.

## 2019-06-16 NOTE — PROGRESS NOTES
Pt wheezing after ambulating to chair and nauseous. Respiratory administered breathing treatment. PRN Zofran given.

## 2019-06-17 PROBLEM — R41.0 DELIRIUM: Status: ACTIVE | Noted: 2019-06-17

## 2019-06-17 LAB
ANION GAP SERPL CALC-SCNC: 7 MMOL/L (ref 7–16)
BUN SERPL-MCNC: 12 MG/DL (ref 8–23)
CALCIUM SERPL-MCNC: 8.2 MG/DL (ref 8.3–10.4)
CHLORIDE SERPL-SCNC: 107 MMOL/L (ref 98–107)
CO2 SERPL-SCNC: 27 MMOL/L (ref 21–32)
CREAT SERPL-MCNC: 0.73 MG/DL (ref 0.8–1.5)
ERYTHROCYTE [DISTWIDTH] IN BLOOD BY AUTOMATED COUNT: 13.5 % (ref 11.9–14.6)
GLUCOSE SERPL-MCNC: 94 MG/DL (ref 65–100)
HCT VFR BLD AUTO: 26.4 % (ref 41.1–50.3)
HGB BLD-MCNC: 9.3 G/DL (ref 13.6–17.2)
MAGNESIUM SERPL-MCNC: 1.8 MG/DL (ref 1.8–2.4)
MCH RBC QN AUTO: 35 PG (ref 26.1–32.9)
MCHC RBC AUTO-ENTMCNC: 35.2 G/DL (ref 31.4–35)
MCV RBC AUTO: 99.2 FL (ref 79.6–97.8)
MM INDURATION POC: 0 MM (ref 0–5)
NRBC # BLD: 0 K/UL (ref 0–0.2)
PLATELET # BLD AUTO: 75 K/UL (ref 150–450)
PMV BLD AUTO: 8.9 FL (ref 9.4–12.3)
POTASSIUM SERPL-SCNC: 4.1 MMOL/L (ref 3.5–5.1)
PPD POC: NEGATIVE NEGATIVE
RBC # BLD AUTO: 2.66 M/UL (ref 4.23–5.6)
SODIUM SERPL-SCNC: 141 MMOL/L (ref 136–145)
WBC # BLD AUTO: 5.7 K/UL (ref 4.3–11.1)

## 2019-06-17 PROCEDURE — 77010033678 HC OXYGEN DAILY

## 2019-06-17 PROCEDURE — 80048 BASIC METABOLIC PNL TOTAL CA: CPT

## 2019-06-17 PROCEDURE — 74011250636 HC RX REV CODE- 250/636: Performed by: HOSPITALIST

## 2019-06-17 PROCEDURE — 65660000000 HC RM CCU STEPDOWN

## 2019-06-17 PROCEDURE — 74011250636 HC RX REV CODE- 250/636: Performed by: SURGERY

## 2019-06-17 PROCEDURE — 97161 PT EVAL LOW COMPLEX 20 MIN: CPT

## 2019-06-17 PROCEDURE — 74011250636 HC RX REV CODE- 250/636: Performed by: NURSE PRACTITIONER

## 2019-06-17 PROCEDURE — 74011000258 HC RX REV CODE- 258: Performed by: SURGERY

## 2019-06-17 PROCEDURE — 74011000250 HC RX REV CODE- 250: Performed by: INTERNAL MEDICINE

## 2019-06-17 PROCEDURE — 83735 ASSAY OF MAGNESIUM: CPT

## 2019-06-17 PROCEDURE — 77030020255 HC SOL INJ LR 1000ML BG

## 2019-06-17 PROCEDURE — 74011000250 HC RX REV CODE- 250: Performed by: SURGERY

## 2019-06-17 PROCEDURE — C8929 TTE W OR WO FOL WCON,DOPPLER: HCPCS

## 2019-06-17 PROCEDURE — 85027 COMPLETE CBC AUTOMATED: CPT

## 2019-06-17 RX ORDER — HALOPERIDOL 5 MG/ML
INJECTION INTRAMUSCULAR
Status: ACTIVE
Start: 2019-06-17 | End: 2019-06-17

## 2019-06-17 RX ORDER — DEXTROSE, SODIUM CHLORIDE, AND POTASSIUM CHLORIDE 5; .45; .15 G/100ML; G/100ML; G/100ML
125 INJECTION INTRAVENOUS CONTINUOUS
Status: DISCONTINUED | OUTPATIENT
Start: 2019-06-17 | End: 2019-06-20

## 2019-06-17 RX ORDER — HALOPERIDOL 5 MG/ML
4 INJECTION INTRAMUSCULAR
Status: COMPLETED | OUTPATIENT
Start: 2019-06-17 | End: 2019-06-17

## 2019-06-17 RX ADMIN — FAMOTIDINE 20 MG: 10 INJECTION, SOLUTION INTRAVENOUS at 22:11

## 2019-06-17 RX ADMIN — METOPROLOL TARTRATE 5 MG: 5 INJECTION INTRAVENOUS at 11:57

## 2019-06-17 RX ADMIN — DEXTROSE MONOHYDRATE, SODIUM CHLORIDE, AND POTASSIUM CHLORIDE 100 ML/HR: 50; 4.5; 1.49 INJECTION, SOLUTION INTRAVENOUS at 14:55

## 2019-06-17 RX ADMIN — FAMOTIDINE 20 MG: 10 INJECTION, SOLUTION INTRAVENOUS at 08:03

## 2019-06-17 RX ADMIN — METOPROLOL TARTRATE 5 MG: 5 INJECTION INTRAVENOUS at 06:19

## 2019-06-17 RX ADMIN — PERFLUTREN 1 ML: 6.52 INJECTION, SUSPENSION INTRAVENOUS at 09:00

## 2019-06-17 RX ADMIN — HALOPERIDOL LACTATE 4 MG: 5 INJECTION, SOLUTION INTRAMUSCULAR at 01:51

## 2019-06-17 RX ADMIN — PIPERACILLIN SODIUM,TAZOBACTAM SODIUM 3.38 G: 3; .375 INJECTION, POWDER, FOR SOLUTION INTRAVENOUS at 08:01

## 2019-06-17 RX ADMIN — SODIUM CHLORIDE, SODIUM LACTATE, POTASSIUM CHLORIDE, AND CALCIUM CHLORIDE 125 ML/HR: 600; 310; 30; 20 INJECTION, SOLUTION INTRAVENOUS at 12:08

## 2019-06-17 RX ADMIN — METOPROLOL TARTRATE 5 MG: 5 INJECTION INTRAVENOUS at 17:25

## 2019-06-17 RX ADMIN — MORPHINE SULFATE 5 MG: 10 INJECTION, SOLUTION INTRAMUSCULAR; INTRAVENOUS at 01:30

## 2019-06-17 RX ADMIN — PIPERACILLIN SODIUM,TAZOBACTAM SODIUM 3.38 G: 3; .375 INJECTION, POWDER, FOR SOLUTION INTRAVENOUS at 14:54

## 2019-06-17 RX ADMIN — LORAZEPAM 1 MG: 2 INJECTION INTRAMUSCULAR; INTRAVENOUS at 00:17

## 2019-06-17 NOTE — PROGRESS NOTES
H&P/Consult Note/Progress Note/Office Note:   Maximino Barbour  MRN: [id number]  RADHA:0[birth year]  Age:83 y.o.    HPI: Maximino Barbour is a 82 y.o. male who is s/p expl lap with 5hrs lysis, 85cm ischemic sb resection, hernia repair,   and evacuation of obstructing bezoar of (large volume of impacted delano slaw) on 6/14/19    He originally came to the ER on 6/14/19   with a 1 day h/o progressive, intermittent, generalized abd pain without radiation which worsened acutely around 8PM on 6/13/19  Nothing made pain better or worse. +Nausea; -V  No fever, chills, chest pain, or back pain    1990s s/p open AAA in [place] s/p lap [place] in [place]  S/p expl lap for SBO (no resection) [place] CABG x 6 2016 s/p thoracic aneurysm stent at [facility]      Surgery was consulted after below CT without contrast showed SBO findings concerning for pneumatosis       4/13/16 s/p colonoscopy; Dr Rishi Valdivia:  SESSILE SERRATED POLYPS. FRAGMENT OF BENIGN COLONIC MUCOSA. Electronically signed out on 4/14/2016 11:51 by Stephani Jason MD   Microscopic Description   The biopsy demonstrates serrated hypermucinous colonic glands imbedded in a nonfibrotic lamina propria with focal crypt dilatation and widening of the crypt bases. An additional fragment demonstrates no adenomatous or hyperplastic change. Dr. Rick Maddox concurs. 6/14/19 CT abd/pelvis without contrast  - Liver: Within normal limits. - Gallbladder and bile ducts: The gallbladder is absent, without biliary tract  dilatation.  - Spleen: Within normal limits. - Urinary tract: Again noted is a 5.3 cm right kidney cyst. The left kidney is  unremarkable. No urinary tract stone or hydronephrosis is identified. There are  bilateral inguinal hernias, containing fat on the left and a small portion of  the urinary bladder on the right. - Adrenals: Within normal limits. - Pancreas: Within normal limits. - Gastrointestinal tract:  In the umbilicus region there is a ventral 2.8 cm  fat-containing hernia, with adjacent stranding which may relate to  incarceration. There is a mid small bowel obstruction, whose transition point is  not clearly identified, but may lie in the region of the ventral hernia although  no bowel is seen within the hernia itself. There are a few questionable areas of  small bowel wall pneumatosis. The colon is unremarkable except for  diverticulosis. No free air or free fluid is seen. - Retroperitoneum: Within normal limits. - Peritoneal cavity and abdominal wall: Within normal limits. - Pelvis: Within normal limits. - Spine/bones: No acute process. - Other comments: Partially visualized is a descending thoracic aortic stent  graft.     IMPRESSION: Mid small bowel obstruction. The transition point is not clearly  identified, but may lie in the region of a small periumbilical fat-containing  ventral hernia which may be incarcerated given the associated fat stranding. Of  note, no bowel is seen within the hernia. Additionally, there are a few  questionable areas of small bowel wall pneumatosis concerning for ischemia. ER  physician notified.       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   Left lobe: At least 4 heterogeneous nodules in the left lobe. The largest measures 2.9 x 1.1 x 2.4 cm in the lower pole, with mild peripheral color Doppler flow and possible tiny internal calcifications.     Thrombocytopenia ([address]     marrow normal slightly big spleen low mpv    Thyroid disease     hypothyroidism     Past Surgical History:   Procedure Laterality Date    CARDIAC SURG PROCEDURE UNLIST  1991    AAA    CARDIAC SURG PROCEDURE UNLIST  9/2003     6 bypass surgeries    HX CHOLECYSTECTOMY  9/2000    HX COLONOSCOPY  02/28/2013    10 polyps/ fu in 3 yrs    HX ENDOSCOPY  02/28/2013    1 biopsy    HX GI  11/2003    small bowel obstruction    HX HEART CATHETERIZATION  04/2015    HX HEENT      thyroidectomy    HX HERNIA REPAIR  09/1993    Insisional    HX LUMBAR LAMINECTOMY  5/2005    L3, L4    HX ORTHOPAEDIC  1/2006    right total knee replacement    HX ORTHOPAEDIC  8/2005     left knee replacement    HX ORTHOPAEDIC  2005    laminectomy L3-l4    HX ORTHOPAEDIC  03/1955    right knee surgery    HX OTHER SURGICAL  05/2002    upper endoscopic retrograde cholangiopancreatogram    HX OTHER SURGICAL  11/2014    removal squamous cell carcinoma    HX OTHER SURGICAL      MRSA lower back -3/2005    HX THYROIDECTOMY  08/24/2017    Dr Diana Hillman    HX THYROIDECTOMY  08/24/2017    Dr. Thang Harry  12/1981     ruptured disc in back   [address]    abd aortic aneurysm repair     Current Facility-Administered Medications   Medication Dose Route Frequency    haloperidol lactate (HALDOL) 5 mg/mL injection        albuterol (PROVENTIL VENTOLIN) nebulizer solution 2.5 mg  2.5 mg Nebulization Q6H PRN    metoprolol (LOPRESSOR) injection 5 mg  5 mg IntraVENous Q6H    [START ON 2019] levothyroxine (SYNTHROID) injection 75 mcg  75 mcg IntraVENous Q24H    heparin (porcine) injection 5,000 Units  5,000 Units SubCUTAneous Q8H    dilTIAZem (CARDIZEM) 125 mg/125 mL (1 mg/mL) dextrose 5% infusion  0-15 mg/hr IntraVENous TITRATE    piperacillin-tazobactam (ZOSYN) 3.375 g in 0.9% sodium chloride (MBP/ADV) 100 mL  3.375 g IntraVENous Q8H    famotidine (PF) (PEPCID) injection 20 mg  20 mg IntraVENous Q12H    morphine 10 mg/ml injection 2-5 mg  2-5 mg IntraVENous Q1H PRN    acetaminophen (TYLENOL) tablet 1,000 mg  1,000 mg Oral Q6H PRN    ondansetron (ZOFRAN) injection 4 mg  4 mg IntraVENous Q4H PRN    lactated Ringers infusion  125 mL/hr IntraVENous CONTINUOUS    white petrolatum-mineral oil (AKWA TEARS) 83-15 % ophthalmic ointment   Both Eyes PRN     Diphenhydramine hcl; Ativan [lorazepam];  Iodinated contrast- oral and iv dye; and Sulfa (sulfonamide antibiotics)  Social History     Socioeconomic History    Marital status:      Spouse name: Not on file    Number of children: Not on file    Years of education: Not on file    Highest education level: Not on file   Tobacco Use    Smoking status: Former Smoker     Packs/day: 1.00     Years: 25.00     Pack years: 25.00     Last attempt to quit: 12/3/1987     Years since quittin.5    Smokeless tobacco: Never Used   Substance and Sexual Activity    Alcohol use: Yes     Comment: occasional glass wine    Drug use: No     Social History     Tobacco Use   Smoking Status Former Smoker    Packs/day: 1.00    Years: 25.00    Pack years: 25.00    Last attempt to quit: 12/3/1987    Years since quittin.5   Smokeless Tobacco Never Used     Family History   Problem Relation Age of Onset    Heart Disease Father     Cancer Mother         colon    Cancer Sister     Cancer Maternal Aunt     Cancer Paternal Grandmother     Diabetes Paternal Grandmother     Thyroid Disease Neg Hx     Thyroid Cancer Neg Hx      ROS: The patient has no difficulty with chest pain or shortness of breath. No fever or chills. Comprehensive review of systems was otherwise unremarkable except as noted above. Physical Exam:   Visit Vitals  /77   Pulse 74   Temp 98.8 °F (37.1 °C)   Resp 21   Ht 6' (1.829 m)   Wt 213 lb (96.6 kg)   SpO2 98%   BMI 28.89 kg/m²     Vitals:    06/17/19 0738 06/17/19 0808 06/17/19 1157 06/17/19 1202   BP: 181/90 159/81 164/81 155/77   Pulse: 88 85 84 74   Resp: 16 16 (!) 33 21   Temp:    98.8 °F (37.1 °C)   SpO2: 97% 96% 100% 98%   Weight:       Height:         No intake/output data recorded. 06/15 1901 - 06/17 0700  In: 5151.1 [I.V.:5076.1]  Out: 4565 [Urine:2450; Drains:240]    Constitutional: Alert, oriented, cooperative patient in no acute distress; appears stated age    Eyes:Sclera are clear. EOMs intact  ENMT: no external lesions gross hearing normal; no obvious neck masses, no ear or lip lesions, nares normal; +NGT with dark bilious output  CV: RRR. Normal perfusion  Resp: No JVD. Breathing is  non-labored; no audible wheezing. GI: dry dressing, intact beatriz, venice drain serosang       Musculoskeletal: unremarkable with normal function. No embolic signs or cyanosis.   Palpable pedal pulses bilat   Neuro:  Oriented; moves all 4; no focal deficits  Psychiatric: normal affect and mood, no memory impairment    Recent vitals (if inpt):  Patient Vitals for the past 24 hrs:   BP Temp Pulse Resp SpO2   06/17/19 1202 155/77 98.8 °F (37.1 °C) 74 21 98 %   06/17/19 1157 164/81  84 (!) 33 100 %   06/17/19 0808 159/81  85 16 96 %   06/17/19 0738 181/90  88 16 97 %   06/17/19 0732     98 %   06/17/19 0708 150/70  66 13 99 %   06/17/19 0638 141/80  72 10 98 %   06/17/19 0608 146/75  81 13 96 %   06/17/19 0538 155/80  85 12 (!) 88 %   06/17/19 0508 126/73  89 13 (!) 87 %   06/17/19 0504 143/85  85 13 90 %   06/17/19 0438 143/85  95 14 (!) 85 %   06/17/19 0408 162/86  97 19 91 %   06/17/19 0338 145/78  89 14 (!) 86 %   06/17/19 0308 (!) 159/92  98 17 96 %   06/17/19 0238 150/84  88 15 95 %   06/17/19 0208 143/82  92 22 94 %   06/17/19 0138 159/78  82 16 96 %   06/17/19 0108 163/81  80 15 98 %   06/17/19 0038 146/75  74 14 97 %   06/17/19 0008 157/78 98.2 °F (36.8 °C) 71 14 97 %   06/16/19 2345 171/73  86     06/16/19 2338 171/73  78 15 96 %   06/16/19 2319 151/84  76 16 97 %   06/16/19 2312 161/82  93 24 92 %   06/16/19 2230 147/81  82 19 98 %   06/16/19 2200 161/77  85 21 98 %   06/16/19 2130 166/84  84 15 99 %   06/16/19 2100 152/76  85 (!) 34 96 %   06/16/19 2053 159/84  90 20 90 %   06/16/19 2000 150/77  75 15 97 %   06/16/19 1900 162/77 98.5 °F (36.9 °C) 73 14 97 %   06/16/19 1817 147/77  81 23 96 %   06/16/19 1729 152/75  88 29 95 %   06/16/19 1646 140/64  88 17 96 %   06/16/19 1632 139/80  89 24 93 %   06/16/19 1616 122/66  80 13 96 %   06/16/19 1601 128/74  72 (!) 32 97 %   06/16/19 1545 132/71  81 17 97 %   06/16/19 1530 151/69  84 26 93 %   06/16/19 1516 133/61  78 14 94 %   06/16/19 1501 136/56 98.9 °F (37.2 °C) 79 16 94 %   06/16/19 1445 127/64  80 17 96 %   06/16/19 1430 123/68  76 15 97 %   06/16/19 1415 135/76  75 11 95 %   06/16/19 1400 106/73  74 18 97 %   06/16/19 1345 124/62  67 13 97 %   06/16/19 1330 133/64  79 (!) 35 97 %   06/16/19 1315 116/70  70 17 96 %       Labs:  Recent Labs     06/17/19  0734 06/15/19  2352   WBC 5.7 6.0   HGB 9.3* 10.2*   PLT 75* 70*    138   K 4.1 4.1    105   CO2 27 28   BUN 12 17   CREA 0.73* 0.96   GLU 94 118*   TROIQ  --  0.02       Lab Results   Component Value Date/Time    WBC 5.7 06/17/2019 07:34 AM    HGB 9.3 (L) 06/17/2019 07:34 AM    PLATELET 75 (L) 87/49/2752 07:34 AM    Sodium 141 06/17/2019 07:34 AM    Potassium 4.1 06/17/2019 07:34 AM    Chloride 107 06/17/2019 07:34 AM    CO2 27 06/17/2019 07:34 AM    BUN 12 06/17/2019 07:34 AM    Creatinine 0.73 (L) 06/17/2019 07:34 AM Glucose 94 06/17/2019 07:34 AM    INR 1.1 05/24/2018 07:05 AM    aPTT 26.5 04/07/2015 04:25 PM    Bilirubin, total 1.5 (H) 06/14/2019 03:35 AM    AST (SGOT) 20 06/14/2019 03:35 AM    ALT (SGPT) 27 06/14/2019 03:35 AM    Alk. phosphatase 65 06/14/2019 03:35 AM    Troponin-I, Qt. 0.02 06/15/2019 11:52 PM       CT Results  (Last 48 hours)    None        chest X-ray      I reviewed recent labs, recent radiologic studies, and pertinent records including other doctor notes if needed. I independently reviewed radiology images for studies I described above or studies I have ordered.    Admission date (for inpatients): 6/14/2019   * No surgery found *  Procedure(s):  EXPLORATORY LAPAROTOMY   Mult enterorrhaphies approx 85cm small bowel resect and anast drain placement hernia repair (no mesh) removal peritoneal FB Removal of Bozoar materail in SB    ASSESSMENT/PLAN:  Problem List  Date Reviewed: 6/14/2019          Codes Class Noted    Delirium ICD-10-CM: R41.0  ICD-9-CM: 780.09  6/17/2019        Atrial fibrillation with rapid ventricular response ([facility] ICD-10-CM: I48.91  ICD-9-CM: 427.31  6/16/2019        * (Principal) SBO (small bowel obstruction) ([facility] ICD-10-CM: Y92.995  ICD-9-CM: 560.9  6/14/2019        Generalized abdominal pain ICD-10-CM: R10.84  ICD-9-CM: 789.07  6/14/2019        Pneumatosis intestinalis ICD-10-CM: K63.89  ICD-9-CM: 569.89  6/14/2019        Thrombocytopenia ([facility] [address] ICD-10-CM: D69.6  ICD-9-CM: 287.5  6/14/2019        Ventral incisional hernia ICD-10-CM: K43.2  ICD-9-CM: 553.21  6/14/2019        Small bowel obstruction ([facility] ICD-10-CM: K56.609  ICD-9-CM: 560.9  6/14/2019        Hypertension ICD-10-CM: I10  ICD-9-CM: 401.9  Unknown        High cholesterol ICD-10-CM: E78.00  ICD-9-CM: 272.0  Unknown        GERD (gastroesophageal reflux disease) ICD-10-CM: K21.9  ICD-9-CM: 530.81  Unknown        Arthritis ICD-10-CM: M19.90  ICD-9-CM: 716.90  Unknown        Postsurgical hypothyroidism ICD-10-CM: E89.0  ICD-9-CM: 244.0  4/11/2017        Chronic obstructive pulmonary disease (HCC) (Chronic) ICD-10-CM: J44.9  ICD-9-CM: 064  4/10/2017        DDD (degenerative disc disease), cervical ICD-10-CM: M50.30  ICD-9-CM: 722.4  3/21/2017        S/P CABG (coronary artery bypass graft) ICD-10-CM: Z95.1  ICD-9-CM: V45.81  12/29/2016        Coronary artery disease of bypass graft of native heart with stable angina pectoris [facility]) ICD-10-CM: I25.708  ICD-9-CM: 414.05, 413.9  8/23/2016    Overview Addendum 6/29/2018 12:32 PM by Hakeem Clark MD     1. CABG (9/2003): Transmyocardial laser revascularization to the inferior wall of the left ventricle. Coronary artery bypass grafting x6. LIMA to LAD: Sequential SVG to ramus and obtuse marginal.  Sequential SVG to posterolateral and right PDA, SVG to diagonal.  2.  Cardiolite( 11/2012): Diaphragmatic attenuation. No ischemia. Normal LV systolic function. EF 63%. 3.  Stress Cardiolite (1/16/15):  EF 52%. Normal perfusion. 4.  LHC (4/13/15):  6 of 6 bypass grafts patent. Small vessel disease involving the apical LAD. Normal LV systolic function. LVEDP 27  5. LHC (5/24/18): Severe multivessel CAD. 6 of 6 grafts patent. Small vessel disease not amendable to PCI. EF 55-60%.               Chronic seasonal allergic rhinitis due to pollen (Chronic) ICD-10-CM: J30.1  ICD-9-CM: 477.0  8/23/2016        Dyslipidemia ICD-10-CM: E78.5  ICD-9-CM: 272.4  8/23/2016        Essential hypertension with goal blood pressure less than 130/85 ICD-10-CM: I10  ICD-9-CM: 401.9  8/23/2016        Diverticulitis of colon (without mention of hemorrhage)(562.11) ICD-10-CM: K57.32  ICD-9-CM: 562.11  12/3/2013        AAA (abdominal aortic aneurysm) ([facility] [address] ICD-10-CM: I71.4  ICD-9-CM: 441.4  12/3/2013        DDD (degenerative disc disease), lumbar ICD-10-CM: M51.36  ICD-9-CM: 722.52  12/3/2013        Renal cyst, right ICD-10-CM: N28.1  ICD-9-CM: 753.10  12/3/2013        Leukopenia ICD-10-CM: J91.786  ICD-9-CM: 288.50  11/13/2012    Overview Addendum 11/19/2012 11:54 AM by Serena Jerez       There are no focal lesions in the liver or spleen. 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 CABG (9/2003): Transmyocardial laser revascularization to the       inferior wall of the left ventricle. Coronary artery bypass grafting x6. LIMA to LAD: Sequential SVG to ramus and obtuse marginal.  Sequential SVG       to posterolateral and right PDA, SVG to diagonal.      2.  Cardiolite( 11/2012): Diaphragmatic attenuation. No ischemia. Normal       LV systolic function. EF 63%. 3.  Stress Cardiolite (1/16/15):  EF 52%. Normal perfusion. 4.  LHC (4/13/15):  6 of 6 bypass grafts patent. Small vessel disease       involving the apical LAD. Normal LV systolic function. LVEDP 27      5. LHC (5/24/18): Severe multivessel CAD. 6 of 6 grafts patent. Small       vessel disease not amendable to PCI. EF 55-60%.        Chronic obstructive pulmonary disease (HCC) (4/10/2017)      Generalized abdominal pain (6/14/2019)      Pneumatosis intestinalis (6/14/2019)      Thrombocytopenia ([address] (6/14/2019)      Ventral incisional hernia (6/14/2019)      Small bowel obstruction (HCC) (6/14/2019)      Atrial fibrillation with rapid ventricular response ([address] (6/16/2019)      Delirium (6/17/2019)             SBO with CT findings concerning for pneumatosis  He is s/p expl lap with 5hrs lysis, 85cm ischemic SB resection, hernia repair,   and evacuation of obstructing bezoar of (large volume of impacted delano slaw) on 6/14/19    NPO  Bowel rest/NGT/IVF  [place]  He is OOB and has ambulated post-op  Move to floor bed when afib medical regimen is 3rd floor compliant       Ventral Hernia on CT  Repaired without mesh on 6/14/19    Thrombocytopenia  Chronic problem  Monitor  Transfuse if needed    Afib new onset 6/15/19  Hospitalists managing    GI and VTE prophylaxis  IV pepcid and SQ Lovenox + SCDs          I have personally performed a face-to-face diagnostic evaluation and management  service on this patient. I have independently seen the patient. I have independently obtained the above history from the patient/family. I have independently examined the patient with above findings. I have independently reviewed data/labs for this patient and developed the above plan of care (MDM). Signed: Kristian Weber.  Rosa North MD, FACS

## 2019-06-17 NOTE — PROGRESS NOTES
0017:  Pt requested medication to help him sleep. MD notified and orders received for 1 mg ativan. 0120:  Pt became highly agitated, attempting to get out of bed, removing monitor leads and attempting to remove NGT. Reorientation unsuccessful. Pt attempted to kick nursing staff. PRN morphine administered. 0150:  Pt continuing to be agitated and disoriented. Reorientation attempts unsuccessful. MD notified and new orders received for Haloperidol 4 mg. 0230:  Pt continuing to get out of bed. Reorientation attempts unsuccessful. Pt attempting to remove UMER drain and NGT. MD notified and orders for soft restraints received.

## 2019-06-17 NOTE — PROGRESS NOTES
Hospitalist Progress Note    2019  Admit Date: 2019  3:14 AM   NAME: Kenney Case   :  1935   MRN:  [id number]   Attending: Alla Villaseñor MD  PCP:  Janice Allison MD    SUBJECTIVE:   Patient is an 81 y/o male with hx CAD, CABG, COPD, HTN, HLD who underwent expl lap yesterday on  for SBO. He went into new onset rapid afib tonight at approx 1020pm with rate to 150s-160s. He denies chest pain or shortness of breath. He believes he may have had an episode of afib when he had his 6 v CABG in . Hospitalist service consulted for new onset rapid atrial fibrillation. - seen with wife and family members at bedside. He is in good spirits and denies concerns. Cardizem infusing at low rate, but he converted back to sinus rhythm early this AM.  He denies chest pain or shortness of breath. Has not had flatus or BM yet. - significant confusion overnight that worsened with ativan. Required haldol. Improved now and oriented x 3 but confused some in conversation.       Review of Systems negative with exception of pertinent positives noted above  PHYSICAL EXAM     Visit Vitals  /80   Pulse 72   Temp 98.2 °F (36.8 °C)   Resp 10   Ht 6' (1.829 m)   Wt 96.6 kg (213 lb)   SpO2 98%   BMI 28.89 kg/m²      Temp (24hrs), Av.5 °F (36.9 °C), Min:98.2 °F (36.8 °C), Max:98.9 °F (37.2 °C)    Patient Vitals for the past 24 hrs:   Temp Pulse Resp BP SpO2   19 0732     98 %   19 0638  72 10 141/80 98 %   19 0608  81 13 146/75 96 %   19 0538  85 12 155/80 (!) 88 %   19 0508  89 13 126/73 (!) 87 %   19 0504  85 13 143/85 90 %   19 0438  95 14 143/85 (!) 85 %   19 0408  97 19 162/86 91 %   19 0338  89 14 145/78 (!) 86 %   19 0308  98 17 (!) 159/92 96 %   19 0238  88 15 150/84 95 %   19 0208  92 22 143/82 94 %   19 0138  82 16 159/78 96 %   19 0108  80 15 163/81 98 %   19 0038  74 14 146/75 97 %   06/17/19 0008 98.2 °F (36.8 °C) 71 14 157/78 97 %   06/16/19 2345  86  171/73    06/16/19 2338  78 15 171/73 96 %   06/16/19 2319  76 16 151/84 97 %   06/16/19 2312  93 24 161/82 92 %   06/16/19 2230  82 19 147/81 98 %   06/16/19 2200  85 21 161/77 98 %   06/16/19 2130  84 15 166/84 99 %   06/16/19 2100  85 (!) 34 152/76 96 %   06/16/19 2053  90 20 159/84 90 %   06/16/19 2000  75 15 150/77 97 %   06/16/19 1900 98.5 °F (36.9 °C) 73 14 162/77 97 %   06/16/19 1817  81 23 147/77 96 %   06/16/19 1729  88 29 152/75 95 %   06/16/19 1646  88 17 140/64 96 %   06/16/19 1632  89 24 139/80 93 %   06/16/19 1616  80 13 122/66 96 %   06/16/19 1601  72 (!) 32 128/74 97 %   06/16/19 1545  81 17 132/71 97 %   06/16/19 1530  84 26 151/69 93 %   06/16/19 1516  78 14 133/61 94 %   06/16/19 1501 98.9 °F (37.2 °C) 79 16 136/56 94 %   06/16/19 1445  80 17 127/64 96 %   06/16/19 1430  76 15 123/68 97 %   06/16/19 1415  75 11 135/76 95 %   06/16/19 1400  74 18 106/73 97 %   06/16/19 1345  67 13 124/62 97 %   06/16/19 1330  79 (!) 35 133/64 97 %   06/16/19 1315  70 17 116/70 96 %   06/16/19 1300  75 18 134/63 95 %   06/16/19 1245  78 20 119/61 97 %   06/16/19 1230  81 19 108/64 95 %   06/16/19 1222  82 29 133/63 95 %   06/16/19 1200  74 11 126/63 97 %   06/16/19 1145  77 19 118/54 95 %   06/16/19 1130  83 18 125/44 94 %   06/16/19 1115 98.5 °F (36.9 °C) 85 16 109/54 93 %   06/16/19 1100  81 15 111/52 94 %   06/16/19 1045  87 28 106/59 95 %   06/16/19 1035     95 %   06/16/19 1030  88 23 101/56 97 %   06/16/19 1015  88 25 136/58    06/16/19 1000  74 21 100/56 95 %   06/16/19 0945  79 13 119/66 93 %   06/16/19 0930  73 11 108/61 94 %   06/16/19 0915  75 14 102/59 95 %   06/16/19 0900  75 12 108/62 95 %   06/16/19 0845  74 12 117/52 95 %   06/16/19 0830  86 24 128/56 95 %   06/16/19 0816     94 %   06/16/19 0815  85 22 114/54 94 %   06/16/19 0800  80 15 102/57 95 % Oxygen Therapy  O2 Sat (%): 98 % (06/17/19 0732)  Pulse via Oximetry: 88 beats per minute (06/17/19 0732)  O2 Device: Nasal cannula (06/17/19 0732)  O2 Flow Rate (L/min): 2 l/min (06/17/19 0732)  FIO2 (%): 28 % (06/16/19 1035)    Intake/Output Summary (Last 24 hours) at 6/17/2019 0754  Last data filed at 6/17/2019 0552  Gross per 24 hour   Intake 5075.08 ml   Output 2100 ml   Net 2975.08 ml      General: No acute distress    Lungs:  CTA Bilaterally.    Heart:  Regular rate and rhythm,  No murmur, rub, or gallop  Abdomen: Soft, Non distended, Non tender, Positive bowel sounds  Extremities: No cyanosis, clubbing or edema  Neurologic:  No focal deficits    Recent Results (from the past 24 hour(s))   PLEASE READ & DOCUMENT PPD TEST IN 24 HRS    Collection Time: 06/16/19 12:06 PM   Result Value Ref Range    PPD Negative Negative    mm Induration 0 0 - 5 mm   CBC W/O DIFF    Collection Time: 06/17/19  7:34 AM   Result Value Ref Range    WBC 5.7 4.3 - 11.1 K/uL    RBC 2.66 (L) 4.23 - 5.6 M/uL    HGB 9.3 (L) 13.6 - 17.2 g/dL    HCT 26.4 (L) 41.1 - 50.3 %    MCV 99.2 (H) 79.6 - 97.8 FL    MCH 35.0 (H) 26.1 - 32.9 PG    MCHC 35.2 (H) 31.4 - 35.0 g/dL    RDW 13.5 11.9 - 14.6 %    PLATELET 75 (L) 614 - 450 K/uL    MPV 8.9 (L) 9.4 - 12.3 FL    ABSOLUTE NRBC 0.00 0.0 - 0.2 K/uL         ASSESSMENT      Hospital Problems as of 6/17/2019 Date Reviewed: 6/14/2019          Codes Class Noted - Resolved POA    Delirium ICD-10-CM: R41.0  ICD-9-CM: 780.09  6/17/2019 - Present No        Atrial fibrillation with rapid ventricular response ([facility] ICD-10-CM: I48.91  ICD-9-CM: 427.31  6/16/2019 - Present No        * (Principal) SBO (small bowel obstruction) ([facility] ICD-10-CM: Q89.964  ICD-9-CM: 560.9  6/14/2019 - Present Yes        Generalized abdominal pain ICD-10-CM: R10.84  ICD-9-CM: 789.07  6/14/2019 - Present Yes        Pneumatosis intestinalis ICD-10-CM: K63.89  ICD-9-CM: 569.89  6/14/2019 - Present Yes        Thrombocytopenia ([facility] ICD-10-CM: D69.6  ICD-9-CM: 287.5  6/14/2019 - Present Yes        Ventral incisional hernia ICD-10-CM: K43.2  ICD-9-CM: 553.21  6/14/2019 - Present Yes        Small bowel obstruction ([facility] ICD-10-CM: M83.118  ICD-9-CM: 560.9  6/14/2019 - Present Yes        Chronic obstructive pulmonary disease ([facility] (Chronic) ICD-10-CM: J44.9  ICD-9-CM: 353  4/10/2017 - Present Yes        Coronary artery disease of bypass graft of native heart with stable angina pectoris [facility]) ICD-10-CM: I25.708  ICD-9-CM: 414.05, 413.9  8/23/2016 - Present Yes    Overview Addendum 6/29/2018 12:32 PM by Bella Woodson MD     1. CABG (9/2003): Transmyocardial laser revascularization to the inferior wall of the left ventricle. Coronary artery bypass grafting x6. LIMA to LAD: Sequential SVG to ramus and obtuse marginal.  Sequential SVG to posterolateral and right PDA, SVG to diagonal.  2.  Cardiolite( 11/2012): Diaphragmatic attenuation. No ischemia. Normal LV systolic function. EF 63%. 3.  Stress Cardiolite (1/16/15):  EF 52%. Normal perfusion. 4.  LHC (4/13/15):  6 of 6 bypass grafts patent. Small vessel disease involving the apical LAD. Normal LV systolic function. LVEDP 27  5. LHC (5/24/18): Severe multivessel CAD. 6 of 6 grafts patent. Small vessel disease not amendable to PCI. EF 55-60%.               Thrombocytopenia, unspecified ([facility] ICD-10-CM: D69.6  ICD-9-CM: 287.5  11/13/2012 - Present Yes            Plan:  · A fib with RVR  · Likely combination from post-op stress and not taking PO beta-blocker due to being NPO  · Copntinue lopressor 5 mg IV q 6 hours  · Cardizem gtt off since 11:40 AM yesterday  · When taking PO, resume home dose lopressor  · Follow up echo  · No anticoagulation for now due to recent surgery  · Consider cardiology consult if a fib returns; otherwise, follow up outpatient    · Delirium- avoid benzos  · Use haldol if needed; consider low dose seroquel qHS when taking po      · SBO- post-op day 3- per general surgery    · Thromboyctopenia- chronic, monitor  · Stable    DVT Prophylaxis: Heparin per general surgery    Signed By: Davidson Amaya.  Deyvi Pacheco MD     June 17, 2019

## 2019-06-17 NOTE — PROGRESS NOTES
Pt OOB to chair. States he cannot get comfortable. Ambulated to chair with one person assist.  Slight wheezing noted during exertion. Wheezing disappeared after settling into chair.

## 2019-06-17 NOTE — PROGRESS NOTES
TRANSFER - IN REPORT:    Verbal report received from [facility] on [employer]  being received from ICU for routine progression of care      Report consisted of patients Situation, Background, Assessment and   Recommendations(SBAR). Information from the following report(s) SBAR, OR Summary, Intake/Output, MAR and Recent Results was reviewed with the receiving nurse. Opportunity for questions and clarification was provided. Assessment completed upon patients arrival to unit and care assumed.

## 2019-06-17 NOTE — PROGRESS NOTES
Patient mental status improving slowly. Pt now alert and oriented to person and place, and occasionally situation.

## 2019-06-17 NOTE — PROGRESS NOTES
Pt OOB to bathroom. Ambulated with one person assistance. Tolerated exertion well. Pt passed flatus but no BM.

## 2019-06-17 NOTE — PROGRESS NOTES
TRANSFER - OUT REPORT:    Verbal report given to [address] (name) on Maximino Villanueva Fairly  being transferred to [facility](unit) for routine progression of care       Report consisted of patients Situation, Background, Assessment and   Recommendations(SBAR). Information from the following report(s) SBAR, Kardex, Procedure Summary, Recent Results and Cardiac Rhythm Sinus Rhythm with 1st deg AVB was reviewed with the receiving nurse. Lines:   Peripheral IV 06/14/19 Right Forearm (Active)   Site Assessment Clean, dry, & intact 6/17/2019  7:32 AM   Phlebitis Assessment 0 6/17/2019  7:32 AM   Infiltration Assessment 0 6/17/2019  7:32 AM   Dressing Status Clean, dry, & intact 6/17/2019  7:32 AM   Dressing Type Transparent;Tape 6/17/2019  7:32 AM   Hub Color/Line Status Green;Patent; Flushed; Infusing 6/17/2019  7:32 AM   Alcohol Cap Used No 6/17/2019  7:32 AM       Peripheral IV 06/14/19 Left Antecubital (Active)   Site Assessment Clean, dry, & intact 6/17/2019  7:32 AM   Phlebitis Assessment 0 6/17/2019  7:32 AM   Infiltration Assessment 0 6/17/2019  7:32 AM   Dressing Status Clean, dry, & intact 6/17/2019  7:32 AM   Dressing Type Transparent;Tape 6/17/2019  7:32 AM   Hub Color/Line Status Pink;Patent; Infusing 6/17/2019  7:32 AM   Alcohol Cap Used No 6/17/2019  7:32 AM       Peripheral IV 06/16/19 Anterior; Left Forearm (Active)   Site Assessment Clean, dry, & intact 6/17/2019  7:32 AM   Phlebitis Assessment 0 6/17/2019  7:32 AM   Infiltration Assessment 0 6/17/2019  7:32 AM   Dressing Status Clean, dry, & intact 6/17/2019  7:32 AM   Dressing Type Transparent;Tape 6/17/2019  7:32 AM   Hub Color/Line Status Pink;Patent; Infusing 6/17/2019  7:32 AM   Alcohol Cap Used No 6/17/2019  7:32 AM        Opportunity for questions and clarification was provided.       Patient transported with:   Monitor  Patient-specific medications from Pharmacy  Tech

## 2019-06-17 NOTE — PROGRESS NOTES
Problem: Falls - Risk of  Goal: *Absence of Falls  Description  Document Bean Bunsaloni Fall Risk and appropriate interventions in the flowsheet. Outcome: Progressing Towards Goal     Problem: Patient Education: Go to Patient Education Activity  Goal: Patient/Family Education  Outcome: Progressing Towards Goal     Problem: Pressure Injury - Risk of  Goal: *Prevention of pressure injury  Description  Document Tim Scale and appropriate interventions in the flowsheet.   Outcome: Progressing Towards Goal     Problem: Patient Education: Go to Patient Education Activity  Goal: Patient/Family Education  Outcome: Progressing Towards Goal     Problem: Infection - Risk of, Surgical Site Infection  Goal: *Absence of surgical site infection signs and symptoms  Outcome: Progressing Towards Goal     Problem: Patient Education: Go to Patient Education Activity  Goal: Patient/Family Education  Outcome: Progressing Towards Goal     Problem: Non-Violent Restraints  Goal: *Removal from restraints as soon as assessed to be safe  Outcome: Progressing Towards Goal  Goal: *No harm/injury to patient while restraints in use  Outcome: Progressing Towards Goal  Goal: *Patient's dignity will be maintained  Outcome: Progressing Towards Goal  Goal: *Patient Specific Goal (EDIT GOAL, INSERT TEXT)  Outcome: Progressing Towards Goal  Goal: Non-violent Restaints:Standard Interventions  Outcome: Progressing Towards Goal  Goal: Non-violent Restraints:Patient Interventions  Outcome: Progressing Towards Goal  Goal: Patient/Family Education  Outcome: Progressing Towards Goal

## 2019-06-17 NOTE — PROGRESS NOTES
Problem: Mobility Impaired (Adult and Pediatric)  Goal: *Acute Goals and Plan of Care (Insert Text)  Description    LTG:  (1.)Mr. Cristina will move from supine to sit and sit to supine  in bed with STAND BY ASSIST within 7 treatment day(s). (2.)Mr. Caryle Sprinkle will transfer from bed to chair and chair to bed with SUPERVISION using the least restrictive device within 7 treatment day(s). (3.)Mr. Caryle Sprinkle will ambulate with MODIFIED INDEPENDENCE for 300 feet with the least restrictive device within 7 treatment day(s). ________________________________________________________________________________________________   Outcome: Progressing Towards Goal    PHYSICAL THERAPY: Initial Assessment 6/17/2019  INPATIENT: PT Visit Days : 1  [place] / [place] / Product Type: Medicare /       NAME/AGE/GENDER: Maximino Lopez is a 82 y.o. male   PRIMARY DIAGNOSIS: SBO (small bowel obstruction) ([facility] [K56.609]  [facility] obstruction ([facility] [K56.609] SBO (small bowel obstruction) (HCC)   SBO (small bowel obstruction) ([facility])    Procedure(s) (LRB):  EXPLORATORY LAPAROTOMY   Mult enterorrhaphies approx 85cm small bowel resect and anast drain placement hernia repair (no mesh) removal peritoneal FB Removal of Bozoar materail in SB (N/A)  3 Days Post-Op  ICD-10: Treatment Diagnosis:    Generalized Muscle Weakness (M62.81)  Other abnormalities of gait and mobility (R26.89)   Precaution/Allergies:  Diphenhydramine hcl; Ativan [lorazepam]; Iodinated contrast- oral and iv dye; and Sulfa (sulfonamide antibiotics)      ASSESSMENT:     Mr. Caryle Sprinkle presents with limited independence with gait and transfers as well as bed mobility due to abdominal surgery. He did well with therapy today. Needs a rolling walker to ambulate and may need some follow up therapy based on his progression.      This section established at most recent assessment   PROBLEM LIST (Impairments causing functional limitations):  Decreased Strength  Decreased Transfer Abilities  Decreased Ambulation Ability/Technique  Decreased Balance  Decreased Activity Tolerance   INTERVENTIONS PLANNED: (Benefits and precautions of physical therapy have been discussed with the patient.)  Bed Mobility  Gait Training  Therapeutic Exercise/Strengthening  Transfer Training     TREATMENT PLAN: Frequency/Duration: daily for duration of hospital stay  Rehabilitation Potential For Stated Goals: Good     REHAB RECOMMENDATIONS (at time of discharge pending progress):    Placement: It is my opinion, based on this patient's performance to date, that Mr. Nathaniel Jenkins may benefit from [address] after discharge due to the functional deficits listed above that are likely to improve with skilled rehabilitation   Equipment: To be determined   None at this time              HISTORY:   History of Present Injury/Illness (Reason for Referral):  Patient is an 81 y/o male with hx CAD, CABG, COPD, HTN, HLD who underwent expl lap yesterday on 6/14 for SBO. He went into new onset rapid afib tonight at approx 1020pm with rate to 150s-160s. He denies chest pain or shortness of breath. He believes he may have had an episode of afib when he had his 6 v CABG in 2003. Hospitalist service consulted for new onset rapid atrial fibrillation. 6/16- seen with wife and family members at bedside. He is in good spirits and denies concerns. Cardizem infusing at low rate, but he converted back to sinus rhythm early this AM.  He denies chest pain or shortness of breath. Has not had flatus or BM yet. 6/17- significant confusion overnight that worsened with ativan. Required haldol. Improved now and oriented x 3 but confused some in conversation.        Past Medical History/Comorbidities:   Mr. 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 He also has no past medical history of Asthma, Chronic kidney disease, Difficult intubation, Heart failure ([address] Liver disease, Malignant hyperthermia due to anesthesia, Nausea & vomiting, Pseudocholinesterase deficiency, Psychiatric disorder, PUD (peptic ulcer disease), Seizures ([address] Sleep apnea, or Thromboembolus ([address] Mr. John Fregoso  has a past surgical history that includes pr cardiac surg procedure unlist (1991); pr cardiac surg procedure unlist (9/2003); hx cholecystectomy (9/2000); pr neurological procedure unlisted (12/1981); hx hernia repair (09/1993); hx gi (11/2003); hx lumbar laminectomy (5/2005); hx other surgical (05/2002); hx colonoscopy (02/28/2013); hx endoscopy (02/28/2013); vascular surgery procedure unlist (1991); hx orthopaedic (1/2006); hx orthopaedic (8/2005); hx orthopaedic (2005); hx heart catheterization (04/2015); hx other surgical (11/2014); hx other surgical; hx orthopaedic (03/1955); hx thyroidectomy (08/24/2017); hx thyroidectomy (08/24/2017); and hx heent. Social History/Living Environment:   Home Environment: Private residence  # Steps to Enter: 3  One/Two Story Residence: Two story  # of Interior Steps: 11  Height of Each Step (in): 11 inches  Interior Rails: Right  Lift Chair Available: No  Living Alone: No  Support Systems: Child(kim), Family member(s)  Patient Expects to be Discharged to[de-identified] Private residence  Current DME Used/Available at Home: Glucometer  Prior Level of Function/Work/Activity:  Independent prior to admit.       Number of Personal Factors/Comorbidities that affect the Plan of Care: 1-2: MODERATE COMPLEXITY   EXAMINATION:   Most Recent Physical Functioning:   Gross Assessment:  AROM: Generally decreased, functional  Strength: Generally decreased, functional               Posture:     Balance:  Sitting: Intact  Standing: Pull to stand; With support Bed Mobility:  Supine to Sit: Minimum assistance;Assist x1  Sit to Supine: (left up in chair)  Wheelchair Mobility:     Transfers:  Sit to Stand: Contact guard assistance  Stand to Sit: Contact guard assistance  Bed to Chair: Contact guard assistance  Gait:     Speed/Khloe: Slow  Gait Abnormalities: Decreased step clearance; Path deviations  Distance (ft): 50 Feet (ft)  Assistive Device: (HHA x 2)  Ambulation - Level of Assistance: Contact guard assistance;Assist x2(for balance as well as multiple lines.)  Interventions: Verbal cues; Safety awareness training      Body Structures Involved:  Muscles Body Functions Affected: Movement Related Activities and Participation Affected: Mobility   Number of elements that affect the Plan of Care: 3: MODERATE COMPLEXITY   CLINICAL PRESENTATION:   Presentation: Stable and uncomplicated: LOW COMPLEXITY   CLINICAL DECISION MAKIN [address]? ?6 Clicks? Basic Mobility Inpatient Short Form  How much difficulty does the patient currently have. .. Unable A Lot A Little None   1. Turning over in bed (including adjusting bedclothes, sheets and blankets)? ? 1   ? 2   ? 3   ? 4   2. Sitting down on and standing up from a chair with arms ( e.g., wheelchair, bedside commode, etc.)   ? 1   ? 2   ? 3   ? 4   3. Moving from lying on back to sitting on the side of the bed?   ? 1   ? 2   ? 3   ? 4   How much help from another person does the patient currently need. .. Total A Lot A Little None   4. Moving to and from a bed to a chair (including a wheelchair)? ? 1   ? 2   ? 3   ? 4   5. Need to walk in hospital room? ? 1   ? 2   ? 3   ? 4   6.   Climbing 3-5 steps with a railing? ? 1   ? 2   ? 3   ? 4   © 2007, Trustees of [address] [place] [address] [address] [id number], under license to [employer]. All rights reserved      Score:  Initial: 18 Most Recent: X (Date: -- )    Interpretation of Tool:  Represents activities that are increasingly more difficult (i.e. Bed mobility, Transfers, Gait). Medical Necessity:     Patient is expected to demonstrate progress in strength and range of motion   to increase independence with gait and transfers   . Reason for Services/Other Comments:  Patient continues to require skilled intervention due to limited functional independence   . Use of outcome tool(s) and clinical judgement create a POC that gives a: Clear prediction of patient's progress: LOW COMPLEXITY            TREATMENT:   (In addition to Assessment/Re-Assessment sessions the following treatments were rendered)   Pre-treatment Symptoms/Complaints:  sore abdomen  Pain: Initial:     Not rated Post Session:  0     Assessment/Reassessment only, no treatment provided today    Braces/Orthotics/Lines/Etc:   IV  reid catheter  drain UMER   nasogastric tube  Multiple ICU lines   O2 Device: Nasal cannula  Treatment/Session Assessment:    Response to Treatment:  tolerated well. Short of breath after ambulation. Interdisciplinary Collaboration:   Physical Therapist  Registered Nurse  Rehabilitation Attendant  After treatment position/precautions:   Up in chair  Bed/Chair-wheels locked  Bed in low position  Call light within reach  RN notified  Family at bedside   Compliance with Program/Exercises: Compliant all of the time, Will assess as treatment progresses  Recommendations/Intent for next treatment session: \"Next visit will focus on advancements to more challenging activities and reduction in assistance provided\".   Total Treatment Duration:  PT Patient Time In/Time Out  Time In: 1040  Time Out: [address]  [address]oor, PT

## 2019-06-18 PROBLEM — R41.0 DELIRIUM: Status: RESOLVED | Noted: 2019-06-17 | Resolved: 2019-06-18

## 2019-06-18 LAB
ANION GAP SERPL CALC-SCNC: 6 MMOL/L (ref 7–16)
BUN SERPL-MCNC: 11 MG/DL (ref 8–23)
CALCIUM SERPL-MCNC: 8 MG/DL (ref 8.3–10.4)
CHLORIDE SERPL-SCNC: 107 MMOL/L (ref 98–107)
CO2 SERPL-SCNC: 28 MMOL/L (ref 21–32)
CREAT SERPL-MCNC: 0.71 MG/DL (ref 0.8–1.5)
ERYTHROCYTE [DISTWIDTH] IN BLOOD BY AUTOMATED COUNT: 13 % (ref 11.9–14.6)
GLUCOSE SERPL-MCNC: 107 MG/DL (ref 65–100)
HCT VFR BLD AUTO: 24.7 % (ref 41.1–50.3)
HGB BLD-MCNC: 8.6 G/DL (ref 13.6–17.2)
MAGNESIUM SERPL-MCNC: 1.9 MG/DL (ref 1.8–2.4)
MCH RBC QN AUTO: 33.7 PG (ref 26.1–32.9)
MCHC RBC AUTO-ENTMCNC: 34.8 G/DL (ref 31.4–35)
MCV RBC AUTO: 96.9 FL (ref 79.6–97.8)
NRBC # BLD: 0 K/UL (ref 0–0.2)
PLATELET # BLD AUTO: 89 K/UL (ref 150–450)
PMV BLD AUTO: 8.6 FL (ref 9.4–12.3)
POTASSIUM SERPL-SCNC: 3.6 MMOL/L (ref 3.5–5.1)
RBC # BLD AUTO: 2.55 M/UL (ref 4.23–5.6)
SODIUM SERPL-SCNC: 141 MMOL/L (ref 136–145)
WBC # BLD AUTO: 4.3 K/UL (ref 4.3–11.1)

## 2019-06-18 PROCEDURE — 65660000000 HC RM CCU STEPDOWN

## 2019-06-18 PROCEDURE — 97116 GAIT TRAINING THERAPY: CPT

## 2019-06-18 PROCEDURE — 36415 COLL VENOUS BLD VENIPUNCTURE: CPT

## 2019-06-18 PROCEDURE — 74011000258 HC RX REV CODE- 258: Performed by: SURGERY

## 2019-06-18 PROCEDURE — 83735 ASSAY OF MAGNESIUM: CPT

## 2019-06-18 PROCEDURE — 94760 N-INVAS EAR/PLS OXIMETRY 1: CPT

## 2019-06-18 PROCEDURE — 97110 THERAPEUTIC EXERCISES: CPT

## 2019-06-18 PROCEDURE — 85027 COMPLETE CBC AUTOMATED: CPT

## 2019-06-18 PROCEDURE — 74011000250 HC RX REV CODE- 250: Performed by: INTERNAL MEDICINE

## 2019-06-18 PROCEDURE — 74011250636 HC RX REV CODE- 250/636: Performed by: SURGERY

## 2019-06-18 PROCEDURE — 80048 BASIC METABOLIC PNL TOTAL CA: CPT

## 2019-06-18 PROCEDURE — 74011250637 HC RX REV CODE- 250/637: Performed by: INTERNAL MEDICINE

## 2019-06-18 PROCEDURE — 77010033678 HC OXYGEN DAILY

## 2019-06-18 RX ORDER — HYDRALAZINE HYDROCHLORIDE 20 MG/ML
10 INJECTION INTRAMUSCULAR; INTRAVENOUS
Status: DISCONTINUED | OUTPATIENT
Start: 2019-06-18 | End: 2019-06-21

## 2019-06-18 RX ORDER — FLUTICASONE PROPIONATE 50 MCG
2 SPRAY, SUSPENSION (ML) NASAL DAILY
Status: DISCONTINUED | OUTPATIENT
Start: 2019-06-18 | End: 2019-06-25

## 2019-06-18 RX ORDER — ENOXAPARIN SODIUM 100 MG/ML
40 INJECTION SUBCUTANEOUS EVERY 24 HOURS
Status: DISCONTINUED | OUTPATIENT
Start: 2019-06-19 | End: 2019-06-26 | Stop reason: HOSPADM

## 2019-06-18 RX ADMIN — CHLORASEPTIC 1 SPRAY: 1.5 LIQUID ORAL at 13:16

## 2019-06-18 RX ADMIN — PIPERACILLIN SODIUM,TAZOBACTAM SODIUM 3.38 G: 3; .375 INJECTION, POWDER, FOR SOLUTION INTRAVENOUS at 23:30

## 2019-06-18 RX ADMIN — HEPARIN SODIUM 5000 UNITS: 5000 INJECTION INTRAVENOUS; SUBCUTANEOUS at 00:18

## 2019-06-18 RX ADMIN — FLUTICASONE PROPIONATE 2 SPRAY: 50 SPRAY, METERED NASAL at 13:12

## 2019-06-18 RX ADMIN — METOPROLOL TARTRATE 5 MG: 5 INJECTION INTRAVENOUS at 21:24

## 2019-06-18 RX ADMIN — METOPROLOL TARTRATE 5 MG: 5 INJECTION INTRAVENOUS at 00:11

## 2019-06-18 RX ADMIN — PIPERACILLIN SODIUM,TAZOBACTAM SODIUM 3.38 G: 3; .375 INJECTION, POWDER, FOR SOLUTION INTRAVENOUS at 00:11

## 2019-06-18 RX ADMIN — METOPROLOL TARTRATE 5 MG: 5 INJECTION INTRAVENOUS at 05:22

## 2019-06-18 RX ADMIN — FAMOTIDINE 20 MG: 10 INJECTION, SOLUTION INTRAVENOUS at 07:34

## 2019-06-18 RX ADMIN — DEXTROSE MONOHYDRATE, SODIUM CHLORIDE, AND POTASSIUM CHLORIDE 100 ML/HR: 50; 4.5; 1.49 INJECTION, SOLUTION INTRAVENOUS at 04:25

## 2019-06-18 RX ADMIN — FAMOTIDINE 20 MG: 10 INJECTION, SOLUTION INTRAVENOUS at 21:24

## 2019-06-18 RX ADMIN — PIPERACILLIN SODIUM,TAZOBACTAM SODIUM 3.38 G: 3; .375 INJECTION, POWDER, FOR SOLUTION INTRAVENOUS at 15:24

## 2019-06-18 RX ADMIN — PIPERACILLIN SODIUM,TAZOBACTAM SODIUM 3.38 G: 3; .375 INJECTION, POWDER, FOR SOLUTION INTRAVENOUS at 07:32

## 2019-06-18 RX ADMIN — METOPROLOL TARTRATE 5 MG: 5 INJECTION INTRAVENOUS at 13:12

## 2019-06-18 NOTE — PROGRESS NOTES
Hospitalist Progress Note    2019  Admit Date: 2019  3:14 AM   NAME: Lily Johnson   :  1935   MRN:  [id number]   Attending: Hunter Carballo MD  PCP:  Hannah Brantley MD    SUBJECTIVE:   Patient is an 81 y/o male with hx CAD, CABG, COPD, HTN, HLD who underwent expl lap yesterday on  for SBO. He went into new onset rapid afib tonight at approx 1020pm with rate to 150s-160s. He denies chest pain or shortness of breath. He believes he may have had an episode of afib when he had his 6 v CABG in . Hospitalist service consulted for new onset rapid atrial fibrillation. - seen with wife and family members at bedside. He is in good spirits and denies concerns. Cardizem infusing at low rate, but he converted back to sinus rhythm early this AM.  He denies chest pain or shortness of breath. Has not had flatus or BM yet. - significant confusion overnight that worsened with ativan. Required haldol. Improved now and oriented x 3 but confused some in conversation. - didn't sleep well last night, but no further delirium/agitation. He is complaining of nasal drainage and sore throat from NG tube. Denies other concerns. BP elevated at times. Reports + flatus and very small BM.       Review of Systems negative with exception of pertinent positives noted above  PHYSICAL EXAM     Visit Vitals  /85 (BP 1 Location: Left arm, BP Patient Position: At rest)   Pulse 70   Temp 98.2 °F (36.8 °C)   Resp 16   Ht 6' (1.829 m)   Wt 96.6 kg (213 lb)   SpO2 98%   BMI 28.89 kg/m²      Temp (24hrs), Av.7 °F (37.1 °C), Min:98.2 °F (36.8 °C), Max:99.1 °F (37.3 °C)    Patient Vitals for the past 24 hrs:   Temp Pulse Resp BP SpO2   19 0848     98 %   19 0814 98.2 °F (36.8 °C) 70 16 170/85 100 %   19 0522  66  157/86    19 0424 99 °F (37.2 °C) 69 20 160/79 97 %   19 0011 98.4 °F (36.9 °C) 88 20 153/78 97 %   19 1849 99.1 °F (37.3 °C) 79 18 154/81 97 %   06/17/19 1556  68 14 155/72 97 %   06/17/19 1502  69 15 176/74 98 %   06/17/19 1456  81 25 182/78    06/17/19 1432  77 19 150/77 99 %   06/17/19 1402  73 19 157/74 94 %   06/17/19 1332  78 23 157/69 97 %   06/17/19 1302  79 14 156/64 98 %   06/17/19 1202 98.8 °F (37.1 °C) 74 21 155/77 98 %       Oxygen Therapy  O2 Sat (%): 98 % (06/18/19 0848)  Pulse via Oximetry: 72 beats per minute (06/18/19 0848)  O2 Device: Nasal cannula (06/18/19 0848)  O2 Flow Rate (L/min): 2 l/min (06/18/19 0848)  FIO2 (%): 28 % (06/16/19 1035)    Intake/Output Summary (Last 24 hours) at 6/18/2019 1157  Last data filed at 6/18/2019 0630  Gross per 24 hour   Intake 2317.67 ml   Output 2950 ml   Net -632.33 ml      General: No acute distress    Lungs:  CTA Bilaterally.    Heart:  Regular rate and rhythm,  No murmur, rub, or gallop  Abdomen: Soft, Non distended, Non tender, Positive bowel sounds  Extremities: No cyanosis, clubbing or edema  Neurologic:  No focal deficits    Recent Results (from the past 24 hour(s))   PLEASE READ & DOCUMENT PPD TEST IN 48 HRS    Collection Time: 06/17/19 12:01 PM   Result Value Ref Range    PPD Negative Negative    mm Induration 0 0 - 5 mm   CBC W/O DIFF    Collection Time: 06/18/19  5:59 AM   Result Value Ref Range    WBC 4.3 4.3 - 11.1 K/uL    RBC 2.55 (L) 4.23 - 5.6 M/uL    HGB 8.6 (L) 13.6 - 17.2 g/dL    HCT 24.7 (L) 41.1 - 50.3 %    MCV 96.9 79.6 - 97.8 FL    MCH 33.7 (H) 26.1 - 32.9 PG    MCHC 34.8 31.4 - 35.0 g/dL    RDW 13.0 11.9 - 14.6 %    PLATELET 89 (L) 860 - 450 K/uL    MPV 8.6 (L) 9.4 - 12.3 FL    ABSOLUTE NRBC 0.00 0.0 - 0.2 K/uL   METABOLIC PANEL, BASIC    Collection Time: 06/18/19  5:59 AM   Result Value Ref Range    Sodium 141 136 - 145 mmol/L    Potassium 3.6 3.5 - 5.1 mmol/L    Chloride 107 98 - 107 mmol/L    CO2 28 21 - 32 mmol/L    Anion gap 6 (L) 7 - 16 mmol/L    Glucose 107 (H) 65 - 100 mg/dL    BUN 11 8 - 23 MG/DL    Creatinine 0.71 (L) 0.8 - 1.5 MG/DL    GFR est AA >60 >60 ml/min/1.73m2    GFR est non-AA >60 >60 ml/min/1.73m2    Calcium 8.0 (L) 8.3 - 10.4 MG/DL   MAGNESIUM    Collection Time: 19  5:59 AM   Result Value Ref Range    Magnesium 1.9 1.8 - 2.4 mg/dL     Imaging:    CT ABD PELV WO CONT   Final Result   IMPRESSION: Mid small bowel obstruction. The transition point is not clearly   identified, but may lie in the region of a small periumbilical fat-containing   ventral hernia which may be incarcerated given the associated fat stranding. Of   note, no bowel is seen within the hernia. Additionally, there are a few   questionable areas of small bowel wall pneumatosis concerning for ischemia. ER   physician notified. Results for orders placed or performed during the hospital encounter of 19   2D ECHO COMPLETE ADULT (TTE) W OR [address] [facility] [address] [facility], [address] [facility]  [phone]    Transthoracic Echocardiogram  2D, M-mode, Doppler, and Color Doppler    Patient: Maria Ines Wasserman  MR #: [id number]  : 1935  Age: 82 years  Gender: Male  Study date: 2019  Account #: [de-identified]  Height: 72 in  Weight: 212.5 lb  BSA: 2.19 mï¾²  Status:Routine  Location: 374  BP: 159/ 81    Allergies: DIPHENHYDRAMINE HCL, LORAZEPAM, IODINATED CONTRAST- ORAL AND IV   DYE,  SULFA (SULFONAMIDE ANTIBIOTICS)    Sonographer:  Angela Snyder [facility]  Group:  [facility]  Referring Physician:  Pipe Segovia MD  Reading Physician:  Marcie Rouse MD [facility] [place]    INDICATIONS: A-fib. PROCEDURE: This was a routine study. A transthoracic echocardiogram was  performed. The study included complete 2D imaging, M-mode, complete spectral  Doppler, and color Doppler. Intravenous contrast (Definity) was administered. Image quality was adequate. LEFT VENTRICLE: Size was normal. Systolic function was normal. Ejection  fraction was estimated in the range of 55 % to 60 %.  There were no regional  wall motion abnormalities. Wall thickness was mildly increased. Avg. E/e'=  9.15. Left ventricular diastolic function parameters were normal.    RIGHT VENTRICLE: The size was normal. Systolic function was normal. The  tricuspid jet envelope definition was inadequate for estimation of RV   systolic  pressure. LEFT ATRIUM: The atrium was moderately dilated. RIGHT ATRIUM: Size was normal.    SYSTEMIC VEINS: IVC: The inferior vena cava was normal in size and course. AORTIC VALVE: The valve was trileaflet. Leaflets exhibited mild to moderate  sclerosis. There was no evidence for stenosis. There was mild regurgitation. MITRAL VALVE: There was mild annular calcification. Valve structure was   normal.  There was no evidence for stenosis. There was trivial regurgitation. TRICUSPID VALVE: The valve structure was normal. There was no evidence for  stenosis. There was trivial regurgitation. PULMONIC VALVE: Not well visualized. There was no evidence for stenosis. There  was mild regurgitation. PERICARDIUM: There was no pericardial effusion. AORTA: The root exhibited dilatation. 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 (4.4 cm). SUMMARY:    -  Left ventricle: Systolic function was normal. Ejection fraction was  estimated in the range of 55 % to 60 %. There were no regional wall motion  abnormalities. Wall thickness was mildly increased. -  Left atrium: The atrium was moderately dilated. -  Mitral valve: There was mild annular calcification.    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 (4.4  cm).     SYSTEM MEASUREMENT TABLES    2D mode  AoR Diam (2D): 4.1 cm  LA Dimension (2D): 4 cm  Left Atrium Systolic Volume Index; Method of Disks, Biplane; 2D mode;: 43.4  ml/m2  IVS/LVPW (2D): 1  IVSd (2D): 1.2 cm  LVIDd (2D): 4.5 cm  LVIDs (2D): 3 cm  LVOT Area (2D): 4.5 cm2  LVPWd (2D): 1.2 cm  RVIDd (2D): 4.2 cm    Tissue Doppler Imaging  LV Peak Early Jones Tissue Holland; Lateral MA (TDI): 10.4 cm/s  LV Peak Early Jones Tissue Holland; Medial MA (TDI): 6.3 cm/s    Unspecified Scan Mode  Peak Grad; Mean; Antegrade Flow: 12 mm[Hg]  Vmax; Antegrade Flow: 163 cm/s  LVOT Diam: 2.4 cm  MV Peak Holland/LV Peak Tissue Holland E-Wave; Lateral MA: 6.9  MV Peak Holland/LV Peak Tissue Holland E-Wave; Medial MA: 11.4    Prepared and signed by    Nga Silvestre MD [facility] [place]  Signed 17-Jun-2019 11:59:26         [address] Problems as of 6/18/2019 Date Reviewed: 6/14/2019          Codes Class Noted - Resolved POA    Delirium ICD-10-CM: R41.0  ICD-9-CM: 780.09  6/17/2019 - Present No        Atrial fibrillation with rapid ventricular response ([facility] ICD-10-CM: I48.91  ICD-9-CM: 427.31  6/16/2019 - Present No        * (Principal) SBO (small bowel obstruction) ([facility] ICD-10-CM: P72.116  ICD-9-CM: 560.9  6/14/2019 - Present Yes        Generalized abdominal pain ICD-10-CM: R10.84  ICD-9-CM: 789.07  6/14/2019 - Present Yes        Pneumatosis intestinalis ICD-10-CM: K63.89  ICD-9-CM: 569.89  6/14/2019 - Present Yes        Thrombocytopenia ([facility] ICD-10-CM: D69.6  ICD-9-CM: 287.5  6/14/2019 - Present Yes        Ventral incisional hernia ICD-10-CM: K43.2  ICD-9-CM: 553.21  6/14/2019 - Present Yes        Small bowel obstruction ([facility] ICD-10-CM: U14.378  ICD-9-CM: 560.9  6/14/2019 - Present Yes        Chronic obstructive pulmonary disease ([facility] (Chronic) ICD-10-CM: J44.9  ICD-9-CM: 455  4/10/2017 - Present Yes        Coronary artery disease of bypass graft of native heart with stable angina pectoris ([address] ICD-10-CM: I25.708  ICD-9-CM: 414.05, 413.9  8/23/2016 - Present Yes    Overview Addendum 6/29/2018 12:32 PM by Fabio Blandon MD     1. CABG (9/2003): Transmyocardial laser revascularization to the inferior wall of the left ventricle. Coronary artery bypass grafting x6.   LIMA to LAD: Sequential SVG to ramus and obtuse marginal.  Sequential SVG to posterolateral and right PDA, SVG to diagonal.  2.  Cardiolite( 11/2012): Diaphragmatic attenuation. No ischemia. Normal LV systolic function. EF 63%. 3.  Stress Cardiolite (1/16/15):  EF 52%. Normal perfusion. 4.  LHC (4/13/15):  6 of 6 bypass grafts patent. Small vessel disease involving the apical LAD. Normal LV systolic function. LVEDP 27  5. LHC (5/24/18): Severe multivessel CAD. 6 of 6 grafts patent. Small vessel disease not amendable to PCI. EF 55-60%. Thrombocytopenia, unspecified ([facility] ICD-10-CM: D69.6  ICD-9-CM: 287.5  11/13/2012 - Present Yes            Plan:  · A fib with RVR- resolved  · Likely combination from post-op stress and not taking PO beta-blocker due to being NPO  · Copntinue lopressor 5 mg IV q 6 hours; resume PO lopressor when able  · Cardizem gtt off since 11:40 AM 6/16  · Echo with normal LVEF, moderate L atrial enlargement, no significant mitral valve issue  · No anticoagulation for now due to recent surgery  · Consider cardiology consult if a fib returns; otherwise, follow up outpatient    · Delirium- avoid benzos  · Use haldol if needed; consider low dose seroquel qHS when taking po  · Resolved      · SBO- post-op day 4- per general surgery  · + flatus and small BM this AM    · Thromboyctopenia- chronic, monitor  · Stable    · HTN  · Continue IV lopressor  · Add prn hydralazine  · Restart home PO meds when able    DVT Prophylaxis: Heparin per general surgery    Signed By: Ash Nazario MD     June 18, 2019

## 2019-06-18 NOTE — PROGRESS NOTES
Assessment done via doc flow sheet. Pt resting in bed, alert to person and place, resp easy & regular, lung sounds diminished. Pt encouraged to use incentive spirometer 10x every hour. O2 @ 2L per nasal cannula in place. NGT to left nare connected to low intermittent suction. NPO. Remote Tele noted. Griffin, patent, draining jacques colored urine. Midline incision to abdomen with dressing intact, old drainage marked with pen. Pt denies pain at this time. Bed low & locked, side rails x3 up with bed alam set. Pt instructed to call for assistance as needed. Call light within reach. Will monitor.

## 2019-06-18 NOTE — PROGRESS NOTES
Admission assessment complete. Family at bedside. A/O x3. Respirations present, non-labored. Wheezing noted upon exertion. 2 LPM oxygen via NC. No signs of distress noted. PIV infusing w/o difficulty. Griffin draining at bedside. Telemetry in place. Left nare NGT - LIS. UMER drain to LLQ - sanguinous fluid patent. Allevyn placed on bottom. Midline dressing - CDI. Oriented to room and surroundings. Alarm placed on recliner. Call light in reach. Will continue to monitor.

## 2019-06-18 NOTE — PROGRESS NOTES
H&P/Consult Note/Progress Note/Office Note:   Maximino Patterson  MRN: [id number]  GD  Age:83 y.o.    HPI: Maximino Patterson is a 82 y.o. male who is s/p expl lap with 5hrs lysis, 85cm ischemic sb resection, hernia repair,   and evacuation of obstructing bezoar of (large volume of impacted delano slaw) on 19    He originally came to the ER on 19   with a 1 day h/o progressive, intermittent, generalized abd pain without radiation which worsened acutely around 8PM on 19  Nothing made pain better or worse. +Nausea; -V  No fever, chills, chest pain, or back pain    1990s s/p open AAA in [place] s/p lap [place] in [place]  S/p expl lap for SBO (no resection) [place] CABG x 2016 s/p thoracic aneurysm stent at [facility]      Surgery was consulted after below CT without contrast showed SBO findings concerning for pneumatosis       16 s/p colonoscopy; Dr Darell Cárdenas:  SESSILE SERRATED POLYPS. FRAGMENT OF BENIGN COLONIC MUCOSA. Electronically signed out on 2016 11:51 by Rupali Lujan MD   Microscopic Description   The biopsy demonstrates serrated hypermucinous colonic glands imbedded in a nonfibrotic lamina propria with focal crypt dilatation and widening of the crypt bases. An additional fragment demonstrates no adenomatous or hyperplastic change. Dr. York Found concurs. 19 CT abd/pelvis without contrast  - Liver: Within normal limits. - Gallbladder and bile ducts: The gallbladder is absent, without biliary tract  dilatation.  - Spleen: Within normal limits. - Urinary tract: Again noted is a 5.3 cm right kidney cyst. The left kidney is  unremarkable. No urinary tract stone or hydronephrosis is identified. There are  bilateral inguinal hernias, containing fat on the left and a small portion of  the urinary bladder on the right. - Adrenals: Within normal limits. - Pancreas: Within normal limits. - Gastrointestinal tract:  In the umbilicus region there is a ventral 2.8 cm  fat-containing hernia, with adjacent stranding which may relate to  incarceration. There is a mid small bowel obstruction, whose transition point is  not clearly identified, but may lie in the region of the ventral hernia although  no bowel is seen within the hernia itself. There are a few questionable areas of  small bowel wall pneumatosis. The colon is unremarkable except for  diverticulosis. No free air or free fluid is seen. - Retroperitoneum: Within normal limits. - Peritoneal cavity and abdominal wall: Within normal limits. - Pelvis: Within normal limits. - Spine/bones: No acute process. - Other comments: Partially visualized is a descending thoracic aortic stent  graft.     IMPRESSION: Mid small bowel obstruction. The transition point is not clearly  identified, but may lie in the region of a small periumbilical fat-containing  ventral hernia which may be incarcerated given the associated fat stranding. Of  note, no bowel is seen within the hernia. Additionally, there are a few  questionable areas of small bowel wall pneumatosis concerning for ischemia. ER  physician notified.       Additonal hx:  6/17/19 POD3 afib over weekend; no complaints; up in chair; interactive and conversant;  Hospitalists treating; no flatus or BM yet   6/18/19 POD4 feels OK, no complaints, +BM          Past Medical History:   Diagnosis Date    Abdominal pain     kower     Acute cervical radiculopathy     Aneurysm ([address]     nov 1991AAA    Arthritis     Atrial fibrillation with rapid ventricular response ([address] 6/16/2019    CAD (coronary artery disease)     6 bipass 9/2003    Cancer ([address]     skin    COPD (chronic obstructive pulmonary disease) (HCC)     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   Left lobe: At least 4 heterogeneous nodules in the left lobe. The largest measures 2.9 x 1.1 x 2.4 cm in the lower pole, with mild peripheral color Doppler flow and possible tiny internal calcifications.     Thrombocytopenia ([address]     marrow normal slightly big spleen low mpv    Thyroid disease     hypothyroidism     Past Surgical History:   Procedure Laterality Date    CARDIAC SURG PROCEDURE UNLIST  1991    AAA    CARDIAC SURG PROCEDURE UNLIST  9/2003     6 bypass surgeries    HX CHOLECYSTECTOMY  9/2000    HX COLONOSCOPY  02/28/2013    10 polyps/ fu in 3 yrs    HX ENDOSCOPY  02/28/2013    1 biopsy    HX GI  11/2003    small bowel obstruction    HX HEART CATHETERIZATION  04/2015    HX HEENT      thyroidectomy    HX HERNIA REPAIR  09/1993    Insisional    HX LUMBAR LAMINECTOMY  5/2005    L3, L4    HX ORTHOPAEDIC  1/2006    right total knee replacement    HX ORTHOPAEDIC  8/2005     left knee replacement    HX ORTHOPAEDIC  2005    laminectomy L3-l4    HX ORTHOPAEDIC  03/1955    right knee surgery    HX OTHER SURGICAL  05/2002    upper endoscopic retrograde cholangiopancreatogram    HX OTHER SURGICAL  11/2014    removal squamous cell carcinoma    HX OTHER SURGICAL      MRSA lower back -3/2005    HX THYROIDECTOMY  08/24/2017    Dr Eddie Mcgowan    HX THYROIDECTOMY  08/24/2017    Dr. Geoff Meraz  12/1981     ruptured disc in back   [address]    abd aortic aneurysm repair     Current Facility-Administered Medications   Medication Dose Route Frequency    [START ON 6/19/2019] enoxaparin (LOVENOX) injection 40 mg  40 mg SubCUTAneous Q24H    hydrALAZINE (APRESOLINE) 20 mg/mL injection 10 mg  10 mg IntraVENous Q6H PRN    fluticasone propionate (FLONASE) 50 mcg/actuation nasal spray 2 Spray  2 Spray Both Nostrils DAILY    sodium chloride (OCEAN) 0.65 % nasal squeeze bottle 2 Spray  2 Spray Both Nostrils Q2H PRN    phenol throat spray (CHLORASEPTIC) 1 Spray  1 Spray Oral PRN    [START ON 2019] levothyroxine (SYNTHROID) injection 112.5 mcg  112.5 mcg IntraVENous Q24H    dextrose 5% - 0.45% NaCl with KCl 20 mEq/L infusion  100 mL/hr IntraVENous CONTINUOUS    albuterol (PROVENTIL VENTOLIN) nebulizer solution 2.5 mg  2.5 mg Nebulization Q6H PRN    metoprolol (LOPRESSOR) injection 5 mg  5 mg IntraVENous Q6H    dilTIAZem (CARDIZEM) 125 mg/125 mL (1 mg/mL) dextrose 5% infusion  0-15 mg/hr IntraVENous TITRATE    piperacillin-tazobactam (ZOSYN) 3.375 g in 0.9% sodium chloride (MBP/ADV) 100 mL  3.375 g IntraVENous Q8H    famotidine (PF) (PEPCID) injection 20 mg  20 mg IntraVENous Q12H    morphine 10 mg/ml injection 2-5 mg  2-5 mg IntraVENous Q1H PRN    acetaminophen (TYLENOL) tablet 1,000 mg  1,000 mg Oral Q6H PRN    ondansetron (ZOFRAN) injection 4 mg  4 mg IntraVENous Q4H PRN    white petrolatum-mineral oil (AKWA TEARS) 83-15 % ophthalmic ointment   Both Eyes PRN     Diphenhydramine hcl; Ativan [lorazepam];  Iodinated contrast- oral and iv dye; and Sulfa (sulfonamide antibiotics)  Social History     Socioeconomic History    Marital status:      Spouse name: Not on file    Number of children: Not on file    Years of education: Not on file    Highest education level: Not on file   Tobacco Use    Smoking status: Former Smoker     Packs/day: 1.00     Years: 25.00     Pack years: 25.00     Last attempt to quit: 12/3/1987     Years since quittin.5    Smokeless tobacco: Never Used   Substance and Sexual Activity    Alcohol use: Yes     Comment: occasional glass wine    Drug use: No     Social History     Tobacco Use   Smoking Status Former Smoker    Packs/day: 1.00    Years: 25.00    Pack years: 25.00    Last attempt to quit: 12/3/1987    Years since quittin.5   Smokeless Tobacco Never Used     Family History   Problem Relation Age of Onset    Heart Disease Father     Cancer Mother         colon    Cancer Sister     Cancer Maternal Aunt     Cancer Paternal Grandmother     Diabetes Paternal Grandmother     Thyroid Disease Neg Hx     Thyroid Cancer Neg Hx      ROS: The patient has no difficulty with chest pain or shortness of breath. No fever or chills. Comprehensive review of systems was otherwise unremarkable except as noted above. Physical Exam:   Visit Vitals  /77 (BP 1 Location: Left arm, BP Patient Position: At rest;Supine)   Pulse 83   Temp 98.2 °F (36.8 °C)   Resp 18   Ht 6' (1.829 m)   Wt 213 lb (96.6 kg)   SpO2 94%   BMI 28.89 kg/m²     Vitals:    19 1231 19 1623 19 1929 19   BP: 147/80 142/75 153/77    Pulse: 82 81 83    Resp: 18  18    Temp: 98.9 °F (37.2 °C) 98.2 °F (36.8 °C) 98.2 °F (36.8 °C)    SpO2: 94% 94% 92% 94%   Weight:       Height:         No intake/output data recorded.  0701 -  1900  In: 2317.7 [I.V.:2317.7]  Out: 6833 [Urine:3095; Drains:195]    Constitutional: Alert, oriented, cooperative patient in no acute distress; appears stated age    Eyes:Sclera are clear. EOMs intact  ENMT: no external lesions gross hearing normal; no obvious neck masses, no ear or lip lesions, nares normal; +NGT with bilious output  CV: RRR. Normal perfusion  Resp: No JVD. Breathing is  non-labored; no audible wheezing. GI: dry dressing, intact beatriz, venice drain serosang       Musculoskeletal: unremarkable with normal function. No embolic signs or cyanosis.   Palpable pedal pulses bilat   Neuro:  Oriented; moves all 4; no focal deficits  Psychiatric: normal affect and mood, no memory impairment    Recent vitals (if inpt):  Patient Vitals for the past 24 hrs:   BP Temp Pulse Resp SpO2   19 2043     94 %   19 1929 153/77 98.2 °F (36.8 °C) 83 18 92 %   06/18/19 1623 142/75 98.2 °F (36.8 °C) 81 20 94 %   06/18/19 1231 147/80 98.9 °F (37.2 °C) 82 18 94 %   06/18/19 0848     98 %   06/18/19 0814 170/85 98.2 °F (36.8 °C) 70 16 100 %   06/18/19 0522 157/86  66     06/18/19 0424 160/79 99 °F (37.2 °C) 69 20 97 %   06/18/19 0011 153/78 98.4 °F (36.9 °C) 88 20 97 %       Labs:  Recent Labs     06/18/19  0559  06/15/19  2352   WBC 4.3   < > 6.0   HGB 8.6*   < > 10.2*   PLT 89*   < > 70*      < > 138   K 3.6   < > 4.1      < > 105   CO2 28   < > 28   BUN 11   < > 17   CREA 0.71*   < > 0.96   *   < > 118*   TROIQ  --   --  0.02    < > = values in this interval not displayed. Lab Results   Component Value Date/Time    WBC 4.3 06/18/2019 05:59 AM    HGB 8.6 (L) 06/18/2019 05:59 AM    PLATELET 89 (L) 18/50/0182 05:59 AM    Sodium 141 06/18/2019 05:59 AM    Potassium 3.6 06/18/2019 05:59 AM    Chloride 107 06/18/2019 05:59 AM    CO2 28 06/18/2019 05:59 AM    BUN 11 06/18/2019 05:59 AM    Creatinine 0.71 (L) 06/18/2019 05:59 AM    Glucose 107 (H) 06/18/2019 05:59 AM    INR 1.1 05/24/2018 07:05 AM    aPTT 26.5 04/07/2015 04:25 PM    Bilirubin, total 1.5 (H) 06/14/2019 03:35 AM    AST (SGOT) 20 06/14/2019 03:35 AM    ALT (SGPT) 27 06/14/2019 03:35 AM    Alk. phosphatase 65 06/14/2019 03:35 AM    Troponin-I, Qt. 0.02 06/15/2019 11:52 PM       CT Results  (Last 48 hours)    None        chest X-ray      I reviewed recent labs, recent radiologic studies, and pertinent records including other doctor notes if needed. I independently reviewed radiology images for studies I described above or studies I have ordered.    Admission date (for inpatients): 6/14/2019   * No surgery found *  Procedure(s):  EXPLORATORY LAPAROTOMY   Mult enterorrhaphies approx 85cm small bowel resect and anast drain placement hernia repair (no mesh) removal peritoneal FB Removal of Bozoar materail in SB    ASSESSMENT/PLAN:  Problem List Date Reviewed: 6/14/2019          Codes Class Noted    Atrial fibrillation with rapid ventricular response ([facility] ICD-10-CM: I48.91  ICD-9-CM: 427.31  6/16/2019        * (Principal) SBO (small bowel obstruction) ([facility] ICD-10-CM: K56.609  ICD-9-CM: 560.9  6/14/2019        Generalized abdominal pain ICD-10-CM: R10.84  ICD-9-CM: 789.07  6/14/2019        Pneumatosis intestinalis ICD-10-CM: K63.89  ICD-9-CM: 569.89  6/14/2019        Thrombocytopenia ([facility] ICD-10-CM: D69.6  ICD-9-CM: 287.5  6/14/2019        Ventral incisional hernia ICD-10-CM: K43.2  ICD-9-CM: 553.21  6/14/2019        Small bowel obstruction ([facility] ICD-10-CM: K56.609  ICD-9-CM: 560.9  6/14/2019        Hypertension ICD-10-CM: I10  ICD-9-CM: 401.9  Unknown        High cholesterol ICD-10-CM: E78.00  ICD-9-CM: 272.0  Unknown        GERD (gastroesophageal reflux disease) ICD-10-CM: K21.9  ICD-9-CM: 530.81  Unknown        Arthritis ICD-10-CM: M19.90  ICD-9-CM: 716.90  Unknown        Postsurgical hypothyroidism ICD-10-CM: E89.0  ICD-9-CM: 244.0  4/11/2017        Chronic obstructive pulmonary disease (HCC) (Chronic) ICD-10-CM: J44.9  ICD-9-CM: 933  4/10/2017        DDD (degenerative disc disease), cervical ICD-10-CM: M50.30  ICD-9-CM: 722.4  3/21/2017        S/P CABG (coronary artery bypass graft) ICD-10-CM: Z95.1  ICD-9-CM: V45.81  12/29/2016        Coronary artery disease of bypass graft of native heart with stable angina pectoris [facility]) ICD-10-CM: E44.001  ICD-9-CM: 414.05, 413.9  8/23/2016    Overview Addendum 6/29/2018 12:32 PM by Shaheen Junior MD     1. CABG (9/2003): Transmyocardial laser revascularization to the inferior wall of the left ventricle. Coronary artery bypass grafting x6. LIMA to LAD: Sequential SVG to ramus and obtuse marginal.  Sequential SVG to posterolateral and right PDA, SVG to diagonal.  2.  Cardiolite( 11/2012): Diaphragmatic attenuation. No ischemia. Normal LV systolic function. EF 63%.   3.  Stress Cardiolite (1/16/15):  EF 52%.  Normal perfusion. 4.  LHC (4/13/15):  6 of 6 bypass grafts patent. Small vessel disease involving the apical LAD. Normal LV systolic function. LVEDP 27  5. LHC (5/24/18): Severe multivessel CAD. 6 of 6 grafts patent. Small vessel disease not amendable to PCI. EF 55-60%. Chronic seasonal allergic rhinitis due to pollen (Chronic) ICD-10-CM: J30.1  ICD-9-CM: 477.0  8/23/2016        Dyslipidemia ICD-10-CM: E78.5  ICD-9-CM: 272.4  8/23/2016        Essential hypertension with goal blood pressure less than 130/85 ICD-10-CM: I10  ICD-9-CM: 401.9  8/23/2016        Diverticulitis of colon (without mention of hemorrhage)(562.11) ICD-10-CM: K57.32  ICD-9-CM: 562.11  12/3/2013        AAA (abdominal aortic aneurysm) ([facility] ICD-10-CM: I71.4  ICD-9-CM: 441.4  12/3/2013        DDD (degenerative disc disease), lumbar ICD-10-CM: M51.36  ICD-9-CM: 722.52  12/3/2013        Renal cyst, right ICD-10-CM: N28.1  ICD-9-CM: 753.10  12/3/2013        Leukopenia ICD-10-CM: D72.819  ICD-9-CM: 288.50  11/13/2012    Overview Addendum 11/19/2012 11:54 AM by Charlotte Olivas       There are no focal lesions in the liver or spleen. 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 CABG (9/2003): Transmyocardial laser revascularization to the       inferior wall of the left ventricle. Coronary artery bypass grafting x6.         LIMA to LAD: Sequential SVG to ramus and obtuse marginal.  Sequential SVG       to posterolateral and right PDA, SVG to diagonal.      2.  Cardiolite( 11/2012): Diaphragmatic attenuation. No ischemia. Normal       LV systolic function. EF 63%. 3.  Stress Cardiolite (1/16/15):  EF 52%. Normal perfusion. 4.  LHC (4/13/15):  6 of 6 bypass grafts patent. Small vessel disease       involving the apical LAD. Normal LV systolic function. LVEDP 27      5. LHC (5/24/18): Severe multivessel CAD. 6 of 6 grafts patent. Small       vessel disease not amendable to PCI. EF 55-60%. Chronic obstructive pulmonary disease (HCC) (4/10/2017)      Hypertension ()      Generalized abdominal pain (6/14/2019)      Pneumatosis intestinalis (6/14/2019)      Thrombocytopenia ([address] (6/14/2019)      Ventral incisional hernia (6/14/2019)      Small bowel obstruction (HCC) (6/14/2019)      Atrial fibrillation with rapid ventricular response ([address] (6/16/2019)             SBO with CT findings concerning for pneumatosis  He is s/p expl lap with 5hrs lysis, 85cm ischemic SB resection, hernia repair,   and evacuation of obstructing bezoar of (large volume of impacted delano slaw) on 6/14/19    NPO  Bowel rest/NGT/IVF  [place]  Urine a little concentrated  Increase IVF slightly  He is OOB and has ambulated post-op      Ventral Hernia on CT  Repaired without mesh on 6/14/19    Thrombocytopenia  Chronic problem  Monitor  Transfuse if needed    Afib new onset 6/15/19  Hospitalists managing    GI and VTE prophylaxis  IV pepcid and SQ Lovenox + SCDs          I have personally performed a face-to-face diagnostic evaluation and management  service on this patient. I have independently seen the patient. I have independently obtained the above history from the patient/family. I have independently examined the patient with above findings. I have independently reviewed data/labs for this patient and developed the above plan of care (MDM). Signed: Shae Carcamo.  Angela Hansen MD, FACS

## 2019-06-18 NOTE — PROGRESS NOTES
06/17/19 1930   Dual Skin Pressure Injury Assessment   Dual Skin Pressure Injury Assessment WDL   Second Care Provider (Based on Facility Policy) Stuart Sam   Skin Integumentary   Skin Integumentary (WDL) X   Skin Condition/Temp Dry; Warm   Skin Integrity Incision (comment)  (Midline abd incision / UMER drain)    Pressure  Injury Documentation No Pressure Injury Noted-Pressure Ulcer Prevention Initiated

## 2019-06-18 NOTE — PROGRESS NOTES
Problem: Falls - Risk of  Goal: *Absence of Falls  Description  Document Bishop Tyler Fall Risk and appropriate interventions in the flowsheet.   Outcome: Progressing Towards Goal     Problem: Infection - Risk of, Surgical Site Infection  Goal: *Absence of surgical site infection signs and symptoms  Outcome: Progressing Towards Goal     Problem: Patient Education: Go to Patient Education Activity  Goal: Patient/Family Education  Outcome: Progressing Towards Goal     Problem: Delirium Treatment  Goal: *Absence of falls  Outcome: Progressing Towards Goal

## 2019-06-18 NOTE — PROGRESS NOTES
Problem: Mobility Impaired (Adult and Pediatric)  Goal: *Acute Goals and Plan of Care (Insert Text)  Description    LTG:  (1.)Mr. Cristina will move from supine to sit and sit to supine  in bed with STAND BY ASSIST within 7 treatment day(s). (2.)Mr. Roni Ceron will transfer from bed to chair and chair to bed with SUPERVISION using the least restrictive device within 7 treatment day(s). (3.)Mr. Roni Ceron will ambulate with MODIFIED INDEPENDENCE for 300 feet with the least restrictive device within 7 treatment day(s). ________________________________________________________________________________________________   Outcome: Progressing Towards Goal    PHYSICAL THERAPY: Daily Note and AM 6/18/2019  INPATIENT: PT Visit Days : 2  [place] / [place] / Product Type: Medicare /       NAME/AGE/GENDER: Maximino Nguyễn is a 82 y.o. male   PRIMARY DIAGNOSIS: SBO (small bowel obstruction) ([facility] [K56.609]  Small bowel obstruction ([facility] [K56.609] SBO (small bowel obstruction) (HCC)   SBO (small bowel obstruction) (HCC)    Procedure(s) (LRB):  EXPLORATORY LAPAROTOMY   Mult enterorrhaphies approx 85cm small bowel resect and anast drain placement hernia repair (no mesh) removal peritoneal FB Removal of Bozoar materail in SB (N/A)  4 Days Post-Op  ICD-10: Treatment Diagnosis:    · Generalized Muscle Weakness (M62.81)  · Other abnormalities of gait and mobility (R26.89)   Precaution/Allergies:  Diphenhydramine hcl; Ativan [lorazepam]; Iodinated contrast- oral and iv dye; and Sulfa (sulfonamide antibiotics)      ASSESSMENT:     Mr. Roni Ceron presents with limited independence with gait and transfers as well as bed mobility due to abdominal surgery. He did well with therapy today. Needs a rolling walker to ambulate and may need some follow up therapy based on his progression. 6/18 am coming out of the bathroom. He walk 100 ft using RW with CGA and no LOB.   He return to chair and performs exercises without any problems. He remain in the chair with call light near. This section established at most recent assessment   PROBLEM LIST (Impairments causing functional limitations):  1. Decreased Strength  2. Decreased Transfer Abilities  3. Decreased Ambulation Ability/Technique  4. Decreased Balance  5. Decreased Activity Tolerance   INTERVENTIONS PLANNED: (Benefits and precautions of physical therapy have been discussed with the patient.)  1. Bed Mobility  2. Gait Training  3. Therapeutic Exercise/Strengthening  4. Transfer Training     TREATMENT PLAN: Frequency/Duration: daily for duration of hospital stay  Rehabilitation Potential For Stated Goals: Good     REHAB RECOMMENDATIONS (at time of discharge pending progress):    Placement: It is my opinion, based on this patient's performance to date, that Mr. Radha Loco may benefit from [address] after discharge due to the functional deficits listed above that are likely to improve with skilled rehabilitation   Equipment:    To be determined    None at this time              HISTORY:   History of Present Injury/Illness (Reason for Referral):  Patient is an 83 y/o male with hx CAD, CABG, COPD, HTN, HLD who underwent expl lap yesterday on 6/14 for SBO. He went into new onset rapid afib tonight at approx 1020pm with rate to 150s-160s. He denies chest pain or shortness of breath. He believes he may have had an episode of afib when he had his 6 v CABG in 2003. Hospitalist service consulted for new onset rapid atrial fibrillation. 6/16- seen with wife and family members at bedside. He is in good spirits and denies concerns. Cardizem infusing at low rate, but he converted back to sinus rhythm early this AM.  He denies chest pain or shortness of breath. Has not had flatus or BM yet. 6/17- significant confusion overnight that worsened with ativan. Required haldol. Improved now and oriented x 3 but confused some in conversation.        Past Medical History/Comorbidities:   Mr. 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 He also has no past medical history of Asthma, Chronic kidney disease, Difficult intubation, Heart failure ([address] Liver disease, Malignant hyperthermia due to anesthesia, Nausea & vomiting, Pseudocholinesterase deficiency, Psychiatric disorder, PUD (peptic ulcer disease), Seizures ([address] Sleep apnea, or Thromboembolus ([address] Mr. Marcie Alberts  has a past surgical history that includes pr cardiac surg procedure unlist (1991); pr cardiac surg procedure unlist (9/2003); hx cholecystectomy (9/2000); pr neurological procedure unlisted (12/1981); hx hernia repair (09/1993); hx gi (11/2003); hx lumbar laminectomy (5/2005); hx other surgical (05/2002); hx colonoscopy (02/28/2013); hx endoscopy (02/28/2013); vascular surgery procedure unlist (1991); hx orthopaedic (1/2006); hx orthopaedic (8/2005); hx orthopaedic (2005); hx heart catheterization (04/2015); hx other surgical (11/2014); hx other surgical; hx orthopaedic (03/1955); hx thyroidectomy (08/24/2017); hx thyroidectomy (08/24/2017); and hx heent.   Social History/Living Environment:   Home Environment: Private residence  # Steps to Enter: 3  One/Two Story Residence: Two story  # of Interior Steps: 11  Height of Each Step (in): 11 inches  Interior Rails: Right  Lift Chair Available: No  Living Alone: No  Support Systems: Child(kim), Family member(s)  Patient Expects to be Discharged to[de-identified] Private residence  Current DME Used/Available at Home: Glucometer  Prior Level of Function/Work/Activity:  Independent prior to admit. Number of Personal Factors/Comorbidities that affect the Plan of Care: 1-2: MODERATE COMPLEXITY   EXAMINATION:   Most Recent Physical Functioning:   Gross Assessment:                  Posture:     Balance:    Bed Mobility:     Wheelchair Mobility:     Transfers:  Sit to Stand: Contact guard assistance  Stand to Sit: Contact guard assistance  Bed to Chair: Contact guard assistance  Gait:     Speed/Khloe: Slow  Gait Abnormalities: Decreased step clearance  Distance (ft): 100 Feet (ft)  Assistive Device: Walker, rolling  Ambulation - Level of Assistance: Contact guard assistance  Interventions: Safety awareness training;Verbal cues  Duration: 15 Minutes      Body Structures Involved:  1. Muscles Body Functions Affected:  1. Movement Related Activities and Participation Affected:  1. Mobility   Number of elements that affect the Plan of Care: 3: MODERATE COMPLEXITY   CLINICAL PRESENTATION:   Presentation: Stable and uncomplicated: LOW COMPLEXITY   CLINICAL DECISION MAKING:   [employer] AM-PAC 6 Clicks   Basic Mobility Inpatient Short Form  How much difficulty does the patient currently have. .. Unable A Lot A Little None   1. Turning over in bed (including adjusting bedclothes, sheets and blankets)? ? 1   ? 2   ? 3   ? 4   2. Sitting down on and standing up from a chair with arms ( e.g., wheelchair, bedside commode, etc.)   ? 1   ? 2   ? 3   ? 4   3. Moving from lying on back to sitting on the side of the bed?   ? 1   ? 2   ? 3   ? 4   How much help from another person does the patient currently need. .. Total A Lot A Little None   4. Moving to and from a bed to a chair (including a wheelchair)? ? 1   ? 2   ? 3   ? 4   5. Need to walk in hospital room? ? 1   ? 2   ? 3   ? 4   6.   Climbing 3-5 steps with a railing? ? 1   ? 2   ? 3   ? 4   © 2007, Trustees of [address] [place] [address] [address] [id number], under license to [employer]. All rights reserved      Score:  Initial: 18 Most Recent: X (Date: -- )    Interpretation of Tool:  Represents activities that are increasingly more difficult (i.e. Bed mobility, Transfers, Gait). Medical Necessity:     · Patient is expected to demonstrate progress in strength and range of motion  ·  to increase independence with gait and transfers   · . Reason for Services/Other Comments:  · Patient continues to require skilled intervention due to limited functional independence   · . Use of outcome tool(s) and clinical judgement create a POC that gives a: Clear prediction of patient's progress: LOW COMPLEXITY            TREATMENT:   (In addition to Assessment/Re-Assessment sessions the following treatments were rendered)   Pre-treatment Symptoms/Complaints:  Doing well  Pain: Initial:   Pain Intensity 1: 0(0/10 after therapy) Post Session:       Gait Training (15 Minutes):  Gait training to improve and/or restore physical functioning as related to mobility. Ambulated 100 Feet (ft) with Contact guard assistance using a Walker, rolling and minimal Safety awareness training;Verbal cues Therapeutic Exercise: (15 Minutes):  Exercises per grid below to improve mobility. Required minimal verbal cues to promote proper body alignment. Progressed range as indicated.      Date:  6/18   Date:   Date:     Activity/Exercise Parameters Parameters Parameters   Toe/heel raise 12     Hip abd/add 12     Marching in place 12     LAQ 12                               Braces/Orthotics/Lines/Etc:   · IV  · reid catheter  · drain UMER   · nasogastric tube  · Multiple ICU lines   · O2 Device: Nasal cannula  Treatment/Session Assessment:    · Response to Treatment: making good progress with therapy  · Interdisciplinary Collaboration:   o Physical Therapy Assistant  o Registered Nurse  o Rehabilitation Attendant  · After treatment position/precautions:   o Up in chair  o Bed/Chair-wheels locked  o Bed in low position  o Call light within reach  o RN notified  o Family at bedside   · Compliance with Program/Exercises: Compliant all of the time, Will assess as treatment progresses  · Recommendations/Intent for next treatment session: \"Next visit will focus on advancements to more challenging activities and reduction in assistance provided\".   Total Treatment Duration:  PT Patient Time In/Time Out  Time In: 1015  Time Out: Kishan [address], PTA

## 2019-06-19 LAB
ANION GAP SERPL CALC-SCNC: 7 MMOL/L (ref 7–16)
BUN SERPL-MCNC: 10 MG/DL (ref 8–23)
CALCIUM SERPL-MCNC: 8.1 MG/DL (ref 8.3–10.4)
CHLORIDE SERPL-SCNC: 105 MMOL/L (ref 98–107)
CO2 SERPL-SCNC: 28 MMOL/L (ref 21–32)
CREAT SERPL-MCNC: 0.7 MG/DL (ref 0.8–1.5)
ERYTHROCYTE [DISTWIDTH] IN BLOOD BY AUTOMATED COUNT: 12.9 % (ref 11.9–14.6)
GLUCOSE SERPL-MCNC: 99 MG/DL (ref 65–100)
HCT VFR BLD AUTO: 25.8 % (ref 41.1–50.3)
HGB BLD-MCNC: 9.4 G/DL (ref 13.6–17.2)
MAGNESIUM SERPL-MCNC: 1.8 MG/DL (ref 1.8–2.4)
MCH RBC QN AUTO: 34.6 PG (ref 26.1–32.9)
MCHC RBC AUTO-ENTMCNC: 36.4 G/DL (ref 31.4–35)
MCV RBC AUTO: 94.9 FL (ref 79.6–97.8)
NRBC # BLD: 0 K/UL (ref 0–0.2)
PLATELET # BLD AUTO: 103 K/UL (ref 150–450)
PMV BLD AUTO: 8.8 FL (ref 9.4–12.3)
POTASSIUM SERPL-SCNC: 3.5 MMOL/L (ref 3.5–5.1)
RBC # BLD AUTO: 2.72 M/UL (ref 4.23–5.6)
SODIUM SERPL-SCNC: 140 MMOL/L (ref 136–145)
WBC # BLD AUTO: 4.2 K/UL (ref 4.3–11.1)

## 2019-06-19 PROCEDURE — 97110 THERAPEUTIC EXERCISES: CPT

## 2019-06-19 PROCEDURE — 80048 BASIC METABOLIC PNL TOTAL CA: CPT

## 2019-06-19 PROCEDURE — 83735 ASSAY OF MAGNESIUM: CPT

## 2019-06-19 PROCEDURE — 36415 COLL VENOUS BLD VENIPUNCTURE: CPT

## 2019-06-19 PROCEDURE — 97116 GAIT TRAINING THERAPY: CPT

## 2019-06-19 PROCEDURE — 74011000258 HC RX REV CODE- 258: Performed by: SURGERY

## 2019-06-19 PROCEDURE — 74011250636 HC RX REV CODE- 250/636: Performed by: SURGERY

## 2019-06-19 PROCEDURE — 65660000000 HC RM CCU STEPDOWN

## 2019-06-19 PROCEDURE — 74011000250 HC RX REV CODE- 250: Performed by: INTERNAL MEDICINE

## 2019-06-19 PROCEDURE — 85027 COMPLETE CBC AUTOMATED: CPT

## 2019-06-19 RX ADMIN — DEXTROSE MONOHYDRATE, SODIUM CHLORIDE, AND POTASSIUM CHLORIDE 125 ML/HR: 50; 4.5; 1.49 INJECTION, SOLUTION INTRAVENOUS at 22:18

## 2019-06-19 RX ADMIN — FLUTICASONE PROPIONATE 2 SPRAY: 50 SPRAY, METERED NASAL at 08:18

## 2019-06-19 RX ADMIN — PIPERACILLIN SODIUM,TAZOBACTAM SODIUM 3.38 G: 3; .375 INJECTION, POWDER, FOR SOLUTION INTRAVENOUS at 22:18

## 2019-06-19 RX ADMIN — METOPROLOL TARTRATE 5 MG: 5 INJECTION INTRAVENOUS at 08:18

## 2019-06-19 RX ADMIN — METOPROLOL TARTRATE 5 MG: 5 INJECTION INTRAVENOUS at 22:06

## 2019-06-19 RX ADMIN — METOPROLOL TARTRATE 5 MG: 5 INJECTION INTRAVENOUS at 03:02

## 2019-06-19 RX ADMIN — ENOXAPARIN SODIUM 40 MG: 40 INJECTION SUBCUTANEOUS at 08:18

## 2019-06-19 RX ADMIN — PIPERACILLIN SODIUM,TAZOBACTAM SODIUM 3.38 G: 3; .375 INJECTION, POWDER, FOR SOLUTION INTRAVENOUS at 14:55

## 2019-06-19 RX ADMIN — FAMOTIDINE 20 MG: 10 INJECTION, SOLUTION INTRAVENOUS at 08:18

## 2019-06-19 RX ADMIN — METOPROLOL TARTRATE 5 MG: 5 INJECTION INTRAVENOUS at 14:55

## 2019-06-19 RX ADMIN — PIPERACILLIN SODIUM,TAZOBACTAM SODIUM 3.38 G: 3; .375 INJECTION, POWDER, FOR SOLUTION INTRAVENOUS at 09:09

## 2019-06-19 RX ADMIN — FAMOTIDINE 20 MG: 10 INJECTION, SOLUTION INTRAVENOUS at 22:06

## 2019-06-19 RX ADMIN — DEXTROSE MONOHYDRATE, SODIUM CHLORIDE, AND POTASSIUM CHLORIDE 125 ML/HR: 50; 4.5; 1.49 INJECTION, SOLUTION INTRAVENOUS at 14:56

## 2019-06-19 NOTE — PROGRESS NOTES
H&P/Consult Note/Progress Note/Office Note:   Maximino Patterson  MRN: [id number]  AJN:[id number]  Age:83 y.o.    HPI: Maximino Patterson is a 82 y.o. male who is s/p expl lap with 5hrs lysis, 85cm ischemic sb resection, hernia repair,   and evacuation of obstructing bezoar of (large volume of impacted delano slaw) on 6/14/19    He originally came to the ER on 6/14/19   with a 1 day h/o progressive, intermittent, generalized abd pain without radiation which worsened acutely around 8PM on 6/13/19  Nothing made pain better or worse. +Nausea; -V  No fever, chills, chest pain, or back pain    1990s s/p open AAA in [place] s/p lap el[place] in [place]  S/p expl lap for SBO (no resection) [place] CABG x 6 2016 s/p thoracic aneurysm stent at [facility]      Surgery was consulted after below CT without contrast showed SBO findings concerning for pneumatosis       4/13/16 s/p colonoscopy; Dr Darell Cárdenas:  SESSILE SERRATED POLYPS. FRAGMENT OF BENIGN COLONIC MUCOSA. Electronically signed out on 4/14/2016 11:51 by Rupali Lujan MD   Microscopic Description   The biopsy demonstrates serrated hypermucinous colonic glands imbedded in a nonfibrotic lamina propria with focal crypt dilatation and widening of the crypt bases. An additional fragment demonstrates no adenomatous or hyperplastic change. Dr. York Found concurs. 6/14/19 CT abd/pelvis without contrast  - Liver: Within normal limits. - Gallbladder and bile ducts: The gallbladder is absent, without biliary tract  dilatation.  - Spleen: Within normal limits. - Urinary tract: Again noted is a 5.3 cm right kidney cyst. The left kidney is  unremarkable. No urinary tract stone or hydronephrosis is identified. There are  bilateral inguinal hernias, containing fat on the left and a small portion of  the urinary bladder on the right. - Adrenals: Within normal limits. - Pancreas: Within normal limits. - Gastrointestinal tract:  In the umbilicus region there is a ventral 2.8 cm  fat-containing hernia, with adjacent stranding which may relate to  incarceration. There is a mid small bowel obstruction, whose transition point is  not clearly identified, but may lie in the region of the ventral hernia although  no bowel is seen within the hernia itself. There are a few questionable areas of  small bowel wall pneumatosis. The colon is unremarkable except for  diverticulosis. No free air or free fluid is seen. - Retroperitoneum: Within normal limits. - Peritoneal cavity and abdominal wall: Within normal limits. - Pelvis: Within normal limits. - Spine/bones: No acute process. - Other comments: Partially visualized is a descending thoracic aortic stent  graft.     IMPRESSION: Mid small bowel obstruction. The transition point is not clearly  identified, but may lie in the region of a small periumbilical fat-containing  ventral hernia which may be incarcerated given the associated fat stranding. Of  note, no bowel is seen within the hernia. Additionally, there are a few  questionable areas of small bowel wall pneumatosis concerning for ischemia. ER  physician notified.       Additonal hx:  6/17/19 POD3 afib over weekend; no complaints; up in chair; interactive and conversant;  Hospitalists treating; no flatus or BM yet   6/18/19 POD4 feels OK, no complaints, +BM  6/19/19 POD5 feels OK, no sign pain isssues, abd still distended        Past Medical History:   Diagnosis Date    Abdominal pain     kower     Acute cervical radiculopathy     Aneurysm ([address]     nov 1991AAA    Arthritis     Atrial fibrillation with rapid ventricular response ([address] 6/16/2019    CAD (coronary artery disease)     6 bipass 9/2003    Cancer ([address]     skin    COPD (chronic obstructive pulmonary disease) ([address]     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   Left lobe: At least 4 heterogeneous nodules in the left lobe. The largest measures 2.9 x 1.1 x 2.4 cm in the lower pole, with mild peripheral color Doppler flow and possible tiny internal calcifications.     Thrombocytopenia ([address]     marrow normal slightly big spleen low mpv    Thyroid disease     hypothyroidism     Past Surgical History:   Procedure Laterality Date    CARDIAC SURG PROCEDURE UNLIST  1991    AAA    CARDIAC SURG PROCEDURE UNLIST  9/2003     6 bypass surgeries    HX CHOLECYSTECTOMY  9/2000    HX COLONOSCOPY  02/28/2013    10 polyps/ fu in 3 yrs    HX ENDOSCOPY  02/28/2013    1 biopsy    HX GI  11/2003    small bowel obstruction    HX HEART CATHETERIZATION  04/2015    HX HEENT      thyroidectomy    HX HERNIA REPAIR  09/1993    Insisional    HX LUMBAR LAMINECTOMY  5/2005    L3, L4    HX ORTHOPAEDIC  1/2006    right total knee replacement    HX ORTHOPAEDIC  8/2005     left knee replacement    HX ORTHOPAEDIC  2005    laminectomy L3-l4    HX ORTHOPAEDIC  03/1955    right knee surgery    HX OTHER SURGICAL  05/2002    upper endoscopic retrograde cholangiopancreatogram    HX OTHER SURGICAL  11/2014    removal squamous cell carcinoma    HX OTHER SURGICAL      MRSA lower back -3/2005    HX THYROIDECTOMY  08/24/2017    Dr Claudia Hurt    HX THYROIDECTOMY  08/24/2017    Dr. Mercado Case  12/1981     ruptured disc in back   [address]    abd aortic aneurysm repair     Current Facility-Administered Medications   Medication Dose Route Frequency    enoxaparin (LOVENOX) injection 40 mg  40 mg SubCUTAneous Q24H    hydrALAZINE (APRESOLINE) 20 mg/mL injection 10 mg  10 mg IntraVENous Q6H PRN    fluticasone propionate (FLONASE) 50 mcg/actuation nasal spray 2 Spray  2 Spray Both Nostrils DAILY    sodium chloride (OCEAN) 0.65 % nasal squeeze bottle 2 Spray  2 Spray Both Nostrils Q2H PRN    phenol throat spray (CHLORASEPTIC) 1 Spray  1 Spray Oral PRN    [START ON 2019] levothyroxine (SYNTHROID) injection 112.5 mcg  112.5 mcg IntraVENous Q24H    dextrose 5% - 0.45% NaCl with KCl 20 mEq/L infusion  125 mL/hr IntraVENous CONTINUOUS    albuterol (PROVENTIL VENTOLIN) nebulizer solution 2.5 mg  2.5 mg Nebulization Q6H PRN    metoprolol (LOPRESSOR) injection 5 mg  5 mg IntraVENous Q6H    dilTIAZem (CARDIZEM) 125 mg/125 mL (1 mg/mL) dextrose 5% infusion  0-15 mg/hr IntraVENous TITRATE    piperacillin-tazobactam (ZOSYN) 3.375 g in 0.9% sodium chloride (MBP/ADV) 100 mL  3.375 g IntraVENous Q8H    famotidine (PF) (PEPCID) injection 20 mg  20 mg IntraVENous Q12H    morphine 10 mg/ml injection 2-5 mg  2-5 mg IntraVENous Q1H PRN    acetaminophen (TYLENOL) tablet 1,000 mg  1,000 mg Oral Q6H PRN    ondansetron (ZOFRAN) injection 4 mg  4 mg IntraVENous Q4H PRN    white petrolatum-mineral oil (AKWA TEARS) 83-15 % ophthalmic ointment   Both Eyes PRN     Diphenhydramine hcl; Ativan [lorazepam];  Iodinated contrast- oral and iv dye; and Sulfa (sulfonamide antibiotics)  Social History     Socioeconomic History    Marital status:      Spouse name: Not on file    Number of children: Not on file    Years of education: Not on file    Highest education level: Not on file   Tobacco Use    Smoking status: Former Smoker     Packs/day: 1.00     Years: 25.00     Pack years: 25.00     Last attempt to quit: 12/3/1987     Years since quittin.5    Smokeless tobacco: Never Used   Substance and Sexual Activity    Alcohol use: Yes     Comment: occasional glass wine    Drug use: No     Social History     Tobacco Use   Smoking Status Former Smoker    Packs/day: 1.00    Years: 25.00    Pack years: 25.00    Last attempt to quit: 12/3/1987    Years since quittin.5   Smokeless Tobacco Never Used     Family History   Problem Relation Age of Onset    Heart Disease Father     Cancer Mother         colon    Cancer Sister     Cancer Maternal Aunt     Cancer Paternal Grandmother     Diabetes Paternal Grandmother     Thyroid Disease Neg Hx     Thyroid Cancer Neg Hx      ROS: The patient has no difficulty with chest pain or shortness of breath. No fever or chills. Comprehensive review of systems was otherwise unremarkable except as noted above. Physical Exam:   Visit Vitals  /82 (BP 1 Location: Right arm, BP Patient Position: At rest)   Pulse 74   Temp 98.6 °F (37 °C)   Resp 20   Ht 6' (1.829 m)   Wt 213 lb (96.6 kg)   SpO2 92%   BMI 28.89 kg/m²     Vitals:    19 2336 19 0302 19 0716 19 1139   BP: 166/82 144/66 (!) 175/93 145/82   Pulse: 70 67 78 74   Resp: 16 16 20 20   Temp: 98.4 °F (36.9 °C) 98.2 °F (36.8 °C) 99.1 °F (37.3 °C) 98.6 °F (37 °C)   SpO2: 93% 92% 92% 92%   Weight:       Height:          0701 -  1900  In: -   Out: 55 [Drains:55]   1901 -  0700  In: 2317.7 [I.V.:2317.7]  Out: 2965 [Urine:2520; Drains:195]    Constitutional: Alert, oriented, cooperative patient in no acute distress; appears stated age    Eyes:Sclera are clear. EOMs intact  ENMT: no external lesions gross hearing normal; no obvious neck masses, no ear or lip lesions, nares normal; +NGT with bilious output  CV: RRR. Normal perfusion  Resp: No JVD. Breathing is  non-labored; no audible wheezing. GI: Incision clear; 2 beatriz intact, venice drain serosang       Musculoskeletal: unremarkable with normal function. No embolic signs or cyanosis.   Palpable pedal pulses bilat   Neuro:  Oriented; moves all 4; no focal deficits  Psychiatric: normal affect and mood, no memory impairment    Recent vitals (if inpt):  Patient Vitals for the past 24 hrs:   BP Temp Pulse Resp SpO2   06/19/19 1139 145/82 98.6 °F (37 °C) 74 20 92 %   06/19/19 0716 (!) 175/93 99.1 °F (37.3 °C) 78 20 92 %   06/19/19 0302 144/66 98.2 °F (36.8 °C) 67 16 92 %   06/18/19 2336 166/82 98.4 °F (36.9 °C) 70 16 93 %   06/18/19 2124 (!) 173/91  78     06/18/19 2043     94 %   06/18/19 1929 153/77 98.2 °F (36.8 °C) 83 18 92 %   06/18/19 1623 142/75 98.2 °F (36.8 °C) 81 20 94 %       Labs:  Recent Labs     06/19/19 0628   WBC 4.2*   HGB 9.4*   *      K 3.5      CO2 28   BUN 10   CREA 0.70*   GLU 99       Lab Results   Component Value Date/Time    WBC 4.2 (L) 06/19/2019 06:28 AM    HGB 9.4 (L) 06/19/2019 06:28 AM    PLATELET 627 (L) 58/02/6533 06:28 AM    Sodium 140 06/19/2019 06:28 AM    Potassium 3.5 06/19/2019 06:28 AM    Chloride 105 06/19/2019 06:28 AM    CO2 28 06/19/2019 06:28 AM    BUN 10 06/19/2019 06:28 AM    Creatinine 0.70 (L) 06/19/2019 06:28 AM    Glucose 99 06/19/2019 06:28 AM    INR 1.1 05/24/2018 07:05 AM    aPTT 26.5 04/07/2015 04:25 PM    Bilirubin, total 1.5 (H) 06/14/2019 03:35 AM    AST (SGOT) 20 06/14/2019 03:35 AM    ALT (SGPT) 27 06/14/2019 03:35 AM    Alk. phosphatase 65 06/14/2019 03:35 AM    Troponin-I, Qt. 0.02 06/15/2019 11:52 PM       CT Results  (Last 48 hours)    None        chest X-ray      I reviewed recent labs, recent radiologic studies, and pertinent records including other doctor notes if needed. I independently reviewed radiology images for studies I described above or studies I have ordered.    Admission date (for inpatients): 6/14/2019   * No surgery found *  Procedure(s):  EXPLORATORY LAPAROTOMY   Mult enterorrhaphies approx 85cm small bowel resect and anast drain placement hernia repair (no mesh) removal peritoneal FB Removal of Bozoar materail in SB    ASSESSMENT/PLAN:  Problem List  Date Reviewed: 6/14/2019          Codes Class Noted    Atrial fibrillation with rapid ventricular response ([place] [address] ICD-10-CM: I48.91  ICD-9-CM: 427.31  6/16/2019        * (Principal) SBO (small bowel obstruction) ([place] [address] ICD-10-CM: K56.609  ICD-9-CM: 560.9  6/14/2019        Generalized abdominal pain ICD-10-CM: R10.84  ICD-9-CM: 789.07  6/14/2019        Pneumatosis intestinalis ICD-10-CM: K63.89  ICD-9-CM: 569.89  6/14/2019        Thrombocytopenia ([place] [address] ICD-10-CM: D69.6  ICD-9-CM: 287.5  6/14/2019        Ventral incisional hernia ICD-10-CM: K43.2  ICD-9-CM: 553.21  6/14/2019        Small bowel obstruction ([place] [address] ICD-10-CM: K56.609  ICD-9-CM: 560.9  6/14/2019        Hypertension ICD-10-CM: I10  ICD-9-CM: 401.9  Unknown        High cholesterol ICD-10-CM: E78.00  ICD-9-CM: 272.0  Unknown        GERD (gastroesophageal reflux disease) ICD-10-CM: K21.9  ICD-9-CM: 530.81  Unknown        Arthritis ICD-10-CM: M19.90  ICD-9-CM: 716.90  Unknown        Postsurgical hypothyroidism ICD-10-CM: E89.0  ICD-9-CM: 244.0  4/11/2017        Chronic obstructive pulmonary disease (HCC) (Chronic) ICD-10-CM: J44.9  ICD-9-CM: 252  4/10/2017        DDD (degenerative disc disease), cervical ICD-10-CM: M50.30  ICD-9-CM: 722.4  3/21/2017        S/P CABG (coronary artery bypass graft) ICD-10-CM: Z95.1  ICD-9-CM: V45.81  12/29/2016        Coronary artery disease of bypass graft of native heart with stable angina pectoris [facility]) ICD-10-CM: Z61.571  ICD-9-CM: 414.05, 413.9  8/23/2016    Overview Addendum 6/29/2018 12:32 PM by John White MD     1. CABG (9/2003): Transmyocardial laser revascularization to the inferior wall of the left ventricle. Coronary artery bypass grafting x6. LIMA to LAD: Sequential SVG to ramus and obtuse marginal.  Sequential SVG to posterolateral and right PDA, SVG to diagonal.  2.  Cardiolite( 11/2012): Diaphragmatic attenuation. No ischemia. Normal LV systolic function. EF 63%. 3.  Stress Cardiolite (1/16/15):  EF 52%. Normal perfusion. 4.  LHC (4/13/15):  6 of 6 bypass grafts patent.   Small vessel disease involving the apical LAD. Normal LV systolic function. LVEDP 27  5. LHC (5/24/18): Severe multivessel CAD. 6 of 6 grafts patent. Small vessel disease not amendable to PCI. EF 55-60%. Chronic seasonal allergic rhinitis due to pollen (Chronic) ICD-10-CM: J30.1  ICD-9-CM: 477.0  8/23/2016        Dyslipidemia ICD-10-CM: E78.5  ICD-9-CM: 272.4  8/23/2016        Essential hypertension with goal blood pressure less than 130/85 ICD-10-CM: I10  ICD-9-CM: 401.9  8/23/2016        Diverticulitis of colon (without mention of hemorrhage)(562.11) ICD-10-CM: K57.32  ICD-9-CM: 562.11  12/3/2013        AAA (abdominal aortic aneurysm) ([facility] ICD-10-CM: I71.4  ICD-9-CM: 441.4  12/3/2013        DDD (degenerative disc disease), lumbar ICD-10-CM: M51.36  ICD-9-CM: 722.52  12/3/2013        Renal cyst, right ICD-10-CM: N28.1  ICD-9-CM: 753.10  12/3/2013        Leukopenia ICD-10-CM: D72.819  ICD-9-CM: 288.50  11/13/2012    Overview Addendum 11/19/2012 11:54 AM by Sim Goodness       There are no focal lesions in the liver or spleen. 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 CABG (9/2003): Transmyocardial laser revascularization to the       inferior wall of the left ventricle. Coronary artery bypass grafting x6. LIMA to LAD: Sequential SVG to ramus and obtuse marginal.  Sequential SVG       to posterolateral and right PDA, SVG to diagonal.      2.  Cardiolite( 11/2012): Diaphragmatic attenuation. No ischemia. Normal       LV systolic function. EF 63%.       3. Stress Cardiolite (1/16/15):  EF 52%. Normal perfusion. 4.  LHC (4/13/15):  6 of 6 bypass grafts patent. Small vessel disease       involving the apical LAD. Normal LV systolic function. LVEDP 27      5. LHC (5/24/18): Severe multivessel CAD. 6 of 6 grafts patent. Small       vessel disease not amendable to PCI. EF 55-60%. Chronic obstructive pulmonary disease (HCC) (4/10/2017)      Hypertension ()      Generalized abdominal pain (6/14/2019)      Pneumatosis intestinalis (6/14/2019)      Thrombocytopenia ([address] (6/14/2019)      Ventral incisional hernia (6/14/2019)      Small bowel obstruction (HCC) (6/14/2019)      Atrial fibrillation with rapid ventricular response ([address] (6/16/2019)             SBO with CT findings concerning for pneumatosis  He is s/p expl lap with 5hrs lysis, 85cm ischemic SB resection, hernia repair,   and evacuation of obstructing bezoar of (large volume of impacted delano slaw) on 6/14/19    NPO  Keep NGT until abd distention resolves  Bowel rest/NGT/IVF  Griffin  Urine a little concentrated  Increase IVF slightly  He is OOB and has ambulated post-op      Ventral Hernia on CT  Repaired without mesh on 6/14/19    Thrombocytopenia  Chronic problem  Monitor  Transfuse if needed    Afib new onset 6/15/19  Hospitalists managing    GI and VTE prophylaxis  IV pepcid and SQ Lovenox + SCDs          I have personally performed a face-to-face diagnostic evaluation and management  service on this patient. I have independently seen the patient. I have independently obtained the above history from the patient/family. I have independently examined the patient with above findings. I have independently reviewed data/labs for this patient and developed the above plan of care (MDM). Signed: Jane Mccabe.  Rosibel Muir MD, FACS

## 2019-06-19 NOTE — PROGRESS NOTES
Patient resting in bed. Respirations present, non-labored. Room air. No signs of distress noted. NGT to left nare - LIS w/ green drainage. Griffin drainage to bedside w/o difficulty. PIV infusing w/o difficulty. Midline abd dressing -CDI, with UMER drain w/ sanguinous drainage. Hypoactive BS. Telemetry in place. Denies pain at this time. Will continue to monitor.

## 2019-06-19 NOTE — PROGRESS NOTES
Shift assessment completed. Pt alert and oriented to person, place, and situation, reoriented to time. Lung sounds diminished with even and unlabored respirations. Heart sounds regular, on remote telemetry. Active bowel sounds with soft, tender and obese abdomen. Skin warm and dry. Midline incisions w/ dry dressing c/d/i. Scattered ecchymosis noted. IV c/d and infusing. LAC PIV removed d/t infiltration. UMER drain patent, charged and draining. Griffin catheter patent and draining. NGT to left nare to low intermittent suction retaped and draining. No c/o pain or nausea at this time. Resting quietly in locked recliner with call light in reach and posey alarm set.

## 2019-06-19 NOTE — PROGRESS NOTES
Problem: Mobility Impaired (Adult and Pediatric)  Goal: *Acute Goals and Plan of Care (Insert Text)  Description    LTG:  (1.)Mr. Cristina will move from supine to sit and sit to supine  in bed with STAND BY ASSIST within 7 treatment day(s). (2.)Mr. Olga Villa will transfer from bed to chair and chair to bed with SUPERVISION using the least restrictive device within 7 treatment day(s). (3.)Mr. Olga Villa will ambulate with MODIFIED INDEPENDENCE for 300 feet with the least restrictive device within 7 treatment day(s). ________________________________________________________________________________________________   Outcome: Progressing Towards Goal    PHYSICAL THERAPY: Daily Note and AM 6/19/2019  INPATIENT: PT Visit Days : 3  [place] / [place] / Product Type: Medicare /       NAME/AGE/GENDER: Gene Lawanda Fabry is a 82 y.o. male   PRIMARY DIAGNOSIS: SBO (small bowel obstruction) ([facility] [K56.609]  Small bowel obstruction ([facility] [K56.609] SBO (small bowel obstruction) (HCC)   SBO (small bowel obstruction) ([facility])    Procedure(s) (LRB):  EXPLORATORY LAPAROTOMY   Mult enterorrhaphies approx 85cm small bowel resect and anast drain placement hernia repair (no mesh) removal peritoneal FB Removal of Bozoar materail in SB (N/A)  5 Days Post-Op  ICD-10: Treatment Diagnosis:    · Generalized Muscle Weakness (M62.81)  · Other abnormalities of gait and mobility (R26.89)   Precaution/Allergies:  Diphenhydramine hcl; Ativan [lorazepam]; Iodinated contrast- oral and iv dye; and Sulfa (sulfonamide antibiotics)      ASSESSMENT:     Mr. Olga Villa presents with limited independence with gait and transfers as well as bed mobility due to abdominal surgery. He did well with therapy today. Needs a rolling walker to ambulate and may need some follow up therapy based on his progression.     6/19 am supine upon arrival.  Works on bed mobility as follows: supine>EOB with CGA, walks 140 ft using RW with CGA and no LOB.  Return to his chair and performs exercises without any problems. He return to supine with call light near. This section established at most recent assessment   PROBLEM LIST (Impairments causing functional limitations):  1. Decreased Strength  2. Decreased Transfer Abilities  3. Decreased Ambulation Ability/Technique  4. Decreased Balance  5. Decreased Activity Tolerance   INTERVENTIONS PLANNED: (Benefits and precautions of physical therapy have been discussed with the patient.)  1. Bed Mobility  2. Gait Training  3. Therapeutic Exercise/Strengthening  4. Transfer Training     TREATMENT PLAN: Frequency/Duration: daily for duration of hospital stay  Rehabilitation Potential For Stated Goals: Good     REHAB RECOMMENDATIONS (at time of discharge pending progress):    Placement: It is my opinion, based on this patient's performance to date, that Mr. Opal Santos may benefit from [address] after discharge due to the functional deficits listed above that are likely to improve with skilled rehabilitation   Equipment:    To be determined    None at this time              HISTORY:   History of Present Injury/Illness (Reason for Referral):  Patient is an 81 y/o male with hx CAD, CABG, COPD, HTN, HLD who underwent expl lap yesterday on 6/14 for SBO. He went into new onset rapid afib tonight at approx 1020pm with rate to 150s-160s. He denies chest pain or shortness of breath. He believes he may have had an episode of afib when he had his 6 v CABG in 2003. Hospitalist service consulted for new onset rapid atrial fibrillation. 6/16- seen with wife and family members at bedside. He is in good spirits and denies concerns. Cardizem infusing at low rate, but he converted back to sinus rhythm early this AM.  He denies chest pain or shortness of breath. Has not had flatus or BM yet. 6/17- significant confusion overnight that worsened with ativan. Required haldol.   Improved now and oriented x 3 but confused some in conversation. Past Medical History/Comorbidities:   Mr. 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 He also has no past medical history of Asthma, Chronic kidney disease, Difficult intubation, Heart failure ([address] Liver disease, Malignant hyperthermia due to anesthesia, Nausea & vomiting, Pseudocholinesterase deficiency, Psychiatric disorder, PUD (peptic ulcer disease), Seizures ([address] Sleep apnea, or Thromboembolus ([address] Mr. Roni Ceron  has a past surgical history that includes pr cardiac surg procedure unlist (1991); pr cardiac surg procedure unlist (9/2003); hx cholecystectomy (9/2000); pr neurological procedure unlisted (12/1981); hx hernia repair (09/1993); hx gi (11/2003); hx lumbar laminectomy (5/2005); hx other surgical (05/2002); hx colonoscopy (02/28/2013); hx endoscopy (02/28/2013); vascular surgery procedure unlist (1991); hx orthopaedic (1/2006); hx orthopaedic (8/2005); hx orthopaedic (2005); hx heart catheterization (04/2015); hx other surgical (11/2014); hx other surgical; hx orthopaedic (03/1955); hx thyroidectomy (08/24/2017); hx thyroidectomy (08/24/2017); and hx heent.   Social History/Living Environment:   Home Environment: Private residence  # Steps to Enter: 3  One/Two Story Residence: Two story  # of Interior Steps: 11  Height of Each Step (in): 11 inches  Interior Rails: Right  Lift Chair Available: No  Living Alone: No  Support Systems: Child(kim), Family member(s)  Patient Expects to be Discharged to[de-identified] Private residence  Current DME Used/Available at Home: Glucometer  Prior Level of Function/Work/Activity:  Independent prior to admit. Number of Personal Factors/Comorbidities that affect the Plan of Care: 1-2: MODERATE COMPLEXITY   EXAMINATION:   Most Recent Physical Functioning:   Gross Assessment:                  Posture:     Balance:    Bed Mobility:  Supine to Sit: Contact guard assistance  Sit to Supine: Contact guard assistance  Wheelchair Mobility:     Transfers:  Sit to Stand: Contact guard assistance  Stand to Sit: Contact guard assistance  Bed to Chair: Contact guard assistance  Gait:     Speed/Khloe: Slow  Gait Abnormalities: Decreased step clearance  Distance (ft): 140 Feet (ft)  Assistive Device: Walker, rolling  Ambulation - Level of Assistance: Contact guard assistance  Interventions: Safety awareness training  Duration: 15 Minutes      Body Structures Involved:  1. Muscles Body Functions Affected:  1. Movement Related Activities and Participation Affected:  1. Mobility   Number of elements that affect the Plan of Care: 3: MODERATE COMPLEXITY   CLINICAL PRESENTATION:   Presentation: Stable and uncomplicated: LOW COMPLEXITY   CLINICAL DECISION MAKING:   [facility] 6 Clicks   Basic Mobility Inpatient Short Form  How much difficulty does the patient currently have. .. Unable A Lot A Little None   1. Turning over in bed (including adjusting bedclothes, sheets and blankets)? ? 1   ? 2   ? 3   ? 4   2. Sitting down on and standing up from a chair with arms ( e.g., wheelchair, bedside commode, etc.)   ? 1   ? 2   ? 3   ? 4   3. Moving from lying on back to sitting on the side of the bed?   ? 1   ? 2   ? 3   ? 4   How much help from another person does the patient currently need. .. Total A Lot A Little None   4. Moving to and from a bed to a chair (including a wheelchair)?    ? 1   ? 2 ? 3   ? 4   5. Need to walk in hospital room? ? 1   ? 2   ? 3   ? 4   6. Climbing 3-5 steps with a railing? ? 1   ? 2   ? 3   ? 4   © 2007, Trustees of [facility], under license to [employer] All rights reserved      Score:  Initial: 18 Most Recent: X (Date: -- )    Interpretation of Tool:  Represents activities that are increasingly more difficult (i.e. Bed mobility, Transfers, Gait). Medical Necessity:     · Patient is expected to demonstrate progress in strength and range of motion  ·  to increase independence with gait and transfers   · . Reason for Services/Other Comments:  · Patient continues to require skilled intervention due to limited functional independence   · . Use of outcome tool(s) and clinical judgement create a POC that gives a: Clear prediction of patient's progress: LOW COMPLEXITY            TREATMENT:   (In addition to Assessment/Re-Assessment sessions the following treatments were rendered)   Pre-treatment Symptoms/Complaints:  Doing ok this morning  Pain: Initial:   Pain Intensity 1: 0(about the same) Post Session:       Gait Training (15 Minutes):  Gait training to improve and/or restore physical functioning as related to mobility. Ambulated 140 Feet (ft) with Contact guard assistance using a Walker, rolling and minimal Safety awareness training Therapeutic Exercise: (15 Minutes):  Exercises per grid below to improve mobility. Required minimal verbal cues to promote proper body alignment. Progressed range as indicated.      Date:  6/18   Date:  6/19   Date:     Activity/Exercise Parameters Parameters Parameters   Toe/heel raise 12 12    Hip abd/add 12 12    Marching in place 12 12    LAQ 12 12                              Braces/Orthotics/Lines/Etc:   · IV  · reid catheter  · drain UMER   · nasogastric tube  · Multiple ICU lines   · O2 Device: Nasal cannula  Treatment/Session Assessment:    · Response to Treatment: doing well with gait and exercises. · Interdisciplinary Collaboration:   o Physical Therapy Assistant  o Registered Nurse  o Rehabilitation Attendant  · After treatment position/precautions:   o Up in chair  o Bed/Chair-wheels locked  o Bed in low position  o Call light within reach  o RN notified  o Family at bedside   · Compliance with Program/Exercises: Compliant all of the time, Will assess as treatment progresses  · Recommendations/Intent for next treatment session: \"Next visit will focus on advancements to more challenging activities and reduction in assistance provided\".   Total Treatment Duration:  PT Patient Time In/Time Out  Time In: 0930  Time Out: [address] [place] [address], [facility]

## 2019-06-19 NOTE — PROGRESS NOTES
Nutrition  Assessment:   Reason for assessment: Length of stay  Assessment:   Diet:  NPO since 6/14  Food/Nutrition, Patient  and Pertinent History:  Patient presented with 1 day history progressive, intermittent, generalized abdominal pain, nausea; admitted with SBO with CT findings concerning for pneumatosis. Past medical history notable for CAD, COPD, HTN, HLD. No nutrition risk factors identified on nursing admission malnutrition screening tool. Pt s/p day 5 exploratory lap, small bowel resection, hernia repair and evacuation of obstruction bezoar. Pt currently NPO with NGT to LIS-250 ml output recorded 6/18; no output recorded today. Abdomen: Active bowel sounds, distended; noted documented small BM on 6/18, +flatus  Pertinent Medications:  IVF: Dex 5% 0.45% sodium chlorice with KCl 20 mEq/L at 125 ml/hr provides 510 non protein kcal/day. Zosyn  Pertinent Labs:  BMP reviewed-unremarkable  Anthropometrics: Height: 6', weight: 96.6 kg (213#) Weight source not indicated, BMI: 28.9 c/w normal range for Older American Male. Macronutrient Needs:  EER:  6053-7465 kcal/day (20-25 kg/kg/BW)  EPR:   gm pro/day (1-1.2 gm/kg/BW)    Intake / Comparative Standards:  NPO does not meet estimated kcal or protein needs. Nutrition Diagnosis: Altered GI function r/t alteration in GI function as evidenced by patient s/p day 5 exploratory lap-small bowel resection. Intervention:  Goal(s): Diet tolerance of  >50% of full liquid diet or > within 3-5 days. Plan:   1. Await initiation of p.o. Diet as medical condition dictates. Modify per preferences and tolerances. 2. If it is expected that patient will be unable to tolerate p.o. diet >50% of full liquid diet or > within 3-5 days, consider TPN. 3. Collaboration of Nutrition Services:  Interdisciplinary Rounds. 4. Nutrition  / Discharge Plan:  Too soon to determine.     Ivelisse De La Garza, MPH, [address],   [phone]

## 2019-06-19 NOTE — PROGRESS NOTES
Problem: Falls - Risk of  Goal: *Absence of Falls  Description  Document Roselie Forde Fall Risk and appropriate interventions in the flowsheet. Outcome: Progressing Towards Goal     Problem: Pressure Injury - Risk of  Goal: *Prevention of pressure injury  Description  Document Tim Scale and appropriate interventions in the flowsheet.   Outcome: Progressing Towards Goal     Problem: Infection - Risk of, Surgical Site Infection  Goal: *Absence of surgical site infection signs and symptoms  Outcome: Progressing Towards Goal

## 2019-06-20 LAB
ANION GAP SERPL CALC-SCNC: 5 MMOL/L (ref 7–16)
BUN SERPL-MCNC: 8 MG/DL (ref 8–23)
CALCIUM SERPL-MCNC: 8 MG/DL (ref 8.3–10.4)
CHLORIDE SERPL-SCNC: 103 MMOL/L (ref 98–107)
CO2 SERPL-SCNC: 29 MMOL/L (ref 21–32)
CREAT SERPL-MCNC: 0.68 MG/DL (ref 0.8–1.5)
ERYTHROCYTE [DISTWIDTH] IN BLOOD BY AUTOMATED COUNT: 13.2 % (ref 11.9–14.6)
GLUCOSE SERPL-MCNC: 116 MG/DL (ref 65–100)
HCT VFR BLD AUTO: 24.8 % (ref 41.1–50.3)
HGB BLD-MCNC: 9 G/DL (ref 13.6–17.2)
MAGNESIUM SERPL-MCNC: 1.7 MG/DL (ref 1.8–2.4)
MCH RBC QN AUTO: 34.2 PG (ref 26.1–32.9)
MCHC RBC AUTO-ENTMCNC: 36.3 G/DL (ref 31.4–35)
MCV RBC AUTO: 94.3 FL (ref 79.6–97.8)
NRBC # BLD: 0 K/UL (ref 0–0.2)
PHOSPHATE SERPL-MCNC: 2.2 MG/DL (ref 2.3–3.7)
PLATELET # BLD AUTO: 113 K/UL (ref 150–450)
PMV BLD AUTO: 8.7 FL (ref 9.4–12.3)
POTASSIUM SERPL-SCNC: 3.4 MMOL/L (ref 3.5–5.1)
RBC # BLD AUTO: 2.63 M/UL (ref 4.23–5.6)
SODIUM SERPL-SCNC: 137 MMOL/L (ref 136–145)
WBC # BLD AUTO: 4.4 K/UL (ref 4.3–11.1)

## 2019-06-20 PROCEDURE — 74011000250 HC RX REV CODE- 250: Performed by: INTERNAL MEDICINE

## 2019-06-20 PROCEDURE — 65270000029 HC RM PRIVATE

## 2019-06-20 PROCEDURE — 83735 ASSAY OF MAGNESIUM: CPT

## 2019-06-20 PROCEDURE — 74011000250 HC RX REV CODE- 250: Performed by: HOSPITALIST

## 2019-06-20 PROCEDURE — 36415 COLL VENOUS BLD VENIPUNCTURE: CPT

## 2019-06-20 PROCEDURE — 74011000250 HC RX REV CODE- 250: Performed by: SURGERY

## 2019-06-20 PROCEDURE — 80048 BASIC METABOLIC PNL TOTAL CA: CPT

## 2019-06-20 PROCEDURE — 85027 COMPLETE CBC AUTOMATED: CPT

## 2019-06-20 PROCEDURE — 74011250636 HC RX REV CODE- 250/636: Performed by: SURGERY

## 2019-06-20 PROCEDURE — 97116 GAIT TRAINING THERAPY: CPT

## 2019-06-20 PROCEDURE — 97110 THERAPEUTIC EXERCISES: CPT

## 2019-06-20 PROCEDURE — 84100 ASSAY OF PHOSPHORUS: CPT

## 2019-06-20 PROCEDURE — 74011250636 HC RX REV CODE- 250/636: Performed by: HOSPITALIST

## 2019-06-20 PROCEDURE — 74011000258 HC RX REV CODE- 258: Performed by: SURGERY

## 2019-06-20 RX ORDER — POTASSIUM CHLORIDE 14.9 MG/ML
20 INJECTION INTRAVENOUS
Status: COMPLETED | OUTPATIENT
Start: 2019-06-20 | End: 2019-06-20

## 2019-06-20 RX ORDER — MAGNESIUM SULFATE HEPTAHYDRATE 40 MG/ML
2 INJECTION, SOLUTION INTRAVENOUS ONCE
Status: COMPLETED | OUTPATIENT
Start: 2019-06-20 | End: 2019-06-20

## 2019-06-20 RX ORDER — MORPHINE SULFATE 4 MG/ML
2-3 INJECTION INTRAVENOUS
Status: DISCONTINUED | OUTPATIENT
Start: 2019-06-20 | End: 2019-06-26 | Stop reason: HOSPADM

## 2019-06-20 RX ADMIN — METOPROLOL TARTRATE 5 MG: 5 INJECTION INTRAVENOUS at 15:13

## 2019-06-20 RX ADMIN — FAMOTIDINE 20 MG: 10 INJECTION, SOLUTION INTRAVENOUS at 21:49

## 2019-06-20 RX ADMIN — DEXTROSE MONOHYDRATE, SODIUM CHLORIDE, AND POTASSIUM CHLORIDE 125 ML/HR: 50; 4.5; 1.49 INJECTION, SOLUTION INTRAVENOUS at 15:13

## 2019-06-20 RX ADMIN — POTASSIUM CHLORIDE 20 MEQ: 200 INJECTION, SOLUTION INTRAVENOUS at 15:13

## 2019-06-20 RX ADMIN — PIPERACILLIN SODIUM,TAZOBACTAM SODIUM 3.38 G: 3; .375 INJECTION, POWDER, FOR SOLUTION INTRAVENOUS at 07:18

## 2019-06-20 RX ADMIN — METOPROLOL TARTRATE 5 MG: 5 INJECTION INTRAVENOUS at 21:49

## 2019-06-20 RX ADMIN — FLUTICASONE PROPIONATE 2 SPRAY: 50 SPRAY, METERED NASAL at 09:00

## 2019-06-20 RX ADMIN — POTASSIUM CHLORIDE 20 MEQ: 200 INJECTION, SOLUTION INTRAVENOUS at 17:04

## 2019-06-20 RX ADMIN — METOPROLOL TARTRATE 5 MG: 5 INJECTION INTRAVENOUS at 09:28

## 2019-06-20 RX ADMIN — PIPERACILLIN SODIUM,TAZOBACTAM SODIUM 3.38 G: 3; .375 INJECTION, POWDER, FOR SOLUTION INTRAVENOUS at 21:57

## 2019-06-20 RX ADMIN — METOPROLOL TARTRATE 5 MG: 5 INJECTION INTRAVENOUS at 03:27

## 2019-06-20 RX ADMIN — SOYBEAN OIL 250 ML: 20 INJECTION, SOLUTION INTRAVENOUS at 17:22

## 2019-06-20 RX ADMIN — SODIUM CHLORIDE: 900 INJECTION, SOLUTION INTRAVENOUS at 21:48

## 2019-06-20 RX ADMIN — POTASSIUM CHLORIDE: 2 INJECTION, SOLUTION, CONCENTRATE INTRAVENOUS at 17:22

## 2019-06-20 RX ADMIN — FAMOTIDINE 20 MG: 10 INJECTION, SOLUTION INTRAVENOUS at 09:28

## 2019-06-20 RX ADMIN — ENOXAPARIN SODIUM 40 MG: 40 INJECTION SUBCUTANEOUS at 09:28

## 2019-06-20 RX ADMIN — MAGNESIUM SULFATE HEPTAHYDRATE 2 G: 40 INJECTION, SOLUTION INTRAVENOUS at 09:27

## 2019-06-20 NOTE — PROGRESS NOTES
Pt assessment completed. Alert and oriented. Lungs CTA diminished in bases. Heart sounds regular. Abdomen soft and non tender with UMER drain present to left side of abdomen. UMER drain patent charged and draining serosanguinous drainage. Midline incision dressing and UMER drain dressing changed. Midline with staples and two open areas. NGT to low intermittent suction draining green liquid drainage. Griffin removed. Pt now up in chair, no pain at this time.

## 2019-06-20 NOTE — PROGRESS NOTES
Hospitalist Progress Note    2019  Admit Date: 2019  3:14 AM   NAME: Lakshmi Mason   :  1935   MRN:  [id number]   Attending: Brenda Rhodes MD  PCP:  Roxanna Sanz MD    SUBJECTIVE:   Patient is an 81 y/o male with hx CAD, CABG, COPD, HTN, HLD who underwent expl lap yesterday on  for SBO. He went into new onset rapid afib tonight at approx 1020pm with rate to 150s-160s. He denies chest pain or shortness of breath. He believes he may have had an episode of afib when he had his 6 v CABG in . Hospitalist service consulted for new onset rapid atrial fibrillation. He converted to sinus rhythm quickly. Started on lopressor 5 mg IV q 6 hours and has been maintained in sinus rhythm. His bowel function has not returned and he has NG tube in still. Starting PPN  per general surgery. He did have an episode of delirium/agitation after he received ativan in the ICU.    - wants to get NG tube out and go home. Understands need to continue NG tube. He reports nose is sore from NG tube. Nasal congestion better but still with thick oral secretions. Has maintained in sinus rhythm.       Review of Systems negative with exception of pertinent positives noted above  PHYSICAL EXAM     Visit Vitals  /79 (BP 1 Location: Left arm, BP Patient Position: At rest)   Pulse 69   Temp 98.5 °F (36.9 °C)   Resp 16   Ht 6' (1.829 m)   Wt 96.6 kg (213 lb)   SpO2 98%   BMI 28.89 kg/m²      Temp (24hrs), Av.3 °F (36.8 °C), Min:98 °F (36.7 °C), Max:99.2 °F (37.3 °C)    Patient Vitals for the past 24 hrs:   Temp Pulse Resp BP SpO2   19 1136 98.5 °F (36.9 °C) 69 16 164/79 98 %   19 0730 98.1 °F (36.7 °C) 66 18 162/82 96 %   19 0319 98.1 °F (36.7 °C) 65 16 (!) 159/95 95 %   19 2345 98 °F (36.7 °C) 69 14 161/86 94 %   19 2028 99.2 °F (37.3 °C) 72 18 151/67 96 %   19 1625 98.1 °F (36.7 °C) 67 16 162/88 93 %       Oxygen Therapy  O2 Sat (%): 98 % (19 1136)  Pulse via Oximetry: 78 beats per minute (06/18/19 2043)  O2 Device: Room air (06/20/19 0730)  O2 Flow Rate (L/min): 2 l/min (06/18/19 0848)  FIO2 (%): 28 % (06/16/19 1035)    Intake/Output Summary (Last 24 hours) at 6/20/2019 1157  Last data filed at 6/20/2019 1039  Gross per 24 hour   Intake 1838 ml   Output 2050 ml   Net -212 ml      General: No acute distress    Lungs:  CTA Bilaterally. Heart:  Regular rate and rhythm,  No murmur, rub, or gallop  Abdomen: Soft, Non distended, Non tender, Positive bowel sounds  Extremities: No cyanosis, clubbing or edema  Neurologic:  No focal deficits    Recent Results (from the past 24 hour(s))   CBC W/O DIFF    Collection Time: 06/20/19  6:02 AM   Result Value Ref Range    WBC 4.4 4.3 - 11.1 K/uL    RBC 2.63 (L) 4.23 - 5.6 M/uL    HGB 9.0 (L) 13.6 - 17.2 g/dL    HCT 24.8 (L) 41.1 - 50.3 %    MCV 94.3 79.6 - 97.8 FL    MCH 34.2 (H) 26.1 - 32.9 PG    MCHC 36.3 (H) 31.4 - 35.0 g/dL    RDW 13.2 11.9 - 14.6 %    PLATELET 151 (L) 929 - 450 K/uL    MPV 8.7 (L) 9.4 - 12.3 FL    ABSOLUTE NRBC 0.00 0.0 - 0.2 K/uL   METABOLIC PANEL, BASIC    Collection Time: 06/20/19  6:02 AM   Result Value Ref Range    Sodium 137 136 - 145 mmol/L    Potassium 3.4 (L) 3.5 - 5.1 mmol/L    Chloride 103 98 - 107 mmol/L    CO2 29 21 - 32 mmol/L    Anion gap 5 (L) 7 - 16 mmol/L    Glucose 116 (H) 65 - 100 mg/dL    BUN 8 8 - 23 MG/DL    Creatinine 0.68 (L) 0.8 - 1.5 MG/DL    GFR est AA >60 >60 ml/min/1.73m2    GFR est non-AA >60 >60 ml/min/1.73m2    Calcium 8.0 (L) 8.3 - 10.4 MG/DL   MAGNESIUM    Collection Time: 06/20/19  6:02 AM   Result Value Ref Range    Magnesium 1.7 (L) 1.8 - 2.4 mg/dL   PHOSPHORUS    Collection Time: 06/20/19  6:08 AM   Result Value Ref Range    Phosphorus 2.2 (L) 2.3 - 3.7 MG/DL     Imaging:    CT ABD PELV WO CONT   Final Result   IMPRESSION: Mid small bowel obstruction.  The transition point is not clearly   identified, but may lie in the region of a small periumbilical fat-containing   ventral hernia which may be incarcerated given the associated fat stranding. Of   note, no bowel is seen within the hernia. Additionally, there are a few   questionable areas of small bowel wall pneumatosis concerning for ischemia. ER   physician notified. Results for orders placed or performed during the hospital encounter of 19   2D ECHO COMPLETE ADULT (TTE) W OR [address] [facility]  [address], [address] [facility]  [phone]    Transthoracic Echocardiogram  2D, M-mode, Doppler, and Color Doppler    Patient: Domo Ch  MR #: [id number]  : 1935  Age: 82 years  Gender: Male  Study date: 2019  Account #: [de-identified]  Height: 72 in  Weight: 212.5 lb  BSA: 2.19 mï¾²  Status:Routine  Location: 374  BP: 159/ 81    Allergies: DIPHENHYDRAMINE HCL, LORAZEPAM, IODINATED CONTRAST- ORAL AND IV   DYE,  SULFA (SULFONAMIDE ANTIBIOTICS)    Sonographer:  Stalin Meyer [facility]  Group:  [facility]  Referring Physician:  Noemy Bailey MD  Reading Physician:  Roverto Baker MD [facility]    INDICATIONS: A-fib. PROCEDURE: This was a routine study. A transthoracic echocardiogram was  performed. The study included complete 2D imaging, M-mode, complete spectral  Doppler, and color Doppler. Intravenous contrast (Definity) was administered. Image quality was adequate. LEFT VENTRICLE: Size was normal. Systolic function was normal. Ejection  fraction was estimated in the range of 55 % to 60 %. There were no regional  wall motion abnormalities. Wall thickness was mildly increased. Avg. E/e'=  9.15. Left ventricular diastolic function parameters were normal.    RIGHT VENTRICLE: The size was normal. Systolic function was normal. The  tricuspid jet envelope definition was inadequate for estimation of RV   systolic  pressure. LEFT ATRIUM: The atrium was moderately dilated.     RIGHT ATRIUM: Size was normal.    SYSTEMIC VEINS: IVC: The inferior vena cava was normal in size and course. AORTIC VALVE: The valve was trileaflet. Leaflets exhibited mild to moderate  sclerosis. There was no evidence for stenosis. There was mild regurgitation. MITRAL VALVE: There was mild annular calcification. Valve structure was   normal.  There was no evidence for stenosis. There was trivial regurgitation. TRICUSPID VALVE: The valve structure was normal. There was no evidence for  stenosis. There was trivial regurgitation. PULMONIC VALVE: Not well visualized. There was no evidence for stenosis. There  was mild regurgitation. PERICARDIUM: There was no pericardial effusion. AORTA: The root exhibited dilatation. 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 (4.4 cm). SUMMARY:    -  Left ventricle: Systolic function was normal. Ejection fraction was  estimated in the range of 55 % to 60 %. There were no regional wall motion  abnormalities. Wall thickness was mildly increased. -  Left atrium: The atrium was moderately dilated. -  Mitral valve: There was mild annular calcification.    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 (4.4  cm). SYSTEM MEASUREMENT TABLES    2D mode  AoR Diam (2D): 4.1 cm  LA Dimension (2D): 4 cm  Left Atrium Systolic Volume Index; Method of Disks, Biplane; 2D mode;: 43.4  ml/m2  IVS/LVPW (2D): 1  IVSd (2D): 1.2 cm  LVIDd (2D): 4.5 cm  LVIDs (2D): 3 cm  LVOT Area (2D): 4.5 cm2  LVPWd (2D): 1.2 cm  RVIDd (2D): 4.2 cm    Tissue Doppler Imaging  LV Peak Early Jones Tissue Holland; Lateral MA (TDI): 10.4 cm/s  LV Peak Early Jones Tissue Holland; Medial MA (TDI): 6.3 cm/s    Unspecified Scan Mode  Peak Grad; Mean; Antegrade Flow: 12 mm[Hg]  Vmax;  Antegrade Flow: 163 cm/s  LVOT Diam: 2.4 cm  MV Peak Holland/LV Peak Tissue Holland E-Wave; Lateral MA: 6.9  MV Peak Holland/LV Peak Tissue Holland E-Wave; Medial MA: 11.4    Prepared and signed by    Eligio Younger MD [facility]  Signed 17-Jun-2019 11:59:26         ASSESSMENT      Hospital Problems as of 6/20/2019 Date Reviewed: 6/14/2019          Codes Class Noted - Resolved POA    Atrial fibrillation with rapid ventricular response (HCC) ICD-10-CM: I48.91  ICD-9-CM: 427.31  6/16/2019 - Present No        * (Principal) SBO (small bowel obstruction) ([facility] ICD-10-CM: K56.609  ICD-9-CM: 560.9  6/14/2019 - Present Yes        Generalized abdominal pain ICD-10-CM: R10.84  ICD-9-CM: 789.07  6/14/2019 - Present Yes        Pneumatosis intestinalis ICD-10-CM: K63.89  ICD-9-CM: 569.89  6/14/2019 - Present Yes        Thrombocytopenia ([facility] ICD-10-CM: D69.6  ICD-9-CM: 287.5  6/14/2019 - Present Yes        Ventral incisional hernia ICD-10-CM: K43.2  ICD-9-CM: 553.21  6/14/2019 - Present Yes        Small bowel obstruction ([facility] ICD-10-CM: P59.634  ICD-9-CM: 560.9  6/14/2019 - Present Yes        Hypertension ICD-10-CM: I10  ICD-9-CM: 401.9  Unknown - Present Yes        Chronic obstructive pulmonary disease ([facility] (Chronic) ICD-10-CM: J44.9  ICD-9-CM: 357  4/10/2017 - Present Yes        Coronary artery disease of bypass graft of native heart with stable angina pectoris ([facility] ICD-10-CM: I25.708  ICD-9-CM: 414.05, 413.9  8/23/2016 - Present Yes    Overview Addendum 6/29/2018 12:32 PM by Kevin Vargas MD     1. CABG (9/2003): Transmyocardial laser revascularization to the inferior wall of the left ventricle. Coronary artery bypass grafting x6. LIMA to LAD: Sequential SVG to ramus and obtuse marginal.  Sequential SVG to posterolateral and right PDA, SVG to diagonal.  2.  Cardiolite( 11/2012): Diaphragmatic attenuation. No ischemia. Normal LV systolic function. EF 63%. 3.  Stress Cardiolite (1/16/15):  EF 52%. Normal perfusion. 4.  LHC (4/13/15):  6 of 6 bypass grafts patent. Small vessel disease involving the apical LAD. Normal LV systolic function. LVEDP 27  5. LHC (5/24/18): Severe multivessel CAD. 6 of 6 grafts patent.   Small vessel disease not amendable to PCI. EF 55-60%. Thrombocytopenia, unspecified ([facility] ICD-10-CM: D69.6  ICD-9-CM: 287.5  11/13/2012 - Present Yes        RESOLVED: Delirium ICD-10-CM: R41.0  ICD-9-CM: 780.09  6/17/2019 - 6/18/2019 No            Plan:  · A fib with RVR- resolved  · Likely combination from post-op stress and not taking PO beta-blocker due to being NPO  · Copntinue lopressor 5 mg IV q 6 hours; resume PO lopressor when able  · Cardizem gtt off since 11:40 AM 6/16  · Echo with normal LVEF, moderate L atrial enlargement, no significant mitral valve issue  · No anticoagulation for now due to recent surgery  · Consider cardiology consult if a fib returns; otherwise, follow up outpatient  · Will discontinue cardiac monitoring as he has maintained in sinus rhythm. · Delirium- resolved  · Avoid benzos    · SBO- post-op day 5- per general surgery    · Thromboyctopenia- chronic, monitor  · Stable    · HTN  · Continue IV lopressor  · PRN hydralazine  · Restart home PO meds when able    · Hypothyroidism  · IV levothyroxine to start 1/34 as hospital policy requires 7 day hold for IV. DVT Prophylaxis: Heparin per general surgery    Signed By: Cheryl Reilly.  Breanna Paige MD     June 20, 2019

## 2019-06-20 NOTE — PROGRESS NOTES
Problem: Mobility Impaired (Adult and Pediatric)  Goal: *Acute Goals and Plan of Care (Insert Text)  Description    LTG:  (1.)Mr. Cristina will move from supine to sit and sit to supine  in bed with STAND BY ASSIST within 7 treatment day(s). (2.)Mr. Mychal Chris will transfer from bed to chair and chair to bed with SUPERVISION using the least restrictive device within 7 treatment day(s). (3.)Mr. Mychal Chris will ambulate with MODIFIED INDEPENDENCE for 300 feet with the least restrictive device within 7 treatment day(s). ________________________________________________________________________________________________   Outcome: Progressing Towards Goal    PHYSICAL THERAPY: Daily Note and AM 6/20/2019  INPATIENT: PT Visit Days : 3  [place] / [place] / Product Type: Medicare /       NAME/AGE/GENDER: Maximino Nguyen is a 82 y.o. male   PRIMARY DIAGNOSIS: SBO (small bowel obstruction) ([facility] [K56.609]  Small bowel obstruction ([facility] [K56.609] SBO (small bowel obstruction) (HCC)   SBO (small bowel obstruction) ([facility])    Procedure(s) (LRB):  EXPLORATORY LAPAROTOMY   Mult enterorrhaphies approx 85cm small bowel resect and anast drain placement hernia repair (no mesh) removal peritoneal FB Removal of Bozoar materail in SB (N/A)  6 Days Post-Op  ICD-10: Treatment Diagnosis:    · Generalized Muscle Weakness (M62.81)  · Other abnormalities of gait and mobility (R26.89)   Precaution/Allergies:  Diphenhydramine hcl; Ativan [lorazepam]; Iodinated contrast- oral and iv dye; and Sulfa (sulfonamide antibiotics)      ASSESSMENT:     Mr. Mychal Chris presents with limited independence with gait and transfers as well as bed mobility due to abdominal surgery. He did well with therapy today. Needs a rolling walker to ambulate and may need some follow up therapy based on his progression.     6/19 am supine upon arrival.  Works on bed mobility as follows: supine>EOB with CGA, walks 140 ft using RW with CGA and no LOB.  Return to his chair and performs exercises without any problems. He return to supine with call light near. 6/20--Pt performed standing in room on arrival. Pt performed exercises while in standing. Pt ambulated in hallway. Pt in bedside chair with needs in reach. RN notified. This section established at most recent assessment   PROBLEM LIST (Impairments causing functional limitations):  1. Decreased Strength  2. Decreased Transfer Abilities  3. Decreased Ambulation Ability/Technique  4. Decreased Balance  5. Decreased Activity Tolerance   INTERVENTIONS PLANNED: (Benefits and precautions of physical therapy have been discussed with the patient.)  1. Bed Mobility  2. Gait Training  3. Therapeutic Exercise/Strengthening  4. Transfer Training     TREATMENT PLAN: Frequency/Duration: daily for duration of hospital stay  Rehabilitation Potential For Stated Goals: Good     REHAB RECOMMENDATIONS (at time of discharge pending progress):    Placement: It is my opinion, based on this patient's performance to date, that Mr. Nuha Starks may benefit from [address] after discharge due to the functional deficits listed above that are likely to improve with skilled rehabilitation   Equipment:    To be determined    None at this time              HISTORY:   History of Present Injury/Illness (Reason for Referral):  Patient is an 81 y/o male with hx CAD, CABG, COPD, HTN, HLD who underwent expl lap yesterday on 6/14 for SBO. He went into new onset rapid afib tonight at approx 1020pm with rate to 150s-160s. He denies chest pain or shortness of breath. He believes he may have had an episode of afib when he had his 6 v CABG in 2003. Hospitalist service consulted for new onset rapid atrial fibrillation. 6/16- seen with wife and family members at bedside. He is in good spirits and denies concerns.   Cardizem infusing at low rate, but he converted back to sinus rhythm early this AM.  He denies chest pain or shortness of breath. Has not had flatus or BM yet. 6/17- significant confusion overnight that worsened with ativan. Required haldol. Improved now and oriented x 3 but confused some in conversation. Past Medical History/Comorbidities:   Mr. 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 He also has no past medical history of Asthma, Chronic kidney disease, Difficult intubation, Heart failure ([address] Liver disease, Malignant hyperthermia due to anesthesia, Nausea & vomiting, Pseudocholinesterase deficiency, Psychiatric disorder, PUD (peptic ulcer disease), Seizures ([address] Sleep apnea, or Thromboembolus ([address] Mr. Melisa Zaman  has a past surgical history that includes pr cardiac surg procedure unlist (1991); pr cardiac surg procedure unlist (9/2003); hx cholecystectomy (9/2000); pr neurological procedure unlisted (12/1981); hx hernia repair (09/1993); hx gi (11/2003); hx lumbar laminectomy (5/2005); hx other surgical (05/2002); hx colonoscopy (02/28/2013); hx endoscopy (02/28/2013); vascular surgery procedure unlist (1991); hx orthopaedic (1/2006); hx orthopaedic (8/2005); hx orthopaedic (2005); hx heart catheterization (04/2015); hx other surgical (11/2014); hx other surgical; hx orthopaedic (03/1955); hx thyroidectomy (08/24/2017); hx thyroidectomy (08/24/2017); and hx heent.   Social History/Living Environment:   Home Environment: Private residence  # Steps to Enter: 3  One/Two Story Residence: Two story  # of Interior Steps: 11  Height of Each Step (in): 11 inches  Interior Rails: Right  Lift Chair Available: No  Living Alone: No  Support Systems: Child(kim), Family member(s)  Patient Expects to be Discharged to[de-identified] Private residence  Current DME Used/Available at Home: Glucometer  Prior Level of Function/Work/Activity:  Independent prior to admit. Number of Personal Factors/Comorbidities that affect the Plan of Care: 1-2: MODERATE COMPLEXITY   EXAMINATION:   Most Recent Physical Functioning:   Gross Assessment:                  Posture:     Balance:  Sitting: Intact  Standing: Pull to stand; With support Bed Mobility:     Wheelchair Mobility:     Transfers:  Sit to Stand: Stand-by assistance  Stand to Sit: Stand-by assistance  Gait:     Gait Abnormalities: (slightly unsteady)  Distance (ft): 160 Feet (ft)  Assistive Device: Walker, rolling  Ambulation - Level of Assistance: Stand-by assistance  Interventions: Safety awareness training  Duration: 15 Minutes      Body Structures Involved:  1. Muscles Body Functions Affected:  1. Movement Related Activities and Participation Affected:  1. Mobility   Number of elements that affect the Plan of Care: 3: MODERATE COMPLEXITY   CLINICAL PRESENTATION:   Presentation: Stable and uncomplicated: LOW COMPLEXITY   CLINICAL DECISION MAKING:   [employer] AM-PAC 6 Clicks   Basic Mobility Inpatient Short Form  How much difficulty does the patient currently have. .. Unable A Lot A Little None   1. Turning over in bed (including adjusting bedclothes, sheets and blankets)? ? 1   ? 2   ? 3   ? 4   2. Sitting down on and standing up from a chair with arms ( e.g., wheelchair, bedside commode, etc.)   ? 1   ? 2   ? 3   ? 4   3. Moving from lying on back to sitting on the side of the bed?   ? 1   ? 2   ? 3   ? 4   How much help from another person does the patient currently need. .. Total A Lot A Little None   4.   Moving to and from a bed to a chair (including a wheelchair)? ? 1   ? 2   ? 3   ? 4   5. Need to walk in hospital room? ? 1   ? 2   ? 3   ? 4   6. Climbing 3-5 steps with a railing? ? 1   ? 2   ? 3   ? 4   © 2007, Trustees of [facility] [employer], under license to [employer]. All rights reserved      Score:  Initial: 18 Most Recent: X (Date: -- )    Interpretation of Tool:  Represents activities that are increasingly more difficult (i.e. Bed mobility, Transfers, Gait). Medical Necessity:     · Patient is expected to demonstrate progress in strength and range of motion  ·  to increase independence with gait and transfers   · . Reason for Services/Other Comments:  · Patient continues to require skilled intervention due to limited functional independence   · . Use of outcome tool(s) and clinical judgement create a POC that gives a: Clear prediction of patient's progress: LOW COMPLEXITY            TREATMENT:   (In addition to Assessment/Re-Assessment sessions the following treatments were rendered)   Pre-treatment Symptoms/Complaints:   Pain: Initial:   Pain Intensity 1: 0 Post Session:       Gait Training (15 Minutes):  Gait training to improve and/or restore physical functioning as related to mobility. Ambulated 160 Feet (ft) with Stand-by assistance using a Walker, rolling and minimal Safety awareness training Therapeutic Exercise: ( 8 minutes):  Exercises per grid below to improve mobility. Required minimal verbal cues to promote proper body alignment. Progressed range as indicated.      Date:  6/18   Date:  6/19   Date:  6/20   Activity/Exercise Parameters Parameters Parameters   Toe/heel raise 12 12 12   Hip abd/add 12 12    Marching in place 12 12 12   LAQ 12 12    squats   12                       Braces/Orthotics/Lines/Etc:   · IV  · reid catheter  · drain UMER   · nasogastric tube  · Multiple ICU lines   · O2 Device: Nasal cannula  Treatment/Session Assessment:    · Response to Treatment: Pt agreeable to therapy. · Interdisciplinary Collaboration:   o Physical Therapist  o Registered Nurse  · After treatment position/precautions:   o Up in chair  o Bed/Chair-wheels locked  o Bed in low position  o Call light within reach  o RN notified  o Family at bedside   · Compliance with Program/Exercises: Compliant all of the time, Will assess as treatment progresses  · Recommendations/Intent for next treatment session: \"Next visit will focus on advancements to more challenging activities and reduction in assistance provided\".   Total Treatment Duration:  PT Patient Time In/Time Out  Time In: 0907  Time Out: 0930    Troy Elias, PT

## 2019-06-20 NOTE — PROGRESS NOTES
Patient awake in bed this AM, slept for short intervals overnight. Offers no complaints, will monitor.

## 2019-06-20 NOTE — PROGRESS NOTES
H&P/Consult Note/Progress Note/Office Note:   Maximino Greene  MRN: [id number]  GCN:[id number]  Age:83 y.o.    HPI: Maximino Greene is a 82 y.o. male who is s/p expl lap with 5hrs lysis, 85cm ischemic sb resection, hernia repair,   and evacuation of obstructing bezoar of (large volume of impacted delano slaw) on 6/14/19    He originally came to the ER on 6/14/19   with a 1 day h/o progressive, intermittent, generalized abd pain without radiation which worsened acutely around 8PM on 6/13/19  Nothing made pain better or worse. +Nausea; -V  No fever, chills, chest pain, or back pain    1990s s/p open AAA in [place] s/p lap [place] in [place]  S/p expl lap for SBO (no resection) [place] CABG x 6 2016 s/p thoracic aneurysm stent at [address]      Surgery was consulted after below CT without contrast showed SBO findings concerning for pneumatosis       4/13/16 s/p colonoscopy; Dr Erik Habermann:  SESSILE SERRATED POLYPS. FRAGMENT OF BENIGN COLONIC MUCOSA. Electronically signed out on 4/14/2016 11:51 by Shantel Alan MD   Microscopic Description   The biopsy demonstrates serrated hypermucinous colonic glands imbedded in a nonfibrotic lamina propria with focal crypt dilatation and widening of the crypt bases. An additional fragment demonstrates no adenomatous or hyperplastic change. Dr. Kelsey Bañuelos concurs. 6/14/19 CT abd/pelvis without contrast  - Liver: Within normal limits. - Gallbladder and bile ducts: The gallbladder is absent, without biliary tract  dilatation.  - Spleen: Within normal limits. - Urinary tract: Again noted is a 5.3 cm right kidney cyst. The left kidney is  unremarkable. No urinary tract stone or hydronephrosis is identified. There are  bilateral inguinal hernias, containing fat on the left and a small portion of  the urinary bladder on the right. - Adrenals: Within normal limits. - Pancreas: Within normal limits. - Gastrointestinal tract:  In the umbilicus region there is a ventral 2.8 cm  fat-containing hernia, with adjacent stranding which may relate to  incarceration. There is a mid small bowel obstruction, whose transition point is  not clearly identified, but may lie in the region of the ventral hernia although  no bowel is seen within the hernia itself. There are a few questionable areas of  small bowel wall pneumatosis. The colon is unremarkable except for  diverticulosis. No free air or free fluid is seen. - Retroperitoneum: Within normal limits. - Peritoneal cavity and abdominal wall: Within normal limits. - Pelvis: Within normal limits. - Spine/bones: No acute process. - Other comments: Partially visualized is a descending thoracic aortic stent  graft.     IMPRESSION: Mid small bowel obstruction. The transition point is not clearly  identified, but may lie in the region of a small periumbilical fat-containing  ventral hernia which may be incarcerated given the associated fat stranding. Of  note, no bowel is seen within the hernia. Additionally, there are a few  questionable areas of small bowel wall pneumatosis concerning for ischemia. ER  physician notified.       Additonal hx:  6/17/19 POD3 afib over weekend; no complaints; up in chair; interactive and conversant;  Hospitalists treating; no flatus or BM yet   6/18/19 POD4 feels OK, no complaints, +BM  6/19/19 POD5 feels OK, no sign pain isssues, abd still distended  6/20/19 POD6 many loose BMs and lost of flatus; no sign abd pain issues, distention better but persists            Past Medical History:   Diagnosis Date    Abdominal pain     kower     Acute cervical radiculopathy     Aneurysm ([address]     nov 1991AAA    Arthritis     Atrial fibrillation with rapid ventricular response ([address] 6/16/2019    CAD (coronary artery disease)     6 bipass 9/2003    Cancer ([address]     skin    COPD (chronic obstructive pulmonary disease) ([address]     Descending thoracic aortic aneurysm ([address] 10/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   Left lobe: At least 4 heterogeneous nodules in the left lobe. The largest measures 2.9 x 1.1 x 2.4 cm in the lower pole, with mild peripheral color Doppler flow and possible tiny internal calcifications.     Thrombocytopenia ([address]     marrow normal slightly big spleen low mpv    Thyroid disease     hypothyroidism     Past Surgical History:   Procedure Laterality Date    CARDIAC SURG PROCEDURE UNLIST  1991    AAA    CARDIAC SURG PROCEDURE UNLIST  9/2003     6 bypass surgeries    HX CHOLECYSTECTOMY  9/2000    HX COLONOSCOPY  02/28/2013    10 polyps/ fu in 3 yrs    HX ENDOSCOPY  02/28/2013    1 biopsy    HX GI  11/2003    small bowel obstruction    HX HEART CATHETERIZATION  04/2015    HX HEENT      thyroidectomy    HX HERNIA REPAIR  09/1993    Insisional    HX LUMBAR LAMINECTOMY  5/2005    L3, L4    HX ORTHOPAEDIC  1/2006    right total knee replacement    HX ORTHOPAEDIC  8/2005     left knee replacement    HX ORTHOPAEDIC  2005    laminectomy L3-l4    HX ORTHOPAEDIC  03/1955    right knee surgery    HX OTHER SURGICAL  05/2002    upper endoscopic retrograde cholangiopancreatogram    HX OTHER SURGICAL  11/2014    removal squamous cell carcinoma    HX OTHER SURGICAL      MRSA lower back -3/2005    HX THYROIDECTOMY  08/24/2017    Dr Korin Lincoln    HX THYROIDECTOMY  08/24/2017    Dr. Jam Freitas  12/1981     ruptured disc in back   [address]    abd aortic aneurysm repair     Current Facility-Administered Medications Medication Dose Route Frequency    magnesium sulfate 2 g/50 ml IVPB (premix or compounded)  2 g IntraVENous ONCE    enoxaparin (LOVENOX) injection 40 mg  40 mg SubCUTAneous Q24H    hydrALAZINE (APRESOLINE) 20 mg/mL injection 10 mg  10 mg IntraVENous Q6H PRN    fluticasone propionate (FLONASE) 50 mcg/actuation nasal spray 2 Spray  2 Spray Both Nostrils DAILY    sodium chloride (OCEAN) 0.65 % nasal squeeze bottle 2 Spray  2 Spray Both Nostrils Q2H PRN    phenol throat spray (CHLORASEPTIC) 1 Spray  1 Spray Oral PRN    [START ON 2019] levothyroxine (SYNTHROID) injection 112.5 mcg  112.5 mcg IntraVENous Q24H    dextrose 5% - 0.45% NaCl with KCl 20 mEq/L infusion  125 mL/hr IntraVENous CONTINUOUS    albuterol (PROVENTIL VENTOLIN) nebulizer solution 2.5 mg  2.5 mg Nebulization Q6H PRN    metoprolol (LOPRESSOR) injection 5 mg  5 mg IntraVENous Q6H    dilTIAZem (CARDIZEM) 125 mg/125 mL (1 mg/mL) dextrose 5% infusion  0-15 mg/hr IntraVENous TITRATE    piperacillin-tazobactam (ZOSYN) 3.375 g in 0.9% sodium chloride (MBP/ADV) 100 mL  3.375 g IntraVENous Q8H    famotidine (PF) (PEPCID) injection 20 mg  20 mg IntraVENous Q12H    morphine 10 mg/ml injection 2-5 mg  2-5 mg IntraVENous Q1H PRN    acetaminophen (TYLENOL) tablet 1,000 mg  1,000 mg Oral Q6H PRN    ondansetron (ZOFRAN) injection 4 mg  4 mg IntraVENous Q4H PRN    white petrolatum-mineral oil (AKWA TEARS) 83-15 % ophthalmic ointment   Both Eyes PRN     Diphenhydramine hcl; Ativan [lorazepam];  Iodinated contrast- oral and iv dye; and Sulfa (sulfonamide antibiotics)  Social History     Socioeconomic History    Marital status:      Spouse name: Not on file    Number of children: Not on file    Years of education: Not on file    Highest education level: Not on file   Tobacco Use    Smoking status: Former Smoker     Packs/day: 1.00     Years: 25.00     Pack years: 25.00     Last attempt to quit: 12/3/1987     Years since quittin.5    Smokeless tobacco: Never Used   Substance and Sexual Activity    Alcohol use: Yes     Comment: occasional glass wine    Drug use: No     Social History     Tobacco Use   Smoking Status Former Smoker    Packs/day: 1.00    Years: 25.00    Pack years: 25.00    Last attempt to quit: 12/3/1987    Years since quittin.5   Smokeless Tobacco Never Used     Family History   Problem Relation Age of Onset    Heart Disease Father     Cancer Mother         colon    Cancer Sister     Cancer Maternal Aunt     Cancer Paternal Grandmother     Diabetes Paternal Grandmother     Thyroid Disease Neg Hx     Thyroid Cancer Neg Hx      ROS: The patient has no difficulty with chest pain or shortness of breath. No fever or chills. Comprehensive review of systems was otherwise unremarkable except as noted above. Physical Exam:   Visit Vitals  BP (!) 159/95 (BP 1 Location: Left arm, BP Patient Position: At rest)   Pulse 65   Temp 98.1 °F (36.7 °C)   Resp 16   Ht 6' (1.829 m)   Wt 213 lb (96.6 kg)   SpO2 95%   BMI 28.89 kg/m²     Vitals:    19 1625 19 2028 19 2345 19 0319   BP: 162/88 151/67 161/86 (!) 159/95   Pulse: 67 72 69 65   Resp: 16 18 14 16   Temp: 98.1 °F (36.7 °C) 99.2 °F (37.3 °C) 98 °F (36.7 °C) 98.1 °F (36.7 °C)   SpO2: 93% 96% 94% 95%   Weight:       Height:         No intake/output data recorded.  1901 -  0700  In: 1838 [I.V.:1838]  Out: 3000 [Urine:2300; Drains:300]    Constitutional: Alert, oriented, cooperative patient in no acute distress; appears stated age    Eyes:Sclera are clear. EOMs intact  ENMT: no external lesions gross hearing normal; no obvious neck masses, no ear or lip lesions, nares normal; +NGT with bilious output  CV: RRR. Normal perfusion  Resp: No JVD. Breathing is  non-labored; no audible wheezing.       GI: Incision clear; 2 beatriz out on 19 (will not replace), Bryce drain serosang, less distention       Musculoskeletal: unremarkable with normal function. No embolic signs or cyanosis. Palpable pedal pulses bilat   Neuro:  Oriented; moves all 4; no focal deficits  Psychiatric: normal affect and mood, no memory impairment    Recent vitals (if inpt):  Patient Vitals for the past 24 hrs:   BP Temp Pulse Resp SpO2   06/20/19 0319 (!) 159/95 98.1 °F (36.7 °C) 65 16 95 %   06/19/19 2345 161/86 98 °F (36.7 °C) 69 14 94 %   06/19/19 2028 151/67 99.2 °F (37.3 °C) 72 18 96 %   06/19/19 1625 162/88 98.1 °F (36.7 °C) 67 16 93 %   06/19/19 1139 145/82 98.6 °F (37 °C) 74 20 92 %   06/19/19 0716 (!) 175/93 99.1 °F (37.3 °C) 78 20 92 %       Labs:  Recent Labs     06/20/19  0602   WBC 4.4   HGB 9.0*   *      K 3.4*      CO2 29   BUN 8   CREA 0.68*   *       Lab Results   Component Value Date/Time    WBC 4.4 06/20/2019 06:02 AM    HGB 9.0 (L) 06/20/2019 06:02 AM    PLATELET 828 (L) 64/52/7969 06:02 AM    Sodium 137 06/20/2019 06:02 AM    Potassium 3.4 (L) 06/20/2019 06:02 AM    Chloride 103 06/20/2019 06:02 AM    CO2 29 06/20/2019 06:02 AM    BUN 8 06/20/2019 06:02 AM    Creatinine 0.68 (L) 06/20/2019 06:02 AM    Glucose 116 (H) 06/20/2019 06:02 AM    INR 1.1 05/24/2018 07:05 AM    aPTT 26.5 04/07/2015 04:25 PM    Bilirubin, total 1.5 (H) 06/14/2019 03:35 AM    AST (SGOT) 20 06/14/2019 03:35 AM    ALT (SGPT) 27 06/14/2019 03:35 AM    Alk. phosphatase 65 06/14/2019 03:35 AM    Troponin-I, Qt. 0.02 06/15/2019 11:52 PM       CT Results  (Last 48 hours)    None        chest X-ray      I reviewed recent labs, recent radiologic studies, and pertinent records including other doctor notes if needed. I independently reviewed radiology images for studies I described above or studies I have ordered.    Admission date (for inpatients): 6/14/2019   * No surgery found *  Procedure(s):  EXPLORATORY LAPAROTOMY   Mult enterorrhaphies approx 85cm small bowel resect and anast drain placement hernia repair (no mesh) removal peritoneal FB Removal of Bozoar materail in [place]    ASSESSMENT/PLAN:  Problem List  Date Reviewed: 6/14/2019          Codes Class Noted    Atrial fibrillation with rapid ventricular response ([facility] ICD-10-CM: I48.91  ICD-9-CM: 427.31  6/16/2019        * (Principal) SBO (small bowel obstruction) ([facility] ICD-10-CM: K56.609  ICD-9-CM: 560.9  6/14/2019        Generalized abdominal pain ICD-10-CM: R10.84  ICD-9-CM: 789.07  6/14/2019        Pneumatosis intestinalis ICD-10-CM: K63.89  ICD-9-CM: 569.89  6/14/2019        Thrombocytopenia ([facility] ICD-10-CM: D69.6  ICD-9-CM: 287.5  6/14/2019        Ventral incisional hernia ICD-10-CM: K43.2  ICD-9-CM: 553.21  6/14/2019        Small bowel obstruction ([facility] ICD-10-CM: K56.609  ICD-9-CM: 560.9  6/14/2019        Hypertension ICD-10-CM: I10  ICD-9-CM: 401.9  Unknown        High cholesterol ICD-10-CM: E78.00  ICD-9-CM: 272.0  Unknown        GERD (gastroesophageal reflux disease) ICD-10-CM: K21.9  ICD-9-CM: 530.81  Unknown        Arthritis ICD-10-CM: M19.90  ICD-9-CM: 716.90  Unknown        Postsurgical hypothyroidism ICD-10-CM: E89.0  ICD-9-CM: 244.0  4/11/2017        Chronic obstructive pulmonary disease (HCC) (Chronic) ICD-10-CM: J44.9  ICD-9-CM: 286  4/10/2017        DDD (degenerative disc disease), cervical ICD-10-CM: M50.30  ICD-9-CM: 722.4  3/21/2017        S/P CABG (coronary artery bypass graft) ICD-10-CM: Z95.1  ICD-9-CM: V45.81  12/29/2016        Coronary artery disease of bypass graft of native heart with stable angina pectoris [facility]) ICD-10-CM: G38.772  ICD-9-CM: 414.05, 413.9  8/23/2016    Overview Addendum 6/29/2018 12:32 PM by Kelly Milan MD     1. CABG (9/2003): Transmyocardial laser revascularization to the inferior wall of the left ventricle. Coronary artery bypass grafting x6. LIMA to LAD: Sequential SVG to ramus and obtuse marginal.  Sequential SVG to posterolateral and right PDA, SVG to diagonal.  2.  Cardiolite( 11/2012): Diaphragmatic attenuation. No ischemia.   Normal LV systolic function. EF 63%. 3.  Stress Cardiolite (1/16/15):  EF 52%. Normal perfusion. 4.  LHC (4/13/15):  6 of 6 bypass grafts patent. Small vessel disease involving the apical LAD. Normal LV systolic function. LVEDP 27  5. LHC (5/24/18): Severe multivessel CAD. 6 of 6 grafts patent. Small vessel disease not amendable to PCI. EF 55-60%. Chronic seasonal allergic rhinitis due to pollen (Chronic) ICD-10-CM: J30.1  ICD-9-CM: 477.0  8/23/2016        Dyslipidemia ICD-10-CM: E78.5  ICD-9-CM: 272.4  8/23/2016        Essential hypertension with goal blood pressure less than 130/85 ICD-10-CM: I10  ICD-9-CM: 401.9  8/23/2016        Diverticulitis of colon (without mention of hemorrhage)(562.11) ICD-10-CM: K57.32  ICD-9-CM: 562.11  12/3/2013        AAA (abdominal aortic aneurysm) ([facility] ICD-10-CM: I71.4  ICD-9-CM: 441.4  12/3/2013        DDD (degenerative disc disease), lumbar ICD-10-CM: M51.36  ICD-9-CM: 722.52  12/3/2013        Renal cyst, right ICD-10-CM: N28.1  ICD-9-CM: 753.10  12/3/2013        Leukopenia ICD-10-CM: D72.819  ICD-9-CM: 288.50  11/13/2012    Overview Addendum 11/19/2012 11:54 AM by Richard Shaikh       There are no focal lesions in the liver or spleen. 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 CABG (9/2003): Transmyocardial laser revascularization to the       inferior wall of the left ventricle. Coronary artery bypass grafting x6.         LIMA to LAD: Sequential SVG to ramus and obtuse marginal.  Sequential SVG       to posterolateral and right PDA, SVG to diagonal.      2.  Cardiolite( 11/2012): Diaphragmatic attenuation. No ischemia. Normal       LV systolic function. EF 63%. 3.  Stress Cardiolite (1/16/15):  EF 52%. Normal perfusion. 4.  LHC (4/13/15):  6 of 6 bypass grafts patent. Small vessel disease       involving the apical LAD. Normal LV systolic function. LVEDP 27      5. LHC (5/24/18): Severe multivessel CAD. 6 of 6 grafts patent. Small       vessel disease not amendable to PCI. EF 55-60%. Chronic obstructive pulmonary disease (HCC) (4/10/2017)      Hypertension ()      Generalized abdominal pain (6/14/2019)      Pneumatosis intestinalis (6/14/2019)      Thrombocytopenia ([address] (6/14/2019)      Ventral incisional hernia (6/14/2019)      Small bowel obstruction (HCC) (6/14/2019)      Atrial fibrillation with rapid ventricular response ([address] (6/16/2019)             SBO with CT findings concerning for pneumatosis  He is s/p expl lap with 5hrs lysis, 85cm ischemic SB resection, hernia repair,   and evacuation of obstructing bezoar of (large volume of impacted delano slaw) on 6/14/19    Keep NGT until abd distention improves more or resolves  NPO/bowel rest  Start PPN today    Griffin out today with a request for nursing to record urine accurately   PT to continue with mobilization  Ambulate halls if possible    Ventral Hernia on CT  Repaired without mesh on 6/14/19    Thrombocytopenia  Stable, Chronic problem  Monitor    Afib new onset 6/15/19  Hospitalists managing    GI and VTE prophylaxis  IV pepcid and SQ Lovenox + SCDs    Hypomagnesemia (Mg 1.7 today)  Replace with 2gm IVPB mag sulfate        I have personally performed a face-to-face diagnostic evaluation and management  service on this patient. I have independently seen the patient. I have independently obtained the above history from the patient/family. I have independently examined the patient with above findings.   I have independently reviewed data/labs for this patient and developed the above plan of care (MDM). Signed: Brandi Guerin.  Polly Nesbitt MD, FACS

## 2019-06-20 NOTE — PROGRESS NOTES
Nutrition Follow Up:  Assessment:   Reason for initial assessment: Length of stay  Consult received for PPN management per nutrition support protocols 6/20 per   Assessment:   Diet:  NPO since 6/14  Food/Nutrition, Patient  and Pertinent History: Patient presented with 1 day history progressive, intermittent, generalized abdominal pain, nausea; admitted with SBO with CT findings concerning for pneumatosis. Past medical history notable for CAD, COPD, HTN, HLD. No nutrition risk factors identified on nursing admission malnutrition screening tool. Pt s/p day 6 exploratory lap, small bowel resection, hernia repair and evacuation of obstruction bezoar. Pt currently NPO with NGT to LIS-200 ml output recorded 6/19 and 200 ml output recorded n 6/20. PPN to be initiated at 1800 today; pt without central line. Abdomen: Active bowel sounds, distended; noted documented small BM on 6/19, +flatus  Pertinent Medications:  IVF: Dex 5% 0.45% sodium chlorice with KCl 20 mEq/L at 125 ml/hr provides 510 non protein kcal/day. Zosyn  Pertinent Labs:  Hypomagnesemia, Hypokalemia, Hypophosphatemia  Anthropometrics: Height: 6', weight: 96.6 kg (213#) Weight source not indicated, BMI: 28.9 c/w normal range for Older American Male. Macronutrient Needs:  EER:  2420-6605 kcal/day (20-25 kg/kg/BW)  EPR:   gm pro/day (1-1.2 gm/kg/BW)  Max CHO:  466 gm/day (4mg/kg/IBW)  Fluids:  1 ml/kcal or per MD recommendation    Intake / Comparative Standards:  NPO does not meet estimated kcal or protein needs. Nutrition Diagnosis: Altered GI function r/t alteration in GI function as evidenced by patient s/p day 6 exploratory lap-small bowel resection. Intervention:  Meals and snacks: NPO; need for bowel rest; advance as medical condition dictates per MD recommendation  Nutritional Supplement Therapy: Electrolyte replacement per nutritional support protocols are active on MAR.   PN/IVF:   1. Start PPN: 5%DEX/ 4.25%AA at 100 ml/hr with 250 ml 20% Lipids daily provides  1316 kcal/d (68% of needs), 102 grams of protein/d (100% of needs), 120 grams of CHO/d (does not exceed maximum CHO load) and 2400 ml of total volume/d ( >100% of needs). 2. Adjust IVF once PPN starts-discontinue current IVF. 3. Add TPN labs, POC Glucoses/SSI if Glucose > 180 mg/dl on AM BMP. Coordination of Nutrition Care: Duarte Ruby RN  Discharge nutrition plan:  Too soon to determine    Agustin Gonzalez, [address] [place] [address], [address] [place] [address]  [phone]

## 2019-06-20 NOTE — PROGRESS NOTES
H&P/Consult Note/Progress Note/Office Note:   Maximino Camarena  MRN: [id number]  XPZ:[id number]  Age:83 y.o.    HPI: Maximino Camarena is a 82 y.o. male who is s/p expl lap with 5hrs lysis, 85cm ischemic sb resection, hernia repair,   and evacuation of obstructing bezoar of (large volume of impacted delano slaw) on 6/14/19    He originally came to the ER on 6/14/19   with a 1 day h/o progressive, intermittent, generalized abd pain without radiation which worsened acutely around 8PM on 6/13/19  Nothing made pain better or worse. +Nausea; -V  No fever, chills, chest pain, or back pain    1990s s/p open AAA in [place] s/p lap [place] in [place]  S/p expl lap for SBO (no resection) [place] CABG x 6 2016 s/p thoracic aneurysm stent at [facility]      Surgery was consulted after below CT without contrast showed SBO findings concerning for pneumatosis       4/13/16 s/p colonoscopy; Dr Zee :  SESSILE SERRATED POLYPS. FRAGMENT OF BENIGN COLONIC MUCOSA. Electronically signed out on 4/14/2016 11:51 by Christal Pinedo MD   Microscopic Description   The biopsy demonstrates serrated hypermucinous colonic glands imbedded in a nonfibrotic lamina propria with focal crypt dilatation and widening of the crypt bases. An additional fragment demonstrates no adenomatous or hyperplastic change. Dr. Brian Rodríguez concurs. 6/14/19 CT abd/pelvis without contrast  - Liver: Within normal limits. - Gallbladder and bile ducts: The gallbladder is absent, without biliary tract  dilatation.  - Spleen: Within normal limits. - Urinary tract: Again noted is a 5.3 cm right kidney cyst. The left kidney is  unremarkable. No urinary tract stone or hydronephrosis is identified. There are  bilateral inguinal hernias, containing fat on the left and a small portion of  the urinary bladder on the right. - Adrenals: Within normal limits. - Pancreas: Within normal limits. - Gastrointestinal tract:  In the umbilicus region there is a ventral 2.8 cm  fat-containing hernia, with adjacent stranding which may relate to  incarceration. There is a mid small bowel obstruction, whose transition point is  not clearly identified, but may lie in the region of the ventral hernia although  no bowel is seen within the hernia itself. There are a few questionable areas of  small bowel wall pneumatosis. The colon is unremarkable except for  diverticulosis. No free air or free fluid is seen. - Retroperitoneum: Within normal limits. - Peritoneal cavity and abdominal wall: Within normal limits. - Pelvis: Within normal limits. - Spine/bones: No acute process. - Other comments: Partially visualized is a descending thoracic aortic stent  graft.     IMPRESSION: Mid small bowel obstruction. The transition point is not clearly  identified, but may lie in the region of a small periumbilical fat-containing  ventral hernia which may be incarcerated given the associated fat stranding. Of  note, no bowel is seen within the hernia. Additionally, there are a few  questionable areas of small bowel wall pneumatosis concerning for ischemia. ER  physician notified.       Additonal hx:  6/17/19 POD3 afib over weekend; no complaints; up in chair; interactive and conversant;  Hospitalists treating; no flatus or BM yet   6/18/19 POD4 feels OK, no complaints, +BM  6/19/19 POD5 feels OK, no sign pain isssues, abd still distended  6/20/19 POD6 many loose BMs and lost of flatus; no sign abd pain issues, distention better but persists  6/21/19 POD7 lost of flatus; some mild discomfort in RUQ, and distention better but still an issue          Past Medical History:   Diagnosis Date    Abdominal pain     kower     Acute cervical radiculopathy     Aneurysm ([address]     nov 1991AAA    Arthritis     Atrial fibrillation with rapid ventricular response ([address] 6/16/2019    CAD (coronary artery disease)     6 bipass 9/2003    Cancer ([address]     skin    COPD (chronic obstructive pulmonary disease) (HCC)     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   Left lobe: At least 4 heterogeneous nodules in the left lobe. The largest measures 2.9 x 1.1 x 2.4 cm in the lower pole, with mild peripheral color Doppler flow and possible tiny internal calcifications.     Thrombocytopenia ([address]     marrow normal slightly big spleen low mpv    Thyroid disease     hypothyroidism     Past Surgical History:   Procedure Laterality Date    CARDIAC SURG PROCEDURE UNLIST  1991    AAA    CARDIAC SURG PROCEDURE UNLIST  9/2003     6 bypass surgeries    HX CHOLECYSTECTOMY  9/2000    HX COLONOSCOPY  02/28/2013    10 polyps/ fu in 3 yrs    HX ENDOSCOPY  02/28/2013    1 biopsy    HX GI  11/2003    small bowel obstruction    HX HEART CATHETERIZATION  04/2015    HX HEENT      thyroidectomy    HX HERNIA REPAIR  09/1993    Insisional    HX LUMBAR LAMINECTOMY  5/2005    L3, L4    HX ORTHOPAEDIC  1/2006    right total knee replacement    HX ORTHOPAEDIC  8/2005     left knee replacement    HX ORTHOPAEDIC  2005    laminectomy L3-l4    HX ORTHOPAEDIC  03/1955    right knee surgery    HX OTHER SURGICAL  05/2002    upper endoscopic retrograde cholangiopancreatogram    HX OTHER SURGICAL  11/2014    removal squamous cell carcinoma    HX OTHER SURGICAL      MRSA lower back -3/2005    HX THYROIDECTOMY  08/24/2017    Dr Bella Lal    HX THYROIDECTOMY  08/24/2017    Dr. Naz Wilde  12/1981     ruptured disc in back    VASCULAR SURGERY PROCEDURE UNLIST  1991    abd aortic aneurysm repair     Current Facility-Administered Medications   Medication Dose Route Frequency    0.9% sodium chloride infusion  30 mL/hr IntraVENous CONTINUOUS    levothyroxine (SYNTHROID) injection 75 mcg  75 mcg IntraVENous Q24H    NUTRITIONAL SUPPORT ELECTROLYTE PRN ORDERS   Does Not Apply PRN    TPN ADULT - dextrose 5% amino acid 4.25%   IntraVENous CONTINUOUS    fat emulsion 20% (LIPOSYN, INTRAlipid) infusion 250 mL  250 mL IntraVENous CONTINUOUS    morphine injection 2-3 mg  2-3 mg IntraVENous Q2H PRN    enoxaparin (LOVENOX) injection 40 mg  40 mg SubCUTAneous Q24H    hydrALAZINE (APRESOLINE) 20 mg/mL injection 10 mg  10 mg IntraVENous Q6H PRN    fluticasone propionate (FLONASE) 50 mcg/actuation nasal spray 2 Spray  2 Spray Both Nostrils DAILY    sodium chloride (OCEAN) 0.65 % nasal squeeze bottle 2 Spray  2 Spray Both Nostrils Q2H PRN    phenol throat spray (CHLORASEPTIC) 1 Spray  1 Spray Oral PRN    albuterol (PROVENTIL VENTOLIN) nebulizer solution 2.5 mg  2.5 mg Nebulization Q6H PRN    metoprolol (LOPRESSOR) injection 5 mg  5 mg IntraVENous Q6H    piperacillin-tazobactam (ZOSYN) 3.375 g in 0.9% sodium chloride (MBP/ADV) 100 mL  3.375 g IntraVENous Q8H    famotidine (PF) (PEPCID) injection 20 mg  20 mg IntraVENous Q12H    acetaminophen (TYLENOL) tablet 1,000 mg  1,000 mg Oral Q6H PRN    ondansetron (ZOFRAN) injection 4 mg  4 mg IntraVENous Q4H PRN    white petrolatum-mineral oil (AKWA TEARS) 83-15 % ophthalmic ointment   Both Eyes PRN     Diphenhydramine hcl; Ativan [lorazepam];  Iodinated contrast- oral and iv dye; and Sulfa (sulfonamide antibiotics)  Social History     Socioeconomic History    Marital status:      Spouse name: Not on file    Number of children: Not on file    Years of education: Not on file    Highest education level: Not on file   Tobacco Use    Smoking status: Former Smoker     Packs/day: 1.00     Years: 25.00     Pack years: 25.00     Last attempt to quit: 12/3/1987     Years since quittin.5    Smokeless tobacco: Never Used   Substance and Sexual Activity    Alcohol use: Yes     Comment: occasional glass wine    Drug use: No     Social History     Tobacco Use   Smoking Status Former Smoker    Packs/day: 1.00    Years: 25.00    Pack years: 25.00    Last attempt to quit: 12/3/1987    Years since quittin.5   Smokeless Tobacco Never Used     Family History   Problem Relation Age of Onset    Heart Disease Father     Cancer Mother         colon    Cancer Sister     Cancer Maternal Aunt     Cancer Paternal Grandmother     Diabetes Paternal Grandmother     Thyroid Disease Neg Hx     Thyroid Cancer Neg Hx      ROS: The patient has no difficulty with chest pain or shortness of breath. No fever or chills. Comprehensive review of systems was otherwise unremarkable except as noted above. Physical Exam:   Visit Vitals  /67   Pulse 75   Temp 98.2 °F (36.8 °C)   Resp 18   Ht 6' (1.829 m)   Wt 213 lb (96.6 kg)   SpO2 99%   BMI 28.89 kg/m²     Vitals:    19 1607 19 1944 19 2345 19 0336   BP: 151/85 163/78 164/73 163/67   Pulse: 67 64 63 75   Resp: 16 18 18 18   Temp: 97.8 °F (36.6 °C) 98.2 °F (36.8 °C) 98 °F (36.7 °C) 98.2 °F (36.8 °C)   SpO2: 94% 94% 98% 99%   Weight:       Height:          1901 -  0700  In: 4662 [I.V.:1798]  Out: 1695 [Urine:925; Drains:120]   0701 -  1900  In: 1838 [I.V.:1838]  Out: 2195 [Urine:1500; Drains:295]    Constitutional: Alert, oriented, cooperative patient in no acute distress; appears stated age    Eyes:Sclera are clear. EOMs intact  ENMT: no external lesions gross hearing normal; no obvious neck masses, no ear or lip lesions, nares normal; +NGT with bilious output  CV: RRR. Normal perfusion  Resp: No JVD. Breathing is  non-labored; no audible wheezing.       GI: Incision clear; 2 beatriz out on 19 (will not replace), Bryce drain serosang, less distention       Musculoskeletal: unremarkable with normal function. No embolic signs or cyanosis. Palpable pedal pulses bilat   Neuro:  Oriented; moves all 4; no focal deficits  Psychiatric: normal affect and mood, no memory impairment    Recent vitals (if inpt):  Patient Vitals for the past 24 hrs:   BP Temp Pulse Resp SpO2   06/21/19 0336 163/67 98.2 °F (36.8 °C) 75 18 99 %   06/20/19 2345 164/73 98 °F (36.7 °C) 63 18 98 %   06/20/19 1944 163/78 98.2 °F (36.8 °C) 64 18 94 %   06/20/19 1607 151/85 97.8 °F (36.6 °C) 67 16 94 %   06/20/19 1322 168/87 98.6 °F (37 °C) 72 18 96 %   06/20/19 1136 164/79 98.5 °F (36.9 °C) 69 16 98 %   06/20/19 0730 162/82 98.1 °F (36.7 °C) 66 18 96 %       Labs:  Recent Labs     06/21/19  0529   WBC 4.9   HGB 9.3*   *      K 4.3      CO2 26   BUN 10   CREA 0.63*   *       Lab Results   Component Value Date/Time    WBC 4.9 06/21/2019 05:29 AM    HGB 9.3 (L) 06/21/2019 05:29 AM    PLATELET 127 (L) 37/11/1880 05:29 AM    Sodium 136 06/21/2019 05:29 AM    Potassium 4.3 06/21/2019 05:29 AM    Chloride 103 06/21/2019 05:29 AM    CO2 26 06/21/2019 05:29 AM    BUN 10 06/21/2019 05:29 AM    Creatinine 0.63 (L) 06/21/2019 05:29 AM    Glucose 117 (H) 06/21/2019 05:29 AM    INR 1.1 05/24/2018 07:05 AM    aPTT 26.5 04/07/2015 04:25 PM    Bilirubin, total 1.5 (H) 06/14/2019 03:35 AM    AST (SGOT) 20 06/14/2019 03:35 AM    ALT (SGPT) 27 06/14/2019 03:35 AM    Alk. phosphatase 65 06/14/2019 03:35 AM    Troponin-I, Qt. 0.02 06/15/2019 11:52 PM       CT Results  (Last 48 hours)    None        chest X-ray      I reviewed recent labs, recent radiologic studies, and pertinent records including other doctor notes if needed. I independently reviewed radiology images for studies I described above or studies I have ordered.    Admission date (for inpatients): 6/14/2019   * No surgery found *  Procedure(s):  EXPLORATORY LAPAROTOMY   Mult enterorrhaphies approx 85cm small bowel resect and anast drain placement hernia repair (no mesh) removal peritoneal FB Removal of Bozoar materail in SB    ASSESSMENT/PLAN:  Problem List  Date Reviewed: 6/14/2019          Codes Class Noted    Atrial fibrillation with rapid ventricular response ([facility] ICD-10-CM: I48.91  ICD-9-CM: 427.31  6/16/2019        * (Principal) SBO (small bowel obstruction) ([facility] ICD-10-CM: K56.609  ICD-9-CM: 560.9  6/14/2019        Generalized abdominal pain ICD-10-CM: R10.84  ICD-9-CM: 789.07  6/14/2019        Pneumatosis intestinalis ICD-10-CM: K63.89  ICD-9-CM: 569.89  6/14/2019        Thrombocytopenia ([facility] ICD-10-CM: D69.6  ICD-9-CM: 287.5  6/14/2019        Ventral incisional hernia ICD-10-CM: K43.2  ICD-9-CM: 553.21  6/14/2019        Small bowel obstruction ([facility] ICD-10-CM: K56.609  ICD-9-CM: 560.9  6/14/2019        Hypertension ICD-10-CM: I10  ICD-9-CM: 401.9  Unknown        High cholesterol ICD-10-CM: E78.00  ICD-9-CM: 272.0  Unknown        GERD (gastroesophageal reflux disease) ICD-10-CM: K21.9  ICD-9-CM: 530.81  Unknown        Arthritis ICD-10-CM: M19.90  ICD-9-CM: 716.90  Unknown        Postsurgical hypothyroidism ICD-10-CM: E89.0  ICD-9-CM: 244.0  4/11/2017        Chronic obstructive pulmonary disease (HCC) (Chronic) ICD-10-CM: J44.9  ICD-9-CM: 038  4/10/2017        DDD (degenerative disc disease), cervical ICD-10-CM: M50.30  ICD-9-CM: 722.4  3/21/2017        S/P CABG (coronary artery bypass graft) ICD-10-CM: Z95.1  ICD-9-CM: V45.81  12/29/2016        Coronary artery disease of bypass graft of native heart with stable angina pectoris [facility]) ICD-10-CM: C23.707  ICD-9-CM: 414.05, 413.9  8/23/2016    Overview Addendum 6/29/2018 12:32 PM by Trinity Chauhan MD     1. CABG (9/2003): Transmyocardial laser revascularization to the inferior wall of the left ventricle. Coronary artery bypass grafting x6.   LIMA to LAD: Sequential SVG to ramus and obtuse marginal.  Sequential SVG to posterolateral and right PDA, SVG to diagonal.  2.  Cardiolite( 11/2012): Diaphragmatic attenuation. No ischemia. Normal LV systolic function. EF 63%. 3.  Stress Cardiolite (1/16/15):  EF 52%. Normal perfusion. 4.  LHC (4/13/15):  6 of 6 bypass grafts patent. Small vessel disease involving the apical LAD. Normal LV systolic function. LVEDP 27  5. LHC (5/24/18): Severe multivessel CAD. 6 of 6 grafts patent. Small vessel disease not amendable to PCI. EF 55-60%. Chronic seasonal allergic rhinitis due to pollen (Chronic) ICD-10-CM: J30.1  ICD-9-CM: 477.0  8/23/2016        Dyslipidemia ICD-10-CM: E78.5  ICD-9-CM: 272.4  8/23/2016        Essential hypertension with goal blood pressure less than 130/85 ICD-10-CM: I10  ICD-9-CM: 401.9  8/23/2016        Diverticulitis of colon (without mention of hemorrhage)(562.11) ICD-10-CM: K57.32  ICD-9-CM: 562.11  12/3/2013        AAA (abdominal aortic aneurysm) ([facility] ICD-10-CM: I71.4  ICD-9-CM: 441.4  12/3/2013        DDD (degenerative disc disease), lumbar ICD-10-CM: M51.36  ICD-9-CM: 722.52  12/3/2013        Renal cyst, right ICD-10-CM: N28.1  ICD-9-CM: 753.10  12/3/2013        Leukopenia ICD-10-CM: D72.819  ICD-9-CM: 288.50  11/13/2012    Overview Addendum 11/19/2012 11:54 AM by Renee Boyd       There are no focal lesions in the liver or spleen. 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 CABG (9/2003): Transmyocardial laser revascularization to the       inferior wall of the left ventricle. Coronary artery bypass grafting x6. LIMA to LAD: Sequential SVG to ramus and obtuse marginal.  Sequential SVG       to posterolateral and right PDA, SVG to diagonal.      2.  Cardiolite( 11/2012): Diaphragmatic attenuation. No ischemia. Normal       LV systolic function. EF 63%. 3.  Stress Cardiolite (1/16/15):  EF 52%. Normal perfusion. 4.  LHC (4/13/15):  6 of 6 bypass grafts patent. Small vessel disease       involving the apical LAD. Normal LV systolic function. LVEDP 27      5. LHC (5/24/18): Severe multivessel CAD. 6 of 6 grafts patent. Small       vessel disease not amendable to PCI. EF 55-60%. Chronic obstructive pulmonary disease (HCC) (4/10/2017)      Hypertension ()      Generalized abdominal pain (6/14/2019)      Pneumatosis intestinalis (6/14/2019)      Thrombocytopenia ([address] (6/14/2019)      Ventral incisional hernia (6/14/2019)      Small bowel obstruction (HCC) (6/14/2019)      Atrial fibrillation with rapid ventricular response ([address] (6/16/2019)             SBO with CT findings concerning for pneumatosis  He is s/p expl lap with 5hrs lysis, 85cm ischemic SB resection, hernia repair,   and evacuation of obstructing bezoar of (large volume of impacted delano slaw) on 6/14/19    Keep NGT until abd distention resolves  He has a tenuous SB anastomosis in RUQ and had a lot of ischemia at time of surgery   Check KUB this am  NPO/bowel rest  PPN started 6/20/19    Voiding without Folety  Griffin out on 6/20/19 with a request for nursing to record urine accurately   PT to continue with mobilization  Ambulate halls if possible      Ventral Hernia on CT  Repaired without mesh on 6/14/19    Thrombocytopenia  Stable, Chronic problem  Monitor    Afib new onset 6/15/19  Hospitalists managing and discontinued cardiac monitoring on Thursday 6/20/19    GI and VTE prophylaxis  IV pepcid and SQ Lovenox + SCDs          I have personally performed a face-to-face diagnostic evaluation and management  service on this patient.       I have independently seen the patient. I have independently obtained the above history from the patient/family. I have independently examined the patient with above findings. I have independently reviewed data/labs for this patient and developed the above plan of care (MDM). Signed: Frederic Sanchez.  Anca Cameron MD, FACS

## 2019-06-21 ENCOUNTER — APPOINTMENT (OUTPATIENT)
Dept: GENERAL RADIOLOGY | Age: 84
DRG: 329 | End: 2019-06-21
Attending: SURGERY
Payer: MEDICARE

## 2019-06-21 LAB
ANION GAP SERPL CALC-SCNC: 7 MMOL/L (ref 7–16)
BUN SERPL-MCNC: 10 MG/DL (ref 8–23)
CALCIUM SERPL-MCNC: 7.6 MG/DL (ref 8.3–10.4)
CHLORIDE SERPL-SCNC: 103 MMOL/L (ref 98–107)
CO2 SERPL-SCNC: 26 MMOL/L (ref 21–32)
CREAT SERPL-MCNC: 0.63 MG/DL (ref 0.8–1.5)
ERYTHROCYTE [DISTWIDTH] IN BLOOD BY AUTOMATED COUNT: 13.3 % (ref 11.9–14.6)
GLUCOSE SERPL-MCNC: 117 MG/DL (ref 65–100)
HCT VFR BLD AUTO: 25.7 % (ref 41.1–50.3)
HGB BLD-MCNC: 9.3 G/DL (ref 13.6–17.2)
MAGNESIUM SERPL-MCNC: 2.3 MG/DL (ref 1.8–2.4)
MCH RBC QN AUTO: 34.1 PG (ref 26.1–32.9)
MCHC RBC AUTO-ENTMCNC: 36.2 G/DL (ref 31.4–35)
MCV RBC AUTO: 94.1 FL (ref 79.6–97.8)
NRBC # BLD: 0 K/UL (ref 0–0.2)
PHOSPHATE SERPL-MCNC: 3.2 MG/DL (ref 2.3–3.7)
PLATELET # BLD AUTO: 136 K/UL (ref 150–450)
PMV BLD AUTO: 8.7 FL (ref 9.4–12.3)
POTASSIUM SERPL-SCNC: 4.3 MMOL/L (ref 3.5–5.1)
RBC # BLD AUTO: 2.73 M/UL (ref 4.23–5.6)
SODIUM SERPL-SCNC: 136 MMOL/L (ref 136–145)
TRIGL SERPL-MCNC: 131 MG/DL (ref 35–150)
WBC # BLD AUTO: 4.9 K/UL (ref 4.3–11.1)

## 2019-06-21 PROCEDURE — 74011000250 HC RX REV CODE- 250: Performed by: SURGERY

## 2019-06-21 PROCEDURE — 77030019605

## 2019-06-21 PROCEDURE — 85027 COMPLETE CBC AUTOMATED: CPT

## 2019-06-21 PROCEDURE — 84100 ASSAY OF PHOSPHORUS: CPT

## 2019-06-21 PROCEDURE — 74011250637 HC RX REV CODE- 250/637: Performed by: FAMILY MEDICINE

## 2019-06-21 PROCEDURE — 83735 ASSAY OF MAGNESIUM: CPT

## 2019-06-21 PROCEDURE — 74011000250 HC RX REV CODE- 250: Performed by: INTERNAL MEDICINE

## 2019-06-21 PROCEDURE — 77030020263 HC SOL INJ SOD CL0.9% LFCR 1000ML

## 2019-06-21 PROCEDURE — 65270000029 HC RM PRIVATE

## 2019-06-21 PROCEDURE — 74018 RADEX ABDOMEN 1 VIEW: CPT

## 2019-06-21 PROCEDURE — 36415 COLL VENOUS BLD VENIPUNCTURE: CPT

## 2019-06-21 PROCEDURE — 74011250636 HC RX REV CODE- 250/636: Performed by: INTERNAL MEDICINE

## 2019-06-21 PROCEDURE — 97110 THERAPEUTIC EXERCISES: CPT

## 2019-06-21 PROCEDURE — 84478 ASSAY OF TRIGLYCERIDES: CPT

## 2019-06-21 PROCEDURE — 74011000258 HC RX REV CODE- 258: Performed by: SURGERY

## 2019-06-21 PROCEDURE — 77030032490 HC SLV COMPR SCD KNE COVD -B

## 2019-06-21 PROCEDURE — 74011250636 HC RX REV CODE- 250/636: Performed by: SURGERY

## 2019-06-21 PROCEDURE — 80048 BASIC METABOLIC PNL TOTAL CA: CPT

## 2019-06-21 RX ORDER — SODIUM CHLORIDE 9 MG/ML
30 INJECTION, SOLUTION INTRAVENOUS CONTINUOUS
Status: DISCONTINUED | OUTPATIENT
Start: 2019-06-21 | End: 2019-06-24

## 2019-06-21 RX ORDER — HYDRALAZINE HYDROCHLORIDE 20 MG/ML
20 INJECTION INTRAMUSCULAR; INTRAVENOUS
Status: DISCONTINUED | OUTPATIENT
Start: 2019-06-21 | End: 2019-06-26 | Stop reason: HOSPADM

## 2019-06-21 RX ORDER — GABAPENTIN 400 MG/1
400 CAPSULE ORAL ONCE
Status: COMPLETED | OUTPATIENT
Start: 2019-06-21 | End: 2019-06-21

## 2019-06-21 RX ADMIN — METOPROLOL TARTRATE 5 MG: 5 INJECTION INTRAVENOUS at 08:44

## 2019-06-21 RX ADMIN — FLUTICASONE PROPIONATE 2 SPRAY: 50 SPRAY, METERED NASAL at 08:38

## 2019-06-21 RX ADMIN — POTASSIUM CHLORIDE: 2 INJECTION, SOLUTION, CONCENTRATE INTRAVENOUS at 17:50

## 2019-06-21 RX ADMIN — PIPERACILLIN SODIUM,TAZOBACTAM SODIUM 3.38 G: 3; .375 INJECTION, POWDER, FOR SOLUTION INTRAVENOUS at 14:22

## 2019-06-21 RX ADMIN — PIPERACILLIN SODIUM,TAZOBACTAM SODIUM 3.38 G: 3; .375 INJECTION, POWDER, FOR SOLUTION INTRAVENOUS at 08:47

## 2019-06-21 RX ADMIN — SODIUM CHLORIDE 30 ML/HR: 900 INJECTION, SOLUTION INTRAVENOUS at 06:46

## 2019-06-21 RX ADMIN — PIPERACILLIN SODIUM,TAZOBACTAM SODIUM 3.38 G: 3; .375 INJECTION, POWDER, FOR SOLUTION INTRAVENOUS at 23:49

## 2019-06-21 RX ADMIN — LEVOTHYROXINE SODIUM 75 MCG: 100 INJECTION, POWDER, LYOPHILIZED, FOR SOLUTION INTRAVENOUS at 08:37

## 2019-06-21 RX ADMIN — METOPROLOL TARTRATE 5 MG: 5 INJECTION INTRAVENOUS at 03:28

## 2019-06-21 RX ADMIN — FAMOTIDINE 20 MG: 10 INJECTION, SOLUTION INTRAVENOUS at 08:43

## 2019-06-21 RX ADMIN — GABAPENTIN 400 MG: 400 CAPSULE ORAL at 23:50

## 2019-06-21 RX ADMIN — HYDRALAZINE HYDROCHLORIDE 20 MG: 20 INJECTION INTRAMUSCULAR; INTRAVENOUS at 16:26

## 2019-06-21 RX ADMIN — SOYBEAN OIL 250 ML: 20 INJECTION, SOLUTION INTRAVENOUS at 17:51

## 2019-06-21 RX ADMIN — FAMOTIDINE 20 MG: 10 INJECTION, SOLUTION INTRAVENOUS at 21:04

## 2019-06-21 RX ADMIN — ENOXAPARIN SODIUM 40 MG: 40 INJECTION SUBCUTANEOUS at 08:41

## 2019-06-21 RX ADMIN — METOPROLOL TARTRATE 5 MG: 5 INJECTION INTRAVENOUS at 21:12

## 2019-06-21 RX ADMIN — METOPROLOL TARTRATE 5 MG: 5 INJECTION INTRAVENOUS at 14:21

## 2019-06-21 NOTE — PROGRESS NOTES
Problem: Mobility Impaired (Adult and Pediatric)  Goal: *Acute Goals and Plan of Care (Insert Text)  Description    LTG:  (1.)Mr. Cristina will move from supine to sit and sit to supine  in bed with STAND BY ASSIST within 7 treatment day(s). (2.)Mr. Doroteo Anderson will transfer from bed to chair and chair to bed with SUPERVISION using the least restrictive device within 7 treatment day(s). (3.)Mr. Doroteo Anderson will ambulate with MODIFIED INDEPENDENCE for 300 feet with the least restrictive device within 7 treatment day(s). ________________________________________________________________________________________________   Outcome: Progressing Towards Goal    PHYSICAL THERAPY: Daily Note and AM 6/21/2019  INPATIENT: PT Visit Days : 5  [place] / [place] / Product Type: Medicare /       NAME/AGE/GENDER: Gene Dewey Moritz is a 82 y.o. male   PRIMARY DIAGNOSIS: SBO (small bowel obstruction) ([facility] [K56.609]  Small bowel obstruction ([facility] [K56.609] SBO (small bowel obstruction) (HCC)   SBO (small bowel obstruction) ([facility])    Procedure(s) (LRB):  EXPLORATORY LAPAROTOMY   Mult enterorrhaphies approx 85cm small bowel resect and anast drain placement hernia repair (no mesh) removal peritoneal FB Removal of Bozoar materail in SB (N/A)  7 Days Post-Op  ICD-10: Treatment Diagnosis:    · Generalized Muscle Weakness (M62.81)  · Other abnormalities of gait and mobility (R26.89)   Precaution/Allergies:  Diphenhydramine hcl; Ativan [lorazepam]; Iodinated contrast- oral and iv dye; and Sulfa (sulfonamide antibiotics)      ASSESSMENT:     Mr. Doroteo Anderson presents with limited independence with gait and transfers as well as bed mobility due to abdominal surgery. He did well with therapy today. Needs a rolling walker to ambulate and may need some follow up therapy based on his progression.     6/19 am supine upon arrival.  Works on bed mobility as follows: supine>EOB with CGA, walks 140 ft using RW with CGA and no LOB.  Return to his chair and performs exercises without any problems. He return to supine with call light near. 6/21 he did not sleep well and have been getting up/down, so he is tired. Agreeable to exercises in the chair. This section established at most recent assessment   PROBLEM LIST (Impairments causing functional limitations):  1. Decreased Strength  2. Decreased Transfer Abilities  3. Decreased Ambulation Ability/Technique  4. Decreased Balance  5. Decreased Activity Tolerance   INTERVENTIONS PLANNED: (Benefits and precautions of physical therapy have been discussed with the patient.)  1. Bed Mobility  2. Gait Training  3. Therapeutic Exercise/Strengthening  4. Transfer Training     TREATMENT PLAN: Frequency/Duration: daily for duration of hospital stay  Rehabilitation Potential For Stated Goals: Good     REHAB RECOMMENDATIONS (at time of discharge pending progress):    Placement: It is my opinion, based on this patient's performance to date, that Mr. Opal Santos may benefit from [address] after discharge due to the functional deficits listed above that are likely to improve with skilled rehabilitation   Equipment:    To be determined    None at this time              HISTORY:   History of Present Injury/Illness (Reason for Referral):  Patient is an 81 y/o male with hx CAD, CABG, COPD, HTN, HLD who underwent expl lap yesterday on 6/14 for SBO. He went into new onset rapid afib tonight at approx 1020pm with rate to 150s-160s. He denies chest pain or shortness of breath. He believes he may have had an episode of afib when he had his 6 v CABG in 2003. Hospitalist service consulted for new onset rapid atrial fibrillation. 6/16- seen with wife and family members at bedside. He is in good spirits and denies concerns. Cardizem infusing at low rate, but he converted back to sinus rhythm early this AM.  He denies chest pain or shortness of breath. Has not had flatus or BM yet.   6/17- significant confusion overnight that worsened with ativan. Required haldol. Improved now and oriented x 3 but confused some in conversation. Past Medical History/Comorbidities:   Mr. Gale  has a past medical history of Abdominal pain, Acute cervical radiculopathy, Aneurysm ([address] Arthritis, Atrial fibrillation with rapid ventricular response ([address] (6/16/2019), CAD (coronary artery disease), Cancer ([address] COPD (chronic obstructive pulmonary disease) ([address] Descending thoracic aortic aneurysm ([address] (10/17/14), GERD (gastroesophageal reflux disease), High cholesterol, Hypertension, Ill-defined condition, Mild degeneration of cervical intervertebral disc, MRSA (methicillin resistant Staphylococcus aureus), Multiple thyroid nodules (4/11/2017), Thrombocytopenia ([address] and Thyroid disease. He also has no past medical history of Asthma, Chronic kidney disease, Difficult intubation, Heart failure ([address] Liver disease, Malignant hyperthermia due to anesthesia, Nausea & vomiting, Pseudocholinesterase deficiency, Psychiatric disorder, PUD (peptic ulcer disease), Seizures ([address] Sleep apnea, or Thromboembolus ([address] Mr. Gale  has a past surgical history that includes pr cardiac surg procedure unlist (1991); pr cardiac surg procedure unlist (9/2003); hx cholecystectomy (9/2000); pr neurological procedure unlisted (12/1981); hx hernia repair (09/1993); hx gi (11/2003); hx lumbar laminectomy (5/2005); hx other surgical (05/2002); hx colonoscopy (02/28/2013); hx endoscopy (02/28/2013); vascular surgery procedure unlist (1991); hx orthopaedic (1/2006); hx orthopaedic (8/2005); hx orthopaedic (2005); hx heart catheterization (04/2015); hx other surgical (11/2014); hx other surgical; hx orthopaedic (03/1955); hx thyroidectomy (08/24/2017); hx thyroidectomy (08/24/2017); and hx heent.   Social History/Living Environment:   Home Environment: Private residence  # Steps to Enter: 3  One/Two Story Residence: Two story  # of Interior Steps: 11  Height of Each Step (in): 11 inches  Interior Rails: Right  Lift Chair Available: No  Living Alone: No  Support Systems: Child(kim), Family member(s)  Patient Expects to be Discharged to[de-identified] Private residence  Current DME Used/Available at Home: Glucometer  Prior Level of Function/Work/Activity:  Independent prior to admit. Number of Personal Factors/Comorbidities that affect the Plan of Care: 1-2: MODERATE COMPLEXITY   EXAMINATION:   Most Recent Physical Functioning:   Gross Assessment:                  Posture:     Balance:    Bed Mobility:     Wheelchair Mobility:     Transfers:     Gait:     Duration: 0 Minutes      Body Structures Involved:  1. Muscles Body Functions Affected:  1. Movement Related Activities and Participation Affected:  1. Mobility   Number of elements that affect the Plan of Care: 3: MODERATE COMPLEXITY   CLINICAL PRESENTATION:   Presentation: Stable and uncomplicated: LOW COMPLEXITY   CLINICAL DECISION MAKIN[address] [place] [id number] AM-PAC 6 Clicks   Basic Mobility Inpatient Short Form  How much difficulty does the patient currently have. .. Unable A Lot A Little None   1. Turning over in bed (including adjusting bedclothes, sheets and blankets)? ? 1   ? 2   ? 3   ? 4   2. Sitting down on and standing up from a chair with arms ( e.g., wheelchair, bedside commode, etc.)   ? 1   ? 2   ? 3   ? 4   3. Moving from lying on back to sitting on the side of the bed?   ? 1   ? 2   ? 3   ? 4   How much help from another person does the patient currently need. .. Total A Lot A Little None   4. Moving to and from a bed to a chair (including a wheelchair)? ? 1   ? 2   ? 3   ? 4   5. Need to walk in hospital room? ? 1   ? 2   ? 3   ? 4   6. Climbing 3-5 steps with a railing? ? 1   ? 2   ? 3   ? 4   © 2007, Trustees of [address] [place] [address] [address] [address], under license to [employer].  All rights reserved      Score:  Initial: 18 Most Recent: X (Date: -- )    Interpretation of Tool: Represents activities that are increasingly more difficult (i.e. Bed mobility, Transfers, Gait). Medical Necessity:     · Patient is expected to demonstrate progress in strength and range of motion  ·  to increase independence with gait and transfers   · . Reason for Services/Other Comments:  · Patient continues to require skilled intervention due to limited functional independence   · . Use of outcome tool(s) and clinical judgement create a POC that gives a: Clear prediction of patient's progress: LOW COMPLEXITY            TREATMENT:   (In addition to Assessment/Re-Assessment sessions the following treatments were rendered)   Pre-treatment Symptoms/Complaints:   Pain: Initial:   Pain Intensity 1: 0 Post Session:       Gait Training (0 Minutes):  Gait training to improve and/or restore physical functioning as related to mobility. Ambulated   with   using a   and minimal   Therapeutic Exercise: (15 Minutes):  Exercises per grid below to improve mobility. Required minimal verbal cues to promote proper body alignment. Progressed range as indicated. Date:  6/18   Date:  6/19   Date:  6/21   Activity/Exercise Parameters Parameters Parameters   Toe/heel raise 12 12 12   Hip abd/add 12 12 12   Marching in place 12 12 12   LAQ 12 12 12   squats   12                       Braces/Orthotics/Lines/Etc:   · IV  · reid catheter  · drain UMER   · nasogastric tube  · Multiple ICU lines   · O2 Device: Nasal cannula  Treatment/Session Assessment:    · Response to Treatment: Pt doing well. · Interdisciplinary Collaboration:   o Physical Therapist  o Registered Nurse  · After treatment position/precautions:   o Up in chair  o Bed/Chair-wheels locked  o Bed in low position  o Call light within reach  o RN notified  o Family at bedside   · Compliance with Program/Exercises: Compliant all of the time, Will assess as treatment progresses  · Recommendations/Intent for next treatment session:   \"Next visit will focus on advancements to more challenging activities and reduction in assistance provided\".   Total Treatment Duration:  PT Patient Time In/Time Out  Time In: 1130  Time Out: [address], [place]

## 2019-06-21 NOTE — PROGRESS NOTES
Patient awake in bed this AM, slept for short interval overnight. Offers no complaints. Will monitor.

## 2019-06-21 NOTE — PROGRESS NOTES
Nutrition Follow Up:  Assessment:   Reason for initial assessment: Length of stay  Consult received for PPN management per nutrition support protocols 6/20 per   Assessment:   Diet:  NPO since 6/14  PPN:  Initiated 6/20:  Pt does not have a central line. Food/Nutrition, Patient  and Pertinent History: Patient presented with 1 day history progressive, intermittent, generalized abdominal pain, nausea; admitted with SBO with CT findings concerning for pneumatosis. Past medical history notable for CAD, COPD, HTN, HLD. No nutrition risk factors identified on nursing admission malnutrition screening tool. Pt s/p day 7 exploratory lap, small bowel resection, hernia repair and evacuation of obstruction bezoar. Pt remains dependent on PPN;  currently NPO with NGT to LIS-350 ml output recorded so far 6/21 and 200 ml output recorded on 6/20. Abdomen: Active bowel sounds, distention improving but still an issue- Plans to keep NGT until abdominal distention resolves; noted documented small BM on 6/19-small, watery. Pertinent Medications:  IVF: 0.9% sodium chloride at 30 ml/hr-initiated 6/21. Zosyn  Pertinent Labs:  Hypomagnesemia-resolved, Hypokalemia resolved, Hypophosphatemia resolved. Anthropometrics: Height: 6', weight: 96.6 kg (213#) Weight source not indicated, BMI: 28.9 c/w normal range for Older American Male. Macronutrient Needs:  EER:  2725-3242 kcal/day (20-25 kg/kg/BW)  EPR:   gm pro/day (1-1.2 gm/kg/BW)  Max CHO:  466 gm/day (4mg/kg/IBW)  Fluids:  1 ml/kcal or per MD recommendation    Intake / Comparative Standards:  NPO does not meet estimated kcal or protein needs. Intervention:  Meals and snacks: NPO; need for bowel rest; advance as medical condition dictates per MD recommendation  Nutritional Supplement Therapy: Electrolyte replacement per nutritional support protocols are active on MAR. PN/IVF:   1.  Continue PPN: 5%DEX/ 4.25%AA at 100 ml/hr with 250 ml 20% Lipids daily provides 1316 kcal/d (68% of needs), 102 grams of protein/d (100% of needs), 120 grams of CHO/d (does not exceed maximum CHO load) and 2400 ml of total volume/d ( >100% of needs). Additives: 15 mEq/L sodium chloride, 30 mEq/L sodium phosphate, 50 mEq/L potassium chloride and 5mEq/L magnesium sulfate. PPN does not contain calcium gluconate. 2. IVF 0.9% sodium chloride at 30 ml/hr . 3. Continue TPN labs, POC Glucoses/SSI if Glucose > 180 mg/dl on AM BMP. Coordination of Nutrition Care: Avery Arita RN  Discharge nutrition plan:  Too soon to determine    [facility], [address] [place] [address]  [phone]

## 2019-06-21 NOTE — PROGRESS NOTES
Pt resting in bed and is alert and oriented x 4. he denies pain and is on room air. RR even and unlabored. IVF infusing. NG to LIS. Abdominal midline dressing c/d/i. UMER patent and draining. Call light in reach and pt instructed to call for assistance if needed. Will monitor. Family at bedside.

## 2019-06-21 NOTE — PROGRESS NOTES
Hospitalist Progress Note    2019  Admit Date: 2019  3:14 AM   NAME: Valentina Fields   :  1935   MRN:  [id number]   Attending: Soraya Montalvo MD  PCP:  Ron Bhatti MD    SUBJECTIVE:   Patient is an 83 y/o male with hx CAD, CABG, COPD, HTN, HLD who underwent expl lap on  for SBO. He went into new onset rapid afib with rate to 150s-160s. He believes he may have had an episode of afib when he had his 6 v CABG in . Hospitalist service consulted for new onset rapid atrial fibrillation. He converted to sinus rhythm quickly. Started on lopressor 5 mg IV q 6 hours and has been maintained in sinus rhythm. Started PPN  per general surgery. He did have an episode of delirium/agitation after he received ativan in the ICU. Transferred to floor. Remained in NSR so monitor discontinued.  - pt feeling OK. Still frustrated by lack of progress. Remains in NSR. No CP, SOB. PHYSICAL EXAM       Patient Vitals for the past 24 hrs:   Temp Pulse Resp BP SpO2   19 0748 98 °F (36.7 °C) 63 16 155/77 95 %   19 0336 98.2 °F (36.8 °C) 75 18 163/67 99 %   19 2345 98 °F (36.7 °C) 63 18 164/73 98 %   19 1944 98.2 °F (36.8 °C) 64 18 163/78 94 %   19 1607 97.8 °F (36.6 °C) 67 16 151/85 94 %   19 1322 98.6 °F (37 °C) 72 18 168/87 96 %   19 1136 98.5 °F (36.9 °C) 69 16 164/79 98 %       Oxygen Therapy  O2 Sat (%): 95 % (19 0748)  Pulse via Oximetry: 78 beats per minute (19 2043)  O2 Device: Room air (19 0730)  O2 Flow Rate (L/min): 2 l/min (19 0848)  FIO2 (%): 28 % (19 1035)    Intake/Output Summary (Last 24 hours) at 2019 1043  Last data filed at 2019 0754  Gross per 24 hour   Intake 1798 ml   Output 1960 ml   Net -162 ml      General: No acute distress.   NTG in place with copious bilious green material in bucket  Heart:  Regular rate and rhythm,  No murmur, rub, or gallop  Extremities: No cyanosis, clubbing or edema  Neurologic:  No focal deficits    Recent Results (from the past 24 hour(s))   CBC W/O DIFF    Collection Time: 06/21/19  5:29 AM   Result Value Ref Range    WBC 4.9 4.3 - 11.1 K/uL    RBC 2.73 (L) 4.23 - 5.6 M/uL    HGB 9.3 (L) 13.6 - 17.2 g/dL    HCT 25.7 (L) 41.1 - 50.3 %    MCV 94.1 79.6 - 97.8 FL    MCH 34.1 (H) 26.1 - 32.9 PG    MCHC 36.2 (H) 31.4 - 35.0 g/dL    RDW 13.3 11.9 - 14.6 %    PLATELET 479 (L) 069 - 450 K/uL    MPV 8.7 (L) 9.4 - 12.3 FL    ABSOLUTE NRBC 0.00 0.0 - 0.2 K/uL   METABOLIC PANEL, BASIC    Collection Time: 06/21/19  5:29 AM   Result Value Ref Range    Sodium 136 136 - 145 mmol/L    Potassium 4.3 3.5 - 5.1 mmol/L    Chloride 103 98 - 107 mmol/L    CO2 26 21 - 32 mmol/L    Anion gap 7 7 - 16 mmol/L    Glucose 117 (H) 65 - 100 mg/dL    BUN 10 8 - 23 MG/DL    Creatinine 0.63 (L) 0.8 - 1.5 MG/DL    GFR est AA >60 >60 ml/min/1.73m2    GFR est non-AA >60 >60 ml/min/1.73m2    Calcium 7.6 (L) 8.3 - 10.4 MG/DL   MAGNESIUM    Collection Time: 06/21/19  5:29 AM   Result Value Ref Range    Magnesium 2.3 1.8 - 2.4 mg/dL   PHOSPHORUS    Collection Time: 06/21/19  5:29 AM   Result Value Ref Range    Phosphorus 3.2 2.3 - 3.7 MG/DL   TRIGLYCERIDE    Collection Time: 06/21/19  5:29 AM   Result Value Ref Range    Triglyceride 131 35 - 150 MG/DL     Imaging:    CT ABD PELV WO CONT   Final Result   IMPRESSION: Mid small bowel obstruction. The transition point is not clearly   identified, but may lie in the region of a small periumbilical fat-containing   ventral hernia which may be incarcerated given the associated fat stranding. Of   note, no bowel is seen within the hernia. Additionally, there are a few   questionable areas of small bowel wall pneumatosis concerning for ischemia. ER   physician notified.             XR ABD (KUB)    (Results Pending)     Results for orders placed or performed during the hospital encounter of 06/14/19   2D ECHO COMPLETE ADULT (TTE) [address] [place] [address] [facility] [address] [facility], [address] [facility]  [phone]    Transthoracic Echocardiogram  2D, M-mode, Doppler, and Color Doppler    Patient: Andrew Wood  MR #: [id number]  : 1935  Age: 82 years  Gender: Male  Study date: 2019  Account #: [de-identified]  Height: 72 in  Weight: 212.5 lb  BSA: 2.19 mï¾²  Status:Routine  Location: 374  BP: 159/ 81    Allergies: DIPHENHYDRAMINE HCL, LORAZEPAM, IODINATED CONTRAST- ORAL AND IV   DYE,  SULFA (SULFONAMIDE ANTIBIOTICS)    Sonographer:  Orly Mendoza [facility]  Group:  [facility]  Referring Physician:  Zack Moody MD  Reading Physician:  Elena Fernandez MD [facility]    INDICATIONS: A-fib. PROCEDURE: This was a routine study. A transthoracic echocardiogram was  performed. The study included complete 2D imaging, M-mode, complete spectral  Doppler, and color Doppler. Intravenous contrast (Definity) was administered. Image quality was adequate. LEFT VENTRICLE: Size was normal. Systolic function was normal. Ejection  fraction was estimated in the range of 55 % to 60 %. There were no regional  wall motion abnormalities. Wall thickness was mildly increased. Avg. E/e'=  9.15. Left ventricular diastolic function parameters were normal.    RIGHT VENTRICLE: The size was normal. Systolic function was normal. The  tricuspid jet envelope definition was inadequate for estimation of RV   systolic  pressure. LEFT ATRIUM: The atrium was moderately dilated. RIGHT ATRIUM: Size was normal.    SYSTEMIC VEINS: IVC: The inferior vena cava was normal in size and course. AORTIC VALVE: The valve was trileaflet. Leaflets exhibited mild to moderate  sclerosis. There was no evidence for stenosis. There was mild regurgitation. MITRAL VALVE: There was mild annular calcification. Valve structure was   normal.  There was no evidence for stenosis. There was trivial regurgitation.     TRICUSPID VALVE: The valve structure was normal. There was no evidence for  stenosis. There was trivial regurgitation. PULMONIC VALVE: Not well visualized. There was no evidence for stenosis. There  was mild regurgitation. PERICARDIUM: There was no pericardial effusion. AORTA: The root exhibited dilatation. 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 (4.4 cm). SUMMARY:    -  Left ventricle: Systolic function was normal. Ejection fraction was  estimated in the range of 55 % to 60 %. There were no regional wall motion  abnormalities. Wall thickness was mildly increased. -  Left atrium: The atrium was moderately dilated. -  Mitral valve: There was mild annular calcification.    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 (4.4  cm). SYSTEM MEASUREMENT TABLES    2D mode  AoR Diam (2D): 4.1 cm  LA Dimension (2D): 4 cm  Left Atrium Systolic Volume Index; Method of Disks, Biplane; 2D mode;: 43.4  ml/m2  IVS/LVPW (2D): 1  IVSd (2D): 1.2 cm  LVIDd (2D): 4.5 cm  LVIDs (2D): 3 cm  LVOT Area (2D): 4.5 cm2  LVPWd (2D): 1.2 cm  RVIDd (2D): 4.2 cm    Tissue Doppler Imaging  LV Peak Early Jones Tissue Holland; Lateral MA (TDI): 10.4 cm/s  LV Peak Early Jones Tissue Holland; Medial MA (TDI): 6.3 cm/s    Unspecified Scan Mode  Peak Grad; Mean; Antegrade Flow: 12 mm[Hg]  Vmax;  Antegrade Flow: 163 cm/s  LVOT Diam: 2.4 cm  MV Peak Holland/LV Peak Tissue Holland E-Wave; Lateral MA: 6.9  MV Peak Holland/LV Peak Tissue Holland E-Wave; Medial MA: 11.4    Prepared and signed by    Cris Shipley MD [facility]  Signed 17-Jun-2019 11:59:26         Elsa Hernandez 82 Problems as of 6/21/2019 Date Reviewed: 6/14/2019          Codes Class Noted - Resolved POA    Atrial fibrillation with rapid ventricular response ([facility] ICD-10-CM: I48.91  ICD-9-CM: 427.31  6/16/2019 - Present No        * (Principal) SBO (small bowel obstruction) ([facility] ICD-10-CM: J26.825  ICD-9-CM: 560.9  6/14/2019 - Present Yes        Generalized abdominal pain ICD-10-CM: R10.84  ICD-9-CM: 789.07  6/14/2019 - Present Yes        Pneumatosis intestinalis ICD-10-CM: K63.89  ICD-9-CM: 569.89  6/14/2019 - Present Yes        Thrombocytopenia ([facility] ICD-10-CM: D69.6  ICD-9-CM: 287.5  6/14/2019 - Present Yes        Ventral incisional hernia ICD-10-CM: K43.2  ICD-9-CM: 553.21  6/14/2019 - Present Yes        Small bowel obstruction ([facility] ICD-10-CM: O26.703  ICD-9-CM: 560.9  6/14/2019 - Present Yes        Hypertension ICD-10-CM: I10  ICD-9-CM: 401.9  Unknown - Present Yes        Chronic obstructive pulmonary disease ([facility] (Chronic) ICD-10-CM: J44.9  ICD-9-CM: 131  4/10/2017 - Present Yes        Coronary artery disease of bypass graft of native heart with stable angina pectoris ([facility] ICD-10-CM: I25.708  ICD-9-CM: 414.05, 413.9  8/23/2016 - Present Yes    Overview Addendum 6/29/2018 12:32 PM by Derek Bernstein MD     1. CABG (9/2003): Transmyocardial laser revascularization to the inferior wall of the left ventricle. Coronary artery bypass grafting x6. LIMA to LAD: Sequential SVG to ramus and obtuse marginal.  Sequential SVG to posterolateral and right PDA, SVG to diagonal.  2.  Cardiolite( 11/2012): Diaphragmatic attenuation. No ischemia. Normal LV systolic function. EF 63%. 3.  Stress Cardiolite (1/16/15):  EF 52%. Normal perfusion. 4.  LHC (4/13/15):  6 of 6 bypass grafts patent. Small vessel disease involving the apical LAD. Normal LV systolic function. LVEDP 27  5. LHC (5/24/18): Severe multivessel CAD. 6 of 6 grafts patent. Small vessel disease not amendable to PCI. EF 55-60%. Thrombocytopenia, unspecified ([facility] ICD-10-CM: D69.6  ICD-9-CM: 287.5  11/13/2012 - Present Yes        RESOLVED: Delirium ICD-10-CM: R41.0  ICD-9-CM: 780.09  6/17/2019 - 6/18/2019 No            Plan:  · SBO-   · Mgmt per general surgery  · On parenteral nutrition    · A fib with RVR-   · Resolved 6/16, has been in NSR. Probably stress/illness induced.   · lopressor 5 mg IV q 6 hours; resume home PO lopressor when able  · No anticoagulation. Not urgent; in NSR. Can make an argument for no AC at discharge due to triggering event and rapid reversibility. Regardless, can follow up with Dr Sandra Feliciano his cardiologist at discharge and he can decide. · Thromboyctopenia-   · chronic  · Improved; near normal    · HTN  · PRN hydralazine  · Restart home PO meds when able    · Hypothyroidism  · Does not need to urgently start synthroid; can wait until taking PO. Stopped IV synthroid    We are not adding anything to pt's care currently; awaiting return of PO intake. Very stable from our standpoint.   Please have nursing call us back when pt able to take PO and we will see and order all the PO meds      Signed By: Dallin Aguilar MD     June 21, 2019

## 2019-06-22 LAB
ANION GAP SERPL CALC-SCNC: 6 MMOL/L (ref 7–16)
BUN SERPL-MCNC: 14 MG/DL (ref 8–23)
CALCIUM SERPL-MCNC: 7.9 MG/DL (ref 8.3–10.4)
CHLORIDE SERPL-SCNC: 103 MMOL/L (ref 98–107)
CO2 SERPL-SCNC: 25 MMOL/L (ref 21–32)
CREAT SERPL-MCNC: 0.69 MG/DL (ref 0.8–1.5)
ERYTHROCYTE [DISTWIDTH] IN BLOOD BY AUTOMATED COUNT: 13.6 % (ref 11.9–14.6)
GLUCOSE SERPL-MCNC: 119 MG/DL (ref 65–100)
HCT VFR BLD AUTO: 26.1 % (ref 41.1–50.3)
HGB BLD-MCNC: 9.4 G/DL (ref 13.6–17.2)
MCH RBC QN AUTO: 34.3 PG (ref 26.1–32.9)
MCHC RBC AUTO-ENTMCNC: 36 G/DL (ref 31.4–35)
MCV RBC AUTO: 95.3 FL (ref 79.6–97.8)
NRBC # BLD: 0 K/UL (ref 0–0.2)
PLATELET # BLD AUTO: 180 K/UL (ref 150–450)
PMV BLD AUTO: 8.8 FL (ref 9.4–12.3)
POTASSIUM SERPL-SCNC: 4.3 MMOL/L (ref 3.5–5.1)
RBC # BLD AUTO: 2.74 M/UL (ref 4.23–5.6)
SODIUM SERPL-SCNC: 134 MMOL/L (ref 136–145)
WBC # BLD AUTO: 5.4 K/UL (ref 4.3–11.1)

## 2019-06-22 PROCEDURE — 85027 COMPLETE CBC AUTOMATED: CPT

## 2019-06-22 PROCEDURE — 74011000258 HC RX REV CODE- 258: Performed by: SURGERY

## 2019-06-22 PROCEDURE — 65270000029 HC RM PRIVATE

## 2019-06-22 PROCEDURE — 74011000250 HC RX REV CODE- 250: Performed by: SURGERY

## 2019-06-22 PROCEDURE — 74011000250 HC RX REV CODE- 250: Performed by: INTERNAL MEDICINE

## 2019-06-22 PROCEDURE — 36415 COLL VENOUS BLD VENIPUNCTURE: CPT

## 2019-06-22 PROCEDURE — 74011250637 HC RX REV CODE- 250/637: Performed by: INTERNAL MEDICINE

## 2019-06-22 PROCEDURE — 80048 BASIC METABOLIC PNL TOTAL CA: CPT

## 2019-06-22 PROCEDURE — 97530 THERAPEUTIC ACTIVITIES: CPT

## 2019-06-22 PROCEDURE — 74011250636 HC RX REV CODE- 250/636: Performed by: SURGERY

## 2019-06-22 RX ADMIN — PIPERACILLIN SODIUM,TAZOBACTAM SODIUM 3.38 G: 3; .375 INJECTION, POWDER, FOR SOLUTION INTRAVENOUS at 17:06

## 2019-06-22 RX ADMIN — FLUTICASONE PROPIONATE 2 SPRAY: 50 SPRAY, METERED NASAL at 09:42

## 2019-06-22 RX ADMIN — MORPHINE SULFATE 2 MG: 4 INJECTION INTRAVENOUS at 14:02

## 2019-06-22 RX ADMIN — MORPHINE SULFATE 3 MG: 4 INJECTION INTRAVENOUS at 04:43

## 2019-06-22 RX ADMIN — PIPERACILLIN SODIUM,TAZOBACTAM SODIUM 3.38 G: 3; .375 INJECTION, POWDER, FOR SOLUTION INTRAVENOUS at 23:39

## 2019-06-22 RX ADMIN — PIPERACILLIN SODIUM,TAZOBACTAM SODIUM 3.38 G: 3; .375 INJECTION, POWDER, FOR SOLUTION INTRAVENOUS at 09:31

## 2019-06-22 RX ADMIN — FAMOTIDINE 20 MG: 10 INJECTION, SOLUTION INTRAVENOUS at 09:00

## 2019-06-22 RX ADMIN — METOPROLOL TARTRATE 5 MG: 5 INJECTION INTRAVENOUS at 21:18

## 2019-06-22 RX ADMIN — FAMOTIDINE 20 MG: 10 INJECTION, SOLUTION INTRAVENOUS at 21:18

## 2019-06-22 RX ADMIN — METOPROLOL TARTRATE 5 MG: 5 INJECTION INTRAVENOUS at 15:13

## 2019-06-22 RX ADMIN — MORPHINE SULFATE 2 MG: 4 INJECTION INTRAVENOUS at 03:04

## 2019-06-22 RX ADMIN — POTASSIUM CHLORIDE: 2 INJECTION, SOLUTION, CONCENTRATE INTRAVENOUS at 17:54

## 2019-06-22 RX ADMIN — METOPROLOL TARTRATE 5 MG: 5 INJECTION INTRAVENOUS at 09:32

## 2019-06-22 RX ADMIN — METOPROLOL TARTRATE 5 MG: 5 INJECTION INTRAVENOUS at 03:14

## 2019-06-22 RX ADMIN — SOYBEAN OIL 250 ML: 20 INJECTION, SOLUTION INTRAVENOUS at 17:54

## 2019-06-22 RX ADMIN — MORPHINE SULFATE 2 MG: 4 INJECTION INTRAVENOUS at 20:40

## 2019-06-22 RX ADMIN — ENOXAPARIN SODIUM 40 MG: 40 INJECTION SUBCUTANEOUS at 09:32

## 2019-06-22 RX ADMIN — SODIUM CHLORIDE 30 ML/HR: 900 INJECTION, SOLUTION INTRAVENOUS at 21:31

## 2019-06-22 RX ADMIN — ONDANSETRON 4 MG: 2 INJECTION INTRAMUSCULAR; INTRAVENOUS at 09:32

## 2019-06-22 NOTE — PROGRESS NOTES
POD#8 AVSS  Labs OK. Incision fine. Distended. States last night was best night sleep he has had since being here. Small amount flatus. Leave NG.

## 2019-06-22 NOTE — PROGRESS NOTES
OOB in the recliner after ambulating in the hallway with physical therapy, gait steady, contact assist, family at bedside.

## 2019-06-22 NOTE — PROGRESS NOTES
Neurontin 400 mg cap given PO to facilitate rest and sleep, after reporting to Dr. Nelson Degree, pt's hx of taking it for back discomfort and sleep at bedtime, NG clamped.

## 2019-06-22 NOTE — PROGRESS NOTES
Nutrition F/U:  PPN Management for Dr. Adalgisa Nguyen. Assessment:  Weight 97.9 kg (3rd floor SFE 6/21/19), edema - none listed. Pt day 8 s/p exploratory lap, small bowel resection, hernia repair and evacuation of obstruction bezoar. Pt remains dependent on PPN;  currently NPO with NGT to LIS - 575 ml output 6/21. NAPIER was described by night RN as thick and green. Abdomen remains distended but soft with active bowel sounds. Some flatus was reported. Last documented bowel movement was on 6/19, described as small, watery. Labs are remarkable for sodium 134 and trending downward since PPN was started due to its low sodium content. At this point the PPN contains the max amount of lytes that can be added without exceeding the osmolar limit that a peripheral vein can tolerate. IV Access:   2 Peripherals. Enteral Access:   NGT to LIS. Macronutrient Needs:  EER:  7952-3474 kcal/day (20-25 kg/kg/BW)  EPR:   gm pro/day (1-1.2 gm/kg/BW)  Max CHO:  466 gm/day (4mg/kg/IBW)  Fluids:  1 ml/kcal or per MD recommendation  Intake/Comparative Standards:  Current intake of TPN (2.4 liters 4.25%AA/5%DEX with 250 ml 20% lipids) provides 1316 calories/day (68% calorie goal), 102 grams protein/day (100% protein goal), 120 grams CHO/day (does not exceed max CHO limit) and 2650 ml volume/day (>100% fluid goal). Intervention:   Meals and Snacks: NPO. Parenteral Nutrition/IV Fluid: 1) Continue current PPN and 24 hour lipids. No changes to the solution is needed today. Osmolarity: 900 mOsm. 2) IVF: 0.9% NaCl @ 30 ml/hr. Mineral Supplement Therapy: Nutrition Support Orders/Electrolyte Management Replacement Protocols are active on the MAR. Coordination of Nutrition Care by a Nutrition Professional: Collaboration with Yoel Warren. Keanu Lin.  Angelika Astorga  [phone]

## 2019-06-22 NOTE — PROGRESS NOTES
Lying supine no acute distress, neurovascular checks WDL, midline incision dressing dry and intact, given Zofran 4 mg IV for nausea, without any complaints of pain, lungs diminished breath sounds in bases, nasal gastric tube, green gastric content, intermittent suctioning.

## 2019-06-22 NOTE — PROGRESS NOTES
NG resumed to LIS. Pt resting quietly, resp even, unlab, eyes closed with relaxed facial expression with no distress noted.

## 2019-06-22 NOTE — PROGRESS NOTES
Midline abdominal dressing change abd distended tender, staples intact, proximal and distal incision line opening small amount serosanguineous drainage, reapplied sterile dressing and secured with tape.

## 2019-06-22 NOTE — PROGRESS NOTES
Sat up on the side of the bed for 15 min and returned to lying position, head of bed elevated. Morphine 2 mg given IVP slowly for c/o pain.

## 2019-06-22 NOTE — PROGRESS NOTES
Problem: Mobility Impaired (Adult and Pediatric)  Goal: *Acute Goals and Plan of Care (Insert Text)  Description    LTG:  (1.)Mr. Cristina will move from supine to sit and sit to supine  in bed with STAND BY ASSIST within 7 treatment day(s). Supine to sit 6/22/19  (2.)Mr. John Fregoso will transfer from bed to chair and chair to bed with SUPERVISION using the least restrictive device within 7 treatment day(s). Progressing 6/22/19   (3.)Mr. John Fregoso will ambulate with MODIFIED INDEPENDENCE for 300 feet with the least restrictive device within 7 treatment day(s). Progressing 6/22/19  ________________________________________________________________________________________________   Outcome: Progressing Towards Goal    PHYSICAL THERAPY: Daily Note, Re-evaluation and AM 6/22/2019  INPATIENT: PT Visit Days : 6  [place] / [place] PART [place] B / Product Type: Medicare /       NAME/AGE/GENDER: Maximino Castro is a 82 y.o. male   PRIMARY DIAGNOSIS: SBO (small bowel obstruction) ([facility] [K56.609]  [facility] [K56.609] SBO (small bowel obstruction) (HCC)   SBO (small bowel obstruction) (HCC)    Procedure(s) (LRB):  EXPLORATORY LAPAROTOMY   Mult enterorrhaphies approx 85cm small bowel resect and anast drain placement hernia repair (no mesh) removal peritoneal FB Removal of Bozoar materail in SB (N/A)  8 Days Post-Op  ICD-10: Treatment Diagnosis:    · Generalized Muscle Weakness (M62.81)  · Other abnormalities of gait and mobility (R26.89)   Precaution/Allergies:  Diphenhydramine hcl; Ativan [lorazepam]; Iodinated contrast- oral and iv dye; and Sulfa (sulfonamide antibiotics)      ASSESSMENT:     Mr. John Fregoso presents with limited independence with gait and transfers as well as bed mobility due to abdominal surgery. He did well with therapy today. Needs a rolling walker to ambulate and may need some follow up therapy based on his progression.     6/19 am supine upon arrival.  Works on bed mobility as follows: supine>EOB with CGA, walks 140 ft using RW with CGA and no LOB. Return to his chair and performs exercises without any problems. He return to supine with call light near. 6/20--Pt performed standing in room on arrival. Pt performed exercises while in standing. Pt ambulated in hallway. Pt in bedside chair with needs in reach. RN notified. 6/22/19:  Patient participated well. Anxious to get up and walk. Ambulated well with walker for increased distance. Ambulated short distance in room (bed to bathroom) without an assistive device. Progressing well. This section established at most recent assessment   PROBLEM LIST (Impairments causing functional limitations):  1. Decreased Strength  2. Decreased Transfer Abilities  3. Decreased Ambulation Ability/Technique  4. Decreased Balance  5. Decreased Activity Tolerance   INTERVENTIONS PLANNED: (Benefits and precautions of physical therapy have been discussed with the patient.)  1. Bed Mobility  2. Gait Training  3. Therapeutic Exercise/Strengthening  4. Transfer Training     TREATMENT PLAN: Frequency/Duration: daily for duration of hospital stay  Rehabilitation Potential For Stated Goals: Good     REHAB RECOMMENDATIONS (at time of discharge pending progress):    Placement: It is my opinion, based on this patient's performance to date, that Mr. Trey Hammond may benefit from [address] after discharge due to the functional deficits listed above that are likely to improve with skilled rehabilitation   Equipment:    To be determined    None at this time              HISTORY:   History of Present Injury/Illness (Reason for Referral):  Patient is an 81 y/o male with hx CAD, CABG, COPD, HTN, HLD who underwent expl lap yesterday on 6/14 for SBO. He went into new onset rapid afib tonight at approx 1020pm with rate to 150s-160s. He denies chest pain or shortness of breath.  He believes he may have had an episode of afib when he had his 6 v CABG in 2003. Hospitalist service consulted for new onset rapid atrial fibrillation. 6/16- seen with wife and family members at bedside. He is in good spirits and denies concerns. Cardizem infusing at low rate, but he converted back to sinus rhythm early this AM.  He denies chest pain or shortness of breath. Has not had flatus or BM yet. 6/17- significant confusion overnight that worsened with ativan. Required haldol. Improved now and oriented x 3 but confused some in conversation. Past Medical History/Comorbidities:   Mr. 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 He also has no past medical history of Asthma, Chronic kidney disease, Difficult intubation, Heart failure ([address] Liver disease, Malignant hyperthermia due to anesthesia, Nausea & vomiting, Pseudocholinesterase deficiency, Psychiatric disorder, PUD (peptic ulcer disease), Seizures ([address] Sleep apnea, or Thromboembolus ([address]   Mr. Cinda Smith  has a past surgical history that includes pr cardiac surg procedure unlist (1991); pr cardiac surg procedure unlist (9/2003); hx cholecystectomy (9/2000); pr neurological procedure unlisted (12/1981); hx hernia repair (09/1993); hx gi (11/2003); hx lumbar laminectomy (5/2005); hx other surgical (05/2002); hx colonoscopy (02/28/2013); hx endoscopy (02/28/2013); vascular surgery procedure unlist (1991); hx orthopaedic (1/2006); hx orthopaedic (2005); hx orthopaedic (); hx heart catheterization (2015); hx other surgical (2014); hx other surgical; hx orthopaedic (1955); hx thyroidectomy (2017); hx thyroidectomy (2017); and hx heent. Social History/Living Environment:   Home Environment: Private residence  # Steps to Enter: 3  One/Two Story Residence: Two story  # of Interior Steps: 11  Height of Each Step (in): 11 inches  Interior Rails: Right  Lift Chair Available: No  Living Alone: No  Support Systems: Child(kim), Family member(s)  Patient Expects to be Discharged to[de-identified] Private residence  Current DME Used/Available at Home: Glucometer  Prior Level of Function/Work/Activity:  Independent prior to admit. Number of Personal Factors/Comorbidities that affect the Plan of Care: 1-2: MODERATE COMPLEXITY   EXAMINATION:   Most Recent Physical Functioning:   Gross Assessment:  AROM: Generally decreased, functional  Strength: Generally decreased, functional               Posture:     Balance:  Sitting: Intact  Standing - Static: Good  Standing - Dynamic : Fair Bed Mobility:  Supine to Sit: Stand-by assistance  Scooting: Stand-by assistance  Wheelchair Mobility:     Transfers:  Sit to Stand: Stand-by assistance  Stand to Sit: Stand-by assistance  Bed to Chair: Stand-by assistance  Gait:     Speed/Khloe: Pace decreased (<100 feet/min)  Gait Abnormalities: Decreased step clearance  Distance (ft): 300 Feet (ft)  Assistive Device: Walker, rolling  Ambulation - Level of Assistance: Stand-by assistance      Body Structures Involved:  1. Muscles Body Functions Affected:  1. Movement Related Activities and Participation Affected:  1.  Mobility   Number of elements that affect the Plan of Care: 3: MODERATE COMPLEXITY   CLINICAL PRESENTATION:   Presentation: Stable and uncomplicated: LOW COMPLEXITY   CLINICAL DECISION MAKIN [facility] [address] [id number] AM-PAC 6 Clicks   Basic Mobility Inpatient Short Form  How much difficulty does the patient currently have. .. Unable A Lot A Little None   1. Turning over in bed (including adjusting bedclothes, sheets and blankets)? ? 1   ? 2   ? 3   ? 4   2. Sitting down on and standing up from a chair with arms ( e.g., wheelchair, bedside commode, etc.)   ? 1   ? 2   ? 3   ? 4   3. Moving from lying on back to sitting on the side of the bed?   ? 1   ? 2   ? 3   ? 4   How much help from another person does the patient currently need. .. Total A Lot A Little None   4. Moving to and from a bed to a chair (including a wheelchair)? ? 1   ? 2   ? 3   ? 4   5. Need to walk in hospital room? ? 1   ? 2   ? 3   ? 4   6. Climbing 3-5 steps with a railing? ? 1   ? 2   ? 3   ? 4   © 2007, Trustees of [facility] [employer], under license to [employer]. All rights reserved      Score:  Initial: 18 Most Recent: X (Date: -- )    Interpretation of Tool:  Represents activities that are increasingly more difficult (i.e. Bed mobility, Transfers, Gait). Medical Necessity:     · Patient is expected to demonstrate progress in strength and range of motion  ·  to increase independence with gait and transfers   · . Reason for Services/Other Comments:  · Patient continues to require skilled intervention due to limited functional independence   · . Use of outcome tool(s) and clinical judgement create a POC that gives a: Clear prediction of patient's progress: LOW COMPLEXITY            TREATMENT:   (In addition to Assessment/Re-Assessment sessions the following treatments were rendered)   Pre-treatment Symptoms/Complaints:  Patient anxious to walk and get a shower. Pain: Initial:   Pain Intensity 1: 0 Post Session:  0     Therapeutic Activity: (    25 minutes): Therapeutic activities including bed mobility, ambulation with and without walker, and toilet transfers to improve mobility, strength, balance and coordination. Required minimal verbal cues   to promote static and dynamic balance in standing.         Date:  6/18 Date:  6/19   Date:  6/20   Activity/Exercise Parameters Parameters Parameters   Toe/heel raise 12 12 12   Hip abd/add 12 12    Marching in place 12 12 12   LAQ 12 12    squats   12                 Braces/Orthotics/Lines/Etc:   · IV  · drain UMER  · nasogastric tube  Treatment/Session Assessment:    · Response to Treatment: Patient participated well and moving well. Making good progress. · Interdisciplinary Collaboration:   o Physical Therapist  o Registered Nurse  · After treatment position/precautions:   o Bed/Chair-wheels locked  o Bed in low position  o Call light within reach  o RN notified  o Family at bedside  o in  bathroom   · Compliance with Program/Exercises: Compliant all of the time  · Recommendations/Intent for next treatment session: \"Next visit will focus on advancements to more challenging activities and reduction in assistance provided\".   Total Treatment Duration:  PT Patient Time In/Time Out  Time In: 1130  Time Out: ZANDER Morgan

## 2019-06-22 NOTE — PROGRESS NOTES
Resting quietly, awake, resp even, unlab, skin warm, dry. NG intact to right nare to LIS, output thick, green. Abdom soft, tender with active bowel sounds, dsgs clean, dry, intact. UMER intact, emptied of serosanguinous drainage, recharged. SCDs currently off. Reminded pt of rationale for wearing SCDs, to no avail. Refusing to wear SCDs, unable to tolerate due to hx of restless legs, per pt. Assessment completed. No c/o, no needs, no distress.  Voiding without difficulty into urinal.

## 2019-06-22 NOTE — PROGRESS NOTES
Continues to rest quietly, resp even, unlab, eyes closed, NG intact to left nare to LIS, during bedside report given to Nyasia North RN. No distress noted. Bed alarm set.

## 2019-06-22 NOTE — PROGRESS NOTES
Given Morphine 2 mg IV for abdominal pain, garde 5, coughing and deep breathing and using the incentive spirometer with success, coughing non productive, family at bedside.

## 2019-06-23 LAB
ANION GAP SERPL CALC-SCNC: 7 MMOL/L (ref 7–16)
BUN SERPL-MCNC: 15 MG/DL (ref 8–23)
CALCIUM SERPL-MCNC: 8.1 MG/DL (ref 8.3–10.4)
CHLORIDE SERPL-SCNC: 103 MMOL/L (ref 98–107)
CO2 SERPL-SCNC: 24 MMOL/L (ref 21–32)
CREAT SERPL-MCNC: 0.73 MG/DL (ref 0.8–1.5)
ERYTHROCYTE [DISTWIDTH] IN BLOOD BY AUTOMATED COUNT: 14.5 % (ref 11.9–14.6)
GLUCOSE SERPL-MCNC: 103 MG/DL (ref 65–100)
HCT VFR BLD AUTO: 26.8 % (ref 41.1–50.3)
HGB BLD-MCNC: 9.2 G/DL (ref 13.6–17.2)
MCH RBC QN AUTO: 33.5 PG (ref 26.1–32.9)
MCHC RBC AUTO-ENTMCNC: 34.3 G/DL (ref 31.4–35)
MCV RBC AUTO: 97.5 FL (ref 79.6–97.8)
NRBC # BLD: 0 K/UL (ref 0–0.2)
PLATELET # BLD AUTO: 193 K/UL (ref 150–450)
PMV BLD AUTO: 8.8 FL (ref 9.4–12.3)
POTASSIUM SERPL-SCNC: 4.4 MMOL/L (ref 3.5–5.1)
RBC # BLD AUTO: 2.75 M/UL (ref 4.23–5.6)
SODIUM SERPL-SCNC: 134 MMOL/L (ref 136–145)
WBC # BLD AUTO: 5.1 K/UL (ref 4.3–11.1)

## 2019-06-23 PROCEDURE — 85027 COMPLETE CBC AUTOMATED: CPT

## 2019-06-23 PROCEDURE — 74011000250 HC RX REV CODE- 250: Performed by: INTERNAL MEDICINE

## 2019-06-23 PROCEDURE — 74011250636 HC RX REV CODE- 250/636: Performed by: SURGERY

## 2019-06-23 PROCEDURE — 74011000250 HC RX REV CODE- 250: Performed by: SURGERY

## 2019-06-23 PROCEDURE — 36415 COLL VENOUS BLD VENIPUNCTURE: CPT

## 2019-06-23 PROCEDURE — 97530 THERAPEUTIC ACTIVITIES: CPT

## 2019-06-23 PROCEDURE — 74011000258 HC RX REV CODE- 258: Performed by: SURGERY

## 2019-06-23 PROCEDURE — 80048 BASIC METABOLIC PNL TOTAL CA: CPT

## 2019-06-23 PROCEDURE — 65270000029 HC RM PRIVATE

## 2019-06-23 RX ADMIN — METOPROLOL TARTRATE 5 MG: 5 INJECTION INTRAVENOUS at 15:21

## 2019-06-23 RX ADMIN — PIPERACILLIN SODIUM,TAZOBACTAM SODIUM 3.38 G: 3; .375 INJECTION, POWDER, FOR SOLUTION INTRAVENOUS at 15:22

## 2019-06-23 RX ADMIN — POTASSIUM CHLORIDE: 2 INJECTION, SOLUTION, CONCENTRATE INTRAVENOUS at 17:37

## 2019-06-23 RX ADMIN — PIPERACILLIN SODIUM,TAZOBACTAM SODIUM 3.38 G: 3; .375 INJECTION, POWDER, FOR SOLUTION INTRAVENOUS at 23:21

## 2019-06-23 RX ADMIN — SOYBEAN OIL 250 ML: 20 INJECTION, SOLUTION INTRAVENOUS at 17:43

## 2019-06-23 RX ADMIN — PIPERACILLIN SODIUM,TAZOBACTAM SODIUM 3.38 G: 3; .375 INJECTION, POWDER, FOR SOLUTION INTRAVENOUS at 07:18

## 2019-06-23 RX ADMIN — METOPROLOL TARTRATE 5 MG: 5 INJECTION INTRAVENOUS at 02:35

## 2019-06-23 RX ADMIN — FAMOTIDINE 20 MG: 10 INJECTION, SOLUTION INTRAVENOUS at 21:59

## 2019-06-23 RX ADMIN — METOPROLOL TARTRATE 5 MG: 5 INJECTION INTRAVENOUS at 09:20

## 2019-06-23 RX ADMIN — FAMOTIDINE 20 MG: 10 INJECTION, SOLUTION INTRAVENOUS at 09:19

## 2019-06-23 RX ADMIN — MORPHINE SULFATE 3 MG: 4 INJECTION INTRAVENOUS at 23:27

## 2019-06-23 RX ADMIN — MORPHINE SULFATE 3 MG: 4 INJECTION INTRAVENOUS at 02:59

## 2019-06-23 RX ADMIN — ENOXAPARIN SODIUM 40 MG: 40 INJECTION SUBCUTANEOUS at 07:18

## 2019-06-23 RX ADMIN — FLUTICASONE PROPIONATE 2 SPRAY: 50 SPRAY, METERED NASAL at 10:00

## 2019-06-23 RX ADMIN — METOPROLOL TARTRATE 5 MG: 5 INJECTION INTRAVENOUS at 21:58

## 2019-06-23 NOTE — PROGRESS NOTES
Nutrition F/U:  PPN Management for Dr. Tremayne Longo. Assessment:  Weight 101.2 kg (3rd floor SFE 6/22/19), edema - none listed. Pt day 9 s/p exploratory lap, small bowel resection, hernia repair and evacuation of obstruction bezoar. Pt remains dependent on PPN;  currently NPO with NGT to LIS - 700 ml output 6/22. Abdomen remains distended but soft with active bowel sounds. Some flatus was reported. Last documented bowel movement was on 6/19, described as small, watery. Labs are remarkable for sodium 134 and stable x 2 days due to its low sodium content and potassium 4.4 slowly trending upwards. IV Access:             2 Peripherals. Enteral Access:             NGT to LIS. Macronutrient Needs:  EER:  8259-7067 kcal/day (20-25 kg/kg/BW)  EPR:   gm pro/day (1-1.2 gm/kg/BW)  Max CHO:  466 gm/day (4mg/kg/IBW)  Fluids:  1 ml/kcal or per MD recommendation  Intake/Comparative Standards:  Current intake of TPN (2.4 liters 4.25%AA/5%DEX with 250 ml 20% lipids) provides 1316 calories/day (68% calorie goal), 102 grams protein/day (100% protein goal), 120 grams CHO/day (does not exceed max CHO limit) and 2650 ml volume/day (>100% fluid goal). Intervention:   Meals and Snacks: NPO. Parenteral Nutrition/IV Fluid: 1) Continue current PPN and 24 hour lipids. 2) Adjust PPN to increase the sodium content by 10 meq/liter and decrease the potassium content by 10 meq/liter. 3) Additives/liter: sodium 55 (25 chloride, 30 phosphate), potassium 40 (chloride), 5 magnesium and 30 phosphorus (potassium), osmolarity: 900 mOsm. 4) MVI and MTE/bag. 5) IVF: 0.9% NaCl @ 30 ml/hr. Mineral Supplement Therapy: Nutrition Support Orders/Electrolyte Management Replacement Protocols are active on the MAR. Candance Huger Chris Laity.  Sydnie Garzon  [phone]

## 2019-06-23 NOTE — PROGRESS NOTES
Maximino Colvin  Admission Date: 6/14/2019   POD: 9 Days Post-Op            Daily Progress Note: 6/23/2019  Subjective:   Feeling ok. Slept well after pain medicine   Small amounts of flatus, no bm     Objective:   AVSS  Physical Exam:          GEN: well developed and in no acute distress  HEENT:  PERRL, EOMI, no alar flaring or epistaxis, oral mucosa moist without cyanosis,   NECK:  no JVD, no retractions, no thyromegaly or masses  LUNGS:  clear  HEART:  RRR  ABDOMEN:  soft, appropriately tender.  Incision looks fine   EXTREMITIES:  warm with no cyanosis,  SKIN:  no jaundice or ecchymosis,   NEURO:  alert and oriented, grossly non-focal      LAB  Recent Labs     06/23/19  0536 06/22/19  0625 06/21/19  0529   WBC 5.1 5.4 4.9   HGB 9.2* 9.4* 9.3*   HCT 26.8* 26.1* 25.7*    180 136*   * 134* 136   K 4.4 4.3 4.3    103 103   CO2 24 25 26   BUN 15 14 10   CREA 0.73* 0.69* 0.63*   MG  --   --  2.3   PHOS  --   --  3.2   * 119* 117*       Assessment:     Hospital Problems  Date Reviewed: 6/14/2019          Codes Class Noted POA    Atrial fibrillation with rapid ventricular response ([facility] ICD-10-CM: I48.91  ICD-9-CM: 427.31  6/16/2019 No        * (Principal) SBO (small bowel obstruction) ([facility] ICD-10-CM: N50.746  ICD-9-CM: 560.9  6/14/2019 Yes        Generalized abdominal pain ICD-10-CM: R10.84  ICD-9-CM: 789.07  6/14/2019 Yes        Pneumatosis intestinalis ICD-10-CM: K63.89  ICD-9-CM: 569.89  6/14/2019 Yes        Thrombocytopenia ([facility] ICD-10-CM: D69.6  ICD-9-CM: 287.5  6/14/2019 Yes        Ventral incisional hernia ICD-10-CM: K43.2  ICD-9-CM: 553.21  6/14/2019 Yes        Small bowel obstruction ([facility] ICD-10-CM: L44.038  ICD-9-CM: 560.9  6/14/2019 Yes        Hypertension ICD-10-CM: O50  ICD-9-CM: 401.9  Unknown Yes        Chronic obstructive pulmonary disease (HCC) (Chronic) ICD-10-CM: J44.9  ICD-9-CM: 533  4/10/2017 Yes        Coronary artery disease 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 CABG (9/2003): Transmyocardial laser revascularization to the inferior wall of the left ventricle. Coronary artery bypass grafting x6. LIMA to LAD: Sequential SVG to ramus and obtuse marginal.  Sequential SVG to posterolateral and right PDA, SVG to diagonal.  2.  Cardiolite( 11/2012): Diaphragmatic attenuation. No ischemia. Normal LV systolic function. EF 63%. 3.  Stress Cardiolite (1/16/15):  EF 52%. Normal perfusion. 4.  LHC (4/13/15):  6 of 6 bypass grafts patent. Small vessel disease involving the apical LAD. Normal LV systolic function. LVEDP 27  5. LHC (5/24/18): Severe multivessel CAD. 6 of 6 grafts patent. Small vessel disease not amendable to PCI. EF 55-60%.               Thrombocytopenia, unspecified ([facility] ICD-10-CM: D69.6  ICD-9-CM: 287.5  11/13/2012 Yes            Plan:     Encourage patient to ambulate today with assistance   Use incentive spirometer hourly   Awaiting return of bowel function     Pipe Shaw MD

## 2019-06-23 NOTE — PROGRESS NOTES
Problem: Mobility Impaired (Adult and Pediatric)  Goal: *Acute Goals and Plan of Care (Insert Text)  Description    LTG:  (1.)Mr. Cristina will move from supine to sit and sit to supine  in bed with STAND BY ASSIST within 7 treatment day(s). Met 6/23/19  (2.)Mr. Nani Ledbetter will transfer from bed to chair and chair to bed with SUPERVISION using the least restrictive device within 7 treatment day(s). Progressing 6/22/19   (3.)Mr. Nani Ledbetter will ambulate with MODIFIED INDEPENDENCE for 300 feet with the least restrictive device within 7 treatment day(s). Progressing 6/22/19  ________________________________________________________________________________________________   Outcome: Progressing Towards Goal    PHYSICAL THERAPY: Daily Note and AM 6/23/2019  INPATIENT: PT Visit Days : 7  [place] / [place] / Product Type: Medicare /       NAME/AGE/GENDER: Maximino Shelley Client is a 82 y.o. male   PRIMARY DIAGNOSIS: SBO (small bowel obstruction) ([facility] [K56.609]  [facility] bowel obstruction [facility] [K56.609] SBO (small bowel obstruction) ([facility])   SBO (small bowel obstruction) ([facility])    Procedure(s) (LRB):  EXPLORATORY LAPAROTOMY   Mult enterorrhaphies approx 85cm small bowel resect and anast drain placement hernia repair (no mesh) removal peritoneal FB Removal of Bozoar materail in SB (N/A)  9 Days Post-Op  ICD-10: Treatment Diagnosis:    · Generalized Muscle Weakness (M62.81)  · Other abnormalities of gait and mobility (R26.89)   Precaution/Allergies:  Diphenhydramine hcl; Ativan [lorazepam]; Iodinated contrast- oral and iv dye; and Sulfa (sulfonamide antibiotics)      ASSESSMENT:     Mr. Nani Ledbetter presents with limited independence with gait and transfers as well as bed mobility due to abdominal surgery. He did well with therapy today. Needs a rolling walker to ambulate and may need some follow up therapy based on his progression.     6/19 am supine upon arrival.  Works on bed mobility as follows: supine>EOB with CGA, walks 140 ft using RW with CGA and no LOB. Return to his chair and performs exercises without any problems. He return to supine with call light near. 6/20--Pt performed standing in room on arrival. Pt performed exercises while in standing. Pt ambulated in hallway. Pt in bedside chair with needs in reach. RN notified. 6/22/19:  Patient participated well. Anxious to get up and walk. Ambulated well with walker for increased distance. Ambulated short distance in room (bed to bathroom) without an assistive device. Progressing well. 6/23/19:  Patient participated well. Ambulated around 3rd floor with supervision only with walker. Should progress to ambulation without an assistive device as he was independent prior to his surgery. This section established at most recent assessment   PROBLEM LIST (Impairments causing functional limitations):  1. Decreased Strength  2. Decreased Transfer Abilities  3. Decreased Ambulation Ability/Technique  4. Decreased Balance  5. Decreased Activity Tolerance   INTERVENTIONS PLANNED: (Benefits and precautions of physical therapy have been discussed with the patient.)  1. Bed Mobility  2. Gait Training  3. Therapeutic Exercise/Strengthening  4. Transfer Training     TREATMENT PLAN: Frequency/Duration: daily for duration of hospital stay  Rehabilitation Potential For Stated Goals: Good     REHAB RECOMMENDATIONS (at time of discharge pending progress):    Placement: It is my opinion, based on this patient's performance to date, that Mr. Satira Gaucher may benefit from [address] after discharge due to the functional deficits listed above that are likely to improve with skilled rehabilitation   Equipment:    To be determined    None at this time              HISTORY:   History of Present Injury/Illness (Reason for Referral):  Patient is an 81 y/o male with hx CAD, CABG, COPD, HTN, HLD who underwent expl lap yesterday on 6/14 for SBO.  He went into new onset rapid afib tonight at approx 1020pm with rate to 150s-160s. He denies chest pain or shortness of breath. He believes he may have had an episode of afib when he had his 6 v CABG in 2003. Hospitalist service consulted for new onset rapid atrial fibrillation. 6/16- seen with wife and family members at bedside. He is in good spirits and denies concerns. Cardizem infusing at low rate, but he converted back to sinus rhythm early this AM.  He denies chest pain or shortness of breath. Has not had flatus or BM yet. 6/17- significant confusion overnight that worsened with ativan. Required haldol. Improved now and oriented x 3 but confused some in conversation. Past Medical History/Comorbidities:   Mr. 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 He also has no past medical history of Asthma, Chronic kidney disease, Difficult intubation, Heart failure ([address] Liver disease, Malignant hyperthermia due to anesthesia, Nausea & vomiting, Pseudocholinesterase deficiency, Psychiatric disorder, PUD (peptic ulcer disease), Seizures ([address] Sleep apnea, or Thromboembolus ([address]   Mr. Caryle Sprinkle  has a past surgical history that includes pr cardiac surg procedure unlist (1991); pr cardiac surg procedure unlist (9/2003); hx cholecystectomy (9/2000); pr neurological procedure unlisted (12/1981); hx hernia repair (09/1993); hx gi (11/2003); hx lumbar laminectomy (5/2005); hx other surgical (05/2002); hx colonoscopy (02/28/2013); hx endoscopy (02/28/2013); vascular surgery procedure unlist (1991); hx orthopaedic (1/2006); hx orthopaedic (8/2005); hx orthopaedic (2005); hx heart catheterization (04/2015); hx other surgical (11/2014); hx other surgical; hx orthopaedic (03/1955); hx thyroidectomy (08/24/2017); hx thyroidectomy (08/24/2017); and hx heent. Social History/Living Environment:   Home Environment: Private residence  # Steps to Enter: 3  One/Two Story Residence: Two story  # of Interior Steps: 11  Height of Each Step (in): 11 inches  Interior Rails: Right  Lift Chair Available: No  Living Alone: No  Support Systems: Child(kim), Family member(s)  Patient Expects to be Discharged to[de-identified] Private residence  Current DME Used/Available at Home: Glucometer  Prior Level of Function/Work/Activity:  Independent prior to admit. Number of Personal Factors/Comorbidities that affect the Plan of Care: 1-2: MODERATE COMPLEXITY   EXAMINATION:   Most Recent Physical Functioning:   Gross Assessment:  AROM: Generally decreased, functional  Strength: Generally decreased, functional               Posture:     Balance:  Sitting: Intact  Standing - Static: Good  Standing - Dynamic : Fair Bed Mobility:  Supine to Sit: Supervision  Sit to Supine: Supervision  Wheelchair Mobility:     Transfers:  Sit to Stand: Supervision  Stand to Sit: Supervision  Gait:     Speed/Khloe: Pace decreased (<100 feet/min)  Step Length: Left shortened;Right shortened  Distance (ft): 650 Feet (ft)  Assistive Device: Walker, rolling  Ambulation - Level of Assistance: Supervision      Body Structures Involved:  1. Muscles Body Functions Affected:  1. Movement Related Activities and Participation Affected:  1.  Mobility   Number of elements that affect the Plan of Care: 3: MODERATE COMPLEXITY   CLINICAL PRESENTATION:   Presentation: Stable and uncomplicated: LOW COMPLEXITY   CLINICAL DECISION MAKING: [address] [facility] Mobility Inpatient Short Form  How much difficulty does the patient currently have. .. Unable A Lot A Little None   1. Turning over in bed (including adjusting bedclothes, sheets and blankets)? ? 1   ? 2   ? 3   ? 4   2. Sitting down on and standing up from a chair with arms ( e.g., wheelchair, bedside commode, etc.)   ? 1   ? 2   ? 3   ? 4   3. Moving from lying on back to sitting on the side of the bed?   ? 1   ? 2   ? 3   ? 4   How much help from another person does the patient currently need. .. Total A Lot A Little None   4. Moving to and from a bed to a chair (including a wheelchair)? ? 1   ? 2   ? 3   ? 4   5. Need to walk in hospital room? ? 1   ? 2   ? 3   ? 4   6. Climbing 3-5 steps with a railing? ? 1   ? 2   ? 3   ? 4   © 2007, Trustees of [address] [place] [address], under license to [employer]. All rights reserved      Score:  Initial: 18 Most Recent: X (Date: -- )    Interpretation of Tool:  Represents activities that are increasingly more difficult (i.e. Bed mobility, Transfers, Gait). Medical Necessity:     · Patient is expected to demonstrate progress in strength and range of motion  ·  to increase independence with gait and transfers   · . Reason for Services/Other Comments:  · Patient continues to require skilled intervention due to limited functional independence   · . Use of outcome tool(s) and clinical judgement create a POC that gives a: Clear prediction of patient's progress: LOW COMPLEXITY            TREATMENT:   (In addition to Assessment/Re-Assessment sessions the following treatments were rendered)   Pre-treatment Symptoms/Complaints:  Patient reports he just got comfortable but wants to walk. Pain: Initial:   Pain Intensity 1: 0 Post Session:  0     Therapeutic Activity: (    25 minutes):   Therapeutic activities including supine <> sit, sit <> stand, ambulation with rolling walker around 3rd floor to improve mobility, strength, balance and coordination. Required minimal verbal cues   to promote static and dynamic balance in standing. Date:  6/18   Date:  6/19   Date:  6/20   Activity/Exercise Parameters Parameters Parameters   Toe/heel raise 12 12 12   Hip abd/add 12 12    Marching in place 12 12 12   LAQ 12 12    squats   12                 Braces/Orthotics/Lines/Etc:   · IV  · drain UMER  · nasogastric tube  Treatment/Session Assessment:    · Response to Treatment: Patient able to ambulate around entire 3rd floor. Should be able to progress to ambulation without a device. · Interdisciplinary Collaboration:   o Physical Therapist  o Registered Nurse  · After treatment position/precautions:   o Supine in bed  o Bed/Chair-wheels locked  o Bed in low position  o Call light within reach  o RN notified  o Family at bedside   · Compliance with Program/Exercises: Compliant all of the time  · Recommendations/Intent for next treatment session: \"Next visit will focus on advancements to more challenging activities and reduction in assistance provided\".   Total Treatment Duration:  PT Patient Time In/Time Out  Time In: 1150  Time Out: [address] [facility] [facility] Corinne Martínez PT

## 2019-06-23 NOTE — PROGRESS NOTES
Care assumed; pt alert and pleasant. Remains npo with NGT to LIWS; bilious output. BS are hypoactive; he does report passing some flatus. PPN continues.

## 2019-06-24 ENCOUNTER — APPOINTMENT (OUTPATIENT)
Dept: CT IMAGING | Age: 84
DRG: 329 | End: 2019-06-24
Attending: SURGERY
Payer: MEDICARE

## 2019-06-24 LAB
ANION GAP SERPL CALC-SCNC: 9 MMOL/L (ref 7–16)
BUN SERPL-MCNC: 14 MG/DL (ref 8–23)
CALCIUM SERPL-MCNC: 8.2 MG/DL (ref 8.3–10.4)
CHLORIDE SERPL-SCNC: 103 MMOL/L (ref 98–107)
CO2 SERPL-SCNC: 25 MMOL/L (ref 21–32)
CREAT SERPL-MCNC: 0.73 MG/DL (ref 0.8–1.5)
ERYTHROCYTE [DISTWIDTH] IN BLOOD BY AUTOMATED COUNT: 14.2 % (ref 11.9–14.6)
GLUCOSE SERPL-MCNC: 109 MG/DL (ref 65–100)
HCT VFR BLD AUTO: 26.9 % (ref 41.1–50.3)
HGB BLD-MCNC: 9.5 G/DL (ref 13.6–17.2)
MAGNESIUM SERPL-MCNC: 2.3 MG/DL (ref 1.8–2.4)
MCH RBC QN AUTO: 33.9 PG (ref 26.1–32.9)
MCHC RBC AUTO-ENTMCNC: 35.3 G/DL (ref 31.4–35)
MCV RBC AUTO: 96.1 FL (ref 79.6–97.8)
NRBC # BLD: 0 K/UL (ref 0–0.2)
PHOSPHATE SERPL-MCNC: 4.1 MG/DL (ref 2.3–3.7)
PLATELET # BLD AUTO: 218 K/UL (ref 150–450)
PMV BLD AUTO: 8.8 FL (ref 9.4–12.3)
POTASSIUM SERPL-SCNC: 4.1 MMOL/L (ref 3.5–5.1)
RBC # BLD AUTO: 2.8 M/UL (ref 4.23–5.6)
SODIUM SERPL-SCNC: 137 MMOL/L (ref 136–145)
WBC # BLD AUTO: 5 K/UL (ref 4.3–11.1)

## 2019-06-24 PROCEDURE — 36415 COLL VENOUS BLD VENIPUNCTURE: CPT

## 2019-06-24 PROCEDURE — 74176 CT ABD & PELVIS W/O CONTRAST: CPT

## 2019-06-24 PROCEDURE — 80048 BASIC METABOLIC PNL TOTAL CA: CPT

## 2019-06-24 PROCEDURE — 74011000250 HC RX REV CODE- 250: Performed by: SURGERY

## 2019-06-24 PROCEDURE — 74011000250 HC RX REV CODE- 250: Performed by: INTERNAL MEDICINE

## 2019-06-24 PROCEDURE — 83735 ASSAY OF MAGNESIUM: CPT

## 2019-06-24 PROCEDURE — 85027 COMPLETE CBC AUTOMATED: CPT

## 2019-06-24 PROCEDURE — 97530 THERAPEUTIC ACTIVITIES: CPT

## 2019-06-24 PROCEDURE — 74011250636 HC RX REV CODE- 250/636: Performed by: SURGERY

## 2019-06-24 PROCEDURE — 84100 ASSAY OF PHOSPHORUS: CPT

## 2019-06-24 PROCEDURE — 65270000029 HC RM PRIVATE

## 2019-06-24 PROCEDURE — 74011000258 HC RX REV CODE- 258: Performed by: SURGERY

## 2019-06-24 RX ADMIN — FLUTICASONE PROPIONATE 2 SPRAY: 50 SPRAY, METERED NASAL at 09:00

## 2019-06-24 RX ADMIN — PIPERACILLIN SODIUM,TAZOBACTAM SODIUM 3.38 G: 3; .375 INJECTION, POWDER, FOR SOLUTION INTRAVENOUS at 17:27

## 2019-06-24 RX ADMIN — PIPERACILLIN SODIUM,TAZOBACTAM SODIUM 3.38 G: 3; .375 INJECTION, POWDER, FOR SOLUTION INTRAVENOUS at 08:00

## 2019-06-24 RX ADMIN — MORPHINE SULFATE 3 MG: 4 INJECTION INTRAVENOUS at 21:01

## 2019-06-24 RX ADMIN — MORPHINE SULFATE 2 MG: 4 INJECTION INTRAVENOUS at 12:42

## 2019-06-24 RX ADMIN — FAMOTIDINE 20 MG: 10 INJECTION, SOLUTION INTRAVENOUS at 21:01

## 2019-06-24 RX ADMIN — ENOXAPARIN SODIUM 40 MG: 40 INJECTION SUBCUTANEOUS at 08:00

## 2019-06-24 RX ADMIN — SOYBEAN OIL 250 ML: 20 INJECTION, SOLUTION INTRAVENOUS at 17:38

## 2019-06-24 RX ADMIN — FAMOTIDINE 20 MG: 10 INJECTION, SOLUTION INTRAVENOUS at 08:01

## 2019-06-24 RX ADMIN — METOPROLOL TARTRATE 5 MG: 5 INJECTION INTRAVENOUS at 15:04

## 2019-06-24 RX ADMIN — METOPROLOL TARTRATE 5 MG: 5 INJECTION INTRAVENOUS at 03:42

## 2019-06-24 RX ADMIN — MORPHINE SULFATE 3 MG: 4 INJECTION INTRAVENOUS at 04:04

## 2019-06-24 RX ADMIN — METOPROLOL TARTRATE 5 MG: 5 INJECTION INTRAVENOUS at 21:01

## 2019-06-24 RX ADMIN — POTASSIUM CHLORIDE: 2 INJECTION, SOLUTION, CONCENTRATE INTRAVENOUS at 17:38

## 2019-06-24 RX ADMIN — METOPROLOL TARTRATE 5 MG: 5 INJECTION INTRAVENOUS at 08:01

## 2019-06-24 NOTE — PROGRESS NOTES
Resting quietly, awake, resp even, unlab, skin warm, dry. Abdom soft, tender with active bowel sounds, dsgs clean, dry, intact. UMER intact, charged, output serosanguinous. NG remains intact to left nare to LIS, output green. Reports having passed gas. No c/o. No distress. Voiding without difficulty.

## 2019-06-24 NOTE — PROGRESS NOTES
Out of bed ambulating in [place] with walker, the entire 3rd floor unit and back to room without difficulty, with stand by asst. No c/o. No distress.

## 2019-06-24 NOTE — PROGRESS NOTES
H&P/Consult Note/Progress Note/Office Note:   Maximino Coleman  MRN: [id number]  CFN:[id number]  Age:83 y.o.    HPI: Maximino Coleman is a 82 y.o. male who is s/p expl lap with 5hrs lysis, 85cm ischemic sb resection, hernia repair,   and evacuation of obstructing bezoar of (large volume of impacted delano slaw) on 6/14/19    He originally came to the ER on 6/14/19   with a 1 day h/o progressive, intermittent, generalized abd pain without radiation which worsened acutely around 8PM on 6/13/19  Nothing made pain better or worse. +Nausea; -V  No fever, chills, chest pain, or back pain    1990s s/p open AAA in [place] s/p lap [place] in [place]  S/p expl lap for SBO (no resection) [place] CABG x 6 2016 s/p thoracic aneurysm stent at [facility]    Surgery was consulted after below CT without contrast showed SBO findings concerning for pneumatosis       4/13/16 s/p colonoscopy; Dr Mk Elizondo:  SESSILE SERRATED POLYPS. FRAGMENT OF BENIGN COLONIC MUCOSA. Electronically signed out on 4/14/2016 11:51 by Reynaldo Levy MD   Microscopic Description   The biopsy demonstrates serrated hypermucinous colonic glands imbedded in a nonfibrotic lamina propria with focal crypt dilatation and widening of the crypt bases. An additional fragment demonstrates no adenomatous or hyperplastic change. Dr. Yael Hall concurs. 6/14/19 CT abd/pelvis without contrast (pre-op)  - Liver: Within normal limits. - Gallbladder and bile ducts: The gallbladder is absent, without biliary tract  dilatation.  - Spleen: Within normal limits. - Urinary tract: Again noted is a 5.3 cm right kidney cyst. The left kidney is  unremarkable. No urinary tract stone or hydronephrosis is identified. There are  bilateral inguinal hernias, containing fat on the left and a small portion of  the urinary bladder on the right. - Adrenals: Within normal limits. - Pancreas: Within normal limits. - Gastrointestinal tract:  In the umbilicus region there is a ventral 2.8 cm  fat-containing hernia, with adjacent stranding which may relate to  incarceration. There is a mid small bowel obstruction, whose transition point is  not clearly identified, but may lie in the region of the ventral hernia although  no bowel is seen within the hernia itself. There are a few questionable areas of  small bowel wall pneumatosis. The colon is unremarkable except for  diverticulosis. No free air or free fluid is seen. - Retroperitoneum: Within normal limits. - Peritoneal cavity and abdominal wall: Within normal limits. - Pelvis: Within normal limits. - Spine/bones: No acute process. - Other comments: Partially visualized is a descending thoracic aortic stent  graft.     IMPRESSION: Mid small bowel obstruction. The transition point is not clearly  identified, but may lie in the region of a small periumbilical fat-containing  ventral hernia which may be incarcerated given the associated fat stranding. Of  note, no bowel is seen within the hernia. Additionally, there are a few  questionable areas of small bowel wall pneumatosis concerning for ischemia. ER  physician notified.         6/24/19 CT without IV or oral contrast (POD10)  There is scarring at the left lung base. There is evidence of prior thoracic aneurysm repair.     CT ABD: No contour deforming abnormality involving the liver or spleen. There is a stable right renal cyst. There is fatty atrophy of the pancreas. The  adrenal glands are normal. There is a drain present within the right lower  quadrant. There is a question of bowel wall thickening involving the central  small bowel loops (images 42 through 59).     CT PELVIS: The bladder and rectum are normal.   No definite pelvic free fluid. No free air.     Bone window evaluation demonstrates no aggressive osseous lesions.     IMPRESSION:  1.  Limited exam without the benefit of oral or IV contrast. There is a question  of bowel wall thickening involving the central small bowel loops with some  associated mesenteric edema. 2. New drain which terminates within the right lower quadrant            Additonal hx:  6/17/19 POD3 afib over weekend; no complaints; up in chair; interactive and conversant; Hospitalists treating; no flatus or BM yet   6/18/19 POD4 feels OK, no complaints, +BM  6/19/19 POD5 feels OK, no sign pain isssues, abd still distended  6/20/19 POD6 many loose BMs and lost of flatus; no sign abd pain issues, distention better but persists  6/21/19 POD7 lost of flatus; some mild discomfort in RUQ, and distention better but still an issue; abd Xray non-specific bowel gas pattern  6/24/19 POD10 more flatus over weekend, no fevers, WBC normal; Ngt 700-750 q day; NGT out today        Past Medical History:   Diagnosis Date    Abdominal pain     kower     Acute cervical radiculopathy     Aneurysm ([address]     nov 1991AAA    Arthritis     Atrial fibrillation with rapid ventricular response ([address] 6/16/2019    CAD (coronary artery disease)     6 bipass 9/2003    Cancer ([address]     skin    COPD (chronic obstructive pulmonary disease) (HCC)     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   Left lobe: At least 4 heterogeneous nodules in the left lobe. The largest measures 2.9 x 1.1 x 2.4 cm in the lower pole, with mild peripheral color Doppler flow and possible tiny internal calcifications.     Thrombocytopenia (HCC)     marrow normal slightly big spleen low mpv    Thyroid disease     hypothyroidism     Past Surgical History: Procedure Laterality Date    CARDIAC SURG PROCEDURE UNLIST  1991    AAA    CARDIAC SURG PROCEDURE UNLIST  9/2003     6 bypass surgeries    HX CHOLECYSTECTOMY  9/2000    HX COLONOSCOPY  02/28/2013    10 polyps/ fu in 3 yrs    HX ENDOSCOPY  02/28/2013    1 biopsy    HX GI  11/2003    small bowel obstruction    HX HEART CATHETERIZATION  04/2015    HX HEENT      thyroidectomy    HX HERNIA REPAIR  09/1993    Insisional    HX LUMBAR LAMINECTOMY  5/2005    L3, L4    HX ORTHOPAEDIC  1/2006    right total knee replacement    HX ORTHOPAEDIC  8/2005     left knee replacement    HX ORTHOPAEDIC  2005    laminectomy L3-l4    HX ORTHOPAEDIC  03/1955    right knee surgery    HX OTHER SURGICAL  05/2002    upper endoscopic retrograde cholangiopancreatogram    HX OTHER SURGICAL  11/2014    removal squamous cell carcinoma    HX OTHER SURGICAL      MRSA lower back -3/2005    HX THYROIDECTOMY  08/24/2017    Dr Eddie Mcgowan    HX THYROIDECTOMY  08/24/2017    Dr. Geoff Meraz  12/1981     ruptured disc in back    VASCULAR SURGERY PROCEDURE UNLIST  1991    abd aortic aneurysm repair     Current Facility-Administered Medications   Medication Dose Route Frequency    TPN ADULT - dextrose 5% amino acid 4.25%   IntraVENous QPM    hydrALAZINE (APRESOLINE) 20 mg/mL injection 20 mg  20 mg IntraVENous Q4H PRN    NUTRITIONAL SUPPORT ELECTROLYTE PRN ORDERS   Does Not Apply PRN    fat emulsion 20% (LIPOSYN, INTRAlipid) infusion 250 mL  250 mL IntraVENous CONTINUOUS    morphine injection 2-3 mg  2-3 mg IntraVENous Q2H PRN    enoxaparin (LOVENOX) injection 40 mg  40 mg SubCUTAneous Q24H    fluticasone propionate (FLONASE) 50 mcg/actuation nasal spray 2 Spray  2 Spray Both Nostrils DAILY    sodium chloride (OCEAN) 0.65 % nasal squeeze bottle 2 Spray  2 Spray Both Nostrils Q2H PRN    phenol throat spray (CHLORASEPTIC) 1 Spray  1 Spray Oral PRN    albuterol (PROVENTIL VENTOLIN) nebulizer solution 2.5 mg  2.5 mg Nebulization Q6H PRN    metoprolol (LOPRESSOR) injection 5 mg  5 mg IntraVENous Q6H    piperacillin-tazobactam (ZOSYN) 3.375 g in 0.9% sodium chloride (MBP/ADV) 100 mL  3.375 g IntraVENous Q8H    famotidine (PF) (PEPCID) injection 20 mg  20 mg IntraVENous Q12H    acetaminophen (TYLENOL) tablet 1,000 mg  1,000 mg Oral Q6H PRN    ondansetron (ZOFRAN) injection 4 mg  4 mg IntraVENous Q4H PRN    white petrolatum-mineral oil (AKWA TEARS) 83-15 % ophthalmic ointment   Both Eyes PRN     Diphenhydramine hcl; Ativan [lorazepam]; Iodinated contrast- oral and iv dye; and Sulfa (sulfonamide antibiotics)  Social History     Socioeconomic History    Marital status:      Spouse name: Not on file    Number of children: Not on file    Years of education: Not on file    Highest education level: Not on file   Tobacco Use    Smoking status: Former Smoker     Packs/day: 1.00     Years: 25.00     Pack years: 25.00     Last attempt to quit: 12/3/1987     Years since quittin.5    Smokeless tobacco: Never Used   Substance and Sexual Activity    Alcohol use: Yes     Comment: occasional glass wine    Drug use: No     Social History     Tobacco Use   Smoking Status Former Smoker    Packs/day: 1.00    Years: 25.00    Pack years: 25.00    Last attempt to quit: 12/3/1987    Years since quittin.5   Smokeless Tobacco Never Used     Family History   Problem Relation Age of Onset    Heart Disease Father     Cancer Mother         colon    Cancer Sister     Cancer Maternal Aunt     Cancer Paternal Grandmother     Diabetes Paternal Grandmother     Thyroid Disease Neg Hx     Thyroid Cancer Neg Hx      ROS: The patient has no difficulty with chest pain or shortness of breath. No fever or chills. Comprehensive review of systems was otherwise unremarkable except as noted above.     Physical Exam:   Visit Vitals  /72   Pulse 60   Temp 97.9 °F (36.6 °C)   Resp 16   Ht 6' (1.829 m)   [employer] 222 lb 9.6 oz (101 kg)   SpO2 96%   BMI 30.19 kg/m²     Vitals:    06/23/19 2158 06/24/19 0010 06/24/19 0337 06/24/19 0342   BP: 165/69 122/74 150/72 150/72   Pulse: 66 65 60 60   Resp:  16 16    Temp:  98.6 °F (37 °C) 97.9 °F (36.6 °C)    SpO2:  94% 96%    Weight:       Height:         06/23 1901 - 06/24 0700  In: 2243 [I.V.:2243]  Out: 1875 [Urine:1850; Drains:25]  06/22 0701 - 06/23 1900  In: 3609.2 [I.V.:3609.2]  Out: 9682 [Urine:3225; Drains:180]    Constitutional: Alert, oriented, cooperative patient in no acute distress; appears stated age    Eyes:Sclera are clear. EOMs intact  ENMT: no external lesions gross hearing normal; no obvious neck masses, no ear or lip lesions, nares normal; +NGT with bilious output  CV: RRR. Normal perfusion  Resp: No JVD. Breathing is  non-labored; no audible wheezing. GI: Incision clear; 2 beatriz out on 6/20/19 (will not replace), Bryce drain serosang, less distention       Musculoskeletal: unremarkable with normal function. No embolic signs or cyanosis.   Palpable pedal pulses bilat   Neuro:  Oriented; moves all 4; no focal deficits  Psychiatric: normal affect and mood, no memory impairment    Recent vitals (if inpt):  Patient Vitals for the past 24 hrs:   BP Temp Pulse Resp SpO2 Weight   06/24/19 0342 150/72  60      06/24/19 0337 150/72 97.9 °F (36.6 °C) 60 16 96 %    06/24/19 0010 122/74 98.6 °F (37 °C) 65 16 94 %    06/23/19 2158 165/69  66      06/23/19 2156 165/69  66      06/23/19 1959 157/77 99 °F (37.2 °C) 65 18 98 %    06/23/19 1738      222 lb 9.6 oz (101 kg)   06/23/19 1642 144/69 98.4 °F (36.9 °C) 66 20 96 %    06/23/19 1116 140/74 96.6 °F (35.9 °C) 67 20 96 %    06/23/19 0816 146/74 96.4 °F (35.8 °C) 70 18 95 %        Labs:  Recent Labs     06/23/19  0536   WBC 5.1   HGB 9.2*      *   K 4.4      CO2 24   BUN 15   CREA 0.73*   *       Lab Results   Component Value Date/Time    WBC 5.1 06/23/2019 05:36 AM    HGB 9.2 (L) 06/23/2019 05:36 AM    PLATELET 427 59/18/4006 05:36 AM    Sodium 134 (L) 06/23/2019 05:36 AM    Potassium 4.4 06/23/2019 05:36 AM    Chloride 103 06/23/2019 05:36 AM    CO2 24 06/23/2019 05:36 AM    BUN 15 06/23/2019 05:36 AM    Creatinine 0.73 (L) 06/23/2019 05:36 AM    Glucose 103 (H) 06/23/2019 05:36 AM    INR 1.1 05/24/2018 07:05 AM    aPTT 26.5 04/07/2015 04:25 PM    Bilirubin, total 1.5 (H) 06/14/2019 03:35 AM    AST (SGOT) 20 06/14/2019 03:35 AM    ALT (SGPT) 27 06/14/2019 03:35 AM    Alk. phosphatase 65 06/14/2019 03:35 AM    Troponin-I, Qt. 0.02 06/15/2019 11:52 PM       CT Results  (Last 48 hours)    None        chest X-ray      I reviewed recent labs, recent radiologic studies, and pertinent records including other doctor notes if needed. I independently reviewed radiology images for studies I described above or studies I have ordered.    Admission date (for inpatients): 6/14/2019   * No surgery found *  Procedure(s):  EXPLORATORY LAPAROTOMY   Mult enterorrhaphies approx 85cm small bowel resect and anast drain placement hernia repair (no mesh) removal peritoneal FB Removal of Bozoar materail in SB    ASSESSMENT/PLAN:  Problem List  Date Reviewed: 6/14/2019          Codes Class Noted    Atrial fibrillation with rapid ventricular response ([facility] ICD-10-CM: I48.91  ICD-9-CM: 427.31  6/16/2019        * (Principal) SBO (small bowel obstruction) ([facility] ICD-10-CM: D46.119  ICD-9-CM: 560.9  6/14/2019        Generalized abdominal pain ICD-10-CM: R10.84  ICD-9-CM: 789.07  6/14/2019        Pneumatosis intestinalis ICD-10-CM: K63.89  ICD-9-CM: 569.89  6/14/2019        Thrombocytopenia ([facility] ICD-10-CM: D69.6  ICD-9-CM: 287.5  6/14/2019        Ventral incisional hernia ICD-10-CM: K43.2  ICD-9-CM: 553.21  6/14/2019        Small bowel obstruction ([facility] ICD-10-CM: K56.609  ICD-9-CM: 560.9  6/14/2019        Hypertension ICD-10-CM: I10  ICD-9-CM: 401.9  Unknown        High cholesterol ICD-10-CM: E78.00  ICD-9-CM: 272.0  Unknown        GERD (gastroesophageal reflux disease) ICD-10-CM: K21.9  ICD-9-CM: 530.81  Unknown        Arthritis ICD-10-CM: M19.90  ICD-9-CM: 716.90  Unknown        Postsurgical hypothyroidism ICD-10-CM: E89.0  ICD-9-CM: 244.0  4/11/2017        Chronic obstructive pulmonary disease (HCC) (Chronic) ICD-10-CM: J44.9  ICD-9-CM: 175  4/10/2017        DDD (degenerative disc disease), cervical ICD-10-CM: M50.30  ICD-9-CM: 722.4  3/21/2017        S/P CABG (coronary artery bypass graft) ICD-10-CM: Z95.1  ICD-9-CM: V45.81  12/29/2016        Coronary artery disease of bypass graft of native heart with stable angina pectoris [facility]) ICD-10-CM: H38.162  ICD-9-CM: 414.05, 413.9  8/23/2016    Overview Addendum 6/29/2018 12:32 PM by Jeromy Jha MD     1. CABG (9/2003): Transmyocardial laser revascularization to the inferior wall of the left ventricle. Coronary artery bypass grafting x6. LIMA to LAD: Sequential SVG to ramus and obtuse marginal.  Sequential SVG to posterolateral and right PDA, SVG to diagonal.  2.  Cardiolite( 11/2012): Diaphragmatic attenuation. No ischemia. Normal LV systolic function. EF 63%. 3.  Stress Cardiolite (1/16/15):  EF 52%. Normal perfusion. 4.  LHC (4/13/15):  6 of 6 bypass grafts patent. Small vessel disease involving the apical LAD. Normal LV systolic function. LVEDP 27  5. LHC (5/24/18): Severe multivessel CAD. 6 of 6 grafts patent. Small vessel disease not amendable to PCI. EF 55-60%.               Chronic seasonal allergic rhinitis due to pollen (Chronic) ICD-10-CM: J30.1  ICD-9-CM: 477.0  8/23/2016        Dyslipidemia ICD-10-CM: E78.5  ICD-9-CM: 272.4  8/23/2016        Essential hypertension with goal blood pressure less than 130/85 ICD-10-CM: I10  ICD-9-CM: 401.9  8/23/2016        Diverticulitis of colon (without mention of hemorrhage)(562.11) ICD-10-CM: K57.32  ICD-9-CM: 562.11  12/3/2013        AAA (abdominal aortic aneurysm) (HCC) ICD-10-CM: I71.4  ICD-9-CM: 441.4  12/3/2013        DDD (degenerative disc disease), lumbar ICD-10-CM: M51.36  ICD-9-CM: 722.52  12/3/2013        Renal cyst, right ICD-10-CM: N28.1  ICD-9-CM: 753.10  12/3/2013        Leukopenia ICD-10-CM: D72.819  ICD-9-CM: 288.50  11/13/2012    Overview Addendum 11/19/2012 11:54 AM by Sim Goodness       There are no focal lesions in the liver or spleen. 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 CABG (9/2003): Transmyocardial laser revascularization to the       inferior wall of the left ventricle. Coronary artery bypass grafting x6. LIMA to LAD: Sequential SVG to ramus and obtuse marginal.  Sequential SVG       to posterolateral and right PDA, SVG to diagonal.      2.  Cardiolite( 11/2012): Diaphragmatic attenuation. No ischemia. Normal       LV systolic function. EF 63%. 3.  Stress Cardiolite (1/16/15):  EF 52%. Normal perfusion. 4.  LHC (4/13/15):  6 of 6 bypass grafts patent. Small vessel disease       involving the apical LAD. Normal LV systolic function. LVEDP 27      5. LHC (5/24/18): Severe multivessel CAD. 6 of 6 grafts patent. Small       vessel disease not amendable to PCI. EF 55-60%.        Chronic obstructive pulmonary disease (HCC) (4/10/2017)      Hypertension ()      Generalized abdominal pain (6/14/2019)      Pneumatosis intestinalis (6/14/2019)      Thrombocytopenia ([address] (6/14/2019)      Ventral incisional hernia (6/14/2019)      Small bowel obstruction ([address] (6/14/2019)      Atrial fibrillation with rapid ventricular response ([facility] (6/16/2019)             SBO with CT findings concerning for pneumatosis  He is s/p expl lap with 5hrs lysis, 85cm ischemic SB resection, hernia repair,   and evacuation of obstructing bezoar of (large volume of impacted delano slaw) on 6/14/19    Non-contrasted CT today shows some residual small bowel edema as expected but no obstruction or abscess  Remove NGT   NPO/bowel rest  PPN started 6/20/19    Voiding without Griffin which was removed 6/20/19  PT to continue with mobilization  Ambulate halls     Ventral Hernia on CT  Repaired without mesh on 6/14/19    Thrombocytopenia  Stable, Chronic problem  Monitor    Afib new onset 6/15/19  Hospitalists managing and discontinued cardiac monitoring on Thursday 6/20/19    GI and VTE prophylaxis  IV pepcid and SQ Lovenox + SCDs          I have personally performed a face-to-face diagnostic evaluation and management  service on this patient. I have independently seen the patient. I have independently obtained the above history from the patient/family. I have independently examined the patient with above findings. I have independently reviewed data/labs for this patient and developed the above plan of care (MDM). Signed: Shae Carcamo.  Angela Hansen MD, FACS

## 2019-06-24 NOTE — PROGRESS NOTES
Nutrition F/U:  PPN Management for Dr. Apollo Xie. Assessment:  Weight 101 kg (3rd floor SFE 6/23/19),bed weight,  edema - none listed. Pt day 10 s/p exploratory lap, small bowel resection, hernia repair and evacuation of obstruction bezoar. Pt remains dependent on PPN;  currently NPO with NGT to  ml output so far 6/24,  - 600 ml documented output 6/23. Abdomen remains distended but soft with active bowel sounds. Some flatus was reported. Last documented bowel movement was on 6/19, described as small, watery. Labs are remarkable for sodium 137-trending up with additional Na added to PPN, Phos at 4.1   IV Access:             2 Peripherals. Enteral Access:             NGT to LIS. Macronutrient Needs:  EER:  5081-8722 kcal/day (20-25 kg/kg/BW)  EPR:   gm pro/day (1-1.2 gm/kg/BW)  Max CHO:  466 gm/day (4mg/kg/IBW)  Fluids:  1 ml/kcal or per MD recommendation  Intake/Comparative Standards:  Current intake of TPN (2.4 liters 4.25%AA/5%DEX with 250 ml 20% lipids) provides 1316 calories/day (68% calorie goal), 102 grams protein/day (100% protein goal), 120 grams CHO/day (does not exceed max CHO limit) and 2650 ml volume/day (>100% fluid goal). Intervention:   Meals and Snacks: NPO. Parenteral Nutrition/IV Fluid: 1) Continue current PPN and 24 hour lipids. 2) Adjust additives in PPN to increase the sodium content by 10 meq/liter, discontinue Phos in TPN add 4.5 mEq/L of calcium. 3) Additives/liter: sodium 65 ( chloride), potassium 40 (chloride), 5 magnesium and 4.5 mEq/L: calcium. 4) MVI and MTE/bag. 5) IVF: 0.9% NaCl @ 30 ml/hr. Mineral Supplement Therapy: Nutrition Support Orders/Electrolyte Management Replacement Protocols are active on the MAR. Coordination of Nutrition Care:  Jaycee Viera, Jerry  Discharge Nutrition Plan; Too soon to determine.     Allyn Munguia, MPH, [address] [place] [address],   [phone]

## 2019-06-24 NOTE — PROGRESS NOTES
Pt assessment completed. Alert and oriented. Lungs CTA Even and unlabored. Heart sounds regular. Abdomen soft and non tender active bowel sounds. Pt denies pain needs at this time. Midline incision with dry dressing present, clean dry and intact. UMER drain present and charged draining serous drainage. IV present and infusing without difficulty. All needs met.

## 2019-06-24 NOTE — PROGRESS NOTES
Problem: Mobility Impaired (Adult and Pediatric)  Goal: *Acute Goals and Plan of Care (Insert Text)  Description    LTG:  (1.)Mr. Cristina will move from supine to sit and sit to supine  in bed with STAND BY ASSIST within 7 treatment day(s). Supine to sit 6/22/19  (2.)Mr. Cary Moreno will transfer from bed to chair and chair to bed with SUPERVISION using the least restrictive device within 7 treatment day(s). Progressing 6/22/19   (3.)Mr. Cary Moreno will ambulate with MODIFIED INDEPENDENCE for 300 feet with the least restrictive device within 7 treatment day(s). Progressing 6/22/19  ________________________________________________________________________________________________   Outcome: Progressing Towards Goal    PHYSICAL THERAPY: Daily Note and AM 6/24/2019  INPATIENT: PT Visit Days : 1  [place] / Plan[place] PART A AND B / Product Type: Medicare /       NAME/AGE/GENDER: Maximino Greene is a 82 y.o. male   PRIMARY DIAGNOSIS: SBO (small bowel obstruction) ([facility] [K56.609]  [facility] bowel obstruction ([facility] [K56.609] SBO (small bowel obstruction) ([facility])   SBO (small bowel obstruction) ([facility])    Procedure(s) (LRB):  EXPLORATORY LAPAROTOMY   Mult enterorrhaphies approx 85cm small bowel resect and anast drain placement hernia repair (no mesh) removal peritoneal FB Removal of Bozoar materail in SB (N/A)  10 Days Post-Op  ICD-10: Treatment Diagnosis:    · Generalized Muscle Weakness (M62.81)  · Other abnormalities of gait and mobility (R26.89)   Precaution/Allergies:  Diphenhydramine hcl; Ativan [lorazepam]; Iodinated contrast- oral and iv dye; and Sulfa (sulfonamide antibiotics)      ASSESSMENT:     Mr. Cary Moreno presents with limited independence with gait and transfers as well as bed mobility due to abdominal surgery. He did well with therapy today. Needs a rolling walker to ambulate and may need some follow up therapy based on his progression.     6/19 am supine upon arrival.  Works on bed mobility as follows: supine>EOB with CGA, walks 140 ft using RW with CGA and no LOB. Return to his chair and performs exercises without any problems. He return to supine with call light near. 6/20--Pt performed standing in room on arrival. Pt performed exercises while in standing. Pt ambulated in hallway. Pt in bedside chair with needs in reach. RN notified. 6/22/19:  Patient participated well. Anxious to get up and walk. Ambulated well with walker for increased distance. Ambulated short distance in room (bed to bathroom) without an assistive device. Progressing well. 6/24 doing well with gait and bed mobility. This section established at most recent assessment   PROBLEM LIST (Impairments causing functional limitations):  1. Decreased Strength  2. Decreased Transfer Abilities  3. Decreased Ambulation Ability/Technique  4. Decreased Balance  5. Decreased Activity Tolerance   INTERVENTIONS PLANNED: (Benefits and precautions of physical therapy have been discussed with the patient.)  1. Bed Mobility  2. Gait Training  3. Therapeutic Exercise/Strengthening  4. Transfer Training     TREATMENT PLAN: Frequency/Duration: daily for duration of hospital stay  Rehabilitation Potential For Stated Goals: Good     REHAB RECOMMENDATIONS (at time of discharge pending progress):    Placement: It is my opinion, based on this patient's performance to date, that Mr. Nani Ledbetter may benefit from [address] after discharge due to the functional deficits listed above that are likely to improve with skilled rehabilitation   Equipment:    To be determined    None at this time              HISTORY:   History of Present Injury/Illness (Reason for Referral):  Patient is an 83 y/o male with hx CAD, CABG, COPD, HTN, HLD who underwent expl lap yesterday on 6/14 for SBO. He went into new onset rapid afib tonight at approx 1020pm with rate to 150s-160s. He denies chest pain or shortness of breath.  He believes he may have had an episode of afib when he had his 6 v CABG in 2003. Hospitalist service consulted for new onset rapid atrial fibrillation. 6/16- seen with wife and family members at bedside. He is in good spirits and denies concerns. Cardizem infusing at low rate, but he converted back to sinus rhythm early this AM.  He denies chest pain or shortness of breath. Has not had flatus or BM yet. 6/17- significant confusion overnight that worsened with ativan. Required haldol. Improved now and oriented x 3 but confused some in conversation. Past Medical History/Comorbidities:   Mr. 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 He also has no past medical history of Asthma, Chronic kidney disease, Difficult intubation, Heart failure ([address] Liver disease, Malignant hyperthermia due to anesthesia, Nausea & vomiting, Pseudocholinesterase deficiency, Psychiatric disorder, PUD (peptic ulcer disease), Seizures ([address] Sleep apnea, or Thromboembolus ([address]   Mr. Bobby Rivera  has a past surgical history that includes pr cardiac surg procedure unlist (1991); pr cardiac surg procedure unlist (9/2003); hx cholecystectomy (9/2000); pr neurological procedure unlisted (12/1981); hx hernia repair (09/1993); hx gi (11/2003); hx lumbar laminectomy (5/2005); hx other surgical (05/2002); hx colonoscopy (02/28/2013); hx endoscopy (02/28/2013); vascular surgery procedure unlist (1991); hx orthopaedic (1/2006); hx orthopaedic (8/2005); hx orthopaedic (2005); hx heart catheterization (04/2015); hx other surgical (11/2014); hx other surgical; hx orthopaedic (03/1955); hx thyroidectomy (08/24/2017); hx thyroidectomy (08/24/2017); and hx heent. Social History/Living Environment:   Home Environment: Private residence  # Steps to Enter: 3  One/Two Story Residence: Two story  # of Interior Steps: 11  Height of Each Step (in): 11 inches  Interior Rails: Right  Lift Chair Available: No  Living Alone: No  Support Systems: Child(kim), Family member(s)  Patient Expects to be Discharged to[de-identified] Private residence  Current DME Used/Available at Home: Glucometer  Prior Level of Function/Work/Activity:  Independent prior to admit. Number of Personal Factors/Comorbidities that affect the Plan of Care: 1-2: MODERATE COMPLEXITY   EXAMINATION:   Most Recent Physical Functioning:   Gross Assessment:                  Posture:     Balance:    Bed Mobility:  Supine to Sit: Supervision  Sit to Supine: Supervision  Wheelchair Mobility:     Transfers:  Sit to Stand: Supervision  Stand to Sit: Supervision  Gait:     Speed/Khloe: Pace decreased (<100 feet/min)  Step Length: Left shortened;Right shortened  Distance (ft): 650 Feet (ft)  Assistive Device: Walker, rolling  Ambulation - Level of Assistance: Supervision  Duration: 23 Minutes      Body Structures Involved:  1. Muscles Body Functions Affected:  1. Movement Related Activities and Participation Affected:  1. Mobility   Number of elements that affect the Plan of Care: 3: MODERATE COMPLEXITY   CLINICAL PRESENTATION:   Presentation: Stable and uncomplicated: LOW COMPLEXITY   CLINICAL DECISION MAKING:   [employer] AM-PAC 6 Clicks   Basic Mobility Inpatient Short Form  How much difficulty does the patient currently have. .. Unable A Lot A Little None   1. Turning over in bed (including adjusting bedclothes, sheets and blankets)? ? 1   ? 2   ? 3   ? 4   2. Sitting down on and standing up from a chair with arms ( e.g., wheelchair, bedside commode, etc.)   ? 1   ? 2   ? 3   ? 4   3. Moving from lying on back to sitting on the side of the bed?   ? 1   ? 2   ? 3   ? 4   How much help from another person does the patient currently need. .. Total A Lot A Little None   4. Moving to and from a bed to a chair (including a wheelchair)? ? 1   ? 2   ? 3   ? 4   5. Need to walk in hospital room? ? 1   ? 2   ? 3   ? 4   6. Climbing 3-5 steps with a railing? ? 1   ? 2   ? 3   ? 4   © 2007, Trustees of [address] [place] [address] [address] [id number], under license to [employer]. All rights reserved      Score:  Initial: 18 Most Recent: X (Date: -- )    Interpretation of Tool:  Represents activities that are increasingly more difficult (i.e. Bed mobility, Transfers, Gait). Medical Necessity:     · Patient is expected to demonstrate progress in strength and range of motion  ·  to increase independence with gait and transfers   · . Reason for Services/Other Comments:  · Patient continues to require skilled intervention due to limited functional independence   · . Use of outcome tool(s) and clinical judgement create a POC that gives a: Clear prediction of patient's progress: LOW COMPLEXITY            TREATMENT:   (In addition to Assessment/Re-Assessment sessions the following treatments were rendered)   Pre-treatment Symptoms/Complaints:  Patient states doing good  Pain: Initial:   Pain Intensity 1: 0 Post Session:      Therapeutic Activity: (    23 minutes): Therapeutic activities including bed mobility, ambulation with and without walker, and toilet transfers to improve mobility, strength, balance and coordination. Required minimal verbal cues   to promote static and dynamic balance in standing.         Date:  6/18   Date:  6/19   Date:  6/20   Activity/Exercise Parameters Parameters Parameters   Toe/heel raise 12 12 12   Hip abd/add 12 12    Marching in place 12 12 12   LAQ 12 12    squats   12 Braces/Orthotics/Lines/Etc:   · IV  · drain UMER  · nasogastric tube  Treatment/Session Assessment:    · Response to Treatment: Patient participated well  · Interdisciplinary Collaboration:   o Physical Therapy Assistant  o Registered Nurse  · After treatment position/precautions:   o Bed/Chair-wheels locked  o Bed in low position  o Call light within reach  o RN notified  o Family at bedside  o in  bathroom   · Compliance with Program/Exercises: Compliant all of the time  · Recommendations/Intent for next treatment session: \"Next visit will focus on advancements to more challenging activities and reduction in assistance provided\".   Total Treatment Duration:  PT Patient Time In/Time Out  Time In: 1130  Time Out: [address], PTA

## 2019-06-25 LAB
ANION GAP SERPL CALC-SCNC: 6 MMOL/L (ref 7–16)
BUN SERPL-MCNC: 14 MG/DL (ref 8–23)
CALCIUM SERPL-MCNC: 8.4 MG/DL (ref 8.3–10.4)
CHLORIDE SERPL-SCNC: 103 MMOL/L (ref 98–107)
CO2 SERPL-SCNC: 26 MMOL/L (ref 21–32)
CREAT SERPL-MCNC: 0.82 MG/DL (ref 0.8–1.5)
ERYTHROCYTE [DISTWIDTH] IN BLOOD BY AUTOMATED COUNT: 14.5 % (ref 11.9–14.6)
GLUCOSE SERPL-MCNC: 108 MG/DL (ref 65–100)
HCT VFR BLD AUTO: 27.4 % (ref 41.1–50.3)
HGB BLD-MCNC: 9.6 G/DL (ref 13.6–17.2)
MCH RBC QN AUTO: 33.9 PG (ref 26.1–32.9)
MCHC RBC AUTO-ENTMCNC: 35 G/DL (ref 31.4–35)
MCV RBC AUTO: 96.8 FL (ref 79.6–97.8)
NRBC # BLD: 0 K/UL (ref 0–0.2)
PLATELET # BLD AUTO: 239 K/UL (ref 150–450)
PMV BLD AUTO: 8.7 FL (ref 9.4–12.3)
POTASSIUM SERPL-SCNC: 4.7 MMOL/L (ref 3.5–5.1)
RBC # BLD AUTO: 2.83 M/UL (ref 4.23–5.6)
SODIUM SERPL-SCNC: 135 MMOL/L (ref 136–145)
TRIGL SERPL-MCNC: 121 MG/DL (ref 35–150)
WBC # BLD AUTO: 5.4 K/UL (ref 4.3–11.1)

## 2019-06-25 PROCEDURE — 85027 COMPLETE CBC AUTOMATED: CPT

## 2019-06-25 PROCEDURE — 74011250637 HC RX REV CODE- 250/637: Performed by: FAMILY MEDICINE

## 2019-06-25 PROCEDURE — 84478 ASSAY OF TRIGLYCERIDES: CPT

## 2019-06-25 PROCEDURE — 65270000029 HC RM PRIVATE

## 2019-06-25 PROCEDURE — 74011000250 HC RX REV CODE- 250: Performed by: INTERNAL MEDICINE

## 2019-06-25 PROCEDURE — 97530 THERAPEUTIC ACTIVITIES: CPT

## 2019-06-25 PROCEDURE — 36415 COLL VENOUS BLD VENIPUNCTURE: CPT

## 2019-06-25 PROCEDURE — 74011000250 HC RX REV CODE- 250: Performed by: SURGERY

## 2019-06-25 PROCEDURE — 74011250636 HC RX REV CODE- 250/636: Performed by: SURGERY

## 2019-06-25 PROCEDURE — 74011000258 HC RX REV CODE- 258: Performed by: SURGERY

## 2019-06-25 PROCEDURE — 80048 BASIC METABOLIC PNL TOTAL CA: CPT

## 2019-06-25 RX ORDER — AMLODIPINE BESYLATE 5 MG/1
5 TABLET ORAL DAILY
Status: DISCONTINUED | OUTPATIENT
Start: 2019-06-26 | End: 2019-06-26 | Stop reason: HOSPADM

## 2019-06-25 RX ORDER — PANTOPRAZOLE SODIUM 40 MG/1
40 TABLET, DELAYED RELEASE ORAL
Status: DISCONTINUED | OUTPATIENT
Start: 2019-06-26 | End: 2019-06-26 | Stop reason: HOSPADM

## 2019-06-25 RX ORDER — GUAIFENESIN 100 MG/5ML
81 LIQUID (ML) ORAL DAILY
Status: DISCONTINUED | OUTPATIENT
Start: 2019-06-26 | End: 2019-06-26 | Stop reason: HOSPADM

## 2019-06-25 RX ORDER — LEVOTHYROXINE SODIUM 75 UG/1
150 TABLET ORAL
Status: DISCONTINUED | OUTPATIENT
Start: 2019-06-26 | End: 2019-06-26 | Stop reason: HOSPADM

## 2019-06-25 RX ORDER — TERAZOSIN 5 MG/1
10 CAPSULE ORAL
Status: DISCONTINUED | OUTPATIENT
Start: 2019-06-25 | End: 2019-06-26 | Stop reason: HOSPADM

## 2019-06-25 RX ORDER — GABAPENTIN 100 MG/1
100 CAPSULE ORAL 3 TIMES DAILY
Status: DISCONTINUED | OUTPATIENT
Start: 2019-06-25 | End: 2019-06-26 | Stop reason: HOSPADM

## 2019-06-25 RX ORDER — ATORVASTATIN CALCIUM 40 MG/1
40 TABLET, FILM COATED ORAL
Status: DISCONTINUED | OUTPATIENT
Start: 2019-06-25 | End: 2019-06-26 | Stop reason: HOSPADM

## 2019-06-25 RX ORDER — FLUTICASONE PROPIONATE 50 MCG
2 SPRAY, SUSPENSION (ML) NASAL DAILY
Status: DISCONTINUED | OUTPATIENT
Start: 2019-06-26 | End: 2019-06-26 | Stop reason: HOSPADM

## 2019-06-25 RX ORDER — FINASTERIDE 5 MG/1
5 TABLET, FILM COATED ORAL DAILY
Status: DISCONTINUED | OUTPATIENT
Start: 2019-06-26 | End: 2019-06-26 | Stop reason: HOSPADM

## 2019-06-25 RX ADMIN — PIPERACILLIN SODIUM,TAZOBACTAM SODIUM 3.38 G: 3; .375 INJECTION, POWDER, FOR SOLUTION INTRAVENOUS at 08:36

## 2019-06-25 RX ADMIN — METOPROLOL TARTRATE 5 MG: 5 INJECTION INTRAVENOUS at 08:35

## 2019-06-25 RX ADMIN — MORPHINE SULFATE 2 MG: 4 INJECTION INTRAVENOUS at 00:36

## 2019-06-25 RX ADMIN — ATORVASTATIN CALCIUM 40 MG: 40 TABLET, FILM COATED ORAL at 22:06

## 2019-06-25 RX ADMIN — ENOXAPARIN SODIUM 40 MG: 40 INJECTION SUBCUTANEOUS at 08:37

## 2019-06-25 RX ADMIN — MORPHINE SULFATE 3 MG: 4 INJECTION INTRAVENOUS at 20:43

## 2019-06-25 RX ADMIN — METOPROLOL TARTRATE 5 MG: 5 INJECTION INTRAVENOUS at 20:43

## 2019-06-25 RX ADMIN — METOPROLOL TARTRATE 5 MG: 5 INJECTION INTRAVENOUS at 03:06

## 2019-06-25 RX ADMIN — POTASSIUM CHLORIDE: 2 INJECTION, SOLUTION, CONCENTRATE INTRAVENOUS at 17:16

## 2019-06-25 RX ADMIN — GABAPENTIN 100 MG: 100 CAPSULE ORAL at 17:25

## 2019-06-25 RX ADMIN — FLUTICASONE PROPIONATE 2 SPRAY: 50 SPRAY, METERED NASAL at 08:37

## 2019-06-25 RX ADMIN — GABAPENTIN 100 MG: 100 CAPSULE ORAL at 22:06

## 2019-06-25 RX ADMIN — TERAZOSIN HYDROCHLORIDE 10 MG: 5 CAPSULE ORAL at 22:06

## 2019-06-25 RX ADMIN — METOPROLOL TARTRATE 5 MG: 5 INJECTION INTRAVENOUS at 14:44

## 2019-06-25 RX ADMIN — FAMOTIDINE 20 MG: 10 INJECTION, SOLUTION INTRAVENOUS at 08:35

## 2019-06-25 RX ADMIN — MORPHINE SULFATE 3 MG: 4 INJECTION INTRAVENOUS at 05:06

## 2019-06-25 RX ADMIN — SOYBEAN OIL 250 ML: 20 INJECTION, SOLUTION INTRAVENOUS at 17:23

## 2019-06-25 RX ADMIN — PIPERACILLIN SODIUM,TAZOBACTAM SODIUM 3.38 G: 3; .375 INJECTION, POWDER, FOR SOLUTION INTRAVENOUS at 01:30

## 2019-06-25 RX ADMIN — PIPERACILLIN SODIUM,TAZOBACTAM SODIUM 3.38 G: 3; .375 INJECTION, POWDER, FOR SOLUTION INTRAVENOUS at 17:23

## 2019-06-25 NOTE — PROGRESS NOTES
Midline abdominal dressing changed, old dressing with small amount of yellow/green drainage present, sutures intact. No redness or swelling noted on suture line, 4x4 guaze and 5x9 abdominal pad placed over site. UMER drain dressing changed, no drainage noted on old dressing, UMER site with no redness or swelling noted, drain gauze placed over site.

## 2019-06-25 NOTE — PROGRESS NOTES
Nutrition F/U:  PPN Management for Dr. Samy Braden. Assessment:  PPN:  Dex 5% 4.25% amino acids at 100 ml/hr with 250 ml 20% lipids daily. Diet:  6/25:  Clear Liquids  Weight:  94.3 kg source not specified. edema - none listed. Pt day 11 s/p exploratory lap, small bowel resection, hernia repair and evacuation of obstruction bezoar. Pt remains dependent on PPN;  NGT removed 6/24 and clear liquids initiated this am.  Pt tolerating clear liquids. Abdomen soft with active bowel sounds; passing flatus. Last documented bowel movement was on 6/19, described as small, watery. Labs are remarkable for sodium 135   IV Access:             2 Peripherals. Macronutrient Needs:  EER:  2427-7800 kcal/day (20-25 kg/kg/BW)  EPR:   gm pro/day (1-1.2 gm/kg/BW)  Max CHO:  466 gm/day (4mg/kg/IBW)  Fluids:  1 ml/kcal or per MD recommendation  Intake/Comparative Standards:  Current intake of TPN (2.4 liters 4.25%AA/5%DEX with 250 ml 20% lipids) provides 1316 calories/day (68% calorie goal), 102 grams protein/day (100% protein goal), 120 grams CHO/day (does not exceed max CHO limit) and 2650 ml volume/day (>100% fluid goal). Intervention:   Meals and Snacks: Clear Liquids; advance per MD recommendations as medical condition dictates. Nutrition Supplement:  Initiated Ensure Clear on all meal trays; noted preference for Apple flavored. Parenteral Nutrition/IV Fluid: 1) Continue current PPN and 24 hour lipids. 2) No change to additives; current PPN contains no Phos. 3) Additives/liter: sodium 65 ( chloride), potassium 40 (chloride), 5 mEq/L magnesium and 4.5 mEq/L: calcium. 4) MVI and MTE/bag. Mineral Supplement Therapy: Nutrition Support Orders/Electrolyte Management Replacement Protocols are active on the MAR. Coordination of Nutrition Care:  Jonathan Florian, Jerry  Discharge Nutrition Plan; Too soon to determine.     Karsten Rubio, MPH, [address] [place] [address], [place]  [phone]

## 2019-06-25 NOTE — PROGRESS NOTES
Care Management Interventions  PCP Verified by CM: Yes  Mode of Transport at Discharge: Other (see comment)  Transition of Care Consult (CM Consult): Other  Current Support Network: Own Home, Lives with Spouse  Confirm Follow Up Transport: Family  Plan discussed with Pt/Family/Caregiver: Yes  Freedom of Choice Offered: Yes  Discharge Location  Discharge Placement: Home with family assistance      Chart reviewed. SW spoke with pt who is A&O, spouse at bedside. Pt talkative, quite the jokester. Pt states was able to eat today ( jello, broth etc ) states had no problems after eating.   Pt ambulates well with PT.  SW will continue to follow in the event pt has any d/c needs as ordered by MD.

## 2019-06-25 NOTE — PROGRESS NOTES
Problem: Falls - Risk of  Goal: *Absence of Falls  Description  Document Kimberly Levine Fall Risk and appropriate interventions in the flowsheet. Outcome: Progressing Towards Goal     Problem: Pressure Injury - Risk of  Goal: *Prevention of pressure injury  Description  Document Tim Scale and appropriate interventions in the flowsheet.   Outcome: Progressing Towards Goal     Problem: Infection - Risk of, Surgical Site Infection  Goal: *Absence of surgical site infection signs and symptoms  Outcome: Progressing Towards Goal

## 2019-06-25 NOTE — PROGRESS NOTES
Bedside and Verbal shift change report given to [address]  (oncoming nurse) by Tal Yanez RN l (offgoing nurse). Report included the following information SBAR, Kardex, Intake/Output, MAR and Recent Results.

## 2019-06-25 NOTE — PROGRESS NOTES
H&P/Consult Note/Progress Note/Office Note:   Maximino Nguyen  MRN: [id number]  BMV:5/10/0715  Age:83 y.o.    HPI: Maximino Nguyen is a 82 y.o. male who is s/p expl lap with 5hrs lysis, 85cm ischemic sb resection, hernia repair,   and evacuation of obstructing bezoar of (large volume of impacted delano slaw) on 6/14/19    He originally came to the ER on 6/14/19   with a 1 day h/o progressive, intermittent, generalized abd pain without radiation which worsened acutely around 8PM on 6/13/19  Nothing made pain better or worse. +Nausea; -V  No fever, chills, chest pain, or back pain    1990s s/p open AAA in [place] s/p lap [place] in [place]  S/p expl lap for SBO (no resection) [place] CABG x 6 2016 s/p thoracic aneurysm stent at [facility]    Surgery was consulted after below CT without contrast showed SBO findings concerning for pneumatosis       4/13/16 s/p colonoscopy; Dr Charlotte Chaudhari:  SESSILE SERRATED POLYPS. FRAGMENT OF BENIGN COLONIC MUCOSA. Electronically signed out on 4/14/2016 11:51 by Severiano Evener, MD   Microscopic Description   The biopsy demonstrates serrated hypermucinous colonic glands imbedded in a nonfibrotic lamina propria with focal crypt dilatation and widening of the crypt bases. An additional fragment demonstrates no adenomatous or hyperplastic change. Dr. Justus Block concurs. 6/14/19 CT abd/pelvis without contrast (pre-op)  - Liver: Within normal limits. - Gallbladder and bile ducts: The gallbladder is absent, without biliary tract  dilatation.  - Spleen: Within normal limits. - Urinary tract: Again noted is a 5.3 cm right kidney cyst. The left kidney is  unremarkable. No urinary tract stone or hydronephrosis is identified. There are  bilateral inguinal hernias, containing fat on the left and a small portion of  the urinary bladder on the right. - Adrenals: Within normal limits. - Pancreas: Within normal limits. - Gastrointestinal tract:  In the umbilicus region there is a ventral 2.8 cm  fat-containing hernia, with adjacent stranding which may relate to  incarceration. There is a mid small bowel obstruction, whose transition point is  not clearly identified, but may lie in the region of the ventral hernia although  no bowel is seen within the hernia itself. There are a few questionable areas of  small bowel wall pneumatosis. The colon is unremarkable except for  diverticulosis. No free air or free fluid is seen. - Retroperitoneum: Within normal limits. - Peritoneal cavity and abdominal wall: Within normal limits. - Pelvis: Within normal limits. - Spine/bones: No acute process. - Other comments: Partially visualized is a descending thoracic aortic stent  graft.     IMPRESSION: Mid small bowel obstruction. The transition point is not clearly  identified, but may lie in the region of a small periumbilical fat-containing  ventral hernia which may be incarcerated given the associated fat stranding. Of  note, no bowel is seen within the hernia. Additionally, there are a few  questionable areas of small bowel wall pneumatosis concerning for ischemia. ER  physician notified.         6/24/19 CT without IV or oral contrast (POD10)  There is scarring at the left lung base. There is evidence of prior thoracic aneurysm repair.     CT ABD: No contour deforming abnormality involving the liver or spleen. There is a stable right renal cyst. There is fatty atrophy of the pancreas. The  adrenal glands are normal. There is a drain present within the right lower  quadrant. There is a question of bowel wall thickening involving the central  small bowel loops (images 42 through 59).     CT PELVIS: The bladder and rectum are normal.   No definite pelvic free fluid. No free air.     Bone window evaluation demonstrates no aggressive osseous lesions.     IMPRESSION:  1.  Limited exam without the benefit of oral or IV contrast. There is a question  of bowel wall thickening involving the central small bowel loops with some  associated mesenteric edema. 2. New drain which terminates within the right lower quadrant            Additonal hx:  6/17/19 POD3 afib over weekend; no complaints; up in chair; interactive and conversant; Hospitalists treating; no flatus or BM yet   6/18/19 POD4 feels OK, no complaints, +BM  6/19/19 POD5 feels OK, no sign pain isssues, abd still distended  6/20/19 POD6 many loose BMs and lost of flatus; no sign abd pain issues, distention better but persists  6/21/19 POD7 lost of flatus; some mild discomfort in RUQ, and distention better but still an issue; abd Xray non-specific bowel gas pattern  6/24/19 POD10 more flatus over weekend, no fevers, WBC normal; Ngt 700-750 q day; NGT out today  6/25/19 POD11 Tolerating liquid diet so far; lots of flatus; Home tomorrow if he continues to tolerate PO          Past Medical History:   Diagnosis Date    Abdominal pain     kower     Acute cervical radiculopathy     Aneurysm ([address]     nov 1991AAA    Arthritis     Atrial fibrillation with rapid ventricular response ([address] 6/16/2019    CAD (coronary artery disease)     6 bipass 9/2003    Cancer ([address]     skin    COPD (chronic obstructive pulmonary disease) ([address]     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   Left lobe: At least 4 heterogeneous nodules in the left lobe. The largest measures 2.9 x 1.1 x 2.4 cm in the lower pole, with mild peripheral color Doppler flow and possible tiny internal calcifications.     Thrombocytopenia ([address] marrow normal slightly big spleen low mpv    Thyroid disease     hypothyroidism     Past Surgical History:   Procedure Laterality Date    CARDIAC SURG PROCEDURE UNLIST  1991    AAA    CARDIAC SURG PROCEDURE UNLIST  9/2003     6 bypass surgeries    HX CHOLECYSTECTOMY  9/2000    HX COLONOSCOPY  02/28/2013    10 polyps/ fu in 3 yrs    HX ENDOSCOPY  02/28/2013    1 biopsy    HX GI  11/2003    small bowel obstruction    HX HEART CATHETERIZATION  04/2015    HX HEENT      thyroidectomy    HX HERNIA REPAIR  09/1993    Insisional    HX LUMBAR LAMINECTOMY  5/2005    L3, L4    HX ORTHOPAEDIC  1/2006    right total knee replacement    HX ORTHOPAEDIC  8/2005     left knee replacement    HX ORTHOPAEDIC  2005    laminectomy L3-l4    HX ORTHOPAEDIC  03/1955    right knee surgery    HX OTHER SURGICAL  05/2002    upper endoscopic retrograde cholangiopancreatogram    HX OTHER SURGICAL  11/2014    removal squamous cell carcinoma    HX OTHER SURGICAL      MRSA lower back -3/2005    HX THYROIDECTOMY  08/24/2017    Dr Dominique Lund    HX THYROIDECTOMY  08/24/2017    Dr. Mauro Childs  12/1981     ruptured disc in back    VASCULAR SURGERY PROCEDURE UNLIST  1991    abd aortic aneurysm repair     Current Facility-Administered Medications   Medication Dose Route Frequency    TPN ADULT - dextrose 5% amino acid 4.25%   IntraVENous CONTINUOUS    hydrALAZINE (APRESOLINE) 20 mg/mL injection 20 mg  20 mg IntraVENous Q4H PRN    NUTRITIONAL SUPPORT ELECTROLYTE PRN ORDERS   Does Not Apply PRN    fat emulsion 20% (LIPOSYN, INTRAlipid) infusion 250 mL  250 mL IntraVENous CONTINUOUS    morphine injection 2-3 mg  2-3 mg IntraVENous Q2H PRN    enoxaparin (LOVENOX) injection 40 mg  40 mg SubCUTAneous Q24H    fluticasone propionate (FLONASE) 50 mcg/actuation nasal spray 2 Spray  2 Spray Both Nostrils DAILY    sodium chloride (OCEAN) 0.65 % nasal squeeze bottle 2 Spray  2 Spray Both Nostrils Q2H PRN    phenol throat spray (CHLORASEPTIC) 1 Spray  1 Spray Oral PRN    albuterol (PROVENTIL VENTOLIN) nebulizer solution 2.5 mg  2.5 mg Nebulization Q6H PRN    metoprolol (LOPRESSOR) injection 5 mg  5 mg IntraVENous Q6H    piperacillin-tazobactam (ZOSYN) 3.375 g in 0.9% sodium chloride (MBP/ADV) 100 mL  3.375 g IntraVENous Q8H    famotidine (PF) (PEPCID) injection 20 mg  20 mg IntraVENous Q12H    acetaminophen (TYLENOL) tablet 1,000 mg  1,000 mg Oral Q6H PRN    ondansetron (ZOFRAN) injection 4 mg  4 mg IntraVENous Q4H PRN    white petrolatum-mineral oil (AKWA TEARS) 83-15 % ophthalmic ointment   Both Eyes PRN     Diphenhydramine hcl; Ativan [lorazepam]; Iodinated contrast- oral and iv dye; and Sulfa (sulfonamide antibiotics)  Social History     Socioeconomic History    Marital status:      Spouse name: Not on file    Number of children: Not on file    Years of education: Not on file    Highest education level: Not on file   Tobacco Use    Smoking status: Former Smoker     Packs/day: 1.00     Years: 25.00     Pack years: 25.00     Last attempt to quit: 12/3/1987     Years since quittin.5    Smokeless tobacco: Never Used   Substance and Sexual Activity    Alcohol use: Yes     Comment: occasional glass wine    Drug use: No     Social History     Tobacco Use   Smoking Status Former Smoker    Packs/day: 1.00    Years: 25.00    Pack years: 25.00    Last attempt to quit: 12/3/1987    Years since quittin.5   Smokeless Tobacco Never Used     Family History   Problem Relation Age of Onset    Heart Disease Father     Cancer Mother         colon    Cancer Sister     Cancer Maternal Aunt     Cancer Paternal Grandmother     Diabetes Paternal Grandmother     Thyroid Disease Neg Hx     Thyroid Cancer Neg Hx      ROS: The patient has no difficulty with chest pain or shortness of breath. No fever or chills.   Comprehensive review of systems was otherwise unremarkable except as noted above.    Physical Exam:   Visit Vitals  /81 (BP 1 Location: Left arm, BP Patient Position: At rest)   Pulse (!) 56   Temp 98.6 °F (37 °C)   Resp 14   Ht 6' (1.829 m)   Wt 222 lb 9.6 oz (101 kg)   SpO2 96%   BMI 30.19 kg/m²     Vitals:    06/24/19 1750 06/24/19 2046 06/25/19 0007 06/25/19 0403   BP: 160/72 137/79 148/81 152/81   Pulse: 61 63 64 (!) 56   Resp: 17 16 16 14   Temp: 98.4 °F (36.9 °C) 98.5 °F (36.9 °C) 97.7 °F (36.5 °C) 98.6 °F (37 °C)   SpO2: 98% 97% 97% 96%   Weight:       Height:         No intake/output data recorded. 06/23 1901 - 06/25 0700  In: 2243 [I.V.:2243]  Out: 3450 [Urine:2875; Drains:25]    Constitutional: Alert, oriented, cooperative patient in no acute distress; appears stated age    Eyes:Sclera are clear. EOMs intact  ENMT: no external lesions gross hearing normal; no obvious neck masses, no ear or lip lesions, nares normal; +NGT with bilious output  CV: RRR. Normal perfusion  Resp: No JVD. Breathing is  non-labored; no audible wheezing. GI: Incision clear; 2 beatriz out on 6/20/19 (will not replace), Bryce drain serosang, less distention       Musculoskeletal: unremarkable with normal function. No embolic signs or cyanosis.   Palpable pedal pulses bilat   Neuro:  Oriented; moves all 4; no focal deficits  Psychiatric: normal affect and mood, no memory impairment    Recent vitals (if inpt):  Patient Vitals for the past 24 hrs:   BP Temp Pulse Resp SpO2   06/25/19 0403 152/81 98.6 °F (37 °C) (!) 56 14 96 %   06/25/19 0007 148/81 97.7 °F (36.5 °C) 64 16 97 %   06/24/19 2046 137/79 98.5 °F (36.9 °C) 63 16 97 %   06/24/19 1750 160/72 98.4 °F (36.9 °C) 61 17 98 %   06/24/19 1309 126/64 97 °F (36.1 °C) 61 18 98 %   06/24/19 0757 137/77 98 °F (36.7 °C) 65 17 97 %       Labs:  Recent Labs     06/25/19  0533   WBC 5.4   HGB 9.6*      *   K 4.7      CO2 26   BUN 14   CREA 0.82   *       Lab Results   Component Value Date/Time    WBC 5.4 06/25/2019 05:33 AM    HGB 9.6 (L) 06/25/2019 05:33 AM    PLATELET 851 54/77/1370 05:33 AM    Sodium 135 (L) 06/25/2019 05:33 AM    Potassium 4.7 06/25/2019 05:33 AM    Chloride 103 06/25/2019 05:33 AM    CO2 26 06/25/2019 05:33 AM    BUN 14 06/25/2019 05:33 AM    Creatinine 0.82 06/25/2019 05:33 AM    Glucose 108 (H) 06/25/2019 05:33 AM    INR 1.1 05/24/2018 07:05 AM    aPTT 26.5 04/07/2015 04:25 PM    Bilirubin, total 1.5 (H) 06/14/2019 03:35 AM    AST (SGOT) 20 06/14/2019 03:35 AM    ALT (SGPT) 27 06/14/2019 03:35 AM    Alk. phosphatase 65 06/14/2019 03:35 AM    Troponin-I, Qt. 0.02 06/15/2019 11:52 PM       CT Results  (Last 48 hours)               06/24/19 1336  CT ABD PELV WO CONT Final result    Impression:  IMPRESSION:   1. Limited exam without the benefit of oral or IV contrast. There is a question   of bowel wall thickening involving the central small bowel loops with some   associated mesenteric edema. 2. New drain which terminates within the right lower quadrant. Narrative:  History: Abdominal distention. History of bowel resection for obstructing   bezoar. EXAM: CT abdomen and pelvis without contrast       TECHNIQUE: Thin section axial CT images are obtained from the lung bases through   the pubic symphysis. Radiation dose reduction techniques were used for this   study. Our CT scanners use one or all of the following: Automated exposure   control, adjustment of the mA and/or kV according to patient size, use of   iterative reconstruction. COMPARISON: 6/14/2019       FINDINGS: There is scarring at the left lung base. There is evidence of prior   thoracic aneurysm repair. CT ABDOMEN: No contour deforming abnormality involving the liver or spleen. There is a stable right renal cyst. There is fatty atrophy of the pancreas. The   adrenal glands are normal. There is a drain present within the right lower   quadrant.  There is a question of bowel wall thickening involving the central   small bowel loops (images 42 through 61). CT PELVIS: The bladder and rectum are normal. No definite pelvic free fluid. No   free air. Bone window evaluation demonstrates no aggressive osseous lesions. chest X-ray      I reviewed recent labs, recent radiologic studies, and pertinent records including other doctor notes if needed. I independently reviewed radiology images for studies I described above or studies I have ordered.    Admission date (for inpatients): 6/14/2019   * No surgery found *  Procedure(s):  EXPLORATORY LAPAROTOMY   Mult enterorrhaphies approx 85cm small bowel resect and anast drain placement hernia repair (no mesh) removal peritoneal FB Removal of Bozoar materail in SB    ASSESSMENT/PLAN:  Problem List  Date Reviewed: 6/14/2019          Codes Class Noted    Atrial fibrillation with rapid ventricular response ([facility] ICD-10-CM: I48.91  ICD-9-CM: 427.31  6/16/2019        * (Principal) SBO (small bowel obstruction) ([facility] ICD-10-CM: P81.228  ICD-9-CM: 560.9  6/14/2019        Generalized abdominal pain ICD-10-CM: R10.84  ICD-9-CM: 789.07  6/14/2019        Pneumatosis intestinalis ICD-10-CM: K63.89  ICD-9-CM: 569.89  6/14/2019        Thrombocytopenia ([facility] ICD-10-CM: D69.6  ICD-9-CM: 287.5  6/14/2019        Ventral incisional hernia ICD-10-CM: K43.2  ICD-9-CM: 553.21  6/14/2019        Small bowel obstruction ([facility] ICD-10-CM: K56.609  ICD-9-CM: 560.9  6/14/2019        Hypertension ICD-10-CM: I10  ICD-9-CM: 401.9  Unknown        High cholesterol ICD-10-CM: E78.00  ICD-9-CM: 272.0  Unknown        GERD (gastroesophageal reflux disease) ICD-10-CM: K21.9  ICD-9-CM: 530.81  Unknown        Arthritis ICD-10-CM: M19.90  ICD-9-CM: 716.90  Unknown        Postsurgical hypothyroidism ICD-10-CM: E89.0  ICD-9-CM: 244.0  4/11/2017        Chronic obstructive pulmonary disease ([facility] (Chronic) ICD-10-CM: J44.9  ICD-9-CM: 404  4/10/2017        DDD (degenerative disc disease), cervical ICD-10-CM: M50.30  ICD-9-CM: 722.4  3/21/2017        S/P CABG (coronary artery bypass graft) ICD-10-CM: Z95.1  ICD-9-CM: V45.81  12/29/2016        Coronary artery disease of bypass graft of native heart with stable angina pectoris [facility]) ICD-10-CM: A27.722  ICD-9-CM: 414.05, 413.9  8/23/2016    Overview Addendum 6/29/2018 12:32 PM by Marlon Sheffield MD     1. CABG (9/2003): Transmyocardial laser revascularization to the inferior wall of the left ventricle. Coronary artery bypass grafting x6. LIMA to LAD: Sequential SVG to ramus and obtuse marginal.  Sequential SVG to posterolateral and right PDA, SVG to diagonal.  2.  Cardiolite( 11/2012): Diaphragmatic attenuation. No ischemia. Normal LV systolic function. EF 63%. 3.  Stress Cardiolite (1/16/15):  EF 52%. Normal perfusion. 4.  LHC (4/13/15):  6 of 6 bypass grafts patent. Small vessel disease involving the apical LAD. Normal LV systolic function. LVEDP 27  5. LHC (5/24/18): Severe multivessel CAD. 6 of 6 grafts patent. Small vessel disease not amendable to PCI. EF 55-60%. Chronic seasonal allergic rhinitis due to pollen (Chronic) ICD-10-CM: J30.1  ICD-9-CM: 477.0  8/23/2016        Dyslipidemia ICD-10-CM: E78.5  ICD-9-CM: 272.4  8/23/2016        Essential hypertension with goal blood pressure less than 130/85 ICD-10-CM: I10  ICD-9-CM: 401.9  8/23/2016        Diverticulitis of colon (without mention of hemorrhage)(562.11) ICD-10-CM: K57.32  ICD-9-CM: 562.11  12/3/2013        AAA (abdominal aortic aneurysm) ([facility] ICD-10-CM: I71.4  ICD-9-CM: 441.4  12/3/2013        DDD (degenerative disc disease), lumbar ICD-10-CM: M51.36  ICD-9-CM: 722.52  12/3/2013        Renal cyst, right ICD-10-CM: N28.1  ICD-9-CM: 753.10  12/3/2013        Leukopenia ICD-10-CM: D72.819  ICD-9-CM: 288.50  11/13/2012    Overview Addendum 11/19/2012 11:54 AM by Vinay Naqvi       There are no focal lesions in the liver or spleen.  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 CABG (9/2003): Transmyocardial laser revascularization to the       inferior wall of the left ventricle. Coronary artery bypass grafting x6. LIMA to LAD: Sequential SVG to ramus and obtuse marginal.  Sequential SVG       to posterolateral and right PDA, SVG to diagonal.      2.  Cardiolite( 11/2012): Diaphragmatic attenuation. No ischemia. Normal       LV systolic function. EF 63%. 3.  Stress Cardiolite (1/16/15):  EF 52%. Normal perfusion. 4.  LHC (4/13/15):  6 of 6 bypass grafts patent. Small vessel disease       involving the apical LAD. Normal LV systolic function. LVEDP 27      5. LHC (5/24/18): Severe multivessel CAD. 6 of 6 grafts patent. Small       vessel disease not amendable to PCI. EF 55-60%.        Chronic obstructive pulmonary disease (HCC) (4/10/2017)      Hypertension ()      Generalized abdominal pain (6/14/2019)      Pneumatosis intestinalis (6/14/2019)      Thrombocytopenia ([address] (6/14/2019)      Ventral incisional hernia (6/14/2019)      Small bowel obstruction ([address] (6/14/2019)      Atrial fibrillation with rapid ventricular response ([address] (6/16/2019)             SBO with CT findings concerning for pneumatosis  He is s/p expl lap with 5hrs lysis, 85cm ischemic SB resection, hernia repair,   and evacuation of obstructing bezoar of (large volume of impacted delano slaw) on 6/14/19    Non-contrasted CT on 6/24/19 showed some residual small bowel edema as expected but no obstruction or abscess  He started liquid diet in afternoon of 6/24/19  PPN started 6/20/19, will stop in am of 6/26/19 if he continues to tolerate PO    Voiding without Griffin which was removed 6/20/19  PT to continue with mobilization  Ambulate halls     Plan:   Home in am on liquid diet if no issues overnight    Ventral Hernia on CT  Repaired without mesh on 6/14/19    Thrombocytopenia  Stable, Chronic problem  Monitor    Afib new onset 6/15/19  Hospitalists managing and discontinued cardiac monitoring on Thursday 6/20/19    GI and VTE prophylaxis  IV pepcid and SQ Lovenox + SCDs          I have personally performed a face-to-face diagnostic evaluation and management  service on this patient. I have independently seen the patient. I have independently obtained the above history from the patient/family. I have independently examined the patient with above findings. I have independently reviewed data/labs for this patient and developed the above plan of care (MDM). Signed: Negro Mtz.  Adalgisa Nguyen MD, FACS

## 2019-06-25 NOTE — PROGRESS NOTES
Asked to re-evaluate patient's medications    \"84 y/o male with hx CAD, CABG, COPD, HTN, HLD who underwent expl lap on 6/14 for SBO. He went into new onset rapid afib with rate to 150s-160s. He believes he may have had an episode of afib when he had his 6 v CABG in 2003. Hospitalist service consulted for new onset rapid atrial fibrillation.  He converted to sinus rhythm quickly. \"    Hg stable. Tolerating PO now. NG tube removed on 6/24    SBO- now tolerating PO    A fib RVR- Now in normal sinus rhythm. No further episodes of arrhythmia per nursing staff. I do not see any arrhythmias in chart. Sees Dr. Trevor Grace so would suggest he follow up with cardiology to determine if needs anticoagulation or if this episode of a fib was stress induced. Of note, ECHO from this admission did show moderately dilated left atrium. May benefit from holter monitor. Placed back on PO medications. No charge for this review. Please call with any questions.   Will sign off

## 2019-06-25 NOTE — PROGRESS NOTES
Problem: Mobility Impaired (Adult and Pediatric)  Goal: *Acute Goals and Plan of Care (Insert Text)  Description    LTG:  (1.)Mr. Cristina will move from supine to sit and sit to supine  in bed with STAND BY ASSIST within 7 treatment day(s). Supine to sit 6/22/19  (2.)Mr. Reyna Montana will transfer from bed to chair and chair to bed with SUPERVISION using the least restrictive device within 7 treatment day(s). Progressing 6/22/19   (3.)Mr. Reyna Montana will ambulate with MODIFIED INDEPENDENCE for 300 feet with the least restrictive device within 7 treatment day(s). Progressing 6/22/19  ________________________________________________________________________________________________   Outcome: Progressing Towards Goal    PHYSICAL THERAPY: Daily Note and AM 6/25/2019  INPATIENT: PT Visit Days : 2  [place] / [place] / Product Type: Medicare /       NAME/AGE/GENDER: Maximino Sheppard is a 82 y.o. male   PRIMARY DIAGNOSIS: SBO (small bowel obstruction) ([facility] [K56.609]  [facility] [K56.609] SBO (small bowel obstruction) ([facility])   SBO (small bowel obstruction) ([facility])    Procedure(s) (LRB):  EXPLORATORY LAPAROTOMY   Mult enterorrhaphies approx 85cm small bowel resect and anast drain placement hernia repair (no mesh) removal peritoneal FB Removal of Bozoar materail in SB (N/A)  11 Days Post-Op  ICD-10: Treatment Diagnosis:    · Generalized Muscle Weakness (M62.81)  · Other abnormalities of gait and mobility (R26.89)   Precaution/Allergies:  Diphenhydramine hcl; Ativan [lorazepam]; Iodinated contrast- oral and iv dye; and Sulfa (sulfonamide antibiotics)      ASSESSMENT:     Mr. Reyna Montana presents with limited independence with gait and transfers as well as bed mobility due to abdominal surgery. He did well with therapy today. Needs a rolling walker to ambulate and may need some follow up therapy based on his progression.     6/19 am supine upon arrival.  Works on bed mobility as follows: supine>EOB with CGA, walks 140 ft using RW with CGA and no LOB. Return to his chair and performs exercises without any problems. He return to supine with call light near. 6/20--Pt performed standing in room on arrival. Pt performed exercises while in standing. Pt ambulated in hallway. Pt in bedside chair with needs in reach. RN notified. 6/22/19:  Patient participated well. Anxious to get up and walk. Ambulated well with walker for increased distance. Ambulated short distance in room (bed to bathroom) without an assistive device. Progressing well. 6/24 doing well with gait and bed mobility. 6/25-Great progress today with gait distance and with bed mobility. May only need another visit or 2 and then discharge. Walked 1300 feet with rolling walker with a few standing rest breaks, steady gait with walker. Back in room pt moved from stand to sit to supine to sit to stand and back to supine and sitting with supervision. He stayed up in sitting on the side of the bed visiting with family. This section established at most recent assessment   PROBLEM LIST (Impairments causing functional limitations):  1. Decreased Strength  2. Decreased Transfer Abilities  3. Decreased Ambulation Ability/Technique  4. Decreased Balance  5. Decreased Activity Tolerance   INTERVENTIONS PLANNED: (Benefits and precautions of physical therapy have been discussed with the patient.)  1. Bed Mobility  2. Gait Training  3. Therapeutic Exercise/Strengthening  4. Transfer Training     TREATMENT PLAN: Frequency/Duration: daily for duration of hospital stay  Rehabilitation Potential For Stated Goals: Good     REHAB RECOMMENDATIONS (at time of discharge pending progress):    Placement:   It is my opinion, based on this patient's performance to date, that Mr. Cary Moreno may benefit from [address] after discharge due to the functional deficits listed above that are likely to improve with skilled rehabilitation Equipment:    To be determined    None at this time              HISTORY:   History of Present Injury/Illness (Reason for Referral):  Patient is an 81 y/o male with hx CAD, CABG, COPD, HTN, HLD who underwent expl lap yesterday on 6/14 for SBO. He went into new onset rapid afib tonight at approx 1020pm with rate to 150s-160s. He denies chest pain or shortness of breath. He believes he may have had an episode of afib when he had his 6 v CABG in 2003. Hospitalist service consulted for new onset rapid atrial fibrillation. 6/16- seen with wife and family members at bedside. He is in good spirits and denies concerns. Cardizem infusing at low rate, but he converted back to sinus rhythm early this AM.  He denies chest pain or shortness of breath. Has not had flatus or BM yet. 6/17- significant confusion overnight that worsened with ativan. Required haldol. Improved now and oriented x 3 but confused some in conversation. Past Medical History/Comorbidities:   Mr. 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 He also has no past medical history of Asthma, Chronic kidney disease, Difficult intubation, Heart failure ([address] Liver disease, Malignant hyperthermia due to anesthesia, Nausea & vomiting, Pseudocholinesterase deficiency, Psychiatric disorder, PUD (peptic ulcer disease), Seizures ([address] Sleep apnea, or Thromboembolus ([address]   Mr. Cinda Smith  has a past surgical history that includes pr cardiac surg procedure unlist (1991); pr cardiac surg procedure unlist (9/2003); hx cholecystectomy (9/2000); pr neurological procedure unlisted (12/1981); hx hernia repair (09/1993); hx gi (11/2003); hx lumbar laminectomy (5/2005); hx other surgical (05/2002); hx colonoscopy (02/28/2013); hx endoscopy (02/28/2013); vascular surgery procedure unlist (1991); hx orthopaedic (1/2006); hx orthopaedic (8/2005); hx orthopaedic (2005); hx heart catheterization (04/2015); hx other surgical (11/2014); hx other surgical; hx orthopaedic (03/1955); hx thyroidectomy (08/24/2017); hx thyroidectomy (08/24/2017); and hx heent. Social History/Living Environment:   Home Environment: Private residence  # Steps to Enter: 3  One/Two Story Residence: Two story  # of Interior Steps: 11  Height of Each Step (in): 11 inches  Interior Rails: Right  Lift Chair Available: No  Living Alone: No  Support Systems: Child(kim), Family member(s)  Patient Expects to be Discharged to[de-identified] Private residence  Current DME Used/Available at Home: Glucometer  Prior Level of Function/Work/Activity:  Independent prior to admit.       Number of Personal Factors/Comorbidities that affect the Plan of Care: 1-2: MODERATE COMPLEXITY   EXAMINATION:   Most Recent Physical Functioning:   Gross Assessment:                  Posture:     Balance:  Sitting: Intact  Standing: Intact  Standing - Static: Good  Standing - Dynamic : Good(with walker) Bed Mobility:  Supine to Sit: Modified independent;Supervision(with bed flat and no rail)  Sit to Supine: Modified independent;Supervision(with bed flat and no rail)  Wheelchair Mobility:     Transfers:  Sit to Stand: Supervision  Stand to Sit: Supervision  Gait:     Speed/Khloe: Pace decreased (<100 feet/min)  Step Length: Left shortened;Right shortened  Distance (ft): 1300 Feet (ft)  Assistive Device: Walker, rolling  Ambulation - Level of Assistance: Supervision  Duration: 25 Minutes Body Structures Involved:  1. Muscles Body Functions Affected:  1. Movement Related Activities and Participation Affected:  1. Mobility   Number of elements that affect the Plan of Care: 3: MODERATE COMPLEXITY   CLINICAL PRESENTATION:   Presentation: Stable and uncomplicated: LOW COMPLEXITY   CLINICAL DECISION MAKING:   [facility] AM-PAC 6 Clicks   Basic Mobility Inpatient Short Form  How much difficulty does the patient currently have. .. Unable A Lot A Little None   1. Turning over in bed (including adjusting bedclothes, sheets and blankets)? ? 1   ? 2   ? 3   ? 4   2. Sitting down on and standing up from a chair with arms ( e.g., wheelchair, bedside commode, etc.)   ? 1   ? 2   ? 3   ? 4   3. Moving from lying on back to sitting on the side of the bed?   ? 1   ? 2   ? 3   ? 4   How much help from another person does the patient currently need. .. Total A Lot A Little None   4. Moving to and from a bed to a chair (including a wheelchair)? ? 1   ? 2   ? 3   ? 4   5. Need to walk in hospital room? ? 1   ? 2   ? 3   ? 4   6. Climbing 3-5 steps with a railing? ? 1   ? 2   ? 3   ? 4   © 2007, Trustees of [facility], under license to [employer]. All rights reserved      Score:  Initial: 18 Most Recent: X (Date: -- )    Interpretation of Tool:  Represents activities that are increasingly more difficult (i.e. Bed mobility, Transfers, Gait). Medical Necessity:     · Patient is expected to demonstrate progress in strength and range of motion  ·  to increase independence with gait and transfers   · . Reason for Services/Other Comments:  · Patient continues to require skilled intervention due to limited functional independence   · .    Use of outcome tool(s) and clinical judgement create a POC that gives a: Clear prediction of patient's progress: LOW COMPLEXITY            TREATMENT:   (In addition to Assessment/Re-Assessment sessions the following treatments were rendered)   Pre-treatment Symptoms/Complaints:  Patient states doing good  Pain: Initial:    no reports of pain Post Session: no reports of pain     Therapeutic Activity: (    23 minutes): Therapeutic activities including bed mobility, ambulation with and without walker, and toilet transfers to improve mobility, strength, balance and coordination. Required minimal verbal cues   to promote static and dynamic balance in standing. Date:  6/18   Date:  6/19   Date:  6/20   Activity/Exercise Parameters Parameters Parameters   Toe/heel raise 12 12 12   Hip abd/add 12 12    Marching in place 12 12 12   LAQ 12 12    squats   12                 Braces/Orthotics/Lines/Etc:   · IV  · drain UMER  Treatment/Session Assessment:    · Response to Treatment: Patient participated well, nice progressing  · Interdisciplinary Collaboration:   o Registered Nurse  · After treatment position/precautions:   o Bed/Chair-wheels locked  o Bed in low position  o Call light within reach  o RN notified  o Family at bedside  o sitting up on side of bed   · Compliance with Program/Exercises: Compliant all of the time  · Recommendations/Intent for next treatment session: \"Next visit will focus on advancements to more challenging activities and reduction in assistance provided\".   Total Treatment Duration:  PT Patient Time In/Time Out  Time In: 1010  Time Out: [address], PT

## 2019-06-25 NOTE — PROGRESS NOTES
Report received from Boris Baird, [address] [place] [address]. PM assessment completed. PT is AAO x 4 with normal unlabored respirations present on RA. IV sites are CDI and infusing. States pain is 5/10 denies other needs. Bed is low and locked with call light in reach,w ill continue to monitor.

## 2019-06-25 NOTE — PROGRESS NOTES
assessment complete, pt AAO X 4, normal unlabored breath sounds present on RA, Iv sites CDI and infusing, Pt states no pain at present. Midline incision dressing CDI, UMER drain with small amount of seriosanganous drainage. Bed low, locked and call light within reach.  Pt voices no concerns

## 2019-06-25 NOTE — PROGRESS NOTES
Umer drain education given to Pt and Family along with log sheet to keep track with amount of drainage at home. Pt demonstrated correct technique to open, empty, and compress UMER drain.

## 2019-06-26 VITALS
BODY MASS INDEX: 28.15 KG/M2 | TEMPERATURE: 97.7 F | HEIGHT: 72 IN | DIASTOLIC BLOOD PRESSURE: 78 MMHG | OXYGEN SATURATION: 98 % | RESPIRATION RATE: 16 BRPM | SYSTOLIC BLOOD PRESSURE: 145 MMHG | WEIGHT: 207.8 LBS | HEART RATE: 67 BPM

## 2019-06-26 LAB
ANION GAP SERPL CALC-SCNC: 7 MMOL/L (ref 7–16)
BUN SERPL-MCNC: 15 MG/DL (ref 8–23)
CALCIUM SERPL-MCNC: 8.5 MG/DL (ref 8.3–10.4)
CHLORIDE SERPL-SCNC: 103 MMOL/L (ref 98–107)
CO2 SERPL-SCNC: 25 MMOL/L (ref 21–32)
CREAT SERPL-MCNC: 0.88 MG/DL (ref 0.8–1.5)
ERYTHROCYTE [DISTWIDTH] IN BLOOD BY AUTOMATED COUNT: 14.5 % (ref 11.9–14.6)
GLUCOSE SERPL-MCNC: 110 MG/DL (ref 65–100)
HCT VFR BLD AUTO: 26.1 % (ref 41.1–50.3)
HGB BLD-MCNC: 9.1 G/DL (ref 13.6–17.2)
MAGNESIUM SERPL-MCNC: 2 MG/DL (ref 1.8–2.4)
MCH RBC QN AUTO: 34 PG (ref 26.1–32.9)
MCHC RBC AUTO-ENTMCNC: 34.9 G/DL (ref 31.4–35)
MCV RBC AUTO: 97.4 FL (ref 79.6–97.8)
NRBC # BLD: 0 K/UL (ref 0–0.2)
PHOSPHATE SERPL-MCNC: 2.5 MG/DL (ref 2.3–3.7)
PLATELET # BLD AUTO: 228 K/UL (ref 150–450)
PMV BLD AUTO: 8.4 FL (ref 9.4–12.3)
POTASSIUM SERPL-SCNC: 4.4 MMOL/L (ref 3.5–5.1)
RBC # BLD AUTO: 2.68 M/UL (ref 4.23–5.6)
SODIUM SERPL-SCNC: 135 MMOL/L (ref 136–145)
WBC # BLD AUTO: 4.4 K/UL (ref 4.3–11.1)

## 2019-06-26 PROCEDURE — 85027 COMPLETE CBC AUTOMATED: CPT

## 2019-06-26 PROCEDURE — 83735 ASSAY OF MAGNESIUM: CPT

## 2019-06-26 PROCEDURE — 74011250636 HC RX REV CODE- 250/636: Performed by: SURGERY

## 2019-06-26 PROCEDURE — 80048 BASIC METABOLIC PNL TOTAL CA: CPT

## 2019-06-26 PROCEDURE — 74011000258 HC RX REV CODE- 258: Performed by: SURGERY

## 2019-06-26 PROCEDURE — 74011000250 HC RX REV CODE- 250: Performed by: INTERNAL MEDICINE

## 2019-06-26 PROCEDURE — 36415 COLL VENOUS BLD VENIPUNCTURE: CPT

## 2019-06-26 PROCEDURE — 74011250637 HC RX REV CODE- 250/637: Performed by: FAMILY MEDICINE

## 2019-06-26 PROCEDURE — 84100 ASSAY OF PHOSPHORUS: CPT

## 2019-06-26 RX ADMIN — FINASTERIDE 5 MG: 5 TABLET, FILM COATED ORAL at 08:44

## 2019-06-26 RX ADMIN — METOPROLOL TARTRATE 5 MG: 5 INJECTION INTRAVENOUS at 03:00

## 2019-06-26 RX ADMIN — MORPHINE SULFATE 3 MG: 4 INJECTION INTRAVENOUS at 01:25

## 2019-06-26 RX ADMIN — AMLODIPINE BESYLATE 5 MG: 5 TABLET ORAL at 08:44

## 2019-06-26 RX ADMIN — PIPERACILLIN SODIUM,TAZOBACTAM SODIUM 3.38 G: 3; .375 INJECTION, POWDER, FOR SOLUTION INTRAVENOUS at 01:25

## 2019-06-26 RX ADMIN — ENOXAPARIN SODIUM 40 MG: 40 INJECTION SUBCUTANEOUS at 07:29

## 2019-06-26 RX ADMIN — METOPROLOL TARTRATE 5 MG: 5 INJECTION INTRAVENOUS at 08:48

## 2019-06-26 RX ADMIN — LEVOTHYROXINE SODIUM 150 MCG: 75 TABLET ORAL at 07:28

## 2019-06-26 RX ADMIN — ASPIRIN 81 MG 81 MG: 81 TABLET ORAL at 08:44

## 2019-06-26 RX ADMIN — PANTOPRAZOLE SODIUM 40 MG: 40 TABLET, DELAYED RELEASE ORAL at 07:28

## 2019-06-26 RX ADMIN — GABAPENTIN 100 MG: 100 CAPSULE ORAL at 08:44

## 2019-06-26 NOTE — DISCHARGE SUMMARY
[address], [address] [facility]  [phone]   Discharge Summary     Maximino Velázquez  MRN: [id number]     : 1935     Age: 82 y.o. Admit date: 2019     Discharge date: Attending Physician: Janette Mai MD, MD, FACS  Primary Discharge Diagnosis:   Principal Problem:    SBO (small bowel obstruction) ([address] (2019)    Active Problems: Thrombocytopenia, unspecified ([address] (2012)      Coronary artery disease of bypass graft of native heart with stable angina pectoris ([address] (2016)      Overview: 1. CABG (2003): Transmyocardial laser revascularization to the       inferior wall of the left ventricle. Coronary artery bypass grafting x6. LIMA to LAD: Sequential SVG to ramus and obtuse marginal.  Sequential SVG       to posterolateral and right PDA, SVG to diagonal.      2.  Cardiolite( 2012): Diaphragmatic attenuation. No ischemia. Normal       LV systolic function. EF 63%. 3.  Stress Cardiolite (1/16/15):  EF 52%. Normal perfusion. 4.  LHC (4/13/15):  6 of 6 bypass grafts patent. Small vessel disease       involving the apical LAD. Normal LV systolic function. LVEDP 27      5. LHC (18): Severe multivessel CAD. 6 of 6 grafts patent. Small       vessel disease not amendable to PCI. EF 55-60%.        Chronic obstructive pulmonary disease ([address] (4/10/2017)      Hypertension ()      Generalized abdominal pain (2019)      Pneumatosis intestinalis (2019)      Thrombocytopenia ([address] (2019)      Ventral incisional hernia (2019)      Small bowel obstruction ([address] (2019)      Atrial fibrillation with rapid ventricular response ([address] (2019)      Primary Operations or Procedures Performed :  Procedure(s):  EXPLORATORY LAPAROTOMY   Mult enterorrhaphies approx 85cm small bowel resect and anast drain placement hernia repair (no mesh) removal peritoneal FB Removal of Bozoar materail in SB     Brief History and Reason for Admission: Maximino Mitchell was admitted with the following history of present illness. HPI: Maximino Mitchell is a 82 y.o. male who is s/p expl lap with 5hrs lysis, 85cm ischemic sb resection, hernia repair,   and evacuation of obstructing bezoar of (large volume of impacted delano slaw) on 6/14/19     He originally came to the ER on 6/14/19   with a 1 day h/o progressive, intermittent, generalized abd pain without radiation which worsened acutely around 8PM on 6/13/19  Nothing made pain better or worse. +Nausea; -V  No fever, chills, chest pain, or back pain     1990s s/p open AAA in [place] s/p lap [place] in [place]  S/p expl lap for SBO (no resection) [place] CABG x 6 2016 s/p thoracic aneurysm stent at [facility]     Surgery was consulted after below CT without contrast showed SBO findings concerning for pneumatosis        4/13/16 s/p colonoscopy; Dr Dave Cannon:  SESSILE SERRATED POLYPS. FRAGMENT OF BENIGN COLONIC MUCOSA. Electronically signed out on 4/14/2016 11:51 by Tete Gant MD   Microscopic Description   The biopsy demonstrates serrated hypermucinous colonic glands imbedded in a nonfibrotic lamina propria with focal crypt dilatation and widening of the crypt bases. An additional fragment demonstrates no adenomatous or hyperplastic change. Dr. Nicolas Record concurs.         6/14/19 CT abd/pelvis without contrast (pre-op)  - Liver: Within normal limits. - Gallbladder and bile ducts: The gallbladder is absent, without biliary tract  dilatation.  - Spleen: Within normal limits. - Urinary tract: Again noted is a 5.3 cm right kidney cyst. The left kidney is  unremarkable. No urinary tract stone or hydronephrosis is identified. There are  bilateral inguinal hernias, containing fat on the left and a small portion of  the urinary bladder on the right. - Adrenals: Within normal limits.   - Pancreas: Within normal limits. - Gastrointestinal tract: In the umbilicus region there is a ventral 2.8 cm  fat-containing hernia, with adjacent stranding which may relate to  incarceration. There is a mid small bowel obstruction, whose transition point is  not clearly identified, but may lie in the region of the ventral hernia although  no bowel is seen within the hernia itself. There are a few questionable areas of  small bowel wall pneumatosis. The colon is unremarkable except for  diverticulosis. No free air or free fluid is seen. - Retroperitoneum: Within normal limits. - Peritoneal cavity and abdominal wall: Within normal limits. - Pelvis: Within normal limits. - Spine/bones: No acute process. - Other comments: Partially visualized is a descending thoracic aortic stent  graft.     IMPRESSION: Mid small bowel obstruction. The transition point is not clearly  identified, but may lie in the region of a small periumbilical fat-containing  ventral hernia which may be incarcerated given the associated fat stranding. Of  note, no bowel is seen within the hernia. Additionally, there are a few  questionable areas of small bowel wall pneumatosis concerning for ischemia.  ER  physician notified.           6/24/19 CT without IV or oral contrast (POD10)  There is scarring at the left lung base. There is evidence of prior thoracic aneurysm repair.     CT ABD: No contour deforming abnormality involving the liver or spleen. There is a stable right renal cyst. There is fatty atrophy of the pancreas. The  adrenal glands are normal. There is a drain present within the right lower  quadrant. There is a question of bowel wall thickening involving the central  small bowel loops (images 42 through 59).     CT PELVIS: The bladder and rectum are normal.   No definite pelvic free fluid. No free air.     Bone window evaluation demonstrates no aggressive osseous lesions.     IMPRESSION:  1.  Limited exam without the benefit of oral or IV contrast. There is a question  of bowel wall thickening involving the central small bowel loops with some  associated mesenteric edema. 2. New drain which terminates within the right lower quadrant                                   Hospital Course:      6/14/19 OR/surgery as above   6/15/19 POD1 doing well except afib treated medically  6/16/19 POD2 doing well  6/17/19 POD3 afib over weekend; no complaints; up in chair; interactive and conversant; Hospitalists treating; no flatus or BM yet   6/18/19 POD4 feels OK, no complaints, +BM  6/19/19 POD5 feels OK, no sign pain isssues, abd still distended  6/20/19 POD6 many loose BMs and lost of flatus; no sign abd pain issues, distention better but persists  6/21/19 POD7 lost of flatus; some mild discomfort in RUQ, and distention better but still an issue; abd Xray non-specific bowel gas pattern  6/24/19 POD10 more flatus over weekend, no fevers, WBC normal; Ngt 700-750 q day; NGT out today  6/25/19 POD11 Tolerating liquid diet so far; lots of flatus; Home tomorrow if he continues to tolerate PO    Condition at Discharge: good    Discharge Medications:   Current Discharge Medication List      CONTINUE these medications which have NOT CHANGED    Details   albuterol sulfate (PROAIR RESPICLICK) 90 mcg/actuation aepb Take 2 Puffs by inhalation four (4) times daily as needed (Wheeze). Qty: 3 Inhaler, Refills: 3      levothyroxine (SYNTHROID) 150 mcg tablet Take  by mouth Daily (before breakfast). omeprazole (PRILOSEC) 40 mg capsule Take 40 mg by mouth daily. raNITIdine (ZANTAC) 150 mg tablet Take 150 mg by mouth nightly. atorvastatin (LIPITOR) 40 mg tablet TAKE 1 TABLET BY MOUTH  DAILY  Qty: 90 Tab, Refills: 3      fluticasone (FLONASE) 50 mcg/actuation nasal spray 2 Sprays by Both Nostrils route daily.   Qty: 3 Bottle, Refills: 3    Associated Diagnoses: Chronic seasonal allergic rhinitis due to pollen      amLODIPine (NORVASC) 5 mg tablet TAKE 1 TABLET BY MOUTH  DAILY  Qty: 90 Tab, Refills: 3      metoprolol tartrate (LOPRESSOR) 25 mg tablet TAKE 1 TABLET BY MOUTH TWO  TIMES DAILY  Qty: 180 Tab, Refills: 3      gabapentin (NEURONTIN) 100 mg capsule 200-400 mg three (3) times daily. tiotropium-olodaterol (STIOLTO RESPIMAT) 2.5-2.5 mcg/actuation mist Take 2 Puffs by inhalation daily. Qty: 3 Inhaler, Refills: 3      aspirin 81 mg chewable tablet Take 81 mg by mouth daily. finasteride (PROSCAR) 5 mg tablet Take 5 mg by mouth daily. terazosin (HYTRIN) 10 mg capsule Take 10 mg by mouth nightly. Disposition/Discharge Instructions/Follow-up Care:      Replace dry gauze and tape over midline incision and around the drain as needed. Do not pack the 2 open areas of the wound  Empty the drain and keep it suctioning  Try to keep incisions as dry as possible to lower risk of infection. No heavy lifting (>5lbs) for 6 weeks to reduce risk of developing a hernia in the incisions. No driving until you are off pain meds for 24hrs and have no pain with movements associated with driving.     Pain prescription (Norco) on chart for patient to use as needed for pain  Follow-up with Dr Rafael Helms in 12 days on a Monday in the office at:  [address], [address]  (Call for an appt time-->[phone]-->option 1)    Follow-up with your cardiologist for cardiac follow-up and regarding the atrial fibrillation that you had while hospitalized to decide if any new medications needed    Diet: Soups, Juices, and Liquids for 2 more days, Then soft diet until follow-up  Avoid uncooked vegetables      Signed:  Emy Richmond MD, FACS   6/26/2019  7:37 AM

## 2019-06-26 NOTE — PROGRESS NOTES
Nutrition f/u:  PPN Management for Dr. Lisette Rush  PPN:  Dex 5% 4.25% amino acids at 100 ml/hr with 250 ml 20% lipids daily. PPN was decreased to 50ml/hr for 1 hour prior to D/C for pt to D/C to home. Diet: TPN ADULT - dextrose 5% amino acid 4.25%  DIET NUTRITIONAL SUPPLEMENTS All Meals; Ensure Clear  DIET CLEAR LIQUID    Weight:  94.3 kg source not specified. 6/25/19. Lab Results   Component Value Date/Time    Sodium 135 (L) 06/26/2019 05:55 AM    Potassium 4.4 06/26/2019 05:55 AM    Chloride 103 06/26/2019 05:55 AM    CO2 25 06/26/2019 05:55 AM    Anion gap 7 06/26/2019 05:55 AM    Glucose 110 (H) 06/26/2019 05:55 AM    BUN 15 06/26/2019 05:55 AM    Creatinine 0.88 06/26/2019 05:55 AM    Calcium 8.5 06/26/2019 05:55 AM    Albumin 4.0 06/14/2019 03:35 AM    Phosphorus 2.5 06/26/2019 05:55 AM   Mg 2. AM labs remarkable for Na 135, glucose 110. Pt day 12 s/p exploratory lap, small bowel resection, hernia repair and evacuation of obstruction bezoar. Pt to D/C home this am with diet per Dr. Lisette Rush. Pt with appropriate questions re home intake and allowed foods. He reports at baseline he drinks 2 boost (with 20 grams protein) daily. IV Access:             9 Peripherals. Macronutrient Needs:  EER:  0231-9933 kcal/day (20-25 kg/kg/BW)  EPR:   gm pro/day (1-1.2 gm/kg/BW)  Max CHO:  466 gm/day (4mg/kg/IBW)  Fluids:  1 ml/kcal or per MD recommendation  Intake/Comparative Standards:  Current intake of PPN (2.4 liters 4.25%AA/5%DEX with 250 ml 20% lipids) provides 1316 calories/day (68% calorie goal), 102 grams protein/day (100% protein goal), 120 grams CHO/day (does not exceed max CHO limit) and 2650 ml volume/day (>100% fluid goal). Now being discontinued. Clear liquid intake contributed ~400 kcal and 15 grams protein per pt recall.     Intervention:   Meals and Snacks: Per Dr. Lisette Rush  Diet: Soups, Juices, and Liquids for 2 more days, Then soft diet until follow-up  Avoid uncooked vegetables  Nutrition Supplement: Encouraged goal of 3 Boost (miniumum 20 grams protein) daily  Parenteral Nutrition/IV Fluid:   1) Discontinue prior to discharge after minimum one hour infusion at decreased rate of 50ml/hr. Mineral Supplement Therapy:   Nutrition Support Orders/Electrolyte Management Replacement Protocols are active on the MAR. Nutrition education: Provided patient with written and verbal instruction on liquid diet and gi soft diet, soluble vs insoluble fiber. Handouts provided: Full liquids, GI soft, Tips s/p GI surgery. Pt calorie and protein goals included in materials. Patient verbalizes good understanding of diet. Expect good compliance. Coordination of Nutrition Care:  Toña Woodall RN. Discharge Nutrition Plan: Continue Oral Nutrition Supplement (ONS) at discharge. Recommend Ensure Enlive or a comparable/similar product Three times daily for 30 days unless otherwise directed by your Primary Care Physician.      [place], [address], [address]

## 2019-06-26 NOTE — DISCHARGE INSTRUCTIONS
DISCHARGE SUMMARY from Nurse    PATIENT INSTRUCTIONS:    After general anesthesia or intravenous sedation, for 24 hours or while taking prescription Narcotics:  · Limit your activities  · Do not drive and operate hazardous machinery  · Do not make important personal or business decisions  · Do  not drink alcoholic beverages  · If you have not urinated within 8 hours after discharge, please contact your surgeon on call. Report the following to your surgeon:  · Excessive pain, swelling, redness or odor of or around the surgical area  · Temperature over 100.5  · Nausea and vomiting lasting longer than 4 hours or if unable to take medications  · Any signs of decreased circulation or nerve impairment to extremity: change in color, persistent  numbness, tingling, coldness or increase pain  · Any questions    What to do at Home:      *  Please give a list of your current medications to your Primary Care Provider. *  Please update this list whenever your medications are discontinued, doses are      changed, or new medications (including over-the-counter products) are added. *  Please carry medication information at all times in case of emergency situations. These are general instructions for a healthy lifestyle:    No smoking/ No tobacco products/ Avoid exposure to second hand smoke  Surgeon General's Warning:  Quitting smoking now greatly reduces serious risk to your health. Obesity, smoking, and sedentary lifestyle greatly increases your risk for illness    A healthy diet, regular physical exercise & weight monitoring are important for maintaining a healthy lifestyle    You may be retaining fluid if you have a history of heart failure or if you experience any of the following symptoms:  Weight gain of 3 pounds or more overnight or 5 pounds in a week, increased swelling in our hands or feet or shortness of breath while lying flat in bed.   Please call your doctor as soon as you notice any of these symptoms; do not wait until your next office visit. The discharge information has been reviewed with the patient. The patient verbalized understanding. Discharge medications reviewed with the patient and appropriate educational materials and side effects teaching were provided. ___________________________________________________________________________________________________________________________________  Replace dry gauze and tape over midline incision and around the drain as needed. Do not pack the 2 open areas of the wound  Empty the drain and keep it suctioning  Try to keep incisions as dry as possible to lower risk of infection. No heavy lifting (>5lbs) for 6 weeks to reduce risk of developing a hernia in the incisions. No driving until you are off pain meds for 24hrs and have no pain with movements associated with driving.     Pain prescription (Norco) on chart for patient to use as needed for pain  Follow-up with Dr Rosa North in 12 days on a Monday in the office at:  [address], [address]  (Call for an appt time-->[phone]-->option 1)    Diet: Soups, Juices, and Liquids for 2 more days, Then soft diet until follow-up  Avoid uncooked vegetables    Follow-up with your cardiologist for cardiac follow-up and regarding the atrial fibrillation that you had while hospitalized to decide if any new medications needed

## 2019-06-26 NOTE — PROGRESS NOTES
Report received from [place], [facility]. PM assessment completed. PT is AAO x 4 with normal unlabored respirations present on RA. IV sites are CDI and infusing. PT states pain is 7/10 (see MAR). Denies other needs at this time.  BEd is low and locked with call light in reach, will continue to monitor

## 2019-06-26 NOTE — PROGRESS NOTES
Report received from [address]. AM assessment completed. PT is AAO x 4 with normal unlabored respirations on RA, S1,S2 auscultated. IV sites CDI and infusing. Midline Abdominal dressing CDI, UMER drain with small amount of serosanguinous drainage PT states no pain at present. Bed low, locked and call light in reach.  Pt voices no concerns

## 2019-06-26 NOTE — PROGRESS NOTES
Midline abdominal dressing changed, old dressing with scant amount of yellow drainage noted, sutures intact no redness or swelling noted on suture line, 4x4 gauze and 5x9 abdominal pad placed over site, UMER drain dressing changed, no drainage noted on old dressing, UMER site with no redness or swelling noted, drain gauze placed over site. Dressing change teaching done with patient and family, Pt demonstrated understanding.

## 2019-06-26 NOTE — PROGRESS NOTES
Discharge instructions reviewed with patient, opportunity for questions given. Rx given for Norco.  Pt able to leave once TPN administration is complete and ride arrives.

## 2019-06-27 ENCOUNTER — PATIENT OUTREACH (OUTPATIENT)
Dept: CASE MANAGEMENT | Age: 84
End: 2019-06-27

## 2019-06-27 NOTE — OP NOTES
[place]  OPERATIVE REPORT    Name:  Leighann Couch  MR#:  [id number]  :  1935  ACCOUNT #:  [de-identified]  DATE OF SERVICE:  2019    PREOPERATIVE DIAGNOSES:  1. Generalized abdominal pain with peritoneal signs. 2.  Pneumatosis on CT imaging. 3.  Small bowel obstruction. 4.  Thrombocytopenia. 5.  Status post open AAA repair. 6.  Status post thoracic aortic aneurysm stent placement. 7.  Status post exploratory laparotomy for small bowel obstruction. 8.  Incarcerated ventral incisional hernia. POSTOPERATIVE DIAGNOSES:  1. Generalized abdominal pain with peritoneal signs. 2.  Pneumatosis on CT imaging. 3.  Small bowel obstruction. 4.  Thrombocytopenia. 5.  Status post open AAA repair. 6.  Status post thoracic aortic aneurysm stent placement. 7.  Status post exploratory laparotomy for small bowel obstruction. 8.  Incarcerated ventral incisional hernia. 9.  Small bowel bezoar  10. Dense intra-abd adhesions from mult prior abd surgeries  11. Acutely and chronically ischemic small bowel      PROCEDURE PERFORMED:  1. Exploratory laparotomy with multiple enterorrhaphies (CPT 12960 for ICD K55.9). 2.  Small bowel resection of approximately 85 cm with primary anastomosis at a separate site to the enterorrhaphies (CPT 51944-22 for ICD K56.609). 3.  Removal of peritoneal foreign bodies - Dacron and prolene material from prior surgery (CPT 66212-35 for ICD Z18.89). 4.  Small bowel enterotomy for decompression and evacuation of bezoar made up of large quantity of impacted coleslaw (CPT 88459-02 for ICD T18. 9XXA). 5.  Open repair of incarcerated ventral hernia without mesh at separate site and not included in the midline incision (CPT 03759-58 for ICD K43.6). SURGEON:  Blanca Booth MD    ASSISTANT:  None. ANESTHESIA:  General.    COMPLICATIONS:  None. SPECIMENS REMOVED:  Small bowel sent to pathology for review. IMPLANTS:  19 round Bryce drain.     ESTIMATED BLOOD LOSS:  Less than 200 mL. OPERATIVE PROCEDURE:  The patient was taken to the operating room emergently and after adequate anesthesia, his abdomen was prepped and draped in sterile fashion with multiple layers of Betadine scrub and Betadine solution. Time-out protocol was followed. He was given prophylactic antibiotics. He had undergone multiple prior abdominal surgeries. He was opened through a midline incision. There were dense adhesions present. He had a large number of dense fibrotic adhesions, which had to be lysed. There was a lot of small bowel ischemia present. There were palpable pulses in the mesentery. There was a diffuse small bowel obstruction, which was not focal.  There appeared to be large quantity and length of distended small bowel with a lot of impacted intraluminal material.    This surgery took approximately 5 hours to complete. Dissection was incredibly tedious. We freed up the adhesions throughout the small bowel and encountered several areas of ischemic injury of the small bowel, which were repaired primarily with interrupted 3-0 Vicryl sutures and 3-0 silk sutures. In an area separate from the enterorrhaphies, we had an irreversibly ischemic segment of small bowel, which was distended and most likely the source of the pneumatosis noted on preoperative CT imaging. This bowel did not look salvageable and no enterorrhaphies were performed. This bowel was excised by dividing the proximal and distal bowel with blue PALMER staplers. The specimen was approximately 85 cm and measured with suture material.  It was sent to pathology for review. The remaining proximal and distal small bowel still had ischemia and still had significant proximal bezoar impaction impairing flow. We performed an enterotomy on the proximal staple line to allow decompression of the proximal bezoar material.  A large emesis basin was filled with what appeared to be partially digested coleslaw.   The patient and family had reported a 2-day history of eating a large quantity of coleslaw and I suspect this created a large amount of fecal impaction and chronically strictured and stenosed small bowel creating the ischemia and pneumatosis. Through the enterotomy, we milked the undigested bezoar into the emesis basin with minimal spillage and evacuated it to decompress the bowel and prevent further ischemia. After evacuation of the bezoar, we then performed a primary anastomosis of small bowel to small bowel in an end-to-end hand sewn fashion with an outer interrupted layer of 3-0 silk sutures and an inner running layer of 3-0 Vicryl suture. The defect in the mesentery was closed with interrupted 3-0 silk suture. The anastomosis was patent without leakage. After the small bowel resection and anastomosis had been completed, we turned our attention to removal of peritoneal foreign bodies left behind from multiple prior abdominal surgeries. This consisted of knots of Prolene and Dacron suture used on more than one surgery, which were retained in the abdominal wall and peritoneum. We removed these foreign bodies and then closed the incarcerated ventral hernia, which was not incorporated in the midline incision. This was closed from within the abdominal wall using interrupted Vicryl sutures after we reduced the incarcerated omental contents. Mesh was not used. We then irrigated the abdomen with copious sterile saline and after confirmation of hemostasis, we closed the fascia with running #1 PDS suture. A 19 round Bryce drain was brought out through a counterincision and placed adjacent to the anastomosis and adjacent to the remaining small bowel both proximally and distally to the anastomosis, which still had some ischemia present, but were not resected. The subcutaneous adipose was irrigated. The skin was closed with clips.   It was closed loosely around two beatriz where the skin was partially left open and packed with 4x4s. A sterile dressing was placed. The patient was taken to recovery in good condition. He tolerated the procedure well.         Constance Loya MD [id number]  D:  06/26/2019 14:49  T:  06/26/2019 18:59  JOB #:  [id number]

## 2019-06-27 NOTE — PROGRESS NOTES
This note will not be viewable in [address] Transition of Care Discharge Follow-up Questionnaire   Date/Time of Call:   6/27/19   1241pm   What was the patient hospitalized for? SBO  s/p  EXPLORATORY LAPAROTOMY     Does the patient understand his/her diagnosis and/or treatment and what happened during the hospitalization? Yes, spoke with patients wife Ron Hardy, she states understanding of diagnosis and treatment; and is agreeable to call. Ron Hardy states patient is doing very well, drain is patent and draining small amount clear fluid   Did the patient receive discharge instructions? Yes    CM Assessed Risk for Readmission:       Patient stated Risk for Readmission:      Low r/t diagnosis, comorbidities and/or complications of surgery     None stated     Review any discharge instructions (see discharge instructions/AVS in [facility]). Ask patient if they understand these. Do they have any questions? Reviewed, understanding is stated, no questions at this time       Were home services ordered (nursing, PT, OT, ST, etc.)? No    If so, has the first visit occurred? If not, why? (Assist with coordination of services if necessary.)   N/A    Was any DME ordered? No   Patient has cane & walker   If so, has it been received? If not, why?  (Assist patient in obtaining DME orders &/or equipment if necessary.) N/A   Complete a review of all medications (new, continued and discontinued meds per the D/C instructions and medication tab in [facility]). Completed     Were all new prescriptions filled? If not, why?  (Assist patient in obtaining medications if necessary  escalate for CCM &/or SW if ongoing issues are verbalized by pt or anticipated)   No new medications or changes   Does the patient understand the purpose and dosing instructions for all medications?   (If patient has questions, provide explanation and education.)   Yes   Does the patient have any problems in performing ADLs? (If patient is unable to perform ADLs  what is the limiting factor(s)? Do they have a support system that can assist? If no support system is present, discuss possible assistance that they may be able to obtain. Escalate for CCM/SW if ongoing issues are verbalized by pt or anticipated)   Independent w/ ADLs       Does the patient have all follow-up appointments scheduled? 7 day f/up with PCP?   (f/up with PCP may be w/in 14 days if patient has a f/up with their specialist w/in 7 days)    7-14 day f/up with specialist?   (or per discharge instructions)    If f/up has not been made  what actions has the care coordinator made to accomplish this? Has transportation been arranged? Yes        Dr. Pérez May had recent visit, Ron Hardy will call to notify of hospitalization    Dr. Jayson Cruz 7/8/19    Dr. Kiersten Veronica 8/9/19            Yes, there are no transportation needs at this time   Any other questions or concerns expressed by the patient? No other needs or concerns identified. Patient states his gratitude for follow up. Contact information for [facility] was given, instructed to call with new questions or concerns. Schedule next appointment with KARIE NUNEZ Coordinator or refer to RN Case Manager/ per the workflow guidelines. When is care coordinators next follow-up call scheduled? If referred for CCM  what RN care manager was the referral assigned? Community Care Coordinator will follow per workflow guidelines.           Within 30days   YONI Call Completed By: Dmitri Almanzar LPN  Care Coordinator

## 2019-07-08 PROBLEM — R10.84 GENERALIZED ABDOMINAL PAIN: Status: RESOLVED | Noted: 2019-06-14 | Resolved: 2019-07-08

## 2019-07-24 PROBLEM — F17.211 CIGARETTE NICOTINE DEPENDENCE IN REMISSION: Status: ACTIVE | Noted: 2019-07-24

## 2019-07-26 ENCOUNTER — PATIENT OUTREACH (OUTPATIENT)
Dept: CASE MANAGEMENT | Age: 84
End: 2019-07-26

## 2019-07-26 NOTE — PROGRESS NOTES
This note will not be viewable in [address] Transitions of Care  Follow up Outreach Note   Outreach type Phone call: spoke with patient  Home visit:   Date/Time of Outreach: 7/26/19 250pm     Has patient attended PCP or specialist follow-up appointments since last contact? What was outcome of appointment? When is next follow-up scheduled? Patient states he is doing well. He attended follow up as scheduled and will follow with general surgery as needed. Cardiology visit remains as scheduled 8/9/19   Review medications. Any medication changes since last outreach? Does patient have any questions or issues related to their medications? None stated    No      Home health active? If yes  any issue? Progress? No       Referrals needed?  (CM, SW, HH, etc. )   No      Other issues/Miscellaneous? (Transportation, access to meals, ability to perform ADLs, adequate caregiver support, etc.) No other needs or concerns at this time. Patient stated his gratitude for follow up. Next Outreach Scheduled?     Graduation from program?   N/A    Yes       Next Steps/Goals (if applicable):   N/A      Outreach completed by:   Tiffanie Armstrong LPN  Care Coordinator

## 2019-08-01 PROBLEM — K43.2 VENTRAL INCISIONAL HERNIA: Status: RESOLVED | Noted: 2019-06-14 | Resolved: 2019-08-01

## 2019-08-01 PROBLEM — K63.89 PNEUMATOSIS INTESTINALIS: Status: RESOLVED | Noted: 2019-06-14 | Resolved: 2019-08-01

## 2019-08-01 PROBLEM — N40.1 BENIGN PROSTATIC HYPERPLASIA WITH INCOMPLETE BLADDER EMPTYING: Status: ACTIVE | Noted: 2019-08-01

## 2019-08-01 PROBLEM — F17.211 CIGARETTE NICOTINE DEPENDENCE IN REMISSION: Status: RESOLVED | Noted: 2019-07-24 | Resolved: 2019-08-01

## 2019-08-01 PROBLEM — R39.14 BENIGN PROSTATIC HYPERPLASIA WITH INCOMPLETE BLADDER EMPTYING: Status: ACTIVE | Noted: 2019-08-01

## 2019-08-04 PROBLEM — L24.A9 WOUND DRAINAGE: Status: ACTIVE | Noted: 2019-08-04

## 2019-08-05 PROBLEM — T81.89XA STITCH GRANULOMA: Status: ACTIVE | Noted: 2019-08-05

## 2019-08-09 PROBLEM — I48.0 PAROXYSMAL ATRIAL FIBRILLATION (HCC): Status: ACTIVE | Noted: 2019-06-16

## 2019-11-06 PROBLEM — L24.A9 WOUND DRAINAGE: Status: RESOLVED | Noted: 2019-08-04 | Resolved: 2019-11-06

## 2019-11-06 PROBLEM — K56.609 SMALL BOWEL OBSTRUCTION (HCC): Status: RESOLVED | Noted: 2019-06-14 | Resolved: 2019-11-06

## 2019-11-06 PROBLEM — T81.89XA STITCH GRANULOMA: Status: RESOLVED | Noted: 2019-08-05 | Resolved: 2019-11-06

## 2019-12-16 ENCOUNTER — HOSPITAL ENCOUNTER (OUTPATIENT)
Dept: PHYSICAL THERAPY | Age: 84
Discharge: HOME | End: 2019-12-16
Attending: INTERNAL MEDICINE
Payer: MEDICARE

## 2019-12-16 DIAGNOSIS — M50.30 DDD (DEGENERATIVE DISC DISEASE), CERVICAL: ICD-10-CM

## 2019-12-16 DIAGNOSIS — M54.2 NECK AND SHOULDER PAIN: ICD-10-CM

## 2019-12-16 DIAGNOSIS — M25.519 NECK AND SHOULDER PAIN: ICD-10-CM

## 2019-12-16 PROCEDURE — 97110 THERAPEUTIC EXERCISES: CPT

## 2019-12-16 PROCEDURE — 97162 PT EVAL MOD COMPLEX 30 MIN: CPT

## 2019-12-16 NOTE — PROGRESS NOTES
Maximino Kirkland  : 1935  [place] / [place] / Product Type: Medicare /  [address]  [facility]  [facility], [address], [address] [place] [address]  [phone]   [phone]       OUTPATIENT PHYSICAL THERAPY: Daily Treatment Note 2019  Visit Count: 1     ICD-10: Treatment Diagnosis: Pain in left shoulder (M25.512), cervalgia M54.2, pain in thoracic spine M54.6  Precautions/Allergies: none/Diphenhydramine hcl; Ativan [lorazepam]; Iodinated contrast media; and Sulfa (sulfonamide antibiotics)   TREATMENT PLAN:  Effective Dates: 2019 TO 3/15/2020 (90 days). Frequency/Duration: 1 to 3 times a week for 90 Day(s) MEDICAL/REFERRING DIAGNOSIS:  DDD (degenerative disc disease), cervical [M50.30]  Neck and shoulder pain [M54.2, M25.519]   DATE OF ONSET: 2019  REFERRING PHYSICIAN: Mitzi Palomino MD MD Orders: PT-evaluate and treat  Return MD Appointment: unsure       Pre-treatment Symptoms/Complaints:  Pt complains of increasing pain in the lower neck/ mid shoulder blade region of his back after standing only 5 minutes and severe sharp pain in the left shoulder with attempts to reach with the arm. Denies any numbness or tingling. Enjoy reading in bed at night  Pain: Initial:   10 Post Session:  7-10   Medications Last Reviewed:  19    Updated Objective Findings:   See evaluation     TREATMENT:   Therapeutic Exercise: (15 Minutes):  Exercises to improve mobility, strength and posture. Required maximal verbal and manual cues to promote proper body alignment, promote proper body posture and technique. reviewed and issued HEP with self assisted chin tuck, scapular retraction/depression, rows with tband, shoulder extn with tband, shoulder ER with tband    Treatment/Session Summary:    · Response to Treatment:  Fair demonstration of exercises. Needs review. Feel his reading in bed contributes to his pain.  Question shoulder joint involvement  · Communication/Consultation:  None today  · Equipment provided today:  None today  · Recommendations/Intent for next treatment session: Next visit will focus on HEP review with emphasis on posture.     Total Treatment Billable Duration:  15 minutes with evaluation  PT Patient Time In/Time Out  Time In: 7852  Time Out: [address], PT    Future Appointments   Date Time Provider Sanam Jeronimo   12/17/2019  1:30 PM [facility]ás Oliver, [place] [facility]   12/19/2019 10:00 AM Sukhjinder Novoa MD [facility]E   12/19/2019 12:45 PM Tomás Mojorge l, [facility]   12/30/2019  2:00 PM [facility]   1/3/2020  1:15 PM [facility]ás Benito[facility]   1/7/2020 12:30 PM SFE MRI UNIT 1 SFERMRI SFE   1/24/2020  1:00 PM Jose Francisco Hensley NP [facility] PP PP   2/6/2020  3:00 PM Tessy Wisdom MD [facility] MAT MAT   2/27/2020  1:15 PM Ramesh Connors MD [facility] UCDG UCD   3/26/2020  9:00 AM MAT LAB SSA MAT MAT   4/2/2020 11:00 AM Tessy Wisdom MD [facility] MAT MAT   5/1/2020 11:00 AM Ema Pickett G[facility]   5/1/2020 11:30 AM Joey Barrios MD [facility]   5/6/2020  1:40 PM Esteban Hernandez MD END BS ENDO

## 2019-12-16 NOTE — THERAPY EVALUATION
Maximino Burciaga  : 1935  [place]  [facility]  [facility], [address] [place] [address],[address], [place] [address].  [phone]   [phone]       OUTPATIENT PHYSICAL THERAPY:Initial Assessment 2019   ICD-10: Treatment Diagnosis: Pain in left shoulder (M25.512), Cervalgia (M54.2), pain in thoracic spine (M54.6)  Precautions/Allergies: none,Diphenhydramine hcl; Ativan [lorazepam]; Iodinated contrast media; and Sulfa (sulfonamide antibiotics)   TREATMENT PLAN:  Effective Dates: 2019 TO 3/16/2020 (90 days). Frequency/Duration: 1 to 3 times a week for 90 Day(s) MEDICAL/REFERRING DIAGNOSIS:  DDD (degenerative disc disease), cervical [M50.30]  Neck and shoulder pain [M54.2, M25.519]   DATE OF ONSET: 2019  REFERRING PHYSICIAN: Kyleigh Mcleod MD MD Orders: PT-evaluate and treat, ROM, strengthening  Return MD Appointment: unsure     INITIAL ASSESSMENT:  Mr. Boston Lopez comes to therapy with complaints of neck, upper back and left shoulder pain. He presents with limited cervical and left shoulder motion, scapular and trunk weakness along with poor posture. No radicular symptoms were reproduced into the arm but he did test positive for possible rotator cuff pathology. Feel his desire to read while laying in bed at night may be contributing to his pain. He needs to be able to care for his home, safely observe traffic while driving and tolerate standing for > than 5 minutes. Mr Boston Lopez will benefit from skilled therapy to address his deficits and assist in the return of functional, independent ADL's with less pain. PROBLEM LIST (Impacting functional limitations):  1. Decreased Strength  2. Decreased ADL/Functional Activities  3. Increased Pain  4. Decreased Activity Tolerance  5.  Decreased Flexibility/Joint Mobility INTERVENTIONS PLANNED: (Treatment may consist of any combination of the following)  1. Heat  2. Home Exercise Program (HEP)  3. Manual Therapy  4. Neuromuscular Re-education/Strengthening  5. Range of Motion (ROM)  6. Therapeutic Exercise/Strengthening     GOALS: (Goals have been discussed and agreed upon with patient.)  Short-Term Functional Goals: Time Frame: 30 days     1. Independent with HEP     2.L shoulder and neck AROM WFL to allow independent ADL's without compensation    Discharge Goals: Time Frame: 90 days    1. Tolerate activity > 35 minutes for driving    2. ADL's with pain < 3/10   3. Core, trunk and shoulder Strength to 4+-5/5 to allow safe reaching   OUTCOME MEASURE:   Tool Used: Disabilities of the Arm, Shoulder and Hand (DASH) Questionnaire - Quick Version  Score:  Initial: 26/55 or 34%  Most Recent: X/55 (Date: -- )   Interpretation of Score: The DASH is designed to measure the activities of daily living in person's with upper extremity dysfunction or pain. Each section is scored on a 1-5 scale, 5 representing the greatest disability. The scores of each section are added together for a total score of 55. MEDICAL NECESSITY:   · Skilled intervention continues to be required due to need for manual treatment and guided progression into exercises. REASON FOR SERVICES/OTHER COMMENTS:  · Patient continues to require skilled intervention due to need for pt to return to sleeping throught the night and safely observing traffict while driving. Total Duration:       Rehabilitation Potential For Stated Goals: Good  Regarding Maximino Cristina's therapy, I certify that the treatment plan above will be carried out by a therapist or under their direction. Thank you for this referral,  Adonay Wilson, PT ,MSPT      Referring Physician Signature:  Mylene Jameson MD _______________________________ Date _____________     PAIN/SUBJECTIVE:   Initial:   8/10 Post Session:  5/10   HISTORY:   History of Injury/Illness (Reason for Referral):  Pt reports having a gradual increase in neck and upper back pain since having to stand and talk with friends over an extended period of time. X rays and MRI of the neck show only arthritis. He is going for an MRI of the shoulder next month. He states that he can only tolerate standing still for up to 5 minutes before feeling like he needs to lay down. He has noticed that after he reads in bed he is unable to sleep more than 2 hours. Now he is having pain around the left rib cage but it doesn't increase with deep breathing. Past Medical History/Comorbidities:   Mr. Breanna Walters  has a past medical history of Acute cervical radiculopathy, Aneurysm ([address] Arthritis, Atrial fibrillation with rapid ventricular response ([address] (6/16/2019), CAD (coronary artery disease), Cancer [facility]), Descending thoracic aortic aneurysm ([address] (10/17/14) Leukopenia (11/13/2012), Mild degeneration of cervical intervertebral disc, MRSA (methicillin resistant Staphylococcus aureus), Pneumatosis intestinalis (6/14/2019), Renal cyst, right (12/3/2013), Small bowel obstruction ([address] (6/14/2019), Thrombocytopenia ([address] Ventral incisional hernia (6/14/2019). Mr. Breanna Walters  has a past surgical history that includes pr cardiac surg procedure  (1991); pr cardiac surg procedure unlist (9/2003); hx hernia repair (09/1993); lumbar laminectomy (5/2005); vascular surgery procedure unlist (1991); hx orthopaedic (1/2006); hx orthopaedic (8/2005); hx orthopaedic (2005); hx heart catheterization (04/2015); hx other surgical (11/2014); hx other surgical; hx orthopaedic (03/1955); small bowel resection (06/2019). Social History/Living Environment:     lives with wife. Prior Level of Function/Work/Activity:   Cares for the home repairs and yard  Personal Factors:           Other factors that influence how disability is experienced by the patient:  Multiple low back issues   Ambulatory/Rehab Services H2 Model Falls Risk Assessment   Risk Factors:       (1)  Gender [Male] Ability to Rise from Chair:       (0)  Ability to rise in a single movement   Falls Prevention Plan:       No modifications necessary   Total: (5 or greater = High Risk): 1   ©2010 [facility] Patent #[id number]. Federal Law prohibits the replication, distribution or use without written permission from [facility] [employer]   Current Medications:       Current Outpatient Medications:     amoxicillin-clavulanate (AUGMENTIN) 500-125 mg per tablet, Take 1 Tab by mouth two (2) times a day., Disp: 14 Tab, Rfl: 0    methylPREDNISolone (MEDROL, RAEGAN,) 4 mg tablet, Take 1 Tab by mouth Specific Days and Specific Times. , Disp: 1 Dose Pack, Rfl: 0    levothyroxine (SYNTHROID) 150 mcg tablet, Take 1 Tab by mouth Daily (before breakfast). , Disp: 90 Tab, Rfl: 3    albuterol sulfate (PROAIR RESPICLICK) 90 mcg/actuation aepb, Take 2 Puffs by inhalation four (4) times daily as needed (Wheeze). , Disp: 3 Inhaler, Rfl: 3    tiotropium-olodaterol (STIOLTO RESPIMAT) 2.5-2.5 mcg/actuation inhaler, Take 2 Puffs by inhalation daily. , Disp: 3 Inhaler, Rfl: 3    omeprazole (PRILOSEC) 40 mg capsule, Take 40 mg by mouth daily. , Disp: , Rfl:     raNITIdine (ZANTAC) 150 mg tablet, Take 150 mg by mouth nightly., Disp: , Rfl:     atorvastatin (LIPITOR) 40 mg tablet, TAKE 1 TABLET BY MOUTH  DAILY, Disp: 90 Tab, Rfl: 3    fluticasone (FLONASE) 50 mcg/actuation nasal spray, 2 Sprays by Both Nostrils route daily. , Disp: 3 Bottle, Rfl: 3    amLODIPine (NORVASC) 5 mg tablet, TAKE 1 TABLET BY MOUTH  DAILY, Disp: 90 Tab, Rfl: 3    metoprolol tartrate (LOPRESSOR) 25 mg tablet, TAKE 1 TABLET BY MOUTH TWO  TIMES DAILY, Disp: 180 Tab, Rfl: 3    gabapentin (NEURONTIN) 100 mg capsule, 200-400 mg three (3) times daily. , Disp: , Rfl:     aspirin 81 mg chewable tablet, Take 81 mg by mouth daily. , Disp: , Rfl:     finasteride (PROSCAR) 5 mg tablet, Take 5 mg by mouth daily. , Disp: , Rfl:     terazosin (HYTRIN) 10 mg capsule, Take 10 mg by mouth nightly., Disp: , Rfl:    Date Last Reviewed:  12/16/19   Number of Personal Factors/Comorbidities that affect the Plan of Care: 1-2: MODERATE COMPLEXITY   EXAMINATION:   Observation/Orthostatic Postural Assessment:          Significant forward head posture with protracted shoulders  Palpation:          Tender over the left levator scapula muscle, left subscapularis muscle, left subacromial region  ROM: neck: WNL except for left rotation 25%. BUE WNL except:     LUE AROM  L Shoulder Internal Rotation: (to L3)                               Strength: trunk 4-/5  LUE Strength  L Shoulder Flexion: 4  L Shoulder ABduction: 4+  L Shoulder Horizontal ABduction: 4-  L Shoulder Internal Rotation: 4+(3 lift off)  L Shoulder External Rotation: 4+                Special tests: positive Mignon , positive empty can   Body Structures Involved:  1. Bones  2. Joints  3. Muscles  4. Ligaments Body Functions Affected:  1. Sensory/Pain  2. Neuromusculoskeletal  3. Movement Related Activities and Participation Affected:  1. General Tasks and Demands  2. Domestic Life  3. Interpersonal Interactions and Relationships  4.  Community, Social and [place]   Number of elements (examined above) that affect the Plan of Care: 3: MODERATE COMPLEXITY   CLINICAL PRESENTATION:   Presentation: Evolving clinical presentation with changing clinical characteristics: MODERATE COMPLEXITY   CLINICAL DECISION MAKING:   Use of outcome tool(s) and clinical judgement create a POC that gives a: Questionable prediction of patient's progress: MODERATE COMPLEXITY

## 2019-12-17 ENCOUNTER — HOSPITAL ENCOUNTER (OUTPATIENT)
Dept: PHYSICAL THERAPY | Age: 84
Discharge: HOME | End: 2019-12-17
Attending: INTERNAL MEDICINE
Payer: MEDICARE

## 2019-12-17 PROCEDURE — 97110 THERAPEUTIC EXERCISES: CPT

## 2019-12-17 NOTE — PROGRESS NOTES
Maximino Mcduffie  : 1935  [place] / Product Type: Medicare /  [address] at [facility] JAZMIN ROJAS  [facility], [address], [place] [address].  [phone]   [phone]       OUTPATIENT PHYSICAL THERAPY: Daily Treatment Note 2019  Visit Count: 2     ICD-10: Treatment Diagnosis: Pain in left shoulder (M25.512), cervalgia M54.2, pain in thoracic spine M54.6  Precautions/Allergies: none/Diphenhydramine hcl; Ativan [lorazepam]; Iodinated contrast media; and Sulfa (sulfonamide antibiotics)   TREATMENT PLAN:  Effective Dates: 2019 TO 3/15/2020 (90 days). Frequency/Duration: 1 to 3 times a week for 90 Day(s) MEDICAL/REFERRING DIAGNOSIS:  neck and shoulder pain   DATE OF ONSET: 2019  REFERRING PHYSICIAN: Akash Kimble MD MD Orders: PT-evaluate and treat  Return MD Appointment: unsure       Pre-treatment Symptoms/Complaints:  Pt reports still having night pain in the lower neck/ mid shoulder blade region of his back. No rib pain. Did the HEP    Pain: Initial:   610 Post Session:  4-10   Medications Last Reviewed:  19    Updated Objective Findings:   Observation: forward head and protracted scapula  Palpation: tender and stiff over the left T3 regionan     TREATMENT:   Therapeutic Exercise: (39 Minutes): Reviewed HEP with self assisted chin tuck with scapular retraction/depression. Resisted scapular depression with pt in side lying. Chin tucks into pillow with manual positioning of the head in neutral. Exercises to improve mobility, strength and posture. Required maximal verbal and manual cues to promote proper body alignment, promote proper body posture and technique. AIS ER and IR. Hold-relax left subscap.     Date:  19 Date:   Date:     Activity/Exercise Parameters Parameters Parameters   Shoulder ER/IR Side ER with belly press 3x5     Shoulder mid and lower trap Side 2x8     LTR With pt holding B scapula retracted and shoulders against table. 2x10                               Treatment/Session Summary:    · Response to Treatment:  Much better demonstration of exercises after review. Needs frequent cueing for postural correction. Improved subscap strength after treatment  · Communication/Consultation:  None today  · Equipment provided today:  None today  · Recommendations/Intent for next treatment session: Next visit will focus on strengthening with emphasis on posture.     Total Treatment Billable Duration: 39 minutes  PT Patient Time In/Time Out  Time In: 1330  Time Out: [address] Pat[address], PT    Future Appointments   Date Time Provider Sanam Jeronimo   12/19/2019 10:00 AM Amalia Francois MD CSAE CSAE   12/19/2019 12:45 PM Xavi Reap, PT [facility]   12/30/2019  2:00 PM Xavi Reap, PT [facility]   1/3/2020  1:15 PM Xavi Reap, PT [facility]   1/6/2020 11:15 AM Xavi Reap, PT [facility]   1/7/2020 12:30 PM SFE MRI UNIT 1 SFERMRI SFE   1/8/2020 11:15 AM Xavi Reap, PT [facility]   1/10/2020 11:15 AM Xavi Reap, PT [facility]   1/13/2020 11:15 AM Xavi Reap, PT [facility]   1/15/2020 11:15 AM Xavi Reap, PT [facility]   1/17/2020 11:15 AM Xavi Reap, PT [facility]   1/20/2020 11:15 AM Xavi Reap, PT [facility]   1/22/2020 11:15 AM Xavi Reap, PT [facility]   1/24/2020  1:00 PM Sakina Rodgers, NP SSA PP PP   2/6/2020  3:00 PM John Huerta MD SSA MAT MAT   2/27/2020  1:15 PM Wilbert Connors MD SSA UCDG UCD   3/26/2020  9:00 AM MAT LAB [facility]SA MAT MAT   4/2/2020 11:00 AM John Huerta MD SSA MAT MAT   5/1/2020 11:00 AM [facility] OUTREACH INSURANCE [facility]   5/1/2020 11:30 AM Christal Bueno MD [facility]   5/6/2020  1:40 PM Dante Abreu MD END BS ENDO

## 2019-12-19 ENCOUNTER — HOSPITAL ENCOUNTER (OUTPATIENT)
Dept: PHYSICAL THERAPY | Age: 84
Discharge: HOME | End: 2019-12-19
Attending: INTERNAL MEDICINE
Payer: MEDICARE

## 2019-12-19 PROCEDURE — 97110 THERAPEUTIC EXERCISES: CPT

## 2019-12-19 NOTE — PROGRESS NOTES
Maximino Doyle  : 1935  [place] / Product Type: Medicare /  [address] at [facility] JAZMIN ROJAS  [address] [place] [address], [address], [place] [address].  [phone]   [phone]       OUTPATIENT PHYSICAL THERAPY: Daily Treatment Note 2019  Visit Count: 3     ICD-10: Treatment Diagnosis: Pain in left shoulder (M25.512), cervalgia M54.2, pain in thoracic spine M54.6  Precautions/Allergies: none/Diphenhydramine hcl; Ativan [lorazepam]; Iodinated contrast media; and Sulfa (sulfonamide antibiotics)   TREATMENT PLAN:  Effective Dates: 2019 TO 3/15/2020 (90 days). Frequency/Duration: 1 to 3 times a week for 90 Day(s) MEDICAL/REFERRING DIAGNOSIS:  neck and shoulder pain   DATE OF ONSET: 2019  REFERRING PHYSICIAN: Tato Deras MD MD Orders: PT-evaluate and treat  Return MD Appointment: unsure       Pre-treatment Symptoms/Complaints:  Pt reports still having night pain in the lower neck and left shoulder. Did not do the HEP    Pain: Initial:   7/10 in left shoulder, 6 in neck, 2 in upper back Post Session:  2/10   Medications Last Reviewed:  19    Updated Objective Findings:   Observation: forward head and protracted scapula corrects with cueing  Palpation: n/t     TREATMENT:   Therapeutic Exercise: (39 Minutes): Reviewed HEP with self assisted chin tuck with scapular retraction/depression. Exercises to improve mobility, strength and posture. Required maximal verbal and manual cues to promote proper body alignment, promote proper body posture and technique. Date:  19 Date:  19 Date:     Activity/Exercise Parameters Parameters Parameters   Shoulder ER/IR Side ER with belly press 3x5 Yellow seated 2x10    Shoulder mid and lower trap Side 2x8 Seated yellow tband 2x10    LTR With pt holding B scapula retracted and shoulders against table.  2x10     Trunk extn  Seated into tball 2x10    rows  Seated yellow 2x10 Dynamic hugs              Treatment/Session Summary:    · Response to Treatment:  Much better demonstration of exercises after review. Needs frequent cueing for postural correction and to stay on task  · Communication/Consultation:  None today  · Equipment provided today:  None today  · Recommendations/Intent for next treatment session: Next visit will focus on strengthening with emphasis on posture.     Total Treatment Billable Duration: 39 minutes  PT Patient Time In/Time Out  Time In: 6626  Time Out: [address], PT    Future Appointments   Date Time Provider Sanam Kandace   12/30/2019  2:00 PM Donnamarie Fallow, [place] [facility]   1/2/2020 11:30 AM Yuki Higuera MD CSAE CSAE   1/3/2020  1:15 PM Donnamarie Fallow, PT [facility]   1/6/2020 11:15 AM Donnamarie Fallow, PT [facility]   1/7/2020 12:30 PM SFE MRI UNIT 1 SFERMRI SFE   1/8/2020 11:15 AM Donnamarie Fallow, PT [facility]   1/10/2020 11:15 AM Donnamarie Fallow, PT [facility]   1/13/2020 11:15 AM Donnamarie Fallow, PT [facility]   1/15/2020 11:15 AM Donnamarie Fallow, PT [facility]   1/17/2020 11:15 AM Donnamarie Fallow, PT [facility]   1/20/2020 11:15 AM Donnamarie Fallow, PT [facility]   1/22/2020 11:15 AM Donnamarie Fallow, PT [facility]   1/24/2020  1:00 PM Lois Singh NP SSA PP PP   2/6/2020  3:00 PM Barbara Rodríguez MD SSA MAT MAT   2/27/2020  1:15 PM Rossana Connors MD [facility] UCDG UCD   3/26/2020  9:00 AM MAT LAB SSA MAT MAT   4/2/2020 11:00 AM Barbara Rodríguez MD SSA MAT MAT   5/1/2020 11:00 AM [facility] OUTREACH INSURANCE [facility]   5/1/2020 11:30 AM Umer Collado MD [facility]   5/6/2020  1:40 PM Toby Cole MD END BS ENDO

## 2019-12-30 ENCOUNTER — HOSPITAL ENCOUNTER (OUTPATIENT)
Dept: PHYSICAL THERAPY | Age: 84
Discharge: HOME | End: 2019-12-30
Attending: INTERNAL MEDICINE
Payer: MEDICARE

## 2019-12-30 PROCEDURE — 97110 THERAPEUTIC EXERCISES: CPT

## 2020-01-02 ENCOUNTER — HOSPITAL ENCOUNTER (OUTPATIENT)
Dept: LAB | Age: 85
Discharge: HOME | End: 2020-01-02

## 2020-01-02 PROCEDURE — 88305 TISSUE EXAM BY PATHOLOGIST: CPT

## 2020-01-03 ENCOUNTER — HOSPITAL ENCOUNTER (OUTPATIENT)
Dept: PHYSICAL THERAPY | Age: 85
Discharge: HOME | End: 2020-01-03
Attending: INTERNAL MEDICINE
Payer: MEDICARE

## 2020-01-03 PROCEDURE — 97110 THERAPEUTIC EXERCISES: CPT

## 2020-01-03 NOTE — PROGRESS NOTES
Maximino Castro  : 1935  [place] / Product Type: Medicare /  [address]  at [facility] JAZMIN ROJAS  [facility], [address], [place] [address].  [phone]   [phone]       OUTPATIENT PHYSICAL THERAPY: Daily Treatment Note 1/3/2020  Visit Count: 5     ICD-10: Treatment Diagnosis: Pain in left shoulder (M25.512), cervalgia M54.2, pain in thoracic spine M54.6  Precautions/Allergies: none/Diphenhydramine hcl; Ativan [lorazepam]; Iodinated contrast media; and Sulfa (sulfonamide antibiotics)   TREATMENT PLAN:  Effective Dates: 2019 TO 3/15/2020 (90 days). Frequency/Duration: 1 to 3 times a week for 90 Day(s) MEDICAL/REFERRING DIAGNOSIS:  neck and shoulder pain   DATE OF ONSET: 2019  REFERRING PHYSICIAN: Henrene Runner, MD MD Orders: PT-evaluate and treat  Return MD Appointment: March       Pre-treatment Symptoms/Complaints:  Pt reports some pain. Did some of the HEP     Pain: Initial:   6-7/10 in left shoulder and 0-5 in back Post Session: 1-2/10 in neck, shoulder and back   Medications Last Reviewed:  1/3/20    Updated Objective Findings:   Observation: forward head and slouched posture with protracted scapula. IR to L1 improved to T12 with treatment  Palpation: n/t     TREATMENT:   Therapeutic Exercise: (40 Minutes): Tandem stance B trunk rhythmic stabilization. Exercises to improve mobility, strength and posture. Required moderate to minimal verbal and manual cues to promote proper body alignment, promote proper body posture and technique.      Date:  19 Date:  19 Date:  12/30/19 1/3/20    Activity/Exercise Parameters Parameters Parameters     Shoulder ER/IR Side ER with belly press 3x5 Yellow seated 2x10 Red 2x10 Standing red 2x10    Shoulder mid and lower trap Side 2x8 Seated yellow tband 2x10 Red 2x10 Yellow in standing 2x8    midtrap   Standing yellow 2x10 Standing yellow 2x10    \"Y\"   Standing yellow 2x8 Yellow x8    LTR With pt holding B scapula retracted and shoulders against table. 2x10  2x15 Over red tball x30    Trunk extn  Seated into tball 2x10 2x12 Seated 2x12    rows  Seated yellow 2x10 Yellow 2x10 Red 2x10    Dynamic hugs   Red 2x10 Red 2x12    triceps   Pulls red 2x10       Treatment/Session Summary:    · Response to Treatment: good tolerance to increased reps or difficulty with exercises. Needs frequent cueing to stay on task. · Communication/Consultation:  None today  · Equipment provided today:  None today  · Recommendations/Intent for next treatment session: Next visit will focus on strengthening with emphasis on posture.     Total Treatment Billable Duration: 45 minutes  PT Patient Time In/Time Out  Time In: 1230  Time Out: [address], PT    Future Appointments   Date Time Provider Sanam Jeronimo   1/6/2020 11:15 AM Griselda Simmer, PT [facility]   1/7/2020 12:30 PM SFE MRI UNIT 1 SFERMRI SFE   1/8/2020 11:15 AM Griselda Simmer, PT [facility]   1/10/2020 11:15 AM Griselda Simmer, PT [facility]   1/13/2020 11:15 AM Griselda Simmer, PT [facility]   1/14/2020  9:30 AM [facility] NURSE CSAE CSAE   1/15/2020 11:15 AM Griselda Simmer, PT [facility]   1/17/2020 11:15 AM Griselda Simmer, PT [facility]   1/20/2020 11:15 AM Griselda Simmer, PT [facility]   1/22/2020 11:15 AM Griselda Simmer, PT [facility]   1/24/2020  1:00 PM Dea Melgar NP SSA PP PP   2/6/2020  3:00 PM MD [facility] MAT MAT   2/27/2020  1:15 PM Best Connors MD SSA UCDG UCD   3/26/2020  9:00 AM MAT LAB SSA MAT MAT   4/2/2020 11:00 AM MD [facility] MAT MAT   5/1/2020 11:00 AM [facility] OUTREACH INSURANCE [facility]   5/1/2020 11:30 AM Thalia Islas MD [facility]   5/6/2020  1:40 PM Karl Singh MD END BS ENDO

## 2020-01-06 ENCOUNTER — HOSPITAL ENCOUNTER (OUTPATIENT)
Dept: PHYSICAL THERAPY | Age: 85
Discharge: HOME | End: 2020-01-06
Attending: INTERNAL MEDICINE
Payer: MEDICARE

## 2020-01-06 PROCEDURE — 97110 THERAPEUTIC EXERCISES: CPT

## 2020-01-06 NOTE — PROGRESS NOTES
Maximino [place]  : 1935  [place] / Product Type: Medicare /  [address]  at [facility] JAZMIN ROJAS  [address] [place] [address], [address], [place] [address].  [phone]   [phone]       OUTPATIENT PHYSICAL THERAPY: Daily Treatment Note 2020  Visit Count: 6     ICD-10: Treatment Diagnosis: Pain in left shoulder (M25.512), cervalgia M54.2, pain in thoracic spine M54.6  Precautions/Allergies: none/Diphenhydramine hcl; Ativan [lorazepam]; Iodinated contrast media; and Sulfa (sulfonamide antibiotics)   TREATMENT PLAN:  Effective Dates: 2019 TO 3/15/2020 (90 days). Frequency/Duration: 1 to 3 times a week for 90 Day(s) MEDICAL/REFERRING DIAGNOSIS:  neck and shoulder pain   DATE OF ONSET: 2019  REFERRING PHYSICIAN: Cristóbal Ford MD MD Orders: PT-evaluate and treat  Return MD Appointment: March       Pre-treatment Symptoms/Complaints:  Pt reports some soreness in his upper back/lower neck over the weekend. Did the HEP but probably not like I was supposed to. Pain: Initial:   2-7/10 in left shoulder/upper arm when he reaches up and 0-4/10 in upper back Post Session: \"been worked\"   Medications Last Reviewed:  1/3/20    Updated Objective Findings:   Observation: forward head and slouched posture. Palpation: tender over C6-7 region     TREATMENT:   Therapeutic Exercise: (42 Minutes): Exercises to improve mobility, strength and posture. Required moderate to minimal verbal and manual cues to promote proper body alignment, promote proper body posture and technique.      Date:  19 Date:  19 Date:  12/30/19 1/3/20 1/6/20   Activity/Exercise Parameters Parameters Parameters     Shoulder ER/IR Side ER with belly press 3x5 Yellow seated 2x10 Red 2x10 Standing red 2x10 ER red at side standing/ scaption 2x10 2x12  IR with addition of belly press 2x12   Shoulder mid and lower trap Side 2x8 Seated yellow tband 2x10 Red 2x10 Yellow in standing 2x8 Standing    midtrap   Standing yellow 2x10 Standing yellow 2x10 Propped 1# 2x10   \"Y\"   Standing yellow 2x8 Yellow x8 1# standing 2x10 yellow tband ABD hold 2x10   Chin tucks     2x10 supine           LTR With pt holding B scapula retracted and shoulders against table. 2x10  2x15 Over red tball x30    Trunk extn  Seated into tball 2x10 2x12 Seated 2x12    rows  Seated yellow 2x10 Yellow 2x10 Red 2x10 Propped 1# 2x10   Dynamic hugs   Red 2x10 Red 2x12 Serratus punch 3#B 2x10   triceps   Pulls red 2x10  Red 2x12     Treatment/Session Summary:    · Response to Treatment: good tolerance to exercises. Less neck pain with supine exercises when not using a pillow. · Communication/Consultation:  None today  · Equipment provided today:  None today  · Recommendations/Intent for next treatment session: Next visit will focus on strengthening with emphasis on posture. Trunk stabilization.  Measure shoulder    Total Treatment Billable Duration: 42 minutes  PT Patient Time In/Time Out  Time In: 1115  Time Out: 8[facility], PT    Future Appointments   Date Time Provider Sanam Jeronimo   1/7/2020 12:30 PM SFE MRI UNIT 1 SFERMRI SFE   1/8/2020 11:15 AM Chacho Primer, PT [facility]   1/10/2020 11:15 AM Chacho Primer, PT [facility]   1/13/2020 11:15 AM Chacho Primer, PT [facility]   1/14/2020  9:30 AM CSAE NURSE CSAE CSAE   1/15/2020 11:15 AM Chacho Primer, PT [facility]   1/17/2020 11:15 AM Chacho Primer, PT [facility]   1/20/2020 11:15 AM Chacho Primer, PT [facility]   1/22/2020 11:15 AM Chacho Primer, PT [facility]   1/24/2020  1:00 PM Hema Diaz NP SSA PP PP   2/6/2020  3:00 PM Shon Medel MD SSA MAT MAT   2/27/2020  1:15 PM Enrico Connors MD [facility] UCDG UCD   3/26/2020  9:00 AM MAT LAB [facility] MAT MAT   4/2/2020 11:00 AM Shon Medel MD [facility] MAT MAT   5/1/2020 11:00 AM 18[address] [facility] INSURANCE [facility]   5/1/2020 11:30 AM Juanpablo Palafox MD [facility]   5/6/2020  1:40 PM Renetta Alcantara MD END BS ENDO

## 2020-01-07 ENCOUNTER — HOSPITAL ENCOUNTER (OUTPATIENT)
Dept: MRI IMAGING | Age: 85
Discharge: HOME | End: 2020-01-07
Attending: INTERNAL MEDICINE
Payer: MEDICARE

## 2020-01-07 DIAGNOSIS — M54.2 NECK AND SHOULDER PAIN: ICD-10-CM

## 2020-01-07 DIAGNOSIS — M50.30 DDD (DEGENERATIVE DISC DISEASE), CERVICAL: ICD-10-CM

## 2020-01-07 DIAGNOSIS — M25.519 NECK AND SHOULDER PAIN: ICD-10-CM

## 2020-01-07 PROCEDURE — 73221 MRI JOINT UPR EXTREM W/O DYE: CPT

## 2020-01-07 NOTE — PROGRESS NOTES
MRI with bursitis of shoulder. I can see him for shoulder injection or I can refer him to orthopedics. Just let me know what he prefers.   Ludy Briceno MD

## 2020-01-08 ENCOUNTER — HOSPITAL ENCOUNTER (OUTPATIENT)
Dept: PHYSICAL THERAPY | Age: 85
Discharge: HOME | End: 2020-01-08
Attending: INTERNAL MEDICINE
Payer: MEDICARE

## 2020-01-08 PROCEDURE — 97110 THERAPEUTIC EXERCISES: CPT

## 2020-01-08 NOTE — PROGRESS NOTES
Maximino Barbour  : 1935  [place] / Product Type: Medicare /  [address] [place]  at [facility] JAZMIN ROJAS  [address] [place] [address], [address], [address] [place] [address]  [phone]   [phone]       OUTPATIENT PHYSICAL THERAPY: Daily Treatment Note 2020  Visit Count: 7     ICD-10: Treatment Diagnosis: Pain in left shoulder (M25.512), cervalgia M54.2, pain in thoracic spine M54.6  Precautions/Allergies: none/Diphenhydramine hcl; Ativan [lorazepam]; Iodinated contrast media; and Sulfa (sulfonamide antibiotics)   TREATMENT PLAN:  Effective Dates: 2019 TO 3/15/2020 (90 days). Frequency/Duration: 1 to 3 times a week for 90 Day(s) MEDICAL/REFERRING DIAGNOSIS:  neck and shoulder pain   DATE OF ONSET: 2019  REFERRING PHYSICIAN: Janice Allison MD MD Orders: PT-evaluate and treat  Return MD Appointment: March       Pre-treatment Symptoms/Complaints:  Pt reports having a rotator cuff tear and bicep tendon tear per the MRI. Does not want surgery. Some soreness in his upper back/lower neck after reading    Pain: Initial: -5/10    Post Session: no number- a little sore   Medications Last Reviewed:  1/3/20    Updated Objective Findings:   Observation: forward head posture. Palpation: tender over the left biceps tendon region     TREATMENT:   Therapeutic Exercise: (41 Minutes): Exercises to improve mobility, strength and posture. Required moderate to minimal verbal and manual cues to promote proper body alignment, promote proper body posture and technique.   Manual upper cervical flexion stretch    Date:  19 Date:  12/30/19 1/3/20 1/6/20 1/8/20    Activity/Exercise Parameters Parameters       Shoulder ER/IR Yellow seated 2x10 Red 2x10 Standing red 2x10 ER red at side standing/ scaption 2x10 2x12  IR with addition of belly press 2x12 ER red 2x10  Scaption 2x10  90/90 yellow 2x8    Shoulder mid and lower trap Seated yellow tband 2x10 Red 2x10 Yellow in standing 2x8 Standing yellow x10 midtrap Red 2x8    midtrap  Standing yellow 2x10 Standing yellow 2x10 Propped 1# 2x10 Red 2x10    \"Y\"  Standing yellow 2x8 Yellow x8 1# standing 2x10 yellow tband ABD hold 2x10     Chin tucks    2x10 supine x10 supine into pillow    Bicep curls     3# 2x12    LTR  2x15 Over red tball x30      Trunk extn Seated into tball 2x10 2x12 Seated 2x12      rows Seated yellow 2x10 Yellow 2x10 Red 2x10 Propped 1# 2x10 Green 2x10    Dynamic hugs  Red 2x10 Red 2x12 Serratus punch 3#B 2x10 Red tband 2x15    triceps  Pulls red 2x10  Red 2x12 Green 2x10      Treatment/Session Summary:    · Response to Treatment: good tolerance to exercises with increasing reps or difficulty  · Communication/Consultation:  None today  · Equipment provided today:  None today  · Recommendations/Intent for next treatment session: Next visit will focus on strengthening with emphasis on posture and shoulder complex. Trunk stabilization.  Measure shoulder next visit    Total Treatment Billable Duration: 41 minutes  PT Patient Time In/Time Out  Time In: 1117  Time Out: [address], PT    Future Appointments   Date Time Provider Sanam Jeronimo   1/10/2020 11:15 AM Jolena Ganser, PT [facility]   1/13/2020 11:15 AM Jolena Ganser, PT [facility]   1/14/2020  9:30 AM [facility]OP[facility] NURSE [facility] CASTELLANOS   1/15/2020 11:15 AM Jolena Ganser, PT [facility]   1/17/2020 11:15 AM Jolena Ganser, PT [facility]   1/20/2020 11:15 AM Jolena Ganser, PT [facility]   1/22/2020 11:15 AM Jolena Ganser, PT [facility]   1/24/2020  1:00 PM Zen Love NP SSA PP PP   2/6/2020  3:00 PM MD HARVINDER Camilo MAT MAT   2/27/2020  1:15 PM Danny Connors MD SSA UCDG UCD   3/26/2020  9:00 AM MAT LAB [facility]SA MAT MAT   4/2/2020 11:00 AM MD HARVINDER Camilo MAT MAT   5/1/2020 11:00 AM [address]ma [address] [place] [address] [facility]   5/1/2020 11:30 AM Penelope York Erika Marie MD [facility]   5/6/2020  1:40 PM Jodi Greco MD END BS ENDO

## 2020-01-08 NOTE — PROGRESS NOTES
Patient notified of MRI results and he states that he would like to proceed with Ortho referral. He states he is taking PT at [address] [facility]. Can referral be made there?

## 2020-01-10 ENCOUNTER — HOSPITAL ENCOUNTER (OUTPATIENT)
Dept: PHYSICAL THERAPY | Age: 85
Discharge: HOME | End: 2020-01-10
Attending: INTERNAL MEDICINE
Payer: MEDICARE

## 2020-01-10 PROCEDURE — 97110 THERAPEUTIC EXERCISES: CPT

## 2020-01-10 NOTE — PROGRESS NOTES
Maximino Maguire  : 1935  [place] / Product Type: Medicare /  [address] [place]  at [facility] JAZMIN ROJAS  [address] [place] [address], [address], [place] [address].  [phone]   [phone]       OUTPATIENT PHYSICAL THERAPY: Daily Treatment Note 1/10/2020  Visit Count: 8     ICD-10: Treatment Diagnosis: Pain in left shoulder (M25.512), cervalgia M54.2, pain in thoracic spine M54.6  Precautions/Allergies: none/Diphenhydramine hcl; Ativan [lorazepam]; Iodinated contrast media; and Sulfa (sulfonamide antibiotics)   MRI 20: left \"RCT with biceps tear\"  TREATMENT PLAN:  Effective Dates: 2019 TO 3/15/2020 (90 days). Frequency/Duration: 1 to 3 times a week for 90 Day(s) MEDICAL/REFERRING DIAGNOSIS:  neck and shoulder pain   DATE OF ONSET: 2019  REFERRING PHYSICIAN: Nubia Lorenzo MD MD Orders: PT-evaluate and treat  Return MD Appointment: March       Pre-treatment Symptoms/Complaints:  Pt reports his left shoulder hurting this morning from laying on it lat night. Got a referral to see Dr Jie Bond   Pain: Initial: 5/10    Post Session: 3/10   Medications Last Reviewed:  1/10/20    Updated Objective Findings:   Observation: forward head posture. Palpation: crepitus in the left shoulder with movement  Strength:   LUE Strength  L Shoulder Flexion: 4+(pain and quick fatigue)  L Shoulder Horizontal ABduction: 4                     TREATMENT:   Therapeutic Exercise: (42 Minutes): Exercises to improve mobility, strength and posture. Required repetitive minimal verbal and manual cues to promote proper body alignment, promote proper body posture and technique. AIS manual upper cervical flexion stretch followed by manual hold of position for pt to perform chin tucks x10. Standing trunk stabilization hold during rhythmic stabilization.     1/3/20 1/6/20 1/8/20 1/10/20   Activity/Exercise       Shoulder ER/IR Standing red 2x10 ER red at side standing/ scaption 2x10 2x12  IR with addition of belly press 2x12 ER red 2x10  Scaption 2x10  90/90 yellow 2x8 ER green 2x15  Scaption yellow 2x10  90/90 yellow 2x10    IR/Belly press 2x15     Shoulder mid and lower trap Yellow in standing 2x8 Standing yellow x10 midtrap Red 2x8 Low trap propped 0# 2x8   midtrap Standing yellow 2x10 Propped 1# 2x10 Red 2x10 \"T\" red 2x10  Propped 2# 2x10   \"Y\" Yellow x8 1# standing 2x10 yellow tband ABD hold 2x10     Chin tucks  2x10 supine x10 supine into pillow    Bicep curls   3# 2x12 3# 2x15   LTR Over red tball x30      Trunk extn Seated 2x12      rows Red 2x10 Propped 1# 2x10 Green 2x10 2x15   Dynamic hugs Red 2x12 Serratus punch 3#B 2x10 Red tband 2x15 Red 2x10   triceps  Red 2x12 Green 2x10      Treatment/Session Summary:    · Response to Treatment: good tolerance to exercises without increasing pain  · Communication/Consultation:  None today  · Equipment provided today:  None today  · Recommendations/Intent for next treatment session: Next visit will focus on strengthening with emphasis on posture and shoulder complex.      Total Treatment Billable Duration: 42 minutes  PT Patient Time In/Time Out  Time In: 1115  Time Out: 865 [facility],     Future Appointments   Date Time Provider Sanam Jeronimo   1/13/2020 11:15 AM Devon Purchase, PT [facility]   1/14/2020  9:30 AM HOPEE NURSE [facility] ARNETTE   1/15/2020 11:15 AM Devon Purchase, PT [facility]   1/17/2020 11:15 AM Devon Purchase, PT [facility]   1/20/2020 11:15 AM Devon Purchase, PT [facility]   1/22/2020 11:15 AM Devon Purchase, PT [facility]   1/24/2020  1:00 PM Marni Moscoso NP SSA PP PP   2/6/2020  3:00 PM Chema Gomez MD [facility] MAT MAT   2/27/2020  1:15 PM Malik Connors MD [facility] UCDG UCD   3/26/2020  9:00 AM MAT LAB [facility] MAT MAT   4/2/2020 11:00 AM Chema Gomez MD [facility] MAT MAT   5/1/2020 11:00 AM Ema [address] [facility] 5/1/2020 11:30 AM Carlos Wilkins MD [facility]   5/6/2020  1:40 PM Shani Dupont MD END BS ENDO

## 2020-01-13 ENCOUNTER — HOSPITAL ENCOUNTER (OUTPATIENT)
Dept: PHYSICAL THERAPY | Age: 85
Discharge: HOME | End: 2020-01-13
Attending: INTERNAL MEDICINE
Payer: MEDICARE

## 2020-01-13 PROCEDURE — 97110 THERAPEUTIC EXERCISES: CPT

## 2020-01-13 NOTE — PROGRESS NOTES
Maximino Coleman  : 1935  [place] / Product Type: Medicare /  [address]  [address]  [facility], [address], [place] [address].  [phone]   [phone]       OUTPATIENT PHYSICAL THERAPY: Daily Treatment Note 2020  Visit Count: 9     ICD-10: Treatment Diagnosis: Pain in left shoulder (M25.512), cervalgia M54.2, pain in thoracic spine M54.6  Precautions/Allergies: none/Diphenhydramine hcl; Ativan [lorazepam]; Iodinated contrast media; and Sulfa (sulfonamide antibiotics)   MRI 20: left \"RCT with biceps tear\"  TREATMENT PLAN:  Effective Dates: 2019 TO 3/15/2020 (90 days). Frequency/Duration: 1 to 3 times a week for 90 Day(s) MEDICAL/REFERRING DIAGNOSIS:  neck and shoulder pain   DATE OF ONSET: 2019  REFERRING PHYSICIAN: Bj Bunn MD MD Orders: PT-evaluate and treat  Return MD Appointment: March, going to Dr Julia Garcia 20       Pre-treatment Symptoms/Complaints:  Pt reports his left shoulder hurting from using a blower yesterday in the yard. Did the HEP but not 5 times a day like I have been  Pain: Initial: 0-5/10    Post Session: 1-2/10   Medications Last Reviewed:  20  Current Outpatient Medications:     levothyroxine (SYNTHROID)     albuterol sulfate     tiotropium-olodaterol (STIOLTO RESPIMAT)     omeprazole (PRILOSEC) :     atorvastatin (LIPITOR) 40 mg tablet, TAKE 1 TABLET BY MOUTH  DAILY, Disp: 90 Tab, Rfl: 3    fluticasone (FLONASE) 50 mcg/actuation nasal spray, 2 Sprays by Both Nostrils route daily. , Disp: 3 Bottle, Rfl: 3    amLODIPine (NORVASC) 5 mg tablet, TAKE 1 TABLET BY MOUTH  DAILY, Disp: 90 Tab, Rfl: 3    metoprolol tartrate (LOPRESSOR) 25 mg tablet, TAKE 1 TABLET BY MOUTH TWO  TIMES DAILY, Disp: 180 Tab, Rfl: 3    gabapentin (NEURONTIN)     aspirin 81 mg chewable tablet, Take 81 mg by mouth daily. , Disp: , Rfl:     finasteride (PROSCAR) 5 mg tablet, Take 5 mg by mouth daily. , Disp: , Rfl:     terazosin (HYTRIN) 10 mg capsule, Take 10 mg by mouth nightly., Disp: , Rfl:     Updated Objective Findings:   Observation: forward head posture. Palpation: n/t  Strength: n/t                        TREATMENT:   Therapeutic Exercise: (43 Minutes): Exercises to improve mobility, strength and posture. Required repetitive minimal verbal and manual cues to promote proper body alignment, promote proper body posture and technique. Grade  3 to 4-- L unilateral PA glide at T2 for vertebral motion. AIS manual upper cervical flexion stretch followed by pt hold at end ROM. Standing exercises while using head to hold towel against the wall to improve posture    1/6/20 1/8/20 1/10/20 1/13/20    Activity/Exercise        Shoulder ER/IR ER red at side standing/ scaption 2x10 2x12  IR with addition of belly press 2x12 ER red 2x10  Scaption 2x10  90/90 yellow 2x8 ER green 2x15  Scaption yellow 2x10  90/90 yellow 2x10    IR/Belly press 2x15   Red 2x12 holding towel roll against the wall with hrad    ER scaption yellow 2x15    90/90 yellow 2x15     Shoulder mid and lower trap Standing yellow x10 midtrap Red 2x8 Low trap propped 0# 2x8 Prone mid trap 2# 2x10  Low trap 1# 2x10    midtrap Propped 1# 2x10 Red 2x10 \"T\" red 2x10  Propped 2# 2x10     \"Y\" 1# standing 2x10 yellow tband ABD hold 2x10   Prone 1# 2x10    Chin tucks 2x10 supine x10 supine into pillow  Against wall into towel roll    Bicep curls  3# 2x12 3# 2x15 4# 2x20    Full can    1# 2x10    Trunk rhythmic stabilization    [id number]    rows Propped 1# 2x10 Green 2x10 2x15 Red holding towel roll with his head 2x15    Dynamic hugs Serratus punch 3#B 2x10 Red tband 2x15 Red 2x10     triceps Red 2x12 Green 2x10  Propped 2# B 2x10      Treatment/Session Summary:    · Response to Treatment: good tolerance to exercises with increased emphasis on posture.  Pt to perform HEP 1-2 x day as recommended  · Communication/Consultation:  None today  · Equipment provided today:  None today  · Recommendations/Intent for next treatment session: Next visit will focus on strengthening with emphasis on posture and shoulder complex.      Total Treatment Billable Duration: 43 minutes  PT Patient Time In/Time Out  Time In: 1120  Time Out: 865 [facility], PT    Future Appointments   Date Time Provider Sanam Jeronimo   1/14/2020  9:30 AM CSAE NURSE [facility]E CSAE   1/15/2020 11:15 AM Delmus Muskrat, PT [facility]   1/17/2020 11:15 AM Delmus Muskrat, PT [facility]   1/20/2020 11:15 AM Delmus Muskrat, PT [facility]   1/22/2020 11:15 AM Delmus Muskrat, PT [facility]   1/24/2020  1:00 PM Des Campbell NP [facility] PP PP   2/6/2020  3:00 PM Salty Harding MD [facility] MAT MAT   2/27/2020  1:15 PM Freddie Connors MD [facility] UCDG UCD   3/26/2020  9:00 AM MAT LAB [facility] MAT MAT   4/2/2020 11:00 AM Salty Harding MD [facility] MAT MAT   5/1/2020 11:00 AM [facility] OUTREACH INSURANCE [facility]   5/1/2020 11:30 AM Jj Andersen MD [facility]   5/6/2020  1:40 PM Anton Alegria MD [facility] BS ENDO

## 2020-01-15 ENCOUNTER — HOSPITAL ENCOUNTER (OUTPATIENT)
Dept: PHYSICAL THERAPY | Age: 85
Discharge: HOME | End: 2020-01-15
Attending: INTERNAL MEDICINE
Payer: MEDICARE

## 2020-01-15 PROCEDURE — 97110 THERAPEUTIC EXERCISES: CPT

## 2020-01-15 NOTE — PROGRESS NOTES
Gene Rossana Clemons  : 1935  [place] / Product Type: Medicare /  [address] [place]  at [facility] JAZMIN ROJAS  [address] [place] [address], [address], [place] [address].  [phone]   [phone]       OUTPATIENT PHYSICAL THERAPY: Daily Treatment Note 1/15/2020  Visit Count: 10     ICD-10: Treatment Diagnosis: Pain in left shoulder (M25.512), cervalgia M54.2, pain in thoracic spine M54.6  Precautions/Allergies: none/Diphenhydramine hcl; Ativan [lorazepam]; Iodinated contrast media; and Sulfa (sulfonamide antibiotics)   MRI 20: left \"RCT with biceps tear\"  TREATMENT PLAN:  Effective Dates: 2019 TO 3/15/2020 (90 days). Frequency/Duration: 1 to 3 times a week for 90 Day(s) MEDICAL/REFERRING DIAGNOSIS:  neck and shoulder pain   DATE OF ONSET: 2019  REFERRING PHYSICIAN: Rick Romero MD MD Orders: PT-evaluate and treat  Return MD Appointment: March, going to Dr Russell Age 20       Pre-treatment Symptoms/Complaints:  Pt reports his left shoulder hurting when he reaches out to the side. Doesn't hurt to touch. Did the HEP and worked in the yard for exercise   Pain: Initial: 0- briefly 7/10 with pt demonstrating abduction    Post Session: 1-2/10   Medications Last Reviewed:  20  Current Outpatient Medications:     levothyroxine (SYNTHROID)     albuterol sulfate     tiotropium-olodaterol (STIOLTO RESPIMAT)     omeprazole (PRILOSEC) :     atorvastatin (LIPITOR) 40 mg tablet, TAKE 1 TABLET BY MOUTH  DAILY, Disp: 90 Tab, Rfl: 3    fluticasone (FLONASE) 50 mcg/actuation nasal spray, 2 Sprays by Both Nostrils route daily. , Disp: 3 Bottle, Rfl: 3    amLODIPine (NORVASC) 5 mg tablet, TAKE 1 TABLET BY MOUTH  DAILY, Disp: 90 Tab, Rfl: 3    metoprolol tartrate (LOPRESSOR) 25 mg tablet, TAKE 1 TABLET BY MOUTH TWO  TIMES DAILY, Disp: 180 Tab, Rfl: 3    gabapentin (NEURONTIN)     aspirin 81 mg chewable tablet, Take 81 mg by mouth daily. , Disp: , Rfl:   finasteride (PROSCAR) 5 mg tablet, Take 5 mg by mouth daily. , Disp: , Rfl:     terazosin (HYTRIN) 10 mg capsule, Take 10 mg by mouth nightly., Disp: , Rfl:     Updated Objective Findings:   Observation: forward head posture corrects to neutral with cueing. Palpation: not tender to palpate  AROM: WNL. IR to L1  Strength:   LUE Strength  L Shoulder Internal Rotation: 4                     TREATMENT:   Therapeutic Exercise: (40 Minutes): Exercises to improve mobility, strength and posture. Required repetitive minimal verbal and manual cues to promote proper body alignment, promote proper body posture and technique. Grade  3 to 4-- L unilateral PA glide at T2 for vertebral motion.     1/6/20 1/8/20 1/10/20 1/13/20 1/15/20   Activity/Exercise        Shoulder ER/IR ER red at side standing/ scaption 2x10 2x12  IR with addition of belly press 2x12 ER red 2x10  Scaption 2x10  90/90 yellow 2x8 ER green 2x15  Scaption yellow 2x10  90/90 yellow 2x10    IR/Belly press 2x15   Red 2x12 holding towel roll against the wall with hrad    ER scaption yellow 2x15    90/90 yellow 2x15  Red 2x12    Scaption 2# 2x12    90/90 2# 2x12   Shoulder mid and lower trap Standing yellow x10 midtrap Red 2x8 Low trap propped 0# 2x8 Prone mid trap 2# 2x10  Low trap 1# 2x10 Prone 2# x12 x10   low trap 1# x12 x10   midtrap Propped 1# 2x10 Red 2x10 \"T\" red 2x10  Propped 2# 2x10     \"Y\" 1# standing 2x10 yellow tband ABD hold 2x10   Prone 1# 2x10 Prone 1# 2x10   Chin tucks 2x10 supine x10 supine into pillow  Against wall into towel roll    Bicep curls  3# 2x12 3# 2x15 4# 2x20    Full can    1# 2x10 1# 2x10   Trunk rhythmic stabilization    [id number] 2x60 sec   rows Propped 1# 2x10 Green 2x10 2x15 Red holding towel roll with his head 2x15 Prone 2# 2x10   Dynamic hugs Serratus punch 3#B 2x10 Red tband 2x15 Red 2x10     triceps Red 2x12 Green 2x10  Propped 2# B 2x10 Green 2x10     Treatment/Session Summary:    · Response to Treatment: increased reps or level of difficulty with exercises. Still needs frequent cueing to emphasis good posture. · Communication/Consultation:  None today  · Equipment provided today:  None today  · Recommendations/Intent for next treatment session: Next visit will focus on strengthening with emphasis on posture and shoulder complex.      Total Treatment Billable Duration: 40   minutes  PT Patient Time In/Time Out  Time In: 1120  Time Out: 865 [facility], PT    Future Appointments   Date Time Provider Sanam Kandace   1/17/2020 11:15 AM [facility] [place]   1/20/2020 11:15 AM [facility] [place]   1/22/2020 11:15 AM Tad [place], PT [facility]   1/24/2020  1:00 PM Cole Abreu NP [facility] PP PP   2/6/2020  3:00 PM Berta Boswell MD [facility] MAT MAT   2/27/2020  1:15 PM Colonel Pepe Connors MD [facility] UCDG UCD   3/26/2020  9:00 AM MAT LAB [facility] MAT MAT   4/2/2020 11:00 AM Berta Boswell MD [facility] MAT MAT   5/1/2020 11:00 AM [facility] OUTREACH INSURANCE [facility]   5/1/2020 11:30 AM Senthil Smith MD [facility]   5/6/2020  1:40 PM Maira Manning MD [facility] BS ENDO

## 2020-01-15 NOTE — PROGRESS NOTES
Maximino Hansen  : 1935  [place]  Secondary: Darell Mera at [facility]  [address] [facility] ([place], [place]), [address] [place] [address],[address], [place] [address].  [phone]   [phone]       OUTPATIENT PHYSICAL THERAPY:Progress Report 1/15/2020   ICD-10: Treatment Diagnosis: Pain in left shoulder (M25.512), Cervalgia (M54.2), pain in thoracic spine (M54.6)  Precautions/Allergies: none,Diphenhydramine hcl; Ativan [lorazepam]; Iodinated contrast media; and Sulfa (sulfonamide antibiotics)   TREATMENT PLAN:  Effective Dates: 2019 TO 3/16/2020 (90 days). Frequency/Duration: 1 to 3 times a week for 90 Day(s) MEDICAL/REFERRING DIAGNOSIS:  neck and shoulder pain   DATE OF ONSET: 2019  REFERRING PHYSICIAN: Chelo Cunha MD MD Orders: PT-evaluate and treat, ROM, strengthening  Return MD Appointment: unsure      ASSESSMENT:  Mr. Fox  came to therapy with complaints of neck, upper back and left shoulder pain. He presents today with cervical and left shoulder motion WNL. Weakness in left scapular and trunk resulting in forward head posture. MRI indicated rotator cuff pathology. He continues to have soreness pain in the C6 to T1 region that decreases with posture correction. Therapy focus is on manual cervical stretching/mobilizatios, truck and B scapular complex strengthening. He goes to Orthopedic Dr for assessment on . Goals are being address and met. Encouragement is needed for HEP compliance. Mr Fox  will benefit from skilled therapy to address his deficits and assist in the return of functional, independent ADL's with less pain. PROBLEM LIST (Impacting functional limitations):  1. Decreased Strength  2. Decreased ADL/Functional Activities  3. Increased Pain  4. Decreased Activity Tolerance  5. Decreased Flexibility/Joint Mobility INTERVENTIONS PLANNED: (Treatment may consist of any combination of the following)  1. Heat  2.  Home Exercise Program (HEP)  3. Manual Therapy  4. Neuromuscular Re-education/Strengthening  5. Range of Motion (ROM)  6. Therapeutic Exercise/Strengthening     GOALS: (Goals have been discussed and agreed upon with patient.)  Short-Term Functional Goals: Time Frame: 30 days     1. Independent with HEP- partially met with encouragement     2. L shoulder and neck AROM WFL to allow independent ADL's without compensation-met    Discharge Goals: Time Frame: 90 days    1. Tolerate activity > 35 minutes for driving- partially met due to pain    2. ADL's with pain < 3/10- not fully met   3. Core, trunk and shoulder Strength to 4+-5/5 to allow safe reaching- met for the shoulder except for IR behind the back   OUTCOME MEASURE:   Tool Used: Disabilities of the Arm, Shoulder and Hand (DASH) Questionnaire - Quick Version  Score:  Initial: 26/55 or 34%  Most Recent: 22/55 or 21% (Date:1/15/20 )   Interpretation of Score: The DASH is designed to measure the activities of daily living in person's with upper extremity dysfunction or pain. Each section is scored on a 1-5 scale, 5 representing the greatest disability. The scores of each section are added together for a total score of 55. HISTORY:   History of Injury/Illness (Reason for Referral):  Pt reports having a gradual increase in neck and upper back pain since having to stand and talk with friends over an extended period of time. X rays and MRI of the neck show only arthritis. He is going for an MRI of the shoulder next month. He states that he can only tolerate standing still for up to 5 minutes before feeling like he needs to lay down. He has noticed that after he reads in bed he is unable to sleep more than 2 hours. Now he is having pain around the left rib cage but it doesn't increase with deep breathing.   Past Medical History/Comorbidities:   Mr. Rubina Patel  has a past medical history of Acute cervical radiculopathy, Aneurysm ([address] Arthritis, Atrial fibrillation with rapid ventricular response ([address] (6/16/2019), CAD (coronary artery disease), Cancer [facility]), Descending thoracic aortic aneurysm ([address] (10/17/14) Leukopenia (11/13/2012), Mild degeneration of cervical intervertebral disc, MRSA (methicillin resistant Staphylococcus aureus), Pneumatosis intestinalis (6/14/2019), Renal cyst, right (12/3/2013), Small bowel obstruction ([address] (6/14/2019), Thrombocytopenia ([address] Ventral incisional hernia (6/14/2019). Mr. Rubina Patel  has a past surgical history that includes pr cardiac surg procedure  (1991); pr cardiac surg procedure unlist (9/2003); hx hernia repair (09/1993); lumbar laminectomy (5/2005); vascular surgery procedure unlist (1991); hx orthopaedic (1/2006); hx orthopaedic (8/2005); hx orthopaedic (2005); hx heart catheterization (04/2015); hx other surgical (11/2014); hx other surgical; hx orthopaedic (03/1955); small bowel resection (06/2019). Social History/Living Environment:     lives with wife. Prior Level of Function/Work/Activity:   Cares for the home repairs and yard  Personal Factors:           Other factors that influence how disability is experienced by the patient:  Multiple low back issues      EXAMINATION:   Observation/Orthostatic Postural Assessment:          forward head posture with protracted shoulders corrects nicely with cueing  Palpation:          Tender over the left levator scapula muscle  ROM: neck and BUE WNL except:     LUE AROM  L Shoulder Internal Rotation: (to L1)                               Strength: trunk 4- to 4/5  LUE Strength  L Shoulder Internal Rotation: 4                Special tests: still with positive Theola Seek, positive empty can   CLINICAL PRESENTATION:   CLINICAL DECISION MAKING:

## 2020-01-17 ENCOUNTER — HOSPITAL ENCOUNTER (OUTPATIENT)
Dept: PHYSICAL THERAPY | Age: 85
Discharge: HOME | End: 2020-01-17
Attending: INTERNAL MEDICINE
Payer: MEDICARE

## 2020-01-17 PROCEDURE — 97110 THERAPEUTIC EXERCISES: CPT

## 2020-01-17 NOTE — PROGRESS NOTES
Maximino Patterson  : 1935  [place] / Product Type: Medicare /  [address]  at [facility] JAZMIN ROJAS  [address] [place] [address], [address], [place] [address].  [phone]   [phone]       OUTPATIENT PHYSICAL THERAPY: Daily Treatment Note 2020  Visit Count: 11     ICD-10: Treatment Diagnosis: Pain in left shoulder (M25.512), cervalgia M54.2, pain in thoracic spine M54.6  Precautions/Allergies: none/Diphenhydramine hcl; Ativan [lorazepam]; Iodinated contrast media; and Sulfa (sulfonamide antibiotics)   MRI 20: left \"RCT with biceps tear\"  TREATMENT PLAN:  Effective Dates: 2019 TO 3/15/2020 (90 days). Frequency/Duration: 1 to 3 times a week for 90 Day(s) MEDICAL/REFERRING DIAGNOSIS:  neck and shoulder pain   DATE OF ONSET: 2019  REFERRING PHYSICIAN: Salty Harding MD MD Orders: PT-evaluate and treat  Return MD Appointment: March, going to Dr Kelton Woody 20       Pre-treatment Symptoms/Complaints:  Pt reports his left shoulder is only sore but not as bad  Pain: Initial: 0- briefly 210     Post Session: 0-10   Medications Last Reviewed:  20  Current Outpatient Medications:     levothyroxine (SYNTHROID)     albuterol sulfate     tiotropium-olodaterol (STIOLTO RESPIMAT)     omeprazole (PRILOSEC) :     atorvastatin (LIPITOR) 40 mg tablet, TAKE 1 TABLET BY MOUTH  DAILY, Disp: 90 Tab, Rfl: 3    fluticasone (FLONASE) 50 mcg/actuation nasal spray, 2 Sprays by Both Nostrils route daily. , Disp: 3 Bottle, Rfl: 3    amLODIPine (NORVASC) 5 mg tablet, TAKE 1 TABLET BY MOUTH  DAILY, Disp: 90 Tab, Rfl: 3    metoprolol tartrate (LOPRESSOR) 25 mg tablet, TAKE 1 TABLET BY MOUTH TWO  TIMES DAILY, Disp: 180 Tab, Rfl: 3    gabapentin (NEURONTIN)     aspirin 81 mg chewable tablet, Take 81 mg by mouth daily. , Disp: , Rfl:     finasteride (PROSCAR) 5 mg tablet, Take 5 mg by mouth daily. , Disp: , Rfl:     terazosin (HYTRIN) 10 mg capsule, Take 10 mg by mouth nightly., Disp: , Rfl:     Updated Objective Findings:   Observation: forward head posture corrects with less cueing. Palpation: n/t  AROM:n/t  Strength: n/t                        TREATMENT:   Therapeutic Exercise: (40 Minutes): Exercises to improve mobility, strength and posture. Required repetitive minimal verbal and manual cues to promote proper body alignment, promote proper body posture and technique. 1/6/20 1/8/20 1/10/20 1/13/20 1/15/20 1/17/20    Activity/Exercise          Shoulder ER/IR ER red at side standing/ scaption 2x10 2x12  IR with addition of belly press 2x12 ER red 2x10  Scaption 2x10  90/90 yellow 2x8 ER green 2x15  Scaption yellow 2x10  90/90 yellow 2x10    IR/Belly press 2x15   Red 2x12 holding towel roll against the wall with hrad    ER scaption yellow 2x15    90/90 yellow 2x15  Red 2x12    Scaption 2# 2x12    90/90 2# 2x12 Yellow x12 x15  Scaption 2# 2x15  90/90 2# 2x15    Shoulder mid and lower trap Standing yellow x10 midtrap Red 2x8 Low trap propped 0# 2x8 Prone mid trap 2# 2x10  Low trap 1# 2x10 Prone 2# x12 x10   low trap 1# x12 x10 Propped mid trap B 2# with core 2x12  Propped Low trap B 1# 2x10    midtrap Propped 1# 2x10 Red 2x10 \"T\" red 2x10  Propped 2# 2x10       \"Y\" 1# standing 2x10 yellow tband ABD hold 2x10   Prone 1# 2x10 Prone 1# 2x10 Propped B 1# x10 ea    Chin tucks 2x10 supine x10 supine into pillow  Against wall into towel roll      Bicep curls  3# 2x12 3# 2x15 4# 2x20      Full can    1# 2x10 1# 2x10 2# 2x10    Trunk rhythmic stabilization    [id number] 2x60 sec     rows Propped 1# 2x10 Green 2x10 2x15 Red holding towel roll with his head 2x15 Prone 2# 2x10 Propped B 2# 2x10    Dynamic hugs Serratus punch 3#B 2x10 Red tband 2x15 Red 2x10       triceps Red 2x12 Green 2x10  Propped 2# B 2x10 Green 2x10 2x12      Treatment/Session Summary:    · Response to Treatment: increased reps with good posture.    · Communication/Consultation:  None today  · Equipment provided today:  None today  · Recommendations/Intent for next treatment session: Next visit will focus on strengthening with emphasis on posture and shoulder complex.      Total Treatment Billable Duration: 40   minutes  PT Patient Time In/Time Out  Time In: 1120  Time Out: [address], PT    Future Appointments   Date Time Provider Sanam Jungi   1/20/2020 11:15 AM Leslie Lay, [place] [facility]   1/22/2020 11:15 AM Leslie Lay, PT [facility]   1/24/2020  1:00 PM Ric Garcia NP [facility] PP PP   2/6/2020  3:00 PM Alban Jaime MD [facility] MAT MAT   2/27/2020  1:15 PM Quentin Connors MD [facility] UCDG UCD   3/26/2020  9:00 AM MAT LAB [facility] MAT MAT   4/2/2020 11:00 AM Alban Jaime MD [facility] MAT MAT   5/1/2020 11:00 AM [facility] OUTREACH INSURANCE [facility]   5/1/2020 11:30 AM Pedro Villa MD [facility]   5/6/2020  1:40 PM Abelino Mccracken MD [facility] BS ENDO

## 2020-01-20 ENCOUNTER — HOSPITAL ENCOUNTER (OUTPATIENT)
Dept: PHYSICAL THERAPY | Age: 85
Discharge: HOME | End: 2020-01-20
Attending: INTERNAL MEDICINE
Payer: MEDICARE

## 2020-01-20 PROCEDURE — 97110 THERAPEUTIC EXERCISES: CPT

## 2020-01-20 NOTE — PROGRESS NOTES
Maximino Sandoval  : 1935  [place] / Product Type: Medicare /  [address]  at [facility] JAZMIN ROJAS  [address] [place] [address], [address], [place] [address].  [phone]   [phone]       OUTPATIENT PHYSICAL THERAPY: Daily Treatment Note 2020  Visit Count: 12     ICD-10: Treatment Diagnosis: Pain in left shoulder (M25.512), cervalgia M54.2, pain in thoracic spine M54.6  Precautions/Allergies: none/Diphenhydramine hcl; Ativan [lorazepam]; Iodinated contrast media; and Sulfa (sulfonamide antibiotics)   MRI 20: left \"RCT with biceps tear\"  TREATMENT PLAN:  Effective Dates: 2019 TO 3/15/2020 (90 days). Frequency/Duration: 1 to 3 times a week for 90 Day(s) MEDICAL/REFERRING DIAGNOSIS:  neck and shoulder pain   DATE OF ONSET: 2019  REFERRING PHYSICIAN: Pastor Shonda MD MD Orders: PT-evaluate and treat  Return MD Appointment: March, going to Dr Cherylene Keeler 20       Pre-treatment Symptoms/Complaints:  Pt reports his left shoulder good after the last session. Did the HEP  Pain: Initial: 0-1/10     Post Session: 0/10 mostly tired   Medications Last Reviewed:  20  Current Outpatient Medications:     levothyroxine (SYNTHROID)     albuterol sulfate     tiotropium-olodaterol (STIOLTO RESPIMAT)     omeprazole (PRILOSEC) :     atorvastatin (LIPITOR) 40 mg tablet, TAKE 1 TABLET BY MOUTH  DAILY, Disp: 90 Tab, Rfl: 3    fluticasone (FLONASE) 50 mcg/actuation nasal spray, 2 Sprays by Both Nostrils route daily. , Disp: 3 Bottle, Rfl: 3    amLODIPine (NORVASC) 5 mg tablet, TAKE 1 TABLET BY MOUTH  DAILY, Disp: 90 Tab, Rfl: 3    metoprolol tartrate (LOPRESSOR) 25 mg tablet, TAKE 1 TABLET BY MOUTH TWO  TIMES DAILY, Disp: 180 Tab, Rfl: 3    gabapentin (NEURONTIN)     aspirin 81 mg chewable tablet, Take 81 mg by mouth daily. , Disp: , Rfl:     finasteride (PROSCAR) 5 mg tablet, Take 5 mg by mouth daily. , Disp: , Rfl:     terazosin (HYTRIN) 10 mg capsule, Take 10 mg by mouth nightly., Disp: , Rfl:     Updated Objective Findings:   Observation: forward head posture corrects with cueing. Palpation: no tenderness  AROM:n/t  Strength:   LUE Strength  L Shoulder Internal Rotation: 4+                     TREATMENT:   Therapeutic Exercise: (42 Minutes): Exercises to improve mobility, strength and posture. Required repetitive minimal verbal and manual cues to promote proper body alignment, promote proper body posture and technique. 1/13/20 1/15/20 1/17/20 1/20/20   Activity/Exercise       Shoulder ER/IR Red 2x12 holding towel roll against the wall with hrad    ER scaption yellow 2x15    90/90 yellow 2x15  Red 2x12    Scaption 2# 2x12    90/90 2# 2x12 Yellow x12 x15  Scaption 2# 2x15  90/90 2# 2x15 Yellow 2x15  Scaption 2# 2x12    90/90 2# 2x12    Shoulder mid and lower trap Prone mid trap 2# 2x10  Low trap 1# 2x10 Prone 2# x12 x10   low trap 1# x12 x10 Propped mid trap B 2# with core 2x12  Propped Low trap B 1# 2x10 Propped 2# B mid 2x12  Low trap 1# 2x10          \"Y\" Prone 1# 2x10 Prone 1# 2x10 Propped B 1# x10 ea Propped 1# x10   Chin tucks Against wall into towel roll      Bicep curls 4# 2x20   5# 2x15   Full can 1# 2x10 1# 2x10 2# 2x10 2# 2x10   Trunk rhythmic stabilization [id number] 2x60 sec     rows Red holding towel roll with his head 2x15 Prone 2# 2x10 Propped B 2# 2x10 Propped 2# 2x12          triceps Propped 2# B 2x10 Green 2x10 2x12 Green 2x15     Treatment/Session Summary:    · Response to Treatment: increased reps or weight with good posture correction. Decreased pain after activity or exercise. · Communication/Consultation:  None today  · Equipment provided today:  None today  · Recommendations/Intent for next treatment session: Next visit will focus on strengthening with emphasis on posture and shoulder complex.  To Dr Faith Mcdermott    Total Treatment Billable Duration: 42   minutes  PT Patient Time In/Time Out  Time In: 1115  Time Out: [address] [facility],     Future Appointments   Date Time Provider Sanam Jeronimo   1/22/2020 11:15 AM Maryomkar Saint, [place] [facility]   1/24/2020  1:00 PM Ria Kohli NP [facility] PP PP   2/6/2020  3:00 PM Junie Heck MD [facility] MAT MAT   2/27/2020  1:15 PM Joselo Connors MD [facility] UCDG UCD   3/26/2020  9:00 AM MAT LAB [facility] MAT MAT   4/2/2020 11:00 AM Junie Heck MD [facility] MAT MAT   5/1/2020 11:00 AM [facility] OUTREACH INSURANCE [facility]   5/1/2020 11:30 AM Brigid Brunner, MD [facility]   5/6/2020  1:40 PM Eliza Quevedo MD END BS ENDO

## 2020-01-22 ENCOUNTER — HOSPITAL ENCOUNTER (OUTPATIENT)
Dept: PHYSICAL THERAPY | Age: 85
Discharge: HOME | End: 2020-01-22
Attending: INTERNAL MEDICINE
Payer: MEDICARE

## 2020-01-22 PROCEDURE — 97110 THERAPEUTIC EXERCISES: CPT

## 2020-01-22 NOTE — PROGRESS NOTES
Maximino Lopez  : 1935  [place] / Product Type: Medicare /  [address]  at [facility] JAZMIN ROJAS  [address] [facility], [address], [address] [place] [address]  [phone]   [phone]       OUTPATIENT PHYSICAL THERAPY: Daily Treatment Note 2020  Visit Count: 13     ICD-10: Treatment Diagnosis: Pain in left shoulder (M25.512), cervalgia M54.2, pain in thoracic spine M54.6  Precautions/Allergies: none/Diphenhydramine hcl; Ativan [lorazepam]; Iodinated contrast media; and Sulfa (sulfonamide antibiotics)   MRI 20: left \"RCT with biceps tear\"  TREATMENT PLAN:  Effective Dates: 2019 TO 3/15/2020 (90 days). Frequency/Duration: 1 to 3 times a week for 90 Day(s) MEDICAL/REFERRING DIAGNOSIS:  neck and shoulder pain   DATE OF ONSET: 2019  REFERRING PHYSICIAN: Aquilino Hensley MD MD Orders: PT-evaluate and treat.  (20) Dr Inna Her- evaluate and treat left shoulder/neck. ROM, strengthening,Dry needling, modalities for pain, traction, manual therapy, ultrasound  Return MD Appointment: unsure       Pre-treatment Symptoms/Complaints:  Pt reports his left shoulder is getting stronger. Neck isn't hurting at all. Did get an injection in the shoulder but not sure it helped  Pain: Initial: 0-2/10     Post Session: 0/10   Medications Last Reviewed:  20  Current Outpatient Medications:     levothyroxine (SYNTHROID)     albuterol sulfate     tiotropium-olodaterol (STIOLTO RESPIMAT)     omeprazole (PRILOSEC) :     atorvastatin (LIPITOR) 40 mg tablet, TAKE 1 TABLET BY MOUTH  DAILY, Disp: 90 Tab, Rfl: 3    fluticasone (FLONASE) 50 mcg/actuation nasal spray, 2 Sprays by Both Nostrils route daily. , Disp: 3 Bottle, Rfl: 3    amLODIPine (NORVASC) 5 mg tablet, TAKE 1 TABLET BY MOUTH  DAILY, Disp: 90 Tab, Rfl: 3    metoprolol tartrate (LOPRESSOR) 25 mg tablet, TAKE 1 TABLET BY MOUTH TWO  TIMES DAILY, Disp: 180 Tab, Rfl: 3    gabapentin (NEURONTIN)   aspirin 81 mg chewable tablet, Take 81 mg by mouth daily. , Disp: , Rfl:     finasteride (PROSCAR) 5 mg tablet, Take 5 mg by mouth daily. , Disp: , Rfl:     terazosin (HYTRIN) 10 mg capsule, Take 10 mg by mouth nightly., Disp: , Rfl:     Updated Objective Findings:   Observation: n/t   Palpation: mild tenderness over the injection site on posterior shoulder  AROM:n/t  Strength: n/t                        TREATMENT:   Therapeutic Exercise: (41 Minutes): Exercises to improve mobility, strength and posture. Required repetitive minimal verbal and manual cues to promote proper body alignment, promote proper body posture and technique. 1/13/20 1/15/20 1/17/20 1/20/20 1/22/20    Activity/Exercise         Shoulder ER/IR Red 2x12 holding towel roll against the wall with hrad    ER scaption yellow 2x15    90/90 yellow 2x15  Red 2x12    Scaption 2# 2x12    90/90 2# 2x12 Yellow x12 x15  Scaption 2# 2x15  90/90 2# 2x15 Yellow 2x15  Scaption 2# 2x12    90/90 2# 2x12  Red 2x12  Scaption 2# 2x12  90/90 2# 2x12    Shoulder mid and lower trap Prone mid trap 2# 2x10  Low trap 1# 2x10 Prone 2# x12 x10   low trap 1# x12 x10 Propped mid trap B 2# with core 2x12  Propped Low trap B 1# 2x10 Propped 2# B mid 2x12  Low trap 1# 2x10 Propped 2.5# 2x10  Low trap 1.5# 2x10    Shoulder extn     Propped 2.5# x10  Green tband 2x10    \"Y\" Prone 1# 2x10 Prone 1# 2x10 Propped B 1# x10 ea Propped 1# x10 Propped 1.5# 2x10    Chin tucks Against wall into towel roll        Bicep curls 4# 2x20   5# 2x15     Full can 1# 2x10 1# 2x10 2# 2x10 2# 2x10 2# 2x10    Trunk rhythmic stabilization [id number] 2x60 sec       rows Red holding towel roll with his head 2x15 Prone 2# 2x10 Propped B 2# 2x10 Propped 2# 2x12 Propped 2.5# x10  Green tband 2x15             triceps Propped 2# B 2x10 Green 2x10 2x12 Green 2x15       Treatment/Session Summary:    · Response to Treatment: increased reps or weight with good posture correction.     · Communication/Consultation: None today  · Equipment provided today:  None today  · Recommendations/Intent for next treatment session: Next visit will focus on strengthening with emphasis on posture and shoulder complex.      Total Treatment Billable Duration: 41   minutes  PT Patient Time In/Time Out  Time In: 1115  Time Out: [facility],     Future Appointments   Date Time Provider Sanam Jeronimo   2020  1:00 PM Dali Chacon NP [facility] PP PP   2020  2:30 PM In[place] Snide, PT [facility]   2020 11:15 AM Dave Snide, PT [facility]   2/3/2020  2:30 PM [place] Snide, PT [facility]   2020  3:00 PM Cristóbal Ford MD [facility] MAT MAT   2020 11:15 AM [place] Snide, PT [facility]   2/10/2020 11:15 AM [place] Snide, PT [facility]   2020 11:15 AM [place] Snide, PT [facility]   2020 11:15 AM [place] Snide, PT [facility]   2020 11:15 AM [place] Snide, PT [facility]   2020 11:15 AM Dave Snide, PT [facility]   2020  1:15 PM Moreno Connors MD [facility] UCDG UCD   2020 11:15 AM [place] Snide, PT [facility]   3/26/2020  9:00 AM MAT LAB [facility] MAT MAT   2020 11:00 AM Cristóbal Ford MD [facility] MAT MAT   2020 11:00 AM [facility] OUTREACH INSURANCE [facility]   2020 11:30 AM Arslan Jacobo MD [facility]   2020  1:40 PM Adriana Reyna MD [facility] BS ENDO

## 2020-01-27 ENCOUNTER — HOSPITAL ENCOUNTER (OUTPATIENT)
Dept: PHYSICAL THERAPY | Age: 85
Discharge: HOME | End: 2020-01-27
Attending: INTERNAL MEDICINE
Payer: MEDICARE

## 2020-01-27 PROCEDURE — 97110 THERAPEUTIC EXERCISES: CPT

## 2020-01-27 NOTE — PROGRESS NOTES
Gene Dewey Moritz  : 1935  [place] / Product Type: Medicare /  [address]  at [facility] JAZMIN ROJAS  [address] [facility], [address], [address] [place] [address]  [phone]   [phone]       OUTPATIENT PHYSICAL THERAPY: Daily Treatment Note 2020  Visit Count: 14     ICD-10: Treatment Diagnosis: Pain in left shoulder (M25.512), cervalgia M54.2, pain in thoracic spine M54.6  Precautions/Allergies: none/Diphenhydramine hcl; Ativan [lorazepam]; Iodinated contrast media; and Sulfa (sulfonamide antibiotics)   MRI 20: left \"RCT with biceps tear\"  TREATMENT PLAN:  Effective Dates: 2019 TO 3/15/2020 (90 days). Frequency/Duration: 1 to 3 times a week for 90 Day(s) MEDICAL/REFERRING DIAGNOSIS:  neck and shoulder pain   DATE OF ONSET: 2019  REFERRING PHYSICIAN: Ron Bhatti MD MD Orders: PT-evaluate and treat.  (20) Dr Dori Mathis- evaluate and treat left shoulder/neck. ROM, strengthening,Dry needling, modalities for pain, traction, manual therapy, ultrasound  Return MD Appointment: unsure       Pre-treatment Symptoms/Complaints:  Pt reports his left shoulder is not too bad except when he moves it a certain way. It will catch. Right shoulder feels stiff. Neck isn't bad  Pain: Initial: 2- 3/10     Post Session: 0-1/10   Medications Last Reviewed:  20  Current Outpatient Medications:     levothyroxine (SYNTHROID)     albuterol sulfate     tiotropium-olodaterol (STIOLTO RESPIMAT)     omeprazole (PRILOSEC) :     atorvastatin (LIPITOR) 40 mg tablet, TAKE 1 TABLET BY MOUTH  DAILY, Disp: 90 Tab, Rfl: 3    fluticasone (FLONASE) 50 mcg/actuation nasal spray, 2 Sprays by Both Nostrils route daily. , Disp: 3 Bottle, Rfl: 3    amLODIPine (NORVASC) 5 mg tablet, TAKE 1 TABLET BY MOUTH  DAILY, Disp: 90 Tab, Rfl: 3    metoprolol tartrate (LOPRESSOR) 25 mg tablet, TAKE 1 TABLET BY MOUTH TWO  TIMES DAILY, Disp: 180 Tab, Rfl: 3    gabapentin (NEURONTIN)     aspirin 81 mg chewable tablet, Take 81 mg by mouth daily. , Disp: , Rfl:     finasteride (PROSCAR) 5 mg tablet, Take 5 mg by mouth daily. , Disp: , Rfl:     terazosin (HYTRIN) 10 mg capsule, Take 10 mg by mouth nightly., Disp: , Rfl:     Updated Objective Findings:   Observation: grimacing with movement of the right shoulder into flexion  Palpation: tenderness over the left AC joint  AROM:n/t  Strength: n/t                        TREATMENT:   Therapeutic Exercise: (45 Minutes): AIS upper cervical flexion/ lower cervical extension progressing to include B rotation. Grade 3 physiological mobilizations right shoulder ER, IR, abd and flexion. Exercises to improve mobility, strength and posture. Required repetitive minimal verbal and manual cues to promote proper body alignment, promote proper body posture and technique.      1/13/20 1/15/20 1/17/20 1/20/20 1/22/20 1/27/20   Activity/Exercise         Shoulder ER/IR Red 2x12 holding towel roll against the wall with hrad    ER scaption yellow 2x15    90/90 yellow 2x15  Red 2x12    Scaption 2# 2x12    90/90 2# 2x12 Yellow x12 x15  Scaption 2# 2x15  90/90 2# 2x15 Yellow 2x15  Scaption 2# 2x12    90/90 2# 2x12  Red 2x12  Scaption 2# 2x12  90/90 2# 2x12 B Red 2x12   scaption 2# 2x12  90/90 2# 2x12   Shoulder mid and lower trap Prone mid trap 2# 2x10  Low trap 1# 2x10 Prone 2# x12 x10   low trap 1# x12 x10 Propped mid trap B 2# with core 2x12  Propped Low trap B 1# 2x10 Propped 2# B mid 2x12  Low trap 1# 2x10 Propped 2.5# 2x10  Low trap 1.5# 2x10 Propped 2x10  Low trap 1.5# 2x12   Shoulder extn     Propped 2.5# x10  Green tband 2x10 3# 2x12   \"Y\" Prone 1# 2x10 Prone 1# 2x10 Propped B 1# x10 ea Propped 1# x10 Propped 1.5# 2x10 1# 2x12   Chin tucks Against wall into towel roll        Bicep curls 4# 2x20   5# 2x15     Full can 1# 2x10 1# 2x10 2# 2x10 2# 2x10 2# 2x10 2# x12 x10   Trunk rhythmic stabilization [id number] 2x60 sec       rows Red holding towel roll with his head 2x15 Prone 2# 2x10 Propped B 2# 2x10 Propped 2# 2x12 Propped 2.5# x10  Green tband 2x15 3# 2x10            triceps Propped 2# B 2x10 Green 2x10 2x12 Green 2x15  3# cable B x10     Treatment/Session Summary:    · Response to Treatment: right shoulder and upper cervical stiff today. Good demonstration of exercises  · Communication/Consultation:  None today  · Equipment provided today:  None today  · Recommendations/Intent for next treatment session: Next visit will focus on strengthening with emphasis on posture and shoulder complex.      Total Treatment Billable Duration: 45   minutes  PT Patient Time In/Time Out  Time In: 1430  Time Out: ZANDER Brush    Future Appointments   Date Time Provider Sanam Kandace   1/29/2020 11:15 AM Sonda Siskin, PT [facility]   2/3/2020  2:30 PM Sonda Siskin, PT [facility]   2/6/2020  3:00 PM Blanca Nunn MD [facility] MAT MAT   2/7/2020 11:15 AM Sonda Siskin, PT [facility]   2/10/2020 11:15 AM Sonda Siskin, PT [facility]   2/14/2020 11:15 AM Sonda Siskin, PT [facility]   2/17/2020 11:15 AM Sonda Siskin, PT [facility]   2/21/2020 11:15 AM Sonda Siskin, PT [facility]   2/24/2020 11:15 AM Sonda Siskin, PT [facility]   2/27/2020  1:15 PM Quyen Connors MD [facility] UCDG UCD   2/28/2020 11:15 AM Sonda Siskin, PT [facility]   3/26/2020  9:00 AM MAT LAB [facility] MAT MAT   4/2/2020 11:00 AM Blanca Nunn MD [facility] MAT MAT   5/1/2020 11:00 AM [facility] OUTREACH INSURANCE [facility] [address] [place] [address]   5/1/2020 11:30 AM Carlos Wilkins MD [facility]   5/6/2020  1:40 PM Shani Dupont MD [facility] BS ENDO

## 2020-01-29 ENCOUNTER — HOSPITAL ENCOUNTER (OUTPATIENT)
Dept: PHYSICAL THERAPY | Age: 85
Discharge: HOME | End: 2020-01-29
Attending: INTERNAL MEDICINE
Payer: MEDICARE

## 2020-01-29 PROCEDURE — 97110 THERAPEUTIC EXERCISES: CPT

## 2020-01-29 NOTE — PROGRESS NOTES
Maximino Camarena  : 1935  [place] / Product Type: Medicare /  [address] at [facility] JAZMIN ROJAS  [address] [facility], [address], [address] [place] [address]  [phone]   [phone]       OUTPATIENT PHYSICAL THERAPY: Daily Treatment Note 2020  Visit Count: 15     ICD-10: Treatment Diagnosis: Pain in left shoulder (M25.512), cervalgia M54.2, pain in thoracic spine M54.6  Precautions/Allergies: none/Diphenhydramine hcl; Ativan [lorazepam]; Iodinated contrast media; and Sulfa (sulfonamide antibiotics)   MRI 20: left \"RCT with biceps tear\"  TREATMENT PLAN:  Effective Dates: 2019 TO 3/15/2020 (90 days). Frequency/Duration: 1 to 3 times a week for 90 Day(s) MEDICAL/REFERRING DIAGNOSIS:  neck and shoulder pain   DATE OF ONSET: 2019  REFERRING PHYSICIAN: Roxanna Sanz MD MD Orders: PT-evaluate and treat.  (20) Dr Arron Monroy- evaluate and treat left shoulder/neck. ROM, strengthening,Dry needling, modalities for pain, traction, manual therapy, ultrasound  Return MD Appointment: unsure       Pre-treatment Symptoms/Complaints:  Pt reports his left shoulder is feeling better. Neck and upper back hurts a little when I work over head  Pain: Initial: 1-2/10     Post Session: 0-110   Medications Last Reviewed:  20  Current Outpatient Medications:     levothyroxine (SYNTHROID)     albuterol sulfate     tiotropium-olodaterol (STIOLTO RESPIMAT)     omeprazole (PRILOSEC) :     atorvastatin (LIPITOR) 40 mg tablet, TAKE 1 TABLET BY MOUTH  DAILY, Disp: 90 Tab, Rfl: 3    fluticasone (FLONASE) 50 mcg/actuation nasal spray, 2 Sprays by Both Nostrils route daily. , Disp: 3 Bottle, Rfl: 3    amLODIPine (NORVASC) 5 mg tablet, TAKE 1 TABLET BY MOUTH  DAILY, Disp: 90 Tab, Rfl: 3    metoprolol tartrate (LOPRESSOR) 25 mg tablet, TAKE 1 TABLET BY MOUTH TWO  TIMES DAILY, Disp: 180 Tab, Rfl: 3    gabapentin (NEURONTIN)     aspirin 81 mg chewable tablet, Take 81 mg by mouth daily. , Disp: , Rfl:     finasteride (PROSCAR) 5 mg tablet, Take 5 mg by mouth daily. , Disp: , Rfl:     terazosin (HYTRIN) 10 mg capsule, Take 10 mg by mouth nightly., Disp: , Rfl:     Updated Objective Findings:   Observation: good posture correction  Palpation: tenderness over the left T4 region, stiff T3  AROM:n/t  Strength: n/t                        TREATMENT:   Therapeutic Exercise: (40 Minutes): AIS upper cervical flexion/ lower cervical extension. Grade 3 physiological mobilizations right shoulder IR, abd and flexion. Exercises to improve mobility, strength and posture. Required  minimal verbal and manual cues to promote proper body alignment, promote proper body posture and technique. 1/20/20 1/22/20 1/27/20 1/29/20    Activity/Exercise        Shoulder ER/IR Yellow 2x15  Scaption 2# 2x12    90/90 2# 2x12  Red 2x12  Scaption 2# 2x12  90/90 2# 2x12 B Red 2x12   scaption 2# 2x12  90/90 2# 2x12 Red 2x15    Shoulder mid and lower trap Propped 2# B mid 2x12  Low trap 1# 2x10 Propped 2.5# 2x10  Low trap 1.5# 2x10 Propped 2x10  Low trap 1.5# 2x12     Shoulder extn  Propped 2.5# x10  Green tband 2x10 3# 2x12     \"Y\" Propped 1# x10 Propped 1.5# 2x10 1# 2x12     Chin tucks    3x8 with manual stabilization of pt head    Bicep curls 5# 2x15       Full can 2# 2x10 2# 2x10 2# x12 x10     Trunk rhythmic stabilization    2x30 sec    rows Propped 2# 2x12 Propped 2.5# x10  Green tband 2x15 3# 2x10             triceps Green 2x15  3# cable B x10       Treatment/Session Summary:    · Response to Treatment: right shoulder flexion and upper cervical tight. · Communication/Consultation:  None today  · Equipment provided today:  None today  · Recommendations/Intent for next treatment session: Next visit will focus on strengthening with emphasis on posture and shoulder complex.      Total Treatment Billable Duration: 40   minutes  PT Patient Time In/Time Out  Time In: 1115  Time Out: [address] [place], PT    Future Appointments   Date Time Provider Sanam Jeronimo   2/3/2020  2:30 PM Barrientos Flaming, [place] [facility]   2/6/2020  3:00 PM Moy Shah MD [facility] MAT MAT   2/7/2020 11:15 AM Barrientos Flaming, PT [facility]   2/10/2020 11:15 AM Barrientos Flaming, PT [facility]   2/14/2020 11:15 AM Barrientos Flaming, PT [facility]   2/17/2020 11:15 AM Barrientos Flaming, PT [facility]   2/21/2020 11:15 AM Barrientos Flaming, PT [facility]   2/24/2020 11:15 AM Barrientos Flaming, PT [facility]   2/27/2020  1:15 PM Dee Connors MD [facility] UCDG UCD   2/28/2020 11:15 AM Barrientos Flaming, PT [facility]   3/26/2020  9:00 AM MAT LAB [facility] MAT MAT   4/2/2020 11:00 AM Moy Shah MD [facility] MAT MAT   5/1/2020 11:00 AM [address] [place] [address] OUTREACH INSURANCE [address]   5/1/2020 11:30 AM Kristen Pereyra MD [facility]   5/6/2020  1:40 PM Arlen Bowman MD END BS ENDO

## 2020-02-03 ENCOUNTER — HOSPITAL ENCOUNTER (OUTPATIENT)
Dept: PHYSICAL THERAPY | Age: 85
Discharge: HOME | End: 2020-02-03
Attending: INTERNAL MEDICINE
Payer: MEDICARE

## 2020-02-03 PROCEDURE — 97110 THERAPEUTIC EXERCISES: CPT

## 2020-02-03 NOTE — PROGRESS NOTES
Maximino Tracy Kaci  : 1935  [place] / Product Type: Medicare /  [address]  at [facility] JAZMIN ROJAS  [address] [place] [address], [address], [place] [address].  [phone]   [phone]       OUTPATIENT PHYSICAL THERAPY: Daily Treatment Note 2/3/2020  Visit Count: 16     ICD-10: Treatment Diagnosis: Pain in left shoulder (M25.512), cervalgia M54.2, pain in thoracic spine M54.6  Precautions/Allergies: none/Diphenhydramine hcl; Ativan [lorazepam]; Iodinated contrast media; and Sulfa (sulfonamide antibiotics)   MRI 20: left \"RCT with biceps tear\"  TREATMENT PLAN:  Effective Dates: 2019 TO 3/15/2020 (90 days). Frequency/Duration: 1 to 3 times a week for 90 Day(s) MEDICAL/REFERRING DIAGNOSIS:  neck and shoulder pain   DATE OF ONSET: 2019  REFERRING PHYSICIAN: Rick Romero MD MD Orders: PT-evaluate and treat.  (20) Dr Russell Age- evaluate and treat left shoulder/neck. ROM, strengthening,Dry needling, modalities for pain, traction, manual therapy, ultrasound  Return MD Appointment: unsure       Pre-treatment Symptoms/Complaints:  Pt reports his left shoulder is doing ok. Neck and upper back hurts at night when he sleeps. Pain: Initial: 3/10     Post Session: 0-10   Medications Last Reviewed:  2/3//20  Current Outpatient Medications:     levothyroxine (SYNTHROID)     albuterol sulfate     tiotropium-olodaterol (STIOLTO RESPIMAT)     omeprazole (PRILOSEC) :     atorvastatin (LIPITOR) 40 mg tablet, TAKE 1 TABLET BY MOUTH  DAILY, Disp: 90 Tab, Rfl: 3    fluticasone (FLONASE) 50 mcg/actuation nasal spray, 2 Sprays by Both Nostrils route daily. , Disp: 3 Bottle, Rfl: 3    amLODIPine (NORVASC) 5 mg tablet, TAKE 1 TABLET BY MOUTH  DAILY, Disp: 90 Tab, Rfl: 3    metoprolol tartrate (LOPRESSOR) 25 mg tablet, TAKE 1 TABLET BY MOUTH TWO  TIMES DAILY, Disp: 180 Tab, Rfl: 3    gabapentin (NEURONTIN)     aspirin 81 mg chewable tablet, Take 81 mg by mouth daily. , Disp: , Rfl:     finasteride (PROSCAR) 5 mg tablet, Take 5 mg by mouth daily. , Disp: , Rfl:     terazosin (HYTRIN) 10 mg capsule, Take 10 mg by mouth nightly., Disp: , Rfl:     Updated Objective Findings:   Observation: forward head posture   Palpation: tenderness over the left T4 region, stiff T3  AROM:n/t  Strength: n/t                        TREATMENT:   Therapeutic Exercise: (40 Minutes): AIS upper cervical flexion/ lower cervical extension with PROM B rotation. Grade 3 physiological mobilizations right shoulder IR, abd and flexion prior to exercises. Exercises to improve mobility, strength and posture. Required  minimal verbal and manual cues to promote proper body alignment, promote proper body posture and technique. 1/20/20 1/22/20 1/27/20 1/29/20 2/3/20   Activity/Exercise        Shoulder ER/IR Yellow 2x15  Scaption 2# 2x12    90/90 2# 2x12  Red 2x12  Scaption 2# 2x12  90/90 2# 2x12 B Red 2x12   scaption 2# 2x12  90/90 2# 2x12 Red 2x15 Red 2x15    Scaption 3# 2x10  90/90 3# 2x10   Shoulder mid and lower trap Propped 2# B mid 2x12  Low trap 1# 2x10 Propped 2.5# 2x10  Low trap 1.5# 2x10 Propped 2x10  Low trap 1.5# 2x12   propped 2x5    Low trap 3# 2x5   Shoulder extn  Propped 2.5# x10  Green tband 2x10 3# 2x12  Green tband 2x12   \"Y\" Propped 1# x10 Propped 1.5# 2x10 1# 2x12     Chin tucks    3x8 with manual stabilization of pt head Supine With manual stabilization   Bicep curls 5# 2x15       Full can 2# 2x10 2# 2x10 2# x12 x10     Trunk rhythmic stabilization    2x30 sec    rows Propped 2# 2x12 Propped 2.5# x10  Green tband 2x15 3# 2x10  3# 2x10           triceps Green 2x15  3# cable B x10       Treatment/Session Summary:    · Response to Treatment: more fluent movements after treatment.   · Communication/Consultation:  None today  · Equipment provided today:  None today  · Recommendations/Intent for next treatment session: Next visit will focus on strengthening with emphasis on posture and shoulder complex.      Total Treatment Billable Duration: 40   minutes  PT Patient Time In/Time Out  Time In: 1911  Time Out: 4100 [facility], PT    Future Appointments   Date Time Provider Sanam Kandace   2/6/2020  3:00 PM Hannah Brantley MD [facility] MAT MAT   2/7/2020 11:15 AM Zada Kanaris, PT [facility]   2/10/2020 11:15 AM Zada Kanaris, PT [facility]   2/14/2020 11:15 AM Zada Kanaris, PT [facility]   2/17/2020 11:15 AM Zada Kanaris, PT [facility]   2/21/2020 11:15 AM Zada Kanaris, PT [facility]   2/24/2020 11:15 AM Zada Kanaris, PT [facility]   2/27/2020  1:15 PM Marni Connors MD [facility] UCDG UCD   2/28/2020 11:15 AM Zada Kanaris, PT [facility]   3/26/2020  9:00 AM MAT LAB [facility] MAT MAT   4/2/2020 11:00 AM Hannah Brantley MD [facility] MAT MAT   5/1/2020 11:00 AM [facility] OUTREACH INSURANCE [facility]   5/1/2020 11:30 AM Amado Chaudhari MD [facility]   5/6/2020  1:40 PM Domenico Otto MD END BS ENDO

## 2020-02-07 ENCOUNTER — HOSPITAL ENCOUNTER (OUTPATIENT)
Dept: PHYSICAL THERAPY | Age: 85
Discharge: HOME | End: 2020-02-07
Attending: INTERNAL MEDICINE
Payer: MEDICARE

## 2020-02-07 PROCEDURE — 97035 APP MDLTY 1+ULTRASOUND EA 15: CPT

## 2020-02-07 PROCEDURE — 97110 THERAPEUTIC EXERCISES: CPT

## 2020-02-10 ENCOUNTER — HOSPITAL ENCOUNTER (OUTPATIENT)
Dept: PHYSICAL THERAPY | Age: 85
Discharge: HOME | End: 2020-02-10
Attending: INTERNAL MEDICINE
Payer: MEDICARE

## 2020-02-10 PROCEDURE — 97110 THERAPEUTIC EXERCISES: CPT

## 2020-02-10 NOTE — PROGRESS NOTES
Maximino Colvin  : 1935  [place] / Product Type: Medicare /  [address] at [facility] JAZMIN ROJAS  [address] [facility], [address], [address] [facility]  [phone]   [phone]       OUTPATIENT PHYSICAL THERAPY: Daily Treatment Note 2/10/2020  Visit Count: 18     ICD-10: Treatment Diagnosis: Pain in left shoulder (M25.512), cervalgia M54.2, pain in thoracic spine M54.6  Precautions/Allergies: none/Diphenhydramine hcl; Ativan [lorazepam]; Iodinated contrast media; and Sulfa (sulfonamide antibiotics)   MRI 20: left \"RCT with biceps tear\"  TREATMENT PLAN:  Effective Dates: 2019 TO 3/15/2020 (90 days). Frequency/Duration: 1 to 3 times a week for 90 Day(s) MEDICAL/REFERRING DIAGNOSIS:  neck and shoulder pain   DATE OF ONSET: 2019  REFERRING PHYSICIAN: Berta Boswell MD MD Orders: PT-evaluate and treat.  (20) Dr Clarissa Lizarraga- evaluate and treat left shoulder/neck. ROM, strengthening,Dry needling, modalities for pain, traction, manual therapy, ultrasound  Return MD Appointment: unsure       Pre-treatment Symptoms/Complaints:  Pt reports his left shoulder is doing better. It just briefly hurts when I reach form something. Neck is feeling better. Had severe pain around the left rib cage yesterday to where it hurt to breathe  Pain: Initial: 10 in the neck, 0-2 in shoulder     Post Session: 1/10    Medications Last Reviewed:  2/10/20  Current Outpatient Medications:     levothyroxine (SYNTHROID)     albuterol sulfate     tiotropium-olodaterol (STIOLTO RESPIMAT)     omeprazole (PRILOSEC) :     atorvastatin (LIPITOR) 40 mg tablet, TAKE 1 TABLET BY MOUTH  DAILY, Disp: 90 Tab, Rfl: 3    fluticasone (FLONASE) 50 mcg/actuation nasal spray, 2 Sprays by Both Nostrils route daily. , Disp: 3 Bottle, Rfl: 3    amLODIPine (NORVASC) 5 mg tablet, TAKE 1 TABLET BY MOUTH  DAILY, Disp: 90 Tab, Rfl: 3    metoprolol tartrate (LOPRESSOR) 25 mg tablet, TAKE 1 TABLET BY MOUTH TWO  TIMES DAILY, Disp: 180 Tab, Rfl: 3    gabapentin (NEURONTIN)     aspirin 81 mg chewable tablet, Take 81 mg by mouth daily. , Disp: , Rfl:     finasteride (PROSCAR) 5 mg tablet, Take 5 mg by mouth daily. , Disp: , Rfl:     terazosin (HYTRIN) 10 mg capsule, Take 10 mg by mouth nightly., Disp: , Rfl:     Updated Objective Findings:   Observation: forward head posture that corrects nicely with exercises, left scapula elevated and tilted  Palpation: tenderness over the left T4 region  AROM:n/t  Strength: n/t                        TREATMENT:   Modalities (  minutes) none  Therapeutic Exercise: (44 Minutes): AIS upper cervical flexion/ lower cervical extension for stretching ROM prior to exercise. Grade 4 L T2-5 unilateral PA glides prior to exercises. Exercises to improve mobility, strength and posture. Required  minimal verbal cues to promote proper body alignment, promote proper body posture and technique. 1/27/20 1/29/20 2/3/20 2/7/20 2/10/20   Activity/Exercise        Shoulder ER/IR B Red 2x12   scaption 2# 2x12  90/90 2# 2x12 Red 2x15 Red 2x15    Scaption 3# 2x10  90/90 3# 2x10 Red ER x12 Red x15    Scaption 3# x15    90/90 3# x15   Shoulder mid and lower trap Propped 2x10  Low trap 1.5# 2x12   propped 2x5    Low trap 3# 2x5 Manual resist in supine x10 Propped 3# x15 ea   Shoulder extn 3# 2x12  Green tband 2x12 3# B x12  3# B cable 2x10   \"Y\" 1# 2x12       Chin tucks  3x8 with manual stabilization of pt head Supine With manual stabilization Supine with manual guidance,after upper c spine stretching Supine x10 with manual hold of upper cervical flexion           Full can 2# x12 x10   1# b x10 2# x8 1# x10   Trunk rhythmic stabilization  2x30 sec   B tandem stance 2x30 sec   rows 3# 2x10  3# 2x10 7# x15 Tandem stance 2x10           triceps 3# cable B x10   Blue x15 7# cable 2x10     Treatment/Session Summary:    · Response to Treatment: good posture during exercises increased reps. Crepitus with full can exercises using 2#  · Communication/Consultation:  None today  · Equipment provided today:  None today  Recommendations/Intent for next treatment session: Next visit will focus on strengthening with emphasis on posture and shoulder complex.    Total Treatment Billable Duration: 44   minutes  PT Patient Time In/Time Out  Time In: 1115  Time Out: 1431 [facility], PT    Future Appointments   Date Time Provider Sanam Kandace   2/14/2020 11:15 AM Devon Purchase, PT [facility]   2/17/2020 11:15 AM Devon Purchase, PT [facility]   2/21/2020 11:15 AM Devon Purchase, PT [facility]   2/24/2020 11:15 AM Devon Purchase, PT [facility]   2/24/2020  2:40 PM Chema Gomez MD [facility] MAT MAT   2/27/2020  1:15 PM Malik Connors MD [facility] UCDG UCD   2/28/2020 11:15 AM Devon Purchase, PT [facility]   3/26/2020  9:00 AM MAT LAB [facility] MAT MAT   4/2/2020 11:00 AM Chema Gomez MD [facility] MAT MAT   5/1/2020 11:00 AM [facility] OUTREACH INSURANCE [facility]   5/1/2020 11:30 AM Kavita Rosen MD [facility]   5/6/2020  1:40 PM Km Mir MD END BS ENDO

## 2020-02-14 ENCOUNTER — HOSPITAL ENCOUNTER (OUTPATIENT)
Dept: PHYSICAL THERAPY | Age: 85
Discharge: HOME | End: 2020-02-14
Attending: INTERNAL MEDICINE
Payer: MEDICARE

## 2020-02-14 PROCEDURE — 97110 THERAPEUTIC EXERCISES: CPT

## 2020-02-14 NOTE — PROGRESS NOTES
Maximino Mitchell  : 1935  [place] / [place] / Product Type: Medicare /  [address]  at [facility] JAZMIN ROJAS  [address] [facility], [address], [address] [place] [address]  [phone]   [phone]       OUTPATIENT PHYSICAL THERAPY: Daily Treatment Note 2020  Visit Count: 19     ICD-10: Treatment Diagnosis: Pain in left shoulder (M25.512), cervalgia M54.2, pain in thoracic spine M54.6  Precautions/Allergies: none/Diphenhydramine hcl; Ativan [lorazepam]; Iodinated contrast media; and Sulfa (sulfonamide antibiotics)   MRI 20: left \"RCT with biceps tear\"  TREATMENT PLAN:  Effective Dates: 2019 TO 3/15/2020 (90 days). Frequency/Duration: 1 to 3 times a week for 90 Day(s) MEDICAL/REFERRING DIAGNOSIS:  neck and shoulder pain   DATE OF ONSET: 2019  REFERRING PHYSICIAN: Chris Tran MD MD Orders: PT-evaluate and treat.  (20) Dr Ki Macias- evaluate and treat left shoulder/neck. ROM, strengthening,Dry needling, modalities for pain, traction, manual therapy, ultrasound  Return MD Appointment: unsure       Pre-treatment Symptoms/Complaints:  Pt reports his left shoulder is sore from picking up sticks in his yard and neck sore from sitting at his computer. Doing the HEP every day x 10reps  Pain: Initial: 1-210 in the neck and shoulder     Post Session: 1/10    Medications Last Reviewed:  20  Current Outpatient Medications:     levothyroxine (SYNTHROID)     albuterol sulfate     tiotropium-olodaterol (STIOLTO RESPIMAT)     omeprazole (PRILOSEC) :     atorvastatin (LIPITOR) 40 mg tablet, TAKE 1 TABLET BY MOUTH  DAILY, Disp: 90 Tab, Rfl: 3    fluticasone (FLONASE) 50 mcg/actuation nasal spray, 2 Sprays by Both Nostrils route daily. , Disp: 3 Bottle, Rfl: 3    amLODIPine (NORVASC) 5 mg tablet, TAKE 1 TABLET BY MOUTH  DAILY, Disp: 90 Tab, Rfl: 3    metoprolol tartrate (LOPRESSOR) 25 mg tablet, TAKE 1 TABLET BY MOUTH TWO  TIMES DAILY, Disp: 180 Tab, Rfl: 3    gabapentin (NEURONTIN)     aspirin 81 mg chewable tablet, Take 81 mg by mouth daily. , Disp: , Rfl:     finasteride (PROSCAR) 5 mg tablet, Take 5 mg by mouth daily. , Disp: , Rfl:     terazosin (HYTRIN) 10 mg capsule, Take 10 mg by mouth nightly., Disp: , Rfl:     Updated Objective Findings:   Observation: forward head posture that corrects nicely with exercises, left scapula elevated and tilted  Palpation: tenderness over the left T4 region  AROM:n/t  Strength: n/t                        TREATMENT:   Modalities (  minutes) none  Instructed pt on proper sitting posture at the computer with elevating the chair and monitor at eye level. Therapeutic Exercise: (40 Minutes): AIS upper cervical flexion/ lower cervical extension for stretching ROM prior to chin tucks into pillow exercise. Manual resisted lower trap in supine 2x10 Exercises to improve mobility, strength and posture. Required  minimal verbal cues to promote proper body alignment, promote proper body posture and technique.      1/27/20 1/29/20 2/3/20 2/7/20 2/10/20 2/14/20    Activity/Exercise          Shoulder ER/IR B Red 2x12   scaption 2# 2x12  90/90 2# 2x12 Red 2x15 Red 2x15    Scaption 3# 2x10  90/90 3# 2x10 Red ER x12 Red x15    Scaption 3# x15    90/90 3# x15 Side 3# x15  Scaption 3# x15  90/90 3# x15      Shoulder mid and lower trap Propped 2x10  Low trap 1.5# 2x12   propped 2x5    Low trap 3# 2x5 Manual resist in supine x10 Propped 3# x15 ea Side 3# 2x15 ea    Shoulder extn 3# 2x12  Green tband 2x12 3# B x12  3# B cable 2x10     \"Y\" 1# 2x12         Chin tucks  3x8 with manual stabilization of pt head Supine With manual stabilization Supine with manual guidance,after upper c spine stretching Supine x10 with manual hold of upper cervical flexion               Full can 2# x12 x10   1# b x10 2# x8 1# x10     Trunk rhythmic stabilization  2x30 sec   B tandem stance 2x30 sec     rows 3# 2x10  3# 2x10 7# x15 Tandem stance 2x10 triceps 3# cable B x10   Blue x15 7# cable 2x10       Treatment/Session Summary:    · Response to Treatment: good posture during instruction at the computer. Increased focus on rotator cuff and scapular stability  · Communication/Consultation:  None today  · Equipment provided today:  None today  Recommendations/Intent for next treatment session: Next visit will focus on strengthening with emphasis on posture and shoulder complex.    Total Treatment Billable Duration: 40   minutes  PT Patient Time In/Time Out  Time In: 1115  Time Out: Dyana 72, PT    Future Appointments   Date Time Provider Sanam Kandace   2/17/2020 11:15 AM Delmus Muskrat, PT [facility]   2/21/2020 11:15 AM Delmus Muskrat, PT [facility]   2/24/2020 11:15 AM Delmus Muskrat, PT [facility]   2/24/2020  2:40 PM Salty Harding MD [facility] MAT MAT   2/27/2020  1:15 PM [facility]   2/28/2020 11:15 AM Delmus Muskrat, PT [facility]   3/26/2020  9:00 AM MAT LAB [facility] MAT MAT   4/2/2020 11:00 AM Salty Harding MD [facility] MAT MAT   5/1/2020 11:00 AM [facility] OUTREACH INSURANCE [facility]   5/1/2020 11:30 AM Jj Andersen MD [facility]   5/6/2020  1:40 PM Anton Alegria MD END [facility] ENDO

## 2020-02-17 ENCOUNTER — HOSPITAL ENCOUNTER (OUTPATIENT)
Dept: PHYSICAL THERAPY | Age: 85
Discharge: HOME | End: 2020-02-17
Attending: INTERNAL MEDICINE
Payer: MEDICARE

## 2020-02-17 PROCEDURE — 97110 THERAPEUTIC EXERCISES: CPT

## 2020-02-17 NOTE — PROGRESS NOTES
Maximino Dsouza  : 1935  [place]  Secondary: Darell Mera at [facility] CRYSTAL  [address] [facility], [address] [place] [address],[address], [place] [address].  [phone]   [phone]       OUTPATIENT PHYSICAL THERAPY:Progress Report 2020   ICD-10: Treatment Diagnosis: Pain in left shoulder (M25.512), Cervalgia (M54.2), pain in thoracic spine (M54.6)  Precautions/Allergies: none,Diphenhydramine hcl; Ativan [lorazepam]; Iodinated contrast media; and Sulfa (sulfonamide antibiotics)   TREATMENT PLAN:  Effective Dates: 2019 TO 3/16/2020 (90 days). Frequency/Duration: 1 to 3 times a week for 90 Day(s) MEDICAL/REFERRING DIAGNOSIS:  neck and shoulder pain   DATE OF ONSET: 2019  REFERRING PHYSICIAN: Alban Jaime MD MD Orders: PT-evaluate and treat, ROM, strengthening  Return MD Appointment: unsure      ASSESSMENT:  Mr. Leana Abarca  presents today with cervical and left shoulder motion WNL but still assumes a forward head posture when fatigued. Strength has improved but still with some weakness in left ER and serratus muscles. He continues to have soreness pain in the C6 to T1 region that has improved and decreases with posture correction. Therapy focus is on manual cervical stretching/mobilizatios prior to strengthening, truck and B shoulder complex strengthening. Goals are being address and met. HEP compliance has improved. Mr Leana Abarca will benefit from skilled therapy to address his deficits and assist in the return of functional, independent ADL's with less pain. PROBLEM LIST (Impacting functional limitations):  1. Decreased Strength  2. Decreased ADL/Functional Activities  3. Increased Pain  4. Decreased Activity Tolerance  5. Decreased Flexibility/Joint Mobility INTERVENTIONS PLANNED: (Treatment may consist of any combination of the following)  1. Heat  2. Home Exercise Program (HEP)  3. Manual Therapy  4.  Neuromuscular Re-education/Strengthening  5. Range of Motion (ROM)  6. Therapeutic Exercise/Strengthening     GOALS: (Goals have been discussed and agreed upon with patient.)  Short-Term Functional Goals: Time Frame: 30 days     1. Independent with HEP- being met      2. L shoulder and neck AROM WFL to allow independent ADL's without compensation-met    Discharge Goals: Time Frame: 90 days    1. Tolerate activity > 35 minutes for driving-  met with mild pain    2. ADL's with pain < 3/10- not consisntently met   3. Core, trunk and shoulder Strength to 4+-5/5 to allow safe reaching- met    OUTCOME MEASURE:   Tool Used: Disabilities of the Arm, Shoulder and Hand (DASH) Questionnaire - Quick Version  Score:  Initial: 26/55 or 34%  Most Recent: 22/55 or 21% (Date:1/15/20 ) 10/55 or 18%(2/17/20)   Interpretation of Score: The DASH is designed to measure the activities of daily living in person's with upper extremity dysfunction or pain. Each section is scored on a 1-5 scale, 5 representing the greatest disability. The scores of each section are added together for a total score of 55. HISTORY:   History of Injury/Illness (Reason for Referral):  Pt reports having a gradual increase in neck and upper back pain since having to stand and talk with friends over an extended period of time. X rays and MRI of the neck show only arthritis. He is going for an MRI of the shoulder next month. He states that he can only tolerate standing still for up to 5 minutes before feeling like he needs to lay down. He has noticed that after he reads in bed he is unable to sleep more than 2 hours. Now he is having pain around the left rib cage but it doesn't increase with deep breathing.   Past Medical History/Comorbidities:   Mr. Iraida Lou  has a past medical history of Acute cervical radiculopathy, Aneurysm ([address] Arthritis, Atrial fibrillation with rapid ventricular response ([address] (6/16/2019), CAD (coronary artery disease), Cancer ([address] Descending thoracic aortic aneurysm ([facility] [address] (10/17/14) Leukopenia (11/13/2012), Mild degeneration of cervical intervertebral disc, MRSA (methicillin resistant Staphylococcus aureus), Pneumatosis intestinalis (6/14/2019), Renal cyst, right (12/3/2013), Small bowel obstruction ([address] (6/14/2019), Thrombocytopenia ([address] Ventral incisional hernia (6/14/2019). Mr. Nuha Starks  has a past surgical history that includes pr cardiac surg procedure  (1991); pr cardiac surg procedure unlist (9/2003); hx hernia repair (09/1993); lumbar laminectomy (5/2005); vascular surgery procedure unlist (1991); hx orthopaedic (1/2006); hx orthopaedic (8/2005); hx orthopaedic (2005); hx heart catheterization (04/2015); hx other surgical (11/2014); hx other surgical; hx orthopaedic (03/1955); small bowel resection (06/2019). Social History/Living Environment:     lives with wife. Prior Level of Function/Work/Activity:   Cares for the home repairs and yard  Personal Factors:           Other factors that influence how disability is experienced by the patient:  Multiple low back issues      EXAMINATION:   Observation/Orthostatic Postural Assessment:         Good posture with less forward head posture when fatigued   Palpation:          Tender over the left C6-T1  ROM: neck and BUE WNL except:     LUE AROM  L Shoulder Internal Rotation: (to L1)                               Strength: trunk 4- to 4/5  LUE Strength  L Shoulder Flexion: 5  L Shoulder ABduction: 5  L Shoulder Horizontal ABduction: 4+  L Shoulder Internal Rotation: 5(subscap 4)  L Shoulder External Rotation: 5                Special tests: still with positive Shanice Floss, positive empty can but less pain   CLINICAL PRESENTATION:   CLINICAL DECISION MAKING:

## 2020-02-17 NOTE — PROGRESS NOTES
Maximino Lopez  : 1935  [place] / Product Type: Medicare /  [address]  at [facility] JAZMIN ROJAS  [address] [facility], [address], [place] [address].  [phone]   [phone]       OUTPATIENT PHYSICAL THERAPY: Daily Treatment Note 2020  Visit Count: 20     ICD-10: Treatment Diagnosis: Pain in left shoulder (M25.512), cervalgia M54.2, pain in thoracic spine M54.6  Precautions/Allergies: none/Diphenhydramine hcl; Ativan [lorazepam]; Iodinated contrast media; and Sulfa (sulfonamide antibiotics)   MRI 20: left \"RCT with biceps tear\"  TREATMENT PLAN:  Effective Dates: 2019 TO 3/15/2020 (90 days). Frequency/Duration: 1 to 3 times a week for 90 Day(s) MEDICAL/REFERRING DIAGNOSIS:  neck and shoulder pain   DATE OF ONSET: 2019  REFERRING PHYSICIAN: Shon Medel MD MD Orders: PT-evaluate and treat.  (20) Dr Cornelio Recio- evaluate and treat left shoulder/neck. ROM, strengthening,Dry needling, modalities for pain, traction, manual therapy, ultrasound  Return MD Appointment: unsure       Pre-treatment Symptoms/Complaints:  Pt reports his left shoulder and the neck hurt and stiff first thing in the morning. Doing the HEP  Pain: Initial: 4-0/10 in the neck and shoulder     Post Session: 1/10 \"its been worked\"    Medications Last Reviewed:  20  Current Outpatient Medications:     levothyroxine (SYNTHROID)     albuterol sulfate     tiotropium-olodaterol (STIOLTO RESPIMAT)     omeprazole (PRILOSEC) :     atorvastatin (LIPITOR) 40 mg tablet, TAKE 1 TABLET BY MOUTH  DAILY, Disp: 90 Tab, Rfl: 3    fluticasone (FLONASE) 50 mcg/actuation nasal spray, 2 Sprays by Both Nostrils route daily. , Disp: 3 Bottle, Rfl: 3    amLODIPine (NORVASC) 5 mg tablet, TAKE 1 TABLET BY MOUTH  DAILY, Disp: 90 Tab, Rfl: 3    metoprolol tartrate (LOPRESSOR) 25 mg tablet, TAKE 1 TABLET BY MOUTH TWO  TIMES DAILY, Disp: 180 Tab, Rfl: 3    gabapentin (NEURONTIN)     aspirin 81 mg chewable tablet, Take 81 mg by mouth daily. , Disp: , Rfl:     finasteride (PROSCAR) 5 mg tablet, Take 5 mg by mouth daily. , Disp: , Rfl:     terazosin (HYTRIN) 10 mg capsule, Take 10 mg by mouth nightly., Disp: , Rfl:     Updated Objective Findings:   Observation: forward head posture that corrects nicely with exercises, left scapula elevated and tilted  Palpation: tenderness over the left T4 region  AROM: WNL neck and B shoulders  Strength:   LUE Strength  L Shoulder Flexion: 5  L Shoulder ABduction: 5  L Shoulder Horizontal ABduction: 4+  L Shoulder Internal Rotation: 5(subscap 4)  L Shoulder External Rotation: 5                     TREATMENT:   Modalities (  minutes) none  Therapeutic Exercise: (39 Minutes): AIS upper cervical flexion/ lower cervical extension for stretching ROM prior to chin tucks into pillow exercise. Grade 4-- mobilizations at the [facility] and [place] joints for ,obility prior to exercises. Exercises to improve mobility, strength and posture. Required  minimal verbal cues to promote proper body alignment, promote proper body posture and technique. 2/7/20 2/10/20 2/14/20 2/17/20   Activity/Exercise       Shoulder ER/IR Red ER x12 Red x15    Scaption 3# x15    90/90 3# x15 Side 3# x15  Scaption 3# x15  90/90 3# x15   3# 2x15   Shoulder mid and lower trap Manual resist in supine x10 Propped 3# x15 ea Side 3# 2x15 ea Propped 3# 2x15   Shoulder extn 3# B x12  3# B cable 2x10  Red tband 2x15   \"Y\"       Chin tucks Supine with manual guidance,after upper c spine stretching Supine x10 with manual hold of upper cervical flexion  Manual hold of upper cervical flexion 2x10   Dyn hugs    Red 2x8   Full can 1# b x10 2# x8 1# x10  3# x10   Trunk rhythmic stabilization  B tandem stance 2x30 sec     rows 7# x15 Tandem stance 2x10  Red 2x10          triceps Blue x15 7# cable 2x10       Treatment/Session Summary:    · Response to Treatment: exercises without pain.  Weak in left during ER and dynamic hugs  · Communication/Consultation:  None today  · Equipment provided today:  None today  Recommendations/Intent for next treatment session: Next visit will focus on strengthening with emphasis on posture and shoulder complex.    Total Treatment Billable Duration: 39   minutes  PT Patient Time In/Time Out  Time In: 1115  Time Out: [id number] [place], PT    Future Appointments   Date Time Provider Sanam Jeronimo   2/21/2020 11:15 AM Leslie Lay, [place] [facility]   2/24/2020 11:15 AM Leslie Lay, PT [facility]   2/24/2020  2:40 PM Alban Jaime MD [facility] MAT MAT   2/27/2020  1:15 PM [facility]   2/28/2020 11:15 AM Leslie Lay, PT [facility]   3/26/2020  9:00 AM MAT LAB [facility] MAT MAT   4/2/2020 11:00 AM Alban Jaime MD [facility] MAT MAT   5/1/2020 11:00 AM [facility] OUTREACH INSURANCE [facility]   5/1/2020 11:30 AM Pedro Villa MD [facility]   5/6/2020  1:40 PM Abelino Mccracken MD [facility] ENDO

## 2020-02-21 ENCOUNTER — HOSPITAL ENCOUNTER (OUTPATIENT)
Dept: PHYSICAL THERAPY | Age: 85
Discharge: HOME | End: 2020-02-21
Attending: INTERNAL MEDICINE
Payer: MEDICARE

## 2020-02-24 ENCOUNTER — HOSPITAL ENCOUNTER (OUTPATIENT)
Dept: PHYSICAL THERAPY | Age: 85
Discharge: HOME | End: 2020-02-24
Attending: INTERNAL MEDICINE
Payer: MEDICARE

## 2020-02-24 PROCEDURE — 97110 THERAPEUTIC EXERCISES: CPT

## 2020-02-24 NOTE — PROGRESS NOTES
Maximino Garrido Masters  : 1935  [place] / Product Type: Medicare /  [address] at [facility] JAZMIN ROJAS  [address] [place] [address], [address], [place] [address].  [phone]   [phone]       OUTPATIENT PHYSICAL THERAPY: Daily Treatment Note 2020  Visit Count: 21     ICD-10: Treatment Diagnosis: Pain in left shoulder (M25.512), cervalgia M54.2, pain in thoracic spine M54.6  Precautions/Allergies: none/Diphenhydramine hcl; Ativan [lorazepam]; Iodinated contrast media; and Sulfa (sulfonamide antibiotics)   MRI 20: left \"RCT with biceps tear\"  TREATMENT PLAN:  Effective Dates: 2019 TO 3/15/2020 (90 days). Frequency/Duration: 1 to 3 times a week for 90 Day(s) MEDICAL/REFERRING DIAGNOSIS:  neck and shoulder pain   DATE OF ONSET: 2019  REFERRING PHYSICIAN: Anuradha Velazco MD MD Orders: PT-evaluate and treat.  (20) Dr Derrek Alarcon- evaluate and treat left shoulder/neck. ROM, strengthening,Dry needling, modalities for pain, traction, manual therapy, ultrasound  Return MD Appointment: unsure       Pre-treatment Symptoms/Complaints:  Pt reports being sore after doing yard work Saturday. Doing the HEP  Pain: Initial: very little in the neck and shoulder until he reaches overhead     Post Session:    Medications Last Reviewed:  20  Current Outpatient Medications:     levothyroxine (SYNTHROID)     albuterol sulfate     tiotropium-olodaterol (STIOLTO RESPIMAT)     omeprazole (PRILOSEC) :     atorvastatin (LIPITOR) 40 mg tablet, TAKE 1 TABLET BY MOUTH  DAILY, Disp: 90 Tab, Rfl: 3    fluticasone (FLONASE) 50 mcg/actuation nasal spray, 2 Sprays by Both Nostrils route daily. , Disp: 3 Bottle, Rfl: 3    amLODIPine (NORVASC) 5 mg tablet, TAKE 1 TABLET BY MOUTH  DAILY, Disp: 90 Tab, Rfl: 3    metoprolol tartrate (LOPRESSOR) 25 mg tablet, TAKE 1 TABLET BY MOUTH TWO  TIMES DAILY, Disp: 180 Tab, Rfl: 3    gabapentin (NEURONTIN)     aspirin 81 mg chewable tablet, Take 81 mg by mouth daily. , Disp: , Rfl:     finasteride (PROSCAR) 5 mg tablet, Take 5 mg by mouth daily. , Disp: , Rfl:     terazosin (HYTRIN) 10 mg capsule, Take 10 mg by mouth nightly., Disp: , Rfl:     Updated Objective Findings:   Observation: forward head posture that corrects nicely with exercises, left scapula elevated and tilted  Palpation: tenderness over the left T4 region  AROM: WNL neck and B shoulders  Strength:                         TREATMENT:   Modalities (  minutes) none  Therapeutic Exercise: (41 Minutes): AIS upper cervical flexion/ lower cervical extension for stretching ROM in supine; grade 4-- left C5 unilateral PA; grade 4- left translation glides of C7 to T4. Grade 4-- mobilizations at the [facility] and [place] joints for mobility prior to exercises. Exercises to improve mobility, strength and posture. Required  minimal verbal cues to promote proper body posture and technique. 2/7/20 2/10/20 2/14/20 2/17/20 2/24/20    Activity/Exercise         Shoulder ER/IR Red ER x12 Red x15    Scaption 3# x15    90/90 3# x15 Side 3# x15  Scaption 3# x15  90/90 3# x15   3# 2x15 Red 2x10  Scaption 3# 2x10  90/90 3# 2x10    Shoulder mid and lower trap Manual resist in supine x10 Propped 3# x15 ea Side 3# 2x15 ea Propped 3# 2x15     Shoulder extn 3# B x12  3# B cable 2x10  Red tband 2x15 Red 2x10             Chin tucks Supine with manual guidance,after upper c spine stretching Supine x10 with manual hold of upper cervical flexion  Manual hold of upper cervical flexion 2x10 reviewed    Dyn hugs    Red 2x8 Yellow x10----    Full can 1# b x10 2# x8 1# x10  3# x10     Trunk rhythmic stabilization  B tandem stance 2x30 sec       rows 7# x15 Tandem stance 2x10  Red 2x10 Red 2x10             triceps Blue x15 7# cable 2x10   7# cable 2x10      Treatment/Session Summary:    · Response to Treatment: exercises with fatigue. Stiff lower C spine.  Pain with dynamic hug  · Communication/Consultation:  None today  · Equipment provided today:  None today  Recommendations/Intent for next treatment session: Next visit will focus on strengthening with emphasis on posture and shoulder complex.    Total Treatment Billable Duration: 41   minutes  PT Patient Time In/Time Out  Time In: 1115  [facility]     Future Appointments   Date Time Provider Sanam Kandace   2/27/2020  1:15 PM [facility]   2/28/2020 11:15 AM [facility]   3/26/2020  9:00 AM MAT LAB [facility] MAT MAT   4/2/2020 11:00 AM Dayana Correa MD [facility] MAT MAT   5/1/2020 11:00 AM [facility] OUTREACH INSURANCE [facility]   5/1/2020 11:30 AM Angelia Cheung MD [facility]   5/6/2020  1:40 PM Damir Painter MD END BS ENDO

## 2020-02-28 ENCOUNTER — HOSPITAL ENCOUNTER (OUTPATIENT)
Dept: PHYSICAL THERAPY | Age: 85
Discharge: HOME | End: 2020-02-28
Attending: INTERNAL MEDICINE
Payer: MEDICARE

## 2020-02-28 PROCEDURE — 97012 MECHANICAL TRACTION THERAPY: CPT

## 2020-02-28 PROCEDURE — 97110 THERAPEUTIC EXERCISES: CPT

## 2020-02-28 NOTE — PROGRESS NOTES
Maximino Fox  : 1935  [place] / Product Type: Medicare /  [address] at [facility] JAZMIN ROJAS  [address] [facility], [address], [place] [address].  [phone]   [phone]       OUTPATIENT PHYSICAL THERAPY: Daily Treatment Note 2020  Visit Count: 22     ICD-10: Treatment Diagnosis: Pain in left shoulder (M25.512), cervalgia M54.2, pain in thoracic spine M54.6  Precautions/Allergies: none/Diphenhydramine hcl; Ativan [lorazepam]; Iodinated contrast media; and Sulfa (sulfonamide antibiotics)   MRI 20: left \"RCT with biceps tear\"  TREATMENT PLAN:  Effective Dates: 2019 TO 3/15/2020 (90 days). Frequency/Duration: 1 to 3 times a week for 90 Day(s) MEDICAL/REFERRING DIAGNOSIS:  neck and shoulder pain   DATE OF ONSET: 2019  REFERRING PHYSICIAN: Karolina Segovia MD MD Orders: PT-evaluate and treat.  (20) Dr Breanna Dietz- evaluate and treat left shoulder/neck. ROM, strengthening,Dry needling, modalities for pain, traction, manual therapy, ultrasound  Return MD Appointment:        Pre-treatment Symptoms/Complaints:  Pt reports being in pain this morning in the left shoulder when he woke up. Pain: Initial:  Nothing in the neck, 5/10 in the  shoulder      Post Session: 0 neck, 1 shoulder   Medications Last Reviewed:  20  Current Outpatient Medications:     levothyroxine (SYNTHROID)     albuterol sulfate     tiotropium-olodaterol (STIOLTO RESPIMAT)     omeprazole (PRILOSEC) :     atorvastatin (LIPITOR) 40 mg tablet, TAKE 1 TABLET BY MOUTH  DAILY, Disp: 90 Tab, Rfl: 3    fluticasone (FLONASE) 50 mcg/actuation nasal spray, 2 Sprays by Both Nostrils route daily. , Disp: 3 Bottle, Rfl: 3    amLODIPine (NORVASC) 5 mg tablet, TAKE 1 TABLET BY MOUTH  DAILY, Disp: 90 Tab, Rfl: 3    metoprolol tartrate (LOPRESSOR) 25 mg tablet, TAKE 1 TABLET BY MOUTH TWO  TIMES DAILY, Disp: 180 Tab, Rfl: 3    gabapentin (NEURONTIN)    aspirin 81 mg chewable tablet, Take 81 mg by mouth daily. , Disp: , Rfl:     finasteride (PROSCAR) 5 mg tablet, Take 5 mg by mouth daily. , Disp: , Rfl:     terazosin (HYTRIN) 10 mg capsule, Take 10 mg by mouth nightly., Disp: , Rfl:     Updated Objective Findings:   Observation: forward head posture that corrects nicely with exercises, left scapula elevated and tilted  Palpation: stiff lower cervical.AROM: WNL neck and B shoulders  Strength:                         TREATMENT:   Modalities ( 15 minutes) mechanical cervical traction with 23# pull/10# rest for pain relief of radicular symptoms and neck stiffness  Therapeutic Exercise: (25 Minutes): AIS upper cervical flexion/ lower cervical extension for stretching ROM in supine prior to exercises. Exercises per grid for HEP to improve mobility, strength and posture. Required  minimal verbal cues to promote proper body posture and technique.      2/7/20 2/10/20 2/14/20 2/17/20 2/24/20 2/28/20   Activity/Exercise         Shoulder ER/IR Red ER x12 Red x15    Scaption 3# x15    90/90 3# x15 Side 3# x15  Scaption 3# x15  90/90 3# x15   3# 2x15 Red 2x10  Scaption 3# 2x10  90/90 3# 2x10 Red 2x12  Scaption 3# 2x12  90/90 3# 2x12   Shoulder mid and lower trap Manual resist in supine x10 Propped 3# x15 ea Side 3# 2x15 ea Propped 3# 2x15  Prone x10 1#   Shoulder extn 3# B x12  3# B cable 2x10  Red tband 2x15 Red 2x10 Red x 15            Chin tucks Supine with manual guidance,after upper c spine stretching Supine x10 with manual hold of upper cervical flexion  Manual hold of upper cervical flexion 2x10 reviewed reviewed   Dyn hugs    Red 2x8 Yellow x10---- Serratus punch 3# B x10   Full can 1# b x10 2# x8 1# x10  3# x10     Trunk rhythmic stabilization  B tandem stance 2x30 sec       rows 7# x15 Tandem stance 2x10  Red 2x10 Red 2x10 Red x15            triceps Blue x15 7# cable 2x10   7# cable 2x10      Treatment/Session Summary:    · Response to Treatment: good return demonstration of exercises. · Communication/Consultation:  None today  · Equipment provided today:  None today  Recommendations/Intent for next treatment session: Next visit will focus on strengthening with emphasis on posture and shoulder complex.  Review HEP  Total Treatment Billable Duration: 40   minutes  PT Patient Time In/Time Out  Time In: 1125  Time Out: [address] [facility], PT    Future Appointments   Date Time Provider Sanam Jeronimo   3/2/2020  2:30 PM Griselda Simmer, [place] [facility]   3/26/2020  9:00 AM MAT LAB [facility] MAT MAT   4/2/2020 11:00 AM Lorrie Solitario MD [facility] MAT MAT   5/1/2020 11:00 AM [facility] OUTREACH INSURANCE [facility] [address] [place] [address]   5/1/2020 11:30 AM Thalia Islas MD [facility]   5/6/2020  1:40 PM Karl Singh MD END BS ENDO   8/26/2020  2:30 PM Best Connors MD [facility] UCDG UCD

## 2020-03-02 ENCOUNTER — HOSPITAL ENCOUNTER (OUTPATIENT)
Dept: PHYSICAL THERAPY | Age: 85
Discharge: HOME | End: 2020-03-02
Attending: INTERNAL MEDICINE
Payer: MEDICARE

## 2020-03-02 PROCEDURE — 97110 THERAPEUTIC EXERCISES: CPT

## 2020-03-02 NOTE — PROGRESS NOTES
Gene Portland Kaci  : 1935  [place] / Product Type: Medicare /  [address] at [facility] JAZMIN ROJAS  [address] [facility], [address], [address] [facility]  [phone]   [phone]       OUTPATIENT PHYSICAL THERAPY: Daily Treatment Note 3/2/2020  Visit Count: 23     ICD-10: Treatment Diagnosis: Pain in left shoulder (M25.512), cervalgia M54.2, pain in thoracic spine M54.6  Precautions/Allergies: none/Diphenhydramine hcl; Ativan [lorazepam]; Iodinated contrast media; and Sulfa (sulfonamide antibiotics)   MRI 20: left \"RCT with biceps tear\"  TREATMENT PLAN:  Effective Dates: 2019 TO 3/15/2020 (90 days). Frequency/Duration: 1 to 3 times a week for 90 Day(s) MEDICAL/REFERRING DIAGNOSIS:  neck and shoulder pain   DATE OF ONSET: 2019  REFERRING PHYSICIAN: Rick Romero MD MD Orders: PT-evaluate and treat.  (20) Dr Russell Age- evaluate and treat left shoulder/neck. ROM, strengthening,Dry needling, modalities for pain, traction, manual therapy, ultrasound  Return MD Appointment:        Pre-treatment Symptoms/Complaints:  Pt reports being good in his neck. some pain this morning in the left shoulder when he woke up from reading in bed. Pain: Initial:  0/10 in the neck, 0-3/10 in the  shoulder      Post Session: 0 neck, 1 shoulder   Medications Last Reviewed:  3/2/20  Current Outpatient Medications:     levothyroxine (SYNTHROID)     albuterol sulfate     tiotropium-olodaterol (STIOLTO RESPIMAT)     omeprazole (PRILOSEC) :     atorvastatin (LIPITOR) 40 mg tablet, TAKE 1 TABLET BY MOUTH  DAILY, Disp: 90 Tab, Rfl: 3    fluticasone (FLONASE) 50 mcg/actuation nasal spray, 2 Sprays by Both Nostrils route daily. , Disp: 3 Bottle, Rfl: 3    amLODIPine (NORVASC) 5 mg tablet, TAKE 1 TABLET BY MOUTH  DAILY, Disp: 90 Tab, Rfl: 3    metoprolol tartrate (LOPRESSOR) 25 mg tablet, TAKE 1 TABLET BY MOUTH TWO  TIMES DAILY, Disp: 180 Tab, Rfl: 3    gabapentin (NEURONTIN)     aspirin 81 mg chewable tablet, Take 81 mg by mouth daily. , Disp: , Rfl:     finasteride (PROSCAR) 5 mg tablet, Take 5 mg by mouth daily. , Disp: , Rfl:     terazosin (HYTRIN) 10 mg capsule, Take 10 mg by mouth nightly., Disp: , Rfl:     Updated Objective Findings:   Observation: good posture with exercises, left scapula elevated and tilted prior to correction  Palpation: stiff lower cervical  Strength: n/t                        TREATMENT:   Modalities (  Minutes)none  Therapeutic Exercise: (39 Minutes): AIS upper cervical flexion/ lower cervical extension for stretching ROM in supine prior to exercises. Exercises per grid for HEP to improve mobility, strength and posture. Required  minimal verbal cues to promote proper body posture and technique. 2/17/20 2/24/20 2/28/20 3/2/20    Activity/Exercise        Shoulder ER/IR 3# 2x15 Red 2x10  Scaption 3# 2x10  90/90 3# 2x10 Red 2x12  Scaption 3# 2x12  90/90 3# 2x12 Red 2x12  Scaption 3# 2x15  90/90 3# 2x15    Shoulder mid and lower trap Propped 3# 2x15  Prone x10 1# Supine resisted 2x10 ea    Shoulder extn Red tband 2x15 Red 2x10 Red x 15 Red 2x15            Chin tucks Manual hold of upper cervical flexion 2x10 reviewed reviewed  supine with resisted hold at end rom    Dyn hugs Red 2x8 Yellow x10---- Serratus punch 3# B x10 --    Full can 3# x10       Trunk rhythmic stabilization        rows Red 2x10 Red 2x10 Red x15 Red 2x15            triceps  7# cable 2x10        Treatment/Session Summary:    · Response to Treatment: increased reps with exercises. Pain with flexion. ? impingement  · Communication/Consultation:  None today  · Equipment provided today:  None today  Recommendations/Intent for next treatment session: Next visit will focus on strengthening with emphasis on posture and shoulder complex.   Total Treatment Billable Duration: 39   minutes  PT Patient Time In/Time Out  Time In: 1435  Time Out: [address] Marita Galeana, PT    Future Appointments   Date Time Provider Sanam Jeronimo   3/26/2020  9:00 AM MAT LAB [facility] MAT MAT   4/2/2020 11:00 AM Lorrie Solitario MD [facility] MAT MAT   5/1/2020 11:00 AM [facility] OUTREACH INSURANCE [facility]   5/1/2020 11:30 AM Thalia Islas MD [facility]   5/6/2020  1:40 PM Karl Singh MD END BS ENDO   8/26/2020  2:30 PM Ted Zuniga MD [facility] UCDG UCD

## 2020-03-03 ENCOUNTER — HOSPITAL ENCOUNTER (OUTPATIENT)
Dept: PHYSICAL THERAPY | Age: 85
End: 2020-03-03
Attending: INTERNAL MEDICINE
Payer: MEDICARE

## 2020-03-10 ENCOUNTER — HOSPITAL ENCOUNTER (OUTPATIENT)
Dept: PHYSICAL THERAPY | Age: 85
Discharge: HOME | End: 2020-03-10
Attending: INTERNAL MEDICINE
Payer: MEDICARE

## 2020-03-10 PROCEDURE — 97110 THERAPEUTIC EXERCISES: CPT

## 2020-03-10 NOTE — PROGRESS NOTES
Maximino Antoine  : 1935  [place] / Product Type: Medicare /  [address]  at [facility] JAMZIN ROJAS  [address] [facility], [address], [place] [address].  [phone]   [phone]       OUTPATIENT PHYSICAL THERAPY: Daily Treatment Note 3/10/2020  Visit Count: 24     ICD-10: Treatment Diagnosis: Pain in left shoulder (M25.512), cervalgia M54.2, pain in thoracic spine M54.6  Precautions/Allergies: none/Diphenhydramine hcl; Ativan [lorazepam]; Iodinated contrast media; and Sulfa (sulfonamide antibiotics)   MRI 20: left \"RCT with biceps tear\"  TREATMENT PLAN:  Effective Dates: 2019 TO 3/15/2020 (90 days). Frequency/Duration: 1 to 3 times a week for 90 Day(s) MEDICAL/REFERRING DIAGNOSIS:  neck and shoulder pain   DATE OF ONSET: 2019  REFERRING PHYSICIAN: Victor Manuel Russell MD MD Orders: PT-evaluate and treat.  (20) Dr Lisa Paige- evaluate and treat left shoulder/neck. ROM, strengthening,Dry needling, modalities for pain, traction, manual therapy, ultrasound  Return MD Appointment:        Pre-treatment Symptoms/Complaints:  Pt reports just a little discomfort in his neck and only shoulder pain with reaching overhead. Did the exercises for the most part  Pain: Initial:  0-1/10 in the neck, 0-3/10 in the  shoulder      Post Session:  1/10 shoulder   Medications Last Reviewed:  3/10/20  Current Outpatient Medications:     levothyroxine (SYNTHROID)     albuterol sulfate     tiotropium-olodaterol (STIOLTO RESPIMAT)     omeprazole (PRILOSEC) :     atorvastatin (LIPITOR) 40 mg tablet, TAKE 1 TABLET BY MOUTH  DAILY, Disp: 90 Tab, Rfl: 3    fluticasone (FLONASE) 50 mcg/actuation nasal spray, 2 Sprays by Both Nostrils route daily. , Disp: 3 Bottle, Rfl: 3    amLODIPine (NORVASC) 5 mg tablet, TAKE 1 TABLET BY MOUTH  DAILY, Disp: 90 Tab, Rfl: 3    metoprolol tartrate (LOPRESSOR) 25 mg tablet, TAKE 1 TABLET BY MOUTH TWO  TIMES DAILY, Disp: 180 Tab, Rfl: 3    gabapentin (NEURONTIN)     aspirin 81 mg chewable tablet, Take 81 mg by mouth daily. , Disp: , Rfl:     finasteride (PROSCAR) 5 mg tablet, Take 5 mg by mouth daily. , Disp: , Rfl:     terazosin (HYTRIN) 10 mg capsule, Take 10 mg by mouth nightly., Disp: , Rfl:     Updated Objective Findings:   Observation: good posture with exercises after cueing  Palpation: stiff lower cervical, stiff right C7  Strength: n/t                        TREATMENT:   Modalities (  Minutes)none  Therapeutic Exercise: (40 Minutes): AIS upper cervical flexion/ lower cervical extension for stretching ROM in supine prior to exercises. Grade 4- R C7 unilateral AP glides. Exercises per grid for HEP to improve mobility, strength and posture. Required  Frequent minimal verbal cues to promote proper body posture and technique. 2/17/20 2/24/20 2/28/20 3/2/20 3/10/20   Activity/Exercise        Shoulder ER/IR 3# 2x15 Red 2x10  Scaption 3# 2x10  90/90 3# 2x10 Red 2x12  Scaption 3# 2x12  90/90 3# 2x12 Red 2x12  Scaption 3# 2x15  90/90 3# 2x15 Blue 2x10  Scaption and 90/90 4# 2x10   Shoulder mid and lower trap Propped 3# 2x15  Prone x10 1# Supine resisted 2x10 ea Propped 3# 2x10   Shoulder extn Red tband 2x15 Red 2x10 Red x 15 Red 2x15 Blue 2x10           Chin tucks Manual hold of upper cervical flexion 2x10 reviewed reviewed  supine with resisted hold at end rom    Dyn hugs Red 2x8 Yellow x10---- Serratus punch 3# B x10 --    Full can 3# x10       Trunk rhythmic stabilization        rows Red 2x10 Red 2x10 Red x15 Red 2x15 With tricep 4# 2x10           triceps  7# cable 2x10   With row 4# 2x10     Treatment/Session Summary:    · Response to Treatment: increased weight with exercises. · Communication/Consultation:  None today  · Equipment provided today:  None today  Recommendations/Intent for next treatment session: Next visit will focus on strengthening with emphasis on posture and shoulder complex.   Total Treatment Billable Duration: 40   minutes  PT Patient Time In/Time Out  Time In: 1430  Time Out: [address], PT    Future Appointments   Date Time Provider Sanam Kandace   3/16/2020  2:30 PM Karri Hansen, [place] [facility]   3/18/2020  1:00 PM Karri Hansen, PT [facility]   3/24/2020 11:15 AM Karri Hansen, PT [facility]   3/26/2020  9:00 AM MAT LAB [facility] MAT MAT   3/27/2020 11:15 AM Karri Hansen, PT [facility]   3/30/2020 11:15 AM Karri Hansen, PT [facility]   4/1/2020 11:15 AM Karri Hansen, PT [facility]   4/2/2020 11:00 AM Pastor Shonda MD [facility] MAT MAT   4/6/2020 11:15 AM Karri Hansen, PT [facility]   4/8/2020 11:15 AM Karri Hansen, PT [facility]   5/1/2020 11:00 AM [address] [place] [address] OUTREACH INSURANCE [facility] [address] [facility]   5/1/2020 11:30 AM Suki Orr MD [facility]   5/6/2020  1:40 PM Joanne Norton MD END  ENDO   8/26/2020  2:30 PM Sacha Connors MD [facility] UC UCD

## 2020-03-16 ENCOUNTER — HOSPITAL ENCOUNTER (OUTPATIENT)
Dept: PHYSICAL THERAPY | Age: 85
Discharge: HOME | End: 2020-03-16
Attending: INTERNAL MEDICINE
Payer: MEDICARE

## 2020-03-16 PROCEDURE — 97110 THERAPEUTIC EXERCISES: CPT

## 2020-03-16 NOTE — PROGRESS NOTES
Maximino Rascon Yuri  : 1935  [place] / Product Type: Medicare /  [address]  at [address] [place] [address] Rd  [address] [facility], [address], [facility] KRIS Butcher.  [phone]   [phone]       OUTPATIENT PHYSICAL THERAPY: Daily Treatment Note 3/16/2020  Visit Count: 25     ICD-10: Treatment Diagnosis: Pain in left shoulder (M25.512), cervalgia M54.2, pain in thoracic spine M54.6  Precautions/Allergies: none/Diphenhydramine hcl; Ativan [lorazepam]; Iodinated contrast media; and Sulfa (sulfonamide antibiotics)   MRI 20: left \"RCT with biceps tear\"   PLAN: discharge to [facility]   MEDICAL/REFERRING DIAGNOSIS:  neck and shoulder pain   DATE OF ONSET: 2019  REFERRING PHYSICIAN: Jeremy Oliver MD MD Orders: PT-evaluate and treat.  (20) Dr Lori Gambino- evaluate and treat left shoulder/neck. ROM, strengthening,Dry needling, modalities for pain, traction, manual therapy, ultrasound  Return MD Appointment:        Pre-treatment Symptoms/Complaints:  Pt reports pain in his lower neck since mowing the yard this weekend. Shoulder pain with reaching overhead. Doing the HEP some  Pain: Initial:  0-3/10 in the neck, 0-3/10 in the  shoulder      Post Session:  1/10 shoulder   Medications Last Reviewed:  3/16/20  Current Outpatient Medications:     levothyroxine (SYNTHROID)     albuterol sulfate     tiotropium-olodaterol (STIOLTO RESPIMAT)     omeprazole (PRILOSEC) :     atorvastatin (LIPITOR) 40 mg tablet, TAKE 1 TABLET BY MOUTH  DAILY, Disp: 90 Tab, Rfl: 3    fluticasone (FLONASE) 50 mcg/actuation nasal spray, 2 Sprays by Both Nostrils route daily. , Disp: 3 Bottle, Rfl: 3    amLODIPine (NORVASC) 5 mg tablet, TAKE 1 TABLET BY MOUTH  DAILY, Disp: 90 Tab, Rfl: 3    metoprolol tartrate (LOPRESSOR) 25 mg tablet, TAKE 1 TABLET BY MOUTH TWO  TIMES DAILY, Disp: 180 Tab, Rfl: 3    gabapentin (NEURONTIN)     aspirin 81 mg chewable tablet, Take 81 mg by mouth daily., Disp: , Rfl:     finasteride (PROSCAR) 5 mg tablet, Take 5 mg by mouth daily. , Disp: , Rfl:     terazosin (HYTRIN) 10 mg capsule, Take 10 mg by mouth nightly., Disp: , Rfl:     Updated Objective Findings:   Observation: forward head posture   Palpation: stiff lower cervical on the right  Strength:   LUE Strength  L Shoulder Internal Rotation: (subscap 4)                     TREATMENT:   Modalities (  Minutes)none  Therapeutic Exercise: (41 Minutes): AIS upper cervical flexion/ lower cervical extension for stretching ROM in supine prior to exercises. Grade 4- to 4  R C7 unilateral AP glides. Exercises per grid  to improve mobility, strength and posture. Required  Frequent minimal verbal cues to promote proper body posture and technique. Issued blue tband for HEP   2/28/20 3/2/20 3/10/20 3/16/20   Activity/Exercise       Shoulder ER/IR Red 2x12  Scaption 3# 2x12  90/90 3# 2x12 Red 2x12  Scaption 3# 2x15  90/90 3# 2x15 Blue 2x10  Scaption and 90/90 4# 2x10 2x12   Shoulder mid and lower trap Prone x10 1# Supine resisted 2x10 ea Propped 3# 2x10 2# 2x12   Shoulder extn Red x 15 Red 2x15 Blue 2x10 Black 2x12          Chin tucks reviewed  supine with resisted hold at end rom  Standing and supine 2x8   Dyn hugs Serratus punch 3# B x10 --  -                 rows Red x15 Red 2x15 With tricep 4# 2x10 Black 2x12          triceps   With row 4# 2x10 B 10# cables 2x10     Treatment/Session Summary:    · Response to Treatment: increased weight or reps with exercises.  Good technique with upgraded HEP  · Communication/Consultation:  None today  · Equipment provided today:  None today  Recommendations: discharge to HEP  Total Treatment Billable Duration: 41   minutes  PT Patient Time In/Time Out  Time In: 1430  Time Out: [address] [facility] [address], PT    Future Appointments   Date Time Provider Sanam Jeronimo   3/26/2020  9:00 AM MAT LAB [facility] MAT MAT   4/1/2020 11:15 AM Yao Hernandez, PT [facility] 4/2/2020 11:00 AM Rede Soto MD [facility] MAT MAT   4/6/2020 11:15 AM Kinnie Virgen, PT [facility]   4/8/2020 11:15 AM Kinnie Virgen, PT [facility]   4/13/2020 11:15 AM Kinnie Virgen, PT [facility]   4/15/2020 11:15 AM Kinnie Virgen, PT [facility]   4/20/2020 11:15 AM Kinnie Virgen, PT [facility]   4/22/2020 11:15 AM Kinnie Virgen, PT [facility]   5/1/2020 11:00 AM [address] [place] [address] INSURANCE [address]   5/1/2020 11:30 AM Emili Washington MD [facility]   5/6/2020  1:40 PM Moses Davila MD END BS ENDO   8/26/2020  2:30 PM Tangela Connors MD [facility] UC UCD

## 2020-03-16 NOTE — PROGRESS NOTES
Maximino Ferreira  : 1935  [place]  [facility]: [facility]  [facility], [address] [place] [address] [address], [address] [facility]  [phone]   [phone]       OUTPATIENT PHYSICAL THERAPY:Discharge Summary 3/16/2020   ICD-10: Treatment Diagnosis: Pain in left shoulder (M25.512), Cervalgia (M54.2), pain in thoracic spine (M54.6)  Precautions/Allergies: none,Diphenhydramine hcl; Ativan [lorazepam]; Iodinated contrast media; and Sulfa (sulfonamide antibiotics)   PLAN:  Discharge to [facility] MEDICAL/REFERRING DIAGNOSIS:  neck and shoulder pain   DATE OF ONSET: 2019  REFERRING PHYSICIAN: Abdirahman Silvestre MD MD Orders: PT-evaluate and treat, ROM, strengthening  Return MD Appointment: unsure      ASSESSMENT:  Mr. Cinda Smith  presents with cervical and left shoulder motion WNL but still assumes a forward head posture when fatigued. Strength has improved to allow functional use but fatigues. He continues to have soreness pain in the C6 to T1 region that has improved and decreases with posture correction. Therapy focus is on manual cervical stretching/mobilizatios prior to strengthening, truck and B shoulder complex strengthening. Goals are met. Pt is performing the HEP with a blue tband as reviewed. Mr Cinda Smith will be discharged to the [facility]. GOALS: (Goals have been discussed and agreed upon with patient.)  Short-Term Functional Goals: Time Frame: 30 days     1. Independent with HEP-  met      2. L shoulder and neck AROM WFL to allow independent ADL's without compensation-met    Discharge Goals: Time Frame: 90 days    1. Tolerate activity > 35 minutes for driving-  met with mild pain    2. ADL's with pain < 3/10- not consisntently met   3.  Core, trunk and shoulder Strength to 4+-5/5 to allow safe reaching- met    OUTCOME MEASURE:   Tool Used: Disabilities of the Arm, Shoulder and Hand (DASH) Questionnaire - Quick Version  Score: Initial: 26/55 or 34%  Most Recent: 22/55 or 21% (Date:1/15/20 ) 10/55 or 18%(2/17/20), 9/55 or 13%  (3/16/20)   Interpretation of Score: The DASH is designed to measure the activities of daily living in person's with upper extremity dysfunction or pain. Each section is scored on a 1-5 scale, 5 representing the greatest disability. The scores of each section are added together for a total score of 55. HISTORY:   History of Injury/Illness (Reason for Referral):  Pt reports having a gradual increase in neck and upper back pain since having to stand and talk with friends over an extended period of time. X rays and MRI of the neck show only arthritis. He is going for an MRI of the shoulder next month. He states that he can only tolerate standing still for up to 5 minutes before feeling like he needs to lay down. He has noticed that after he reads in bed he is unable to sleep more than 2 hours. Now he is having pain around the left rib cage but it doesn't increase with deep breathing. Past Medical History/Comorbidities:   Mr. Melisa Zaman  has a past medical history of Acute cervical radiculopathy, Aneurysm ([address] Arthritis, Atrial fibrillation with rapid ventricular response ([address] (6/16/2019), CAD (coronary artery disease), Cancer [facility]), Descending thoracic aortic aneurysm ([address] (10/17/14) Leukopenia (11/13/2012), Mild degeneration of cervical intervertebral disc, MRSA (methicillin resistant Staphylococcus aureus), Pneumatosis intestinalis (6/14/2019), Renal cyst, right (12/3/2013), Small bowel obstruction ([address] (6/14/2019), Thrombocytopenia ([address] Ventral incisional hernia (6/14/2019).  Mr. Melisa Zaman  has a past surgical history that includes pr cardiac surg procedure  (1991); pr cardiac surg procedure unlist (9/2003); hx hernia repair (09/1993); lumbar laminectomy (5/2005); vascular surgery procedure unlist (1991); hx orthopaedic (1/2006); hx orthopaedic (8/2005); hx orthopaedic (2005); hx heart catheterization (04/2015); hx other surgical (11/2014); hx other surgical; hx orthopaedic (03/1955); small bowel resection (06/2019). Social History/Living Environment:     lives with wife. Prior Level of Function/Work/Activity:   Cares for the home repairs and yard  Personal Factors: Other factors that influence how disability is experienced by the patient:  Multiple low back issues      EXAMINATION:   Observation/Orthostatic Postural Assessment:         Good posture.  mild forward head posture when fatigued   Palpation:          Tender over the C6-T1 region  ROM: neck and BUE WNL   pain at end ROM left shoulder flexion                                  Strength: trunk  4/5  LUE Strength  L Shoulder Internal Rotation: (subscap 4)                Special tests: left shoulder pain with Tiera Holguin,  less pain with empty can test

## 2020-03-18 ENCOUNTER — APPOINTMENT (OUTPATIENT)
Dept: PHYSICAL THERAPY | Age: 85
End: 2020-03-18
Attending: INTERNAL MEDICINE
Payer: MEDICARE

## 2020-03-24 ENCOUNTER — APPOINTMENT (OUTPATIENT)
Dept: PHYSICAL THERAPY | Age: 85
End: 2020-03-24
Attending: INTERNAL MEDICINE
Payer: MEDICARE

## 2020-03-27 ENCOUNTER — APPOINTMENT (OUTPATIENT)
Dept: PHYSICAL THERAPY | Age: 85
End: 2020-03-27
Attending: INTERNAL MEDICINE
Payer: MEDICARE

## 2020-03-30 ENCOUNTER — APPOINTMENT (OUTPATIENT)
Dept: PHYSICAL THERAPY | Age: 85
End: 2020-03-30
Attending: INTERNAL MEDICINE
Payer: MEDICARE

## 2020-04-01 ENCOUNTER — APPOINTMENT (OUTPATIENT)
Dept: PHYSICAL THERAPY | Age: 85
End: 2020-04-01
Attending: INTERNAL MEDICINE

## 2020-04-06 ENCOUNTER — APPOINTMENT (OUTPATIENT)
Dept: PHYSICAL THERAPY | Age: 85
End: 2020-04-06
Attending: INTERNAL MEDICINE

## 2020-04-08 ENCOUNTER — APPOINTMENT (OUTPATIENT)
Dept: PHYSICAL THERAPY | Age: 85
End: 2020-04-08
Attending: INTERNAL MEDICINE

## 2020-04-13 ENCOUNTER — APPOINTMENT (OUTPATIENT)
Dept: PHYSICAL THERAPY | Age: 85
End: 2020-04-13
Attending: INTERNAL MEDICINE

## 2020-04-15 ENCOUNTER — APPOINTMENT (OUTPATIENT)
Dept: PHYSICAL THERAPY | Age: 85
End: 2020-04-15
Attending: INTERNAL MEDICINE

## 2020-04-20 ENCOUNTER — APPOINTMENT (OUTPATIENT)
Dept: PHYSICAL THERAPY | Age: 85
End: 2020-04-20
Attending: INTERNAL MEDICINE

## 2020-04-22 ENCOUNTER — APPOINTMENT (OUTPATIENT)
Dept: PHYSICAL THERAPY | Age: 85
End: 2020-04-22
Attending: INTERNAL MEDICINE

## 2020-06-26 PROBLEM — I25.708 CORONARY ARTERY DISEASE OF BYPASS GRAFT OF NATIVE HEART WITH STABLE ANGINA PECTORIS (HCC): Status: ACTIVE | Noted: 2020-06-26

## 2020-08-06 PROBLEM — R73.01 IFG (IMPAIRED FASTING GLUCOSE): Status: ACTIVE | Noted: 2020-08-06

## 2020-08-06 PROBLEM — G62.9 PERIPHERAL POLYNEUROPATHY: Status: ACTIVE | Noted: 2020-08-06

## 2020-08-06 PROBLEM — R73.01 ELEVATED FASTING GLUCOSE: Status: ACTIVE | Noted: 2020-08-06

## 2020-08-26 PROBLEM — I25.708 CORONARY ARTERY DISEASE OF BYPASS GRAFT OF NATIVE HEART WITH STABLE ANGINA PECTORIS (HCC): Status: RESOLVED | Noted: 2020-06-26 | Resolved: 2020-08-26

## 2020-10-01 ENCOUNTER — HOSPITAL ENCOUNTER (OUTPATIENT)
Dept: PHYSICAL THERAPY | Age: 85
Discharge: HOME | End: 2020-10-01
Payer: MEDICARE

## 2020-10-01 PROCEDURE — 97162 PT EVAL MOD COMPLEX 30 MIN: CPT

## 2020-10-01 NOTE — THERAPY EVALUATION
Maximino Mccall  : 1935  [place]  Secondary: Darell Sinha 75 at [facility], [address] [address], [facility] [address]  [phone]   [phone]        OUTPATIENT PHYSICAL THERAPY:Initial Assessment and Discharge Summary 10/1/2020   ICD-10: Treatment Diagnosis: Difficulty in walking, not elsewhere classified (R26.2)  Precautions/Allergies:   Diphenhydramine hcl; Ativan [lorazepam]; Iodinated contrast media; and Sulfa (sulfonamide antibiotics)   TREATMENT PLAN:  Effective Dates: 10/1/2020. Frequency/Duration: One time appointment for initial evaluation. Patient discharged from physical therapy. MEDICAL/REFERRING DIAGNOSIS:  Dizziness and giddiness [R42]   DATE OF ONSET: Over the past two months   REFERRING PHYSICIAN: General Victoria MD MD Orders: Evaluate and treat   Return MD Appointment: unknown      INITIAL ASSESSMENT:  Mr. Cristina's dizziness unable to be provoked with testing. Patient's balance is above fall risk based on scores received on Hernandez Balance Scale and Dynamic Gait Index. Patient will not be seen further by physical therapist due inability to reproduce symptoms. No problems or goals stated at this time. Patient discharged from physical therapy. Patient agreeable with this plan. Thank you for this referral.      PROBLEM LIST (Impacting functional limitations):  1. No problems stated INTERVENTIONS PLANNED: (Treatment may consist of any combination of the following)  1. No interventions stated     GOALS: (Goals have been discussed and agreed upon with patient.)  Short-Term Functional Goals: Time Frame:   1. No short term goals stated  Discharge Goals: Time Frame:   1.  No discharge goals stated    OUTCOME MEASURE:   Tool Used: Hernandez Balance Scale  Score:  Initial: /56 Most Recent:  (Date: -- )   Interpretation of Score: Each section is scored on a 0-4 scale, 0 representing the patients inability to perform the task and 4 representing independence. The scores of each section are added together for a total score of 56. The higher the patients score, the more independent the patient is. Any score below 45 indicates increased risk for falls. Tool Used: Dynamic Gait Index  Score:  Initial: 21/24 Most Recent: X/24 (Date: -- )   Interpretation of Score: Each section is scored on a 0-3 scale, 0 representing the patients inability to perform the task and 3 representing independence. The scores of each section are added together for a total score of 24. Any score below 19 indicates increased risk for falls. PT Patient Time In/Time Out  Time In: 1510  Time Out: 1550    Regarding Maixmino Cristina's therapy, I certify that the treatment plan above will be carried out by a therapist or under their direction. Thank you for this referral,  Juan Parker, PT     Referring Physician Signature: Gayle Bosch MD _______________________________ Date _____________     PAIN/SUBJECTIVE:   Initial: Pain Intensity 1: 0  Post Session:  0/10   HISTORY:   History of Injury/Illness (Reason for Referral):  Patient reports dizziness when bending over or sudden head movements. Patient describes dizziness as lightheadedness lasting for a couple of seconds. Patient has had no falls. Patient rates dizziness as 0/10 and pain level as 0/10.    Past Medical History/Comorbidities:   Mr. 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 He also has no past medical history of Asthma, Chronic kidney disease, Difficult intubation, Heart failure ([address] Liver disease, Malignant hyperthermia due to anesthesia, Nausea & vomiting, Pseudocholinesterase deficiency, Psychiatric disorder, PUD (peptic ulcer disease), Seizures ([address] Sleep apnea, or Thromboembolus ([address] Mr. Gino May  has a past surgical history that includes pr cardiac surg procedure unlist (1991); pr cardiac surg procedure unlist (9/2003); hx cholecystectomy (9/2000); neurological procedure unlisted (12/1981); hx hernia repair (09/1993); hx gi (11/2003); hx lumbar laminectomy (5/2005); hx other surgical (05/2002); hx colonoscopy (02/28/2013); hx endoscopy (02/28/2013); vascular surgery procedure unlist (1991); hx orthopaedic (1/2006); hx orthopaedic (8/2005); hx orthopaedic (2005); hx heart catheterization (04/2015); hx other surgical (11/2014); hx other surgical; hx orthopaedic (03/1955); hx thyroidectomy (08/24/2017); hx thyroidectomy (08/24/2017); hx heent; and hx small bowel resection (06/2019). Social History/Living Environment:     Lives with spouse in a two story home. Prior Level of Function/Work/Activity:  Independent. Retired. Dominant Side:         RIGHT  Personal Factors:          Sex:  male        Age:  82 y.o.    Ambulatory/Rehab Services H2 Model Falls Risk Assessment   Risk Factors:       (1)  Dizziness/Vertigo       (1)  Gender [Male]       (1)  Any administered benzodiazepines Ability to Rise from Chair:       (0)  Ability to rise in a single movement   Falls Prevention Plan:       No modifications necessary   Total: (5 or greater = High Risk): 3   ©2010 [facility] [employer] . [facility] States Patent #[id number]. Federal Law prohibits the replication, distribution or use without written permission from [facility] [employer]   Current Medications:       Current Outpatient Medications:     ipratropium (ATROVENT) 42 mcg (0.06 %) nasal spray, 2 Sprays by Both Nostrils route three (3) times daily. , Disp: 15 mL, Rfl: 1    terazosin (HYTRIN) 5 mg capsule, Take 1 Cap by mouth nightly. Indications: enlarged prostate with urination problem, Disp: 30 Cap, Rfl: 5    levothyroxine (SYNTHROID) 150 mcg tablet, 1 tablet once a day except for 1/2 tablet on Sundays, Disp: 90 Tab, Rfl: 3    nitroglycerin (NITROLINGUAL) 400 mcg/spray spray, 1 Ethel by SubLINGual route every five (5) minutes as needed for Chest Pain., Disp: 1 Bottle, Rfl: 3    mupirocin calcium (BACTROBAN) 2 % nasal ointment, by Both Nostrils route two (2) times a day., Disp: 1 g, Rfl: 0    fluticasone propionate (FLONASE) 50 mcg/actuation nasal spray, 2 Sprays by Both Nostrils route daily. Indications: inflammation of the nose due to an allergy, Disp: 3 Bottle, Rfl: 3    atorvastatin (LIPITOR) 40 mg tablet, Take 1 Tab by mouth daily. , Disp: 90 Tab, Rfl: 3    amLODIPine (NORVASC) 5 mg tablet, Take 1 Tab by mouth daily. , Disp: 90 Tab, Rfl: 3    albuterol sulfate (PROAIR RESPICLICK) 90 mcg/actuation aepb, Take 2 Puffs by inhalation four (4) times daily as needed (Wheeze). , Disp: 3 Inhaler, Rfl: 3    tiotropium-olodaterol (STIOLTO RESPIMAT) 2.5-2.5 mcg/actuation inhaler, Take 2 Puffs by inhalation daily. , Disp: 3 Inhaler, Rfl: 3    omeprazole (PRILOSEC) 40 mg capsule, Take 40 mg by mouth daily. , Disp: , Rfl:     metoprolol tartrate (LOPRESSOR) 25 mg tablet, TAKE 1 TABLET BY MOUTH TWO  TIMES DAILY, Disp: 180 Tab, Rfl: 3    gabapentin (NEURONTIN) 100 mg capsule, 200-400 mg three (3) times daily. , Disp: , Rfl:     aspirin 81 mg chewable tablet, Take 81 mg by mouth daily. , Disp: , Rfl:     finasteride (PROSCAR) 5 mg tablet, Take 5 mg by mouth daily. , Disp: , Rfl:    Date Last Reviewed:  10/1/2020   Number of Personal Factors/Comorbidities that affect the Plan of Care: 1-2: MODERATE COMPLEXITY   EXAMINATION:   Functional Mobility:         Gait/Ambulation:  Patient ambulates with normal gait pattern. Strength:          Bilateral lower extremity strength 5/5. Sensation:         Within normal limits. Postural Control & Balance:  · Hernandez Balance Scale:  51/56.   (A score less than 45/56 indicates high risk of falls)     · Dynamic Gait Index:  21/24.   (A score less than or equal to19/24 is abnormal and predictive of falls)     · [employer] Sensory Organization Test:  Not tested. Body Structures Involved:  1. Muscles Body Functions Affected:  1. Neuromusculoskeletal Activities and Participation Affected:  1.  Mobility   Number of elements (examined above) that affect the Plan of Care: 3: MODERATE COMPLEXITY   CLINICAL PRESENTATION:   Presentation: Evolving clinical presentation with changing clinical characteristics: MODERATE COMPLEXITY   CLINICAL DECISION MAKING:   Use of outcome tool(s) and clinical judgement create a POC that gives a: Questionable prediction of patient's progress: MODERATE COMPLEXITY

## 2020-11-23 ENCOUNTER — APPOINTMENT (OUTPATIENT)
Dept: GENERAL RADIOLOGY | Age: 85
End: 2020-11-23
Attending: EMERGENCY MEDICINE
Payer: MEDICARE

## 2020-11-23 ENCOUNTER — HOSPITAL ENCOUNTER (EMERGENCY)
Age: 85
Discharge: HOME | End: 2020-11-23
Attending: EMERGENCY MEDICINE
Payer: MEDICARE

## 2020-11-23 VITALS
SYSTOLIC BLOOD PRESSURE: 164 MMHG | HEIGHT: 73 IN | HEART RATE: 56 BPM | RESPIRATION RATE: 20 BRPM | DIASTOLIC BLOOD PRESSURE: 78 MMHG | OXYGEN SATURATION: 97 % | TEMPERATURE: 98.6 F | WEIGHT: 205 LBS | BODY MASS INDEX: 27.17 KG/M2

## 2020-11-23 DIAGNOSIS — I25.10 CORONARY ARTERY DISEASE INVOLVING NATIVE CORONARY ARTERY OF NATIVE HEART, ANGINA PRESENCE UNSPECIFIED: ICD-10-CM

## 2020-11-23 DIAGNOSIS — R07.9 ACUTE CHEST PAIN: Primary | ICD-10-CM

## 2020-11-23 LAB
ALBUMIN SERPL-MCNC: 3.9 G/DL (ref 3.2–4.6)
ALBUMIN/GLOB SERPL: 1.4 {RATIO} (ref 1.2–3.5)
ALP SERPL-CCNC: 79 U/L (ref 50–136)
ALT SERPL-CCNC: 22 U/L (ref 12–65)
ANION GAP SERPL CALC-SCNC: 6 MMOL/L (ref 7–16)
AST SERPL-CCNC: 17 U/L (ref 15–37)
BASOPHILS # BLD: 0 K/UL (ref 0–0.2)
BASOPHILS NFR BLD: 1 % (ref 0–2)
BILIRUB SERPL-MCNC: 1.5 MG/DL (ref 0.2–1.1)
BUN SERPL-MCNC: 18 MG/DL (ref 8–23)
CALCIUM SERPL-MCNC: 9 MG/DL (ref 8.3–10.4)
CHLORIDE SERPL-SCNC: 100 MMOL/L (ref 98–107)
CO2 SERPL-SCNC: 27 MMOL/L (ref 21–32)
CREAT SERPL-MCNC: 1.35 MG/DL (ref 0.8–1.5)
DIFFERENTIAL METHOD BLD: ABNORMAL
EOSINOPHIL # BLD: 0.1 K/UL (ref 0–0.8)
EOSINOPHIL NFR BLD: 2 % (ref 0.5–7.8)
ERYTHROCYTE [DISTWIDTH] IN BLOOD BY AUTOMATED COUNT: 13.4 % (ref 11.9–14.6)
GLOBULIN SER CALC-MCNC: 2.7 G/DL (ref 2.3–3.5)
GLUCOSE SERPL-MCNC: 122 MG/DL (ref 65–100)
HCT VFR BLD AUTO: 32.2 % (ref 41.1–50.3)
HGB BLD-MCNC: 11.7 G/DL (ref 13.6–17.2)
IMM GRANULOCYTES # BLD AUTO: 0 K/UL (ref 0–0.5)
IMM GRANULOCYTES NFR BLD AUTO: 1 % (ref 0–5)
LYMPHOCYTES # BLD: 0.8 K/UL (ref 0.5–4.6)
LYMPHOCYTES NFR BLD: 21 % (ref 13–44)
MCH RBC QN AUTO: 34.8 PG (ref 26.1–32.9)
MCHC RBC AUTO-ENTMCNC: 36.3 G/DL (ref 31.4–35)
MCV RBC AUTO: 95.8 FL (ref 79.6–97.8)
MONOCYTES # BLD: 0.3 K/UL (ref 0.1–1.3)
MONOCYTES NFR BLD: 7 % (ref 4–12)
NEUTS SEG # BLD: 2.7 K/UL (ref 1.7–8.2)
NEUTS SEG NFR BLD: 69 % (ref 43–78)
NRBC # BLD: 0 K/UL (ref 0–0.2)
PLATELET # BLD AUTO: 104 K/UL (ref 150–450)
PMV BLD AUTO: 8.9 FL (ref 9.4–12.3)
POTASSIUM SERPL-SCNC: 4.2 MMOL/L (ref 3.5–5.1)
PROT SERPL-MCNC: 6.6 G/DL (ref 6.3–8.2)
RBC # BLD AUTO: 3.36 M/UL (ref 4.23–5.6)
SODIUM SERPL-SCNC: 133 MMOL/L (ref 138–145)
TROPONIN-HIGH SENSITIVITY: 10.2 PG/ML (ref 0–14)
TROPONIN-HIGH SENSITIVITY: 9.3 PG/ML (ref 0–14)
WBC # BLD AUTO: 3.9 K/UL (ref 4.3–11.1)

## 2020-11-23 PROCEDURE — 99284 EMERGENCY DEPT VISIT MOD MDM: CPT

## 2020-11-23 PROCEDURE — 80053 COMPREHEN METABOLIC PANEL: CPT

## 2020-11-23 PROCEDURE — 93005 ELECTROCARDIOGRAM TRACING: CPT | Performed by: EMERGENCY MEDICINE

## 2020-11-23 PROCEDURE — 85025 COMPLETE CBC W/AUTO DIFF WBC: CPT

## 2020-11-23 PROCEDURE — 74011250637 HC RX REV CODE- 250/637: Performed by: EMERGENCY MEDICINE

## 2020-11-23 PROCEDURE — 84484 ASSAY OF TROPONIN QUANT: CPT

## 2020-11-23 PROCEDURE — 71046 X-RAY EXAM CHEST 2 VIEWS: CPT

## 2020-11-23 RX ORDER — GUAIFENESIN 100 MG/5ML
162 LIQUID (ML) ORAL
Status: COMPLETED | OUTPATIENT
Start: 2020-11-23 | End: 2020-11-23

## 2020-11-23 RX ADMIN — ASPIRIN 81 MG CHEWABLE TABLET 162 MG: 81 TABLET CHEWABLE at 18:07

## 2020-11-23 NOTE — ED TRIAGE NOTES
Patient ambulatory to triage with mask in place. Patient reports chest pain/tightness, shob and fatigue that has progressively gotten worse over the past days. Pt reports worsening pain with exertion.

## 2020-11-23 NOTE — ED PROVIDER NOTES
71-year-old gentleman has coronary artery disease with bypass grafting 2003. History of thoracic aortic aneurysm with endovascular graft 2015. Patient also has history hypertension and atrial fibrillation. Patient had a hernia surgery 6 weeks ago. He had a nuclear stress test that was negative week before that. Patient describes increasing chest pain and dyspnea on exertion that started about a month ago but is much worse in the last few days. Describes substernal tightness that goes up into his neck. He gets very winded after walking 30 feet. He has occasionally taken nitroglycerin but this does not seem to help. Any activity seems to bring on the pain it will go away with rest.  Feels somewhat similar to years before when he had heart issues. The history is provided by the patient. Chest Pain (Angina)    This is a new problem. The current episode started more than 1 week ago. The problem has been gradually worsening. The problem occurs daily. The pain is associated with exertion. The pain is moderate. The quality of the pain is described as pressure-like. The pain radiates to the left neck. The symptoms are aggravated by exertion. Associated symptoms include dizziness and shortness of breath (HAQUE). Pertinent negatives include no abdominal pain, no back pain, no cough, no diaphoresis, no fever, no headaches, no nausea, no palpitations, no vomiting and no weakness. He has tried nitroglycerin for the symptoms. The treatment provided no relief. Risk factors include cardiac disease and hypertension. His past medical history is significant for aneurysm and HTN. His past medical history does not include DM, DVT or PE. Procedural history includes cardiac catheterization and CABG. Pertinent negatives include no cardiac stents.        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   Left lobe: At least 4 heterogeneous nodules in the left lobe. The largest measures 2.9 x 1.1 x 2.4 cm in the lower pole, with mild peripheral color Doppler flow and possible tiny internal calcifications.     Pneumatosis intestinalis 6/14/2019    Renal cyst, right 12/3/2013    Small bowel obstruction (HCC) 6/14/2019    Thrombocytopenia (HCC)     marrow normal slightly big spleen low mpv    Thyroid disease     hypothyroidism    Ventral incisional hernia 6/14/2019       Past Surgical History:   Procedure Laterality Date    CARDIAC SURG PROCEDURE UNLIST  1991    AAA    CARDIAC SURG PROCEDURE UNLIST  9/2003     6 bypass surgeries    HX CHOLECYSTECTOMY  9/2000    HX COLONOSCOPY  02/28/2013    10 polyps/ fu in 3 yrs    HX ENDOSCOPY  02/28/2013    1 biopsy    HX GI  11/2003    small bowel obstruction    HX HEART CATHETERIZATION  04/2015    HX HEENT      thyroidectomy    HX HERNIA REPAIR  09/1993    Insisional    HX LUMBAR LAMINECTOMY  5/2005    L3, L4    HX ORTHOPAEDIC  1/2006    right total knee replacement    HX ORTHOPAEDIC  2005     left knee replacement    HX ORTHOPAEDIC      laminectomy L3-l4    HX ORTHOPAEDIC  1955    right knee surgery    HX OTHER SURGICAL  2002    upper endoscopic retrograde cholangiopancreatogram    HX OTHER SURGICAL  2014    removal squamous cell carcinoma    HX OTHER SURGICAL      MRSA lower back -3/2005    HX SMALL BOWEL RESECTION  2019    Ischemic small bowel resection and hernia repair without mesh    HX THYROIDECTOMY  2017    Dr Tabatha Boothe  2017    Dr. Steward Arch  1981     ruptured disc in back   [address]    abd aortic aneurysm repair         Family History:   Problem Relation Age of Onset    Heart Disease Father     Cancer Mother         colon    Cancer Sister     Cancer Maternal Aunt     Cancer Paternal Grandmother     Diabetes Paternal Grandmother     Thyroid Disease Neg Hx     Thyroid Cancer Neg Hx        Social History     Socioeconomic History    Marital status:      Spouse name: Not on file    Number of children: Not on file    Years of education: Not on file    Highest education level: Not on file   Occupational History    Not on file   Social Needs    Financial resource strain: Not on file    Food insecurity     Worry: Not on file     Inability: Not on file    Transportation needs     Medical: Not on file     Non-medical: Not on file   Tobacco Use    Smoking status: Former Smoker     Packs/day: 1.00     Years: 25.00     Pack years: 25.00     Last attempt to quit: 12/3/1987     Years since quittin.9    Smokeless tobacco: Never Used   Substance and Sexual Activity    Alcohol use: Yes     Comment: occasional glass wine    Drug use: No    Sexual activity: Not on file   Lifestyle    Physical activity     Days per week: Not on file     Minutes per session: Not on file    Stress: Not on file Relationships    Social connections     Talks on phone: Not on file     Gets together: Not on file     Attends Latter-day service: Not on file     Active member of club or organization: Not on file     Attends meetings of clubs or organizations: Not on file     Relationship status: Not on file    Intimate partner violence     Fear of current or ex partner: Not on file     Emotionally abused: Not on file     Physically abused: Not on file     Forced sexual activity: Not on file   Other Topics Concern    Not on file   Social History Narrative    Not on file         ALLERGIES: Diphenhydramine hcl; Ativan [lorazepam]; Iodinated contrast media; and Sulfa (sulfonamide antibiotics)    Review of Systems   Constitutional: Negative for chills, diaphoresis and fever. Respiratory: Positive for shortness of breath (HAQUE). Negative for cough. Cardiovascular: Positive for chest pain. Negative for palpitations. Gastrointestinal: Negative for abdominal pain, diarrhea, nausea and vomiting. Genitourinary: Negative for dysuria and flank pain. Musculoskeletal: Negative for back pain and neck pain. Skin: Negative for color change and rash. Neurological: Positive for dizziness. Negative for syncope, weakness and headaches. All other systems reviewed and are negative. Vitals:    11/23/20 1713   BP: (!) 140/75   Pulse: 64   Resp: 16   Temp: 98.1 °F (36.7 °C)   SpO2: 99%   Weight: 93 kg (205 lb)   Height: 6' 1\" (1.854 m)            Physical Exam  Vitals signs and nursing note reviewed. Constitutional:       General: He is not in acute distress. Appearance: He is well-developed. HENT:      Head: Normocephalic and atraumatic. Right Ear: External ear normal.      Left Ear: External ear normal.      Mouth/Throat:      Pharynx: No oropharyngeal exudate. Eyes:      Conjunctiva/sclera: Conjunctivae normal.      Pupils: Pupils are equal, round, and reactive to light.    Neck:      Musculoskeletal: Normal range of motion and neck supple. Cardiovascular:      Rate and Rhythm: Normal rate and regular rhythm. Heart sounds: No murmur. Pulmonary:      Effort: No respiratory distress. Breath sounds: Normal breath sounds. Abdominal:      General: Bowel sounds are normal.      Palpations: Abdomen is soft. There is no mass. Tenderness: There is no abdominal tenderness. There is no guarding or rebound. Hernia: No hernia is present. Skin:     General: Skin is warm and dry. Neurological:      Mental Status: He is alert and oriented to person, place, and time. Gait: Gait normal.      Comments: Nl speech   Psychiatric:         Speech: Speech normal.          MDM  Number of Diagnoses or Management Options  Diagnosis management comments: Pattern suspicious for unstable angina. Seems like pulmonary embolism would be less likely. Check chest x-ray and screening lab work. Discussed with cardiology once labs are back. Amount and/or Complexity of Data Reviewed  Clinical lab tests: ordered and reviewed  Tests in the radiology section of CPT®: ordered and reviewed  Tests in the medicine section of CPT®: ordered and reviewed  Review and summarize past medical records: yes (See HPI.   Fortunately has contrast allergy and requires premedication.)  Independent visualization of images, tracings, or specimens: yes (EKG shows normal sinus rhythm no ST-T changes or ectopy.)    Risk of Complications, Morbidity, and/or Mortality  Presenting problems: moderate  Diagnostic procedures: minimal  Management options: low    Patient Progress  Patient progress: stable         Procedures    Results Include:    Recent Results (from the past 24 hour(s))   METABOLIC PANEL, COMPREHENSIVE    Collection Time: 11/23/20  5:19 PM   Result Value Ref Range    Sodium 133 (L) 138 - 145 mmol/L    Potassium 4.2 3.5 - 5.1 mmol/L    Chloride 100 98 - 107 mmol/L    CO2 27 21 - 32 mmol/L    Anion gap 6 (L) 7 - 16 mmol/L    Glucose 122 (H) 65 - 100 mg/dL    BUN 18 8 - 23 MG/DL    Creatinine 1.35 0.8 - 1.5 MG/DL    GFR est AA >60 >60 ml/min/1.73m2    GFR est non-AA 54 (L) >60 ml/min/1.73m2    Calcium 9.0 8.3 - 10.4 MG/DL    Bilirubin, total 1.5 (H) 0.2 - 1.1 MG/DL    ALT (SGPT) 22 12 - 65 U/L    AST (SGOT) 17 15 - 37 U/L    Alk. phosphatase 79 50 - 136 U/L    Protein, total 6.6 6.3 - 8.2 g/dL    Albumin 3.9 3.2 - 4.6 g/dL    Globulin 2.7 2.3 - 3.5 g/dL    A-G Ratio 1.4 1.2 - 3.5     CBC WITH AUTOMATED DIFF    Collection Time: 11/23/20  5:19 PM   Result Value Ref Range    WBC 3.9 (L) 4.3 - 11.1 K/uL    RBC 3.36 (L) 4.23 - 5.6 M/uL    HGB 11.7 (L) 13.6 - 17.2 g/dL    HCT 32.2 (L) 41.1 - 50.3 %    MCV 95.8 79.6 - 97.8 FL    MCH 34.8 (H) 26.1 - 32.9 PG    MCHC 36.3 (H) 31.4 - 35.0 g/dL    RDW 13.4 11.9 - 14.6 %    PLATELET 855 (L) 874 - 450 K/uL    MPV 8.9 (L) 9.4 - 12.3 FL    ABSOLUTE NRBC 0.00 0.0 - 0.2 K/uL    DF AUTOMATED      NEUTROPHILS 69 43 - 78 %    LYMPHOCYTES 21 13 - 44 %    MONOCYTES 7 4.0 - 12.0 %    EOSINOPHILS 2 0.5 - 7.8 %    BASOPHILS 1 0.0 - 2.0 %    IMMATURE GRANULOCYTES 1 0.0 - 5.0 %    ABS. NEUTROPHILS 2.7 1.7 - 8.2 K/UL    ABS. LYMPHOCYTES 0.8 0.5 - 4.6 K/UL    ABS. MONOCYTES 0.3 0.1 - 1.3 K/UL    ABS. EOSINOPHILS 0.1 0.0 - 0.8 K/UL    ABS. BASOPHILS 0.0 0.0 - 0.2 K/UL    ABS. IMM. GRANS. 0.0 0.0 - 0.5 K/UL   TROPONIN-HIGH SENSITIVITY    Collection Time: 11/23/20  5:19 PM   Result Value Ref Range    Troponin-High Sensitivity 9.3 0 - 14 pg/mL     Xr Chest Pa Lat    Result Date: 11/23/2020  EXAM: CHEST X-RAY, 2 VIEWS INDICATION: chest pain COMPARISON: Chest x-ray 5/6/2019 TECHNIQUE: Frontal and lateral views of the chest were obtained. FINDINGS: The lungs are clear and the pulmonary vasculature is normal. No pneumothorax or pleural effusion. Descending thoracic aortic aneurysm with stent in place. The osseous structures are unremarkable. IMPRESSION: No radiographic evidence of acute cardiopulmonary disease. Pain at present. Will discuss with cardiology. Discussed with cardiology. They are willing admit the patient if troponin elevates, he has recurrent pain or desires admission. At this point the patient would rather go home and be seen in a working appointment tomorrow at the office. Message left with [facility] cardiology. 7:59 PM  Troponin stable. Follow-up with cardiology. No pain while in the department.

## 2020-11-24 NOTE — ED NOTES
I have reviewed discharge instructions with the patient. The patient verbalized understanding. Patient left ED via Discharge Method: ambulatory to Home with friend. Opportunity for questions and clarification provided. Patient given 0 scripts. To continue your aftercare when you leave the hospital, you may receive an automated call from our care team to check in on how you are doing. This is a free service and part of our promise to provide the best care and service to meet your aftercare needs.  If you have questions, or wish to unsubscribe from this service please call [phone]. Thank you for Choosing our [facility] Emergency Department.

## 2020-11-24 NOTE — DISCHARGE INSTRUCTIONS
Call cardiology office at 9 AM tomorrow for appointment to recheck. Tell them that we spoke with the cardiologist call and that he needed to be checked. For any persistent pain that does not go away with rest, use nitroglycerin. Recheck for chest pain unrelieved by 3 nitroglycerin. Patient Education        Angina: Care Instructions  Your Care Instructions     You have a problem called angina. Angina happens when there is not enough blood flow to your heart muscle. Angina is a sign of coronary artery disease (CAD). CAD occurs when blood vessels that supply the heart become narrowed. Having CAD increases your risk of a heart attack. Chest pain or pressure is the most common symptom of angina. But some people have other symptoms, like:  · Pain, pressure, or a strange feeling in the back, neck, jaw, or upper belly, or in one or both shoulders or arms. · Shortness of breath. · Nausea or vomiting. · Lightheadedness or sudden weakness. · Fast or irregular heartbeat. Women are somewhat more likely than men to have angina symptoms like shortness of breath, nausea, and back or jaw pain. Angina can be dangerous. That's why it is important to pay attention to your symptoms. Know what is typical for you, learn how to control your symptoms, and understand when you need to get treatment. A change in your usual pattern of symptoms is an emergency. It may mean that you are having a heart attack. The doctor has checked you carefully, but problems can develop later. If you notice any problems or new symptoms, get medical treatment right away. Follow-up care is a key part of your treatment and safety. Be sure to make and go to all appointments, and call your doctor if you are having problems. It's also a good idea to know your test results and keep a list of the medicines you take. How can you care for yourself at home?   Medicines    · If your doctor has given you nitroglycerin for angina symptoms, keep it with you at all times. If you have symptoms, sit down and rest, and take the first dose of nitroglycerin as directed. If your symptoms get worse or are not getting better within 5 minutes, call 911 right away. Stay on the phone. The emergency  will give you further instructions.     · If your doctor advises it, take 1 low-dose aspirin a day to prevent heart attack.     · Be safe with medicines. Take your medicines exactly as prescribed. Call your doctor if you think you are having a problem with your medicine. You will get more details on the specific medicines your doctor prescribes. Lifestyle changes    · Do not smoke. If you need help quitting, talk to your doctor about stop-smoking programs and medicines. These can increase your chances of quitting for good.     · Eat a heart-healthy diet that is low in saturated fat and salt, and is high in fiber. Talk to your doctor or a dietitian about healthy eating.     · Stay at a healthy weight. Or lose weight if you need to. Activity    · Talk to your doctor about a level of activity that is safe for you.     · If an activity causes angina symptoms, stop and rest.   When should you call for help? Call 911 anytime you think you may need emergency care. For example, call if:    · You passed out (lost consciousness).     · You have symptoms of a heart attack. These may include:  ? Chest pain or pressure, or a strange feeling in the chest.  ? Sweating. ? Shortness of breath. ? Nausea or vomiting. ? Pain, pressure, or a strange feeling in the back, neck, jaw, or upper belly or in one or both shoulders or arms. ? Lightheadedness or sudden weakness. ? A fast or irregular heartbeat. After you call 911, the  may tell you to chew 1 adult-strength or 2 to 4 low-dose aspirin. Wait for an ambulance.  Do not try to drive yourself.     · You have angina symptoms that do not go away with rest or are not getting better within 5 minutes after you take a dose of nitroglycerin. Call your doctor now if:    · Your angina symptoms seem worse but still follow your typical pattern. You can predict when symptoms will happen, but they may come on sooner, feel worse, or last longer.     · You feel dizzy or lightheaded, or you feel like you may faint. Watch closely for changes in your health, and be sure to contact your doctor if you have any problems. Where can you learn more? Go to http://www.gray.com/  Enter H129 in the search box to learn more about \"Angina: Care Instructions. \"  Current as of: December 16, 2019               Content Version: 12.6  © 2266-3888 [employer]. Care instructions adapted under license by [employer] (which disclaims liability or warranty for this information). If you have questions about a medical condition or this instruction, always ask your healthcare professional. [address] any warranty or liability for your use of this information.

## 2020-11-25 LAB
ATRIAL RATE: 68 BPM
CALCULATED P AXIS, ECG09: 15 DEGREES
CALCULATED R AXIS, ECG10: 35 DEGREES
CALCULATED T AXIS, ECG11: 40 DEGREES
DIAGNOSIS, 93000: NORMAL
P-R INTERVAL, ECG05: 208 MS
Q-T INTERVAL, ECG07: 408 MS
QRS DURATION, ECG06: 98 MS
QTC CALCULATION (BEZET), ECG08: 433 MS
VENTRICULAR RATE, ECG03: 68 BPM

## 2021-02-03 ENCOUNTER — HOSPITAL ENCOUNTER (OUTPATIENT)
Dept: NUCLEAR MEDICINE | Age: 86
Discharge: HOME | End: 2021-02-03
Attending: INTERNAL MEDICINE
Payer: MEDICARE

## 2021-02-03 DIAGNOSIS — G89.29 CHRONIC LEFT-SIDED THORACIC BACK PAIN: ICD-10-CM

## 2021-02-03 DIAGNOSIS — M54.6 CHRONIC LEFT-SIDED THORACIC BACK PAIN: ICD-10-CM

## 2021-02-03 DIAGNOSIS — D69.6 THROMBOCYTOPENIA (HCC): ICD-10-CM

## 2021-02-03 PROCEDURE — 78306 BONE IMAGING WHOLE BODY: CPT

## 2021-03-01 ENCOUNTER — TELEPHONE (OUTPATIENT)
Dept: CARDIAC CATH/INVASIVE PROCEDURES | Age: 86
End: 2021-03-01

## 2021-03-01 DIAGNOSIS — I25.709 CORONARY ARTERY DISEASE INVOLVING CORONARY BYPASS GRAFT OF NATIVE HEART WITH ANGINA PECTORIS (HCC): Primary | ICD-10-CM

## 2021-03-01 NOTE — PROGRESS NOTES
Pt called with questions regarding blood work. Instructed on when & where labs could be collected.  Pt voiced good understanding

## 2021-03-02 ENCOUNTER — HOSPITAL ENCOUNTER (OUTPATIENT)
Dept: LAB | Age: 86
Discharge: HOME | End: 2021-03-02
Payer: MEDICARE

## 2021-03-02 DIAGNOSIS — I25.709 CORONARY ARTERY DISEASE INVOLVING CORONARY BYPASS GRAFT OF NATIVE HEART WITH ANGINA PECTORIS (HCC): ICD-10-CM

## 2021-03-02 LAB
ANION GAP SERPL CALC-SCNC: 3 MMOL/L (ref 7–16)
BUN SERPL-MCNC: 13 MG/DL (ref 8–23)
CALCIUM SERPL-MCNC: 9 MG/DL (ref 8.3–10.4)
CHLORIDE SERPL-SCNC: 106 MMOL/L (ref 98–107)
CO2 SERPL-SCNC: 28 MMOL/L (ref 21–32)
CREAT SERPL-MCNC: 0.96 MG/DL (ref 0.8–1.5)
ERYTHROCYTE [DISTWIDTH] IN BLOOD BY AUTOMATED COUNT: 13.3 % (ref 11.9–14.6)
GLUCOSE SERPL-MCNC: 114 MG/DL (ref 65–100)
HCT VFR BLD AUTO: 33.3 % (ref 41.1–50.3)
HGB BLD-MCNC: 11.6 G/DL (ref 13.6–17.2)
INR PPP: 1.1
MCH RBC QN AUTO: 34.1 PG (ref 26.1–32.9)
MCHC RBC AUTO-ENTMCNC: 34.8 G/DL (ref 31.4–35)
MCV RBC AUTO: 97.9 FL (ref 79.6–97.8)
NRBC # BLD: 0 K/UL (ref 0–0.2)
PLATELET # BLD AUTO: 97 K/UL (ref 150–450)
PMV BLD AUTO: 9.3 FL (ref 9.4–12.3)
POTASSIUM SERPL-SCNC: 3.9 MMOL/L (ref 3.5–5.1)
PROTHROMBIN TIME: 14.8 SEC (ref 12.5–14.7)
RBC # BLD AUTO: 3.4 M/UL (ref 4.23–5.6)
SODIUM SERPL-SCNC: 137 MMOL/L (ref 136–145)
WBC # BLD AUTO: 3.1 K/UL (ref 4.3–11.1)

## 2021-03-02 PROCEDURE — 85610 PROTHROMBIN TIME: CPT

## 2021-03-02 PROCEDURE — 80048 BASIC METABOLIC PNL TOTAL CA: CPT

## 2021-03-02 PROCEDURE — 85027 COMPLETE CBC AUTOMATED: CPT

## 2021-03-02 PROCEDURE — 36415 COLL VENOUS BLD VENIPUNCTURE: CPT

## 2021-03-09 NOTE — PROGRESS NOTES
Patient pre-assessment complete for [facility] poss with Dr Glen Marinelli scheduled for 3/10/21 at 9:30, arrival time 7:30am. Patient verified using . Patient instructed to bring all home medications in labeled bottles on the day of procedure. NPO status reinforced. Patient informed to take a full dose aspirin 325mg  or 81 mg x 4 on the day of procedure. Instructed they can take all other medications excluding vitamins & supplements. Patient verbalizes understanding of all instructions & denies any questions at this time.

## 2021-03-10 ENCOUNTER — HOSPITAL ENCOUNTER (OUTPATIENT)
Dept: CARDIAC CATH/INVASIVE PROCEDURES | Age: 86
Discharge: HOME | End: 2021-03-10
Attending: INTERNAL MEDICINE | Admitting: INTERNAL MEDICINE
Payer: MEDICARE

## 2021-03-10 VITALS
WEIGHT: 207 LBS | HEIGHT: 72 IN | DIASTOLIC BLOOD PRESSURE: 75 MMHG | RESPIRATION RATE: 14 BRPM | SYSTOLIC BLOOD PRESSURE: 147 MMHG | BODY MASS INDEX: 28.04 KG/M2 | OXYGEN SATURATION: 95 % | HEART RATE: 70 BPM | TEMPERATURE: 98.1 F

## 2021-03-10 DIAGNOSIS — I25.709 CORONARY ARTERY DISEASE INVOLVING CORONARY BYPASS GRAFT OF NATIVE HEART WITH ANGINA PECTORIS (HCC): ICD-10-CM

## 2021-03-10 LAB
ATRIAL RATE: 59 BPM
CALCULATED P AXIS, ECG09: 29 DEGREES
CALCULATED R AXIS, ECG10: -21 DEGREES
CALCULATED T AXIS, ECG11: 27 DEGREES
DIAGNOSIS, 93000: NORMAL
P-R INTERVAL, ECG05: 254 MS
Q-T INTERVAL, ECG07: 454 MS
QRS DURATION, ECG06: 98 MS
QTC CALCULATION (BEZET), ECG08: 449 MS
VENTRICULAR RATE, ECG03: 59 BPM

## 2021-03-10 PROCEDURE — 93459 L HRT ART/GRFT ANGIO: CPT

## 2021-03-10 PROCEDURE — 99152 MOD SED SAME PHYS/QHP 5/>YRS: CPT

## 2021-03-10 PROCEDURE — 93459 L HRT ART/GRFT ANGIO: CPT | Performed by: INTERNAL MEDICINE

## 2021-03-10 PROCEDURE — 93005 ELECTROCARDIOGRAM TRACING: CPT | Performed by: INTERNAL MEDICINE

## 2021-03-10 PROCEDURE — C1769 GUIDE WIRE: HCPCS

## 2021-03-10 PROCEDURE — 77030042317 HC BND COMPR HEMSTAT -B

## 2021-03-10 PROCEDURE — 74011000250 HC RX REV CODE- 250: Performed by: INTERNAL MEDICINE

## 2021-03-10 PROCEDURE — C1894 INTRO/SHEATH, NON-LASER: HCPCS

## 2021-03-10 PROCEDURE — 77030016699 HC CATH ANGI DX INFN1 CARD -A

## 2021-03-10 PROCEDURE — 99153 MOD SED SAME PHYS/QHP EA: CPT

## 2021-03-10 PROCEDURE — 74011250636 HC RX REV CODE- 250/636: Performed by: INTERNAL MEDICINE

## 2021-03-10 PROCEDURE — 99152 MOD SED SAME PHYS/QHP 5/>YRS: CPT | Performed by: INTERNAL MEDICINE

## 2021-03-10 PROCEDURE — 74011000636 HC RX REV CODE- 636: Performed by: INTERNAL MEDICINE

## 2021-03-10 RX ORDER — FENTANYL CITRATE 50 UG/ML
25-50 INJECTION, SOLUTION INTRAMUSCULAR; INTRAVENOUS
Status: DISCONTINUED | OUTPATIENT
Start: 2021-03-10 | End: 2021-03-10 | Stop reason: HOSPADM

## 2021-03-10 RX ORDER — MIDAZOLAM HYDROCHLORIDE 1 MG/ML
.5-2 INJECTION, SOLUTION INTRAMUSCULAR; INTRAVENOUS
Status: DISCONTINUED | OUTPATIENT
Start: 2021-03-10 | End: 2021-03-10 | Stop reason: HOSPADM

## 2021-03-10 RX ORDER — LIDOCAINE HYDROCHLORIDE 10 MG/ML
2-20 INJECTION, SOLUTION EPIDURAL; INFILTRATION; INTRACAUDAL; PERINEURAL ONCE
Status: COMPLETED | OUTPATIENT
Start: 2021-03-10 | End: 2021-03-10

## 2021-03-10 RX ORDER — SODIUM CHLORIDE 9 MG/ML
75 INJECTION, SOLUTION INTRAVENOUS CONTINUOUS
Status: DISCONTINUED | OUTPATIENT
Start: 2021-03-10 | End: 2021-03-10 | Stop reason: HOSPADM

## 2021-03-10 RX ORDER — GUAIFENESIN 100 MG/5ML
81-324 LIQUID (ML) ORAL ONCE
Status: DISCONTINUED | OUTPATIENT
Start: 2021-03-10 | End: 2021-03-10 | Stop reason: HOSPADM

## 2021-03-10 RX ORDER — HEPARIN SODIUM 200 [USP'U]/100ML
3 INJECTION, SOLUTION INTRAVENOUS CONTINUOUS
Status: DISCONTINUED | OUTPATIENT
Start: 2021-03-10 | End: 2021-03-10 | Stop reason: HOSPADM

## 2021-03-10 RX ADMIN — IOPAMIDOL 210 ML: 755 INJECTION, SOLUTION INTRAVENOUS at 11:29

## 2021-03-10 RX ADMIN — SODIUM CHLORIDE 75 ML/HR: 900 INJECTION, SOLUTION INTRAVENOUS at 07:49

## 2021-03-10 RX ADMIN — HEPARIN SODIUM 3 ML/HR: 5000 INJECTION, SOLUTION INTRAVENOUS; SUBCUTANEOUS at 10:56

## 2021-03-10 RX ADMIN — FENTANYL CITRATE 25 MCG: 50 INJECTION, SOLUTION INTRAMUSCULAR; INTRAVENOUS at 11:13

## 2021-03-10 RX ADMIN — LIDOCAINE HYDROCHLORIDE 3 ML: 10 INJECTION, SOLUTION EPIDURAL; INFILTRATION; INTRACAUDAL; PERINEURAL at 10:56

## 2021-03-10 RX ADMIN — MIDAZOLAM 1 MG: 1 INJECTION INTRAMUSCULAR; INTRAVENOUS at 11:13

## 2021-03-10 RX ADMIN — HEPARIN SODIUM 2 ML: 10000 INJECTION, SOLUTION INTRAVENOUS; SUBCUTANEOUS at 10:56

## 2021-03-10 RX ADMIN — MIDAZOLAM 2 MG: 1 INJECTION INTRAMUSCULAR; INTRAVENOUS at 10:54

## 2021-03-10 RX ADMIN — FENTANYL CITRATE 25 MCG: 50 INJECTION, SOLUTION INTRAMUSCULAR; INTRAVENOUS at 10:54

## 2021-03-10 NOTE — PROCEDURES
[facility]  CARDIAC CATH    Name:  Yuni Brumfield  MR#:  [id number]  :  1935  ACCOUNT #:  [de-identified]  DATE OF SERVICE:  03/10/2021    PROCEDURES PERFORMED:  1. Left heart cath, selective coronary arteriography, and left ventriculogram via the left radial approach. 2.  LIMA arteriography. 3.  Saphenous vein graft arteriography. PREOPERATIVE DIAGNOSES:  Exertional dyspnea concerning for anginal equivalent. POSTOPERATIVE DIAGNOSES:  Stable CAD. Perry Petersen SURGEON:  Kiki Thornton. MD Dory    ASSISTANT:  None. ESTIMATED BLOOD LOSS:  Less than 5 mL. SPECIMENS REMOVED:  None. COMPLICATIONS:  None. FINDINGS:  As below. IMPLANTS:  None. ANESTHESIA:  The patient received 3 mg of Versed, 50 mcg of fentanyl. Sedation start time was 10:54 with a procedure completion time of 11:30. Sedation was administered by Corinne Mariee RN, under my supervision. The patient was continuously monitored in regards to his vital signs, level of consciousness and pulse oximetry. Remained stable throughout the procedure. INDICATION:  Recurrent chest pain despite aggressive medical therapy in a patient with prior bypass surgery. Concerns of obstructive coronary artery disease. Cardiac catheterization arranged for further evaluation and treatment. TECHNICAL FACTORS:  After informed consent was obtained, the patient was brought to the cardiac catheterization lab. The left radial artery was prepped and draped in the usual sterile fashion. Utilizing modified Seldinger technique and micropuncture needle, the left radial artery was entered. A 6-Macedonian sheath was placed without difficulty.   A LIMA catheter was used to select and engage the left internal mammary artery to LAD and a multipurpose catheter was used to select and engage the sequential saphenous vein graft to PDA and posterolateral, saphenous vein graft to diagonal, sequential saphenous vein graft to ramus and first obtuse marginal.  Selective injections were performed. A JR4 catheter was used to select and engage the ostium of the right coronary artery and a JL4 catheter was used to select and engage the ostium of the left main coronary artery. The multipurpose catheter was used to cross the aortic valve and the left ventricle. Hemodynamic measurements and left ventriculogram were obtained. .    Contrast is 210. HEMODYNAMICS:  1. Aortic pressure was 118/54. 2.  Left ventricular end-diastolic pressures is 7. CORONARY ANGIOGRAPHY:  1. Left main coronary artery is diffusely diseased with 50% mid and distal stenosis. 2.  LAD:  100% proximally occluded. 3.  Left circumflex:  40% proximal stenosis. The distal circumflex appears to be patent, although the ramus and obtuse marginal are occluded. 4.  Right coronary artery is a medium caliber vessel. Heavily diseased with 80-90% proximal stenosis, 100% mid occlusion. BYPASS GRAFT ANATOMY:  1. LIMA to LAD is a medium caliber vessel. Widely patent. Attaches to the distal LAD. The distal LAD is patent with 20-30% stenosis. The mid LAD retrograde fills and is patent. The apical LAD is occluded. 2.  Sequential saphenous vein graft to right posterolateral and PDA:  Large caliber ectatic vessel. Appears to be patent with 10% mid luminal irregularities. The sequential portion between the right posterolateral branch and PDA is patent with 30% stenosis in the proximal mid segment. The posterolateral branch after the anastomosis is small caliber, but patent. The PDA is very small caliber, but patent with mild luminal irregularities. 3.  Saphenous vein graft to diagonal is a large caliber vessel. 20% proximal and mid stenosis. Attached to the first diagonal artery and there is retrograde filling of the proximal LAD with competitive filling to the LAD with the LIMA. 4.  Sequential saphenous vein graft to ramus intermediate and obtuse marginal is a large caliber vessel. 20% proximal distal stenosis. Attached to a small caliber ramus intermediate which is patent distal to the anastomosis and a moderate-sized obtuse marginal which is patent distal to the anastomosis. 5.  Left ventriculogram performed in GLASER projection shows normal left ventricular systolic function. EF is 55-60%. No focal segmental wall motion abnormalities. Aortic root appears nondilated. CONCLUSIONS:  1. Severe multivessel coronary artery disease. 2.  Six of six bypass grafts patent. 3.  Normal left ventricular function. 4.  Small vessel diffuse disease. The only change compared to previous images is apical LAD is now occluded, but this is a small distal vessel. NOTE:  Very difficult catheter manipulation due to very tortuous left subclavian. I had to use the wire for support with manipulation of all catheters in order to engage coronary arteries. PLAN:  Medical therapy.       MD KINZA [email or web address]  D:  03/10/2021 11:45  T:  03/10/2021 12:51  JOB #:  [id number]

## 2021-03-10 NOTE — PROGRESS NOTES
TRANSFER - OUT REPORT:    Verbal report given to RN(name) on Maximino Naik  being transferred to CPRU(unit) for routine progression of care       Report consisted of patients Situation, Background, Assessment and   Recommendations(SBAR). Information from the following report(s) SBAR was reviewed with the receiving nurse.     [facility] w/ Dr. Sven Chen  No interventions  L radial  TR band 9 mls  3 mg versed  50 mcg fentanyl

## 2021-03-10 NOTE — PROGRESS NOTES
Patient up to bedside, vital signs and site stable. Patient ambulated to bathroom without difficulty. Patient voided without difficulty. Vascular site stable. 1415 Discharge instructions and home medications reviewed with patient. Time allowed for questions and answers. Patient ambulated second time without difficulty. Site stable after ambulation. Peripheral IV sites dc'd without difficulty with tips intact. 1435 Patient discharged to home with family.

## 2021-03-10 NOTE — PROGRESS NOTES
Report received from [address] [place] [address]. Procedural findings communicated. Intra procedural  medication administration reviewed. Progression of care discussed.      Patient received into [address] [facility] 3 post sheath removal.     Access site without bleeding or swelling yes    Dressing dry and intact yes    Patient instructed to limit movement to left upper extremity    Routine post procedural vital signs and site assessment initiated yes

## 2021-03-10 NOTE — PROGRESS NOTES
Pt arrived, ambulated to room with no visible problems, planned [facility] for Dr Lewis Cabrera. Consent signed, Procedure discussed with pt all questions answered voiced understanding. Medications and history discussed with pt. Pt prepped per ordersThe patient has a fraility score of 4-VULNERABLE, based on age.

## 2021-03-10 NOTE — PROGRESS NOTES
Terumo band completely deflated. 1405 Terumo band removed from left wrist using sterile technique. Sterile dressing applied. No signs and symptoms of bleeding, oozing or hematoma.

## 2021-03-10 NOTE — DISCHARGE INSTRUCTIONS
HEART CATHETERIZATION/ANGIOGRAPHY DISCHARGE INSTRUCTIONS    1. Check puncture site frequently for swelling or bleeding. If there is any bleeding, lie down and apply pressure over the area with a clean towel or washcloth and call 911. Notify your doctor for any redness, swelling, drainage, or oozing from the puncture site. Notify your doctor for any fever or chills. 2. If the extremity becomes cold, numb, or painful call Dr. Saucedo Sees at [phone].  3. Activity should be limited for the next 48 hours. Avoid pushing, pulling and bending of affected wrist for 48 hours. No heavy lifting (anything over 5 pounds) for 3 days. No driving for 48 hours. 4. You may resume your usual diet. Drink more fluids than usual.  5. Have a responsible person drive you home and stay with you for at least 24 hours after your heart catheterization/angiography. 6. You may remove bandage from your right arm  in 24 hours. You may shower in 24 hours. No tub baths, hot tubs, or swimming for 1 week. Do not place any lotions, creams, powders, or ointments over puncture site for 1 week. You may place a clean band-aid over the puncture site each day for 5 days. Change daily. I have read the above instructions and have had the opportunity to ask questions.

## 2021-05-03 ENCOUNTER — HOSPITAL ENCOUNTER (OUTPATIENT)
Dept: LAB | Age: 86
Discharge: HOME | End: 2021-05-03
Payer: MEDICARE

## 2021-05-03 DIAGNOSIS — D69.6 THROMBOCYTOPENIA (HCC): ICD-10-CM

## 2021-05-03 LAB
ALBUMIN SERPL-MCNC: 3.6 G/DL (ref 3.2–4.6)
ALBUMIN/GLOB SERPL: 1.3 {RATIO} (ref 1.2–3.5)
ALP SERPL-CCNC: 79 U/L (ref 50–136)
ALT SERPL-CCNC: 20 U/L (ref 12–65)
ANION GAP SERPL CALC-SCNC: 7 MMOL/L (ref 7–16)
AST SERPL-CCNC: 14 U/L (ref 15–37)
BASOPHILS # BLD: 0 K/UL (ref 0–0.2)
BASOPHILS NFR BLD: 1 % (ref 0–2)
BILIRUB SERPL-MCNC: 1.4 MG/DL (ref 0.2–1.1)
BUN SERPL-MCNC: 17 MG/DL (ref 8–23)
CALCIUM SERPL-MCNC: 8.6 MG/DL (ref 8.3–10.4)
CHLORIDE SERPL-SCNC: 101 MMOL/L (ref 98–107)
CO2 SERPL-SCNC: 26 MMOL/L (ref 21–32)
CREAT SERPL-MCNC: 1 MG/DL (ref 0.8–1.5)
DIFFERENTIAL METHOD BLD: ABNORMAL
EOSINOPHIL # BLD: 0.1 K/UL (ref 0–0.8)
EOSINOPHIL NFR BLD: 3 % (ref 0.5–7.8)
ERYTHROCYTE [DISTWIDTH] IN BLOOD BY AUTOMATED COUNT: 13.6 % (ref 11.9–14.6)
GLOBULIN SER CALC-MCNC: 2.7 G/DL (ref 2.3–3.5)
GLUCOSE SERPL-MCNC: 109 MG/DL (ref 65–100)
HCT VFR BLD AUTO: 31.8 %
HGB BLD-MCNC: 11.3 G/DL (ref 13.6–17.2)
IMM GRANULOCYTES # BLD AUTO: 0 K/UL (ref 0–0.5)
IMM GRANULOCYTES NFR BLD AUTO: 0 % (ref 0–5)
LDH SERPL L TO P-CCNC: 151 U/L (ref 110–210)
LYMPHOCYTES # BLD: 0.7 K/UL (ref 0.5–4.6)
LYMPHOCYTES NFR BLD: 22 % (ref 13–44)
MCH RBC QN AUTO: 34.3 PG (ref 26.1–32.9)
MCHC RBC AUTO-ENTMCNC: 35.5 G/DL (ref 31.4–35)
MCV RBC AUTO: 96.7 FL (ref 79.6–97.8)
MONOCYTES # BLD: 0.3 K/UL (ref 0.1–1.3)
MONOCYTES NFR BLD: 8 % (ref 4–12)
NEUTS SEG # BLD: 2.1 K/UL (ref 1.7–8.2)
NEUTS SEG NFR BLD: 67 % (ref 43–78)
NRBC # BLD: 0 K/UL (ref 0–0.2)
PLATELET # BLD AUTO: 105 K/UL (ref 150–450)
PMV BLD AUTO: 8.6 FL (ref 9.4–12.3)
POTASSIUM SERPL-SCNC: 4.2 MMOL/L (ref 3.5–5.1)
PROT SERPL-MCNC: 6.3 G/DL (ref 6.3–8.2)
RBC # BLD AUTO: 3.29 M/UL (ref 4.23–5.6)
SODIUM SERPL-SCNC: 134 MMOL/L (ref 136–145)
WBC # BLD AUTO: 3.2 K/UL (ref 4.3–11.1)

## 2021-05-03 PROCEDURE — 80053 COMPREHEN METABOLIC PANEL: CPT

## 2021-05-03 PROCEDURE — 36415 COLL VENOUS BLD VENIPUNCTURE: CPT

## 2021-05-03 PROCEDURE — 85025 COMPLETE CBC W/AUTO DIFF WBC: CPT

## 2021-05-03 PROCEDURE — 83615 LACTATE (LD) (LDH) ENZYME: CPT

## 2021-05-13 PROBLEM — D64.9 MILD ANEMIA: Status: ACTIVE | Noted: 2021-05-13

## 2021-05-13 NOTE — PROGRESS NOTES
"   Progress Note      Patient Name: Branden Bernal   Patient ID: [id number]   Sex: Male   YOB: 1971        Visit Date: July 23, 2020    Provider: Landen Winters MD   [facility]   Location Address: [address] [place] [address]  [id number]   Location Phone: [phone]          Chief Complaint  · Follow Up Office Visit      History Of Present Illness     Mr. Bernal is seen in follow-up status post hemorrhoidectomy.       Past Medical History  GERD; Hypertension; Mood disorder         Past Surgical History  Tonsilectomy         Medication List  baclofen 10 mg oral tablet; divalproex 250 mg oral tablet extended release 24 hr; gabapentin 600 mg oral tablet; ibuprofen 800 mg oral tablet; risperidone 0.5 mg oral tablet; Tylenol 325 mg oral tablet         Allergy List  NO KNOWN DRUG ALLERGIES         Family Medical History  *No Known Family History         Social History  Tobacco (Current every day)         Review of Systems  · Cardiovascular  o Denies  o : chest pain on exertion, shortness of breath, lower extremity swelling  · Respiratory  o Denies  o : wheezing, chronic cough, coughing up blood  · Gastrointestinal  o Denies  o : diarrhea, chronic abdominal pain, reflux symptoms      Vitals  Date Time BP Position Site L\R Cuff Size HR RR TEMP (F) WT  HT  BMI kg/m2 BSA m2 O2 Sat HC       07/23/2020 03:45 PM       15  198lbs 0oz 5'  9\" 29.24 2.09           Physical Examination     His rectum is healing without problems. He notes some discharge and bleeding, which he was reassured is normal.           Assessment  · Encounter for examination following surgery     V67.00/Z09      Plan  · Medications  o Medications have been Reconciled  o Transition of Care or Provider Policy  · Instructions  o He was given Norco 7.5 mg #25 no refills.  o F/U in 4 weeks.  o Continue sitz baths.             Electronically Signed by: Ngozi Dos Santos-, -Author on July 24, 2020 10:07:25 AM  Electronically " Chest xray looks fine.     Oleksandr Dimas MD Co-signed by: Landen Winters MD -Reviewer on July 29, 2020 10:25:48 AM

## 2021-05-27 ENCOUNTER — HOSPITAL ENCOUNTER (OUTPATIENT)
Dept: LAB | Age: 86
Discharge: HOME | End: 2021-05-27

## 2021-05-27 PROCEDURE — 88312 SPECIAL STAINS GROUP 1: CPT

## 2021-05-27 PROCEDURE — 88305 TISSUE EXAM BY PATHOLOGIST: CPT

## 2021-08-11 ENCOUNTER — HOSPITAL ENCOUNTER (OUTPATIENT)
Dept: MRI IMAGING | Age: 86
Discharge: HOME | End: 2021-08-11
Attending: INTERNAL MEDICINE
Payer: MEDICARE

## 2021-08-11 DIAGNOSIS — M54.42 CHRONIC BILATERAL LOW BACK PAIN WITH BILATERAL SCIATICA: ICD-10-CM

## 2021-08-11 DIAGNOSIS — M54.41 CHRONIC BILATERAL LOW BACK PAIN WITH BILATERAL SCIATICA: ICD-10-CM

## 2021-08-11 DIAGNOSIS — G89.29 CHRONIC BILATERAL LOW BACK PAIN WITH BILATERAL SCIATICA: ICD-10-CM

## 2021-08-11 PROCEDURE — 72148 MRI LUMBAR SPINE W/O DYE: CPT

## 2021-08-25 ENCOUNTER — HOSPITAL ENCOUNTER (OUTPATIENT)
Dept: PHYSICAL THERAPY | Age: 86
Discharge: HOME | End: 2021-08-25
Attending: INTERNAL MEDICINE
Payer: MEDICARE

## 2021-08-25 DIAGNOSIS — G89.29 CHRONIC BILATERAL LOW BACK PAIN WITH BILATERAL SCIATICA: ICD-10-CM

## 2021-08-25 DIAGNOSIS — M54.41 CHRONIC BILATERAL LOW BACK PAIN WITH BILATERAL SCIATICA: ICD-10-CM

## 2021-08-25 DIAGNOSIS — G89.29 CHRONIC LEFT-SIDED THORACIC BACK PAIN: ICD-10-CM

## 2021-08-25 DIAGNOSIS — M54.6 CHRONIC LEFT-SIDED THORACIC BACK PAIN: ICD-10-CM

## 2021-08-25 DIAGNOSIS — M54.42 CHRONIC BILATERAL LOW BACK PAIN WITH BILATERAL SCIATICA: ICD-10-CM

## 2021-08-25 PROCEDURE — 97110 THERAPEUTIC EXERCISES: CPT

## 2021-08-25 PROCEDURE — 97161 PT EVAL LOW COMPLEX 20 MIN: CPT

## 2021-08-25 NOTE — PROGRESS NOTES
Maximino Turner  : 1935  [place]  Secondary: Darell Mera at [place] [address], [address] [facility]  [phone]   [phone]        OUTPATIENT PHYSICAL THERAPY: Daily Treatment Note 2021  Visit Count:  1    ICD-10: Treatment Diagnosis: Low back pain (M54.5) Pain in thoracic spine (M54.6) Generalized Muscle Weakness (M62.81)        Precautions/Allergies:   Diphenhydramine hcl, Ativan [lorazepam], Iodinated contrast media, and Sulfa (sulfonamide antibiotics)   TREATMENT PLAN:  Effective Dates: 2021 TO 10/24/2021 (60 days). Frequency/Duration: 2 times a week for 60 Day(s)    Pre-treatment Symptoms/Complaints:  Patient says his back has been hurting for a long time. Pain: Initial: Pain Intensity 1: 5 /10 Post Session:  4/10   Medications Last Reviewed:  2021  Updated Objective Findings:  See evaluation note from today  TREATMENT:   Therapeutic Exercise: (15 Minutes):  Exercises per grid below to improve mobility, strength and balance. Required moderate visual, verbal and manual cues to promote proper body alignment, promote proper body posture and promote proper body mechanics. Progressed resistance, range, repetitions and complexity of movement as indicated. Date:  2021     Activity/Exercise Parameters   Patient Education Plan of care, HEP   Trunk rotation 2 x 10   Ball rolls, rotation 2 x 10   Open book 2 x 10   Sit to stand 2 x 10               Treatment/Session Summary:    · Response to Treatment:  Patient has good tolerance to all initial HEP tasks today. · Communication/Consultation:  None today  · Equipment provided today:  None today  · Recommendations/Intent for next treatment session: Next visit will focus on improving pain, strength and mobility.     Total Treatment Billable Duration:  15 minutes (45 minute evaluation)   PT Patient Time In/Time Out  Time In: 1300  Time Out: [address]  [place]    Future Appointments   Date Time Provider Sanam Jeronimo   [facility]27[facility]2021 10:30 [facility]   9/1/2021 11:00 AM Janice  Nancie Shah [facility]   9/2/2021 11:20 AM Miguel Sharma MD [facility]   9/3/2021 10:30 AM Kendrick Mccauley [facility]   9/27/2021  1:30 PM Caty Connors MD [facility]   11/3/2021  1:20 PM Meenu Rodriguez MD [facility]   5/9/2022  1:20 PM Ema Pickett [facility]   5/9/2022  1:45 PM Unice Peabody, MD [facility]

## 2021-08-25 NOTE — THERAPY EVALUATION
Maximino Castillo  : 1935  [place]  Secondary: Darell Mera at [place] [address], [address] [facility]  [phone]   [phone]          OUTPATIENT PHYSICAL THERAPY:Initial Assessment 2021   ICD-10: Treatment Diagnosis: Low back pain (M54.5) Pain in thoracic spine (M54.6) Generalized Muscle Weakness (M62.81)      Precautions/Allergies:   Diphenhydramine hcl, Ativan [lorazepam], Iodinated contrast media, and Sulfa (sulfonamide antibiotics)   TREATMENT PLAN:  Effective Dates: 2021 TO 10/24/2021 (60 days). Frequency/Duration: 2 times a week for 60 Day(s) MEDICAL/REFERRING DIAGNOSIS:  Chronic bilateral low back pain with bilateral sciatica [M54.42, M54.41, G89.29]  Chronic left-sided thoracic back pain [M54.6, G89.29]   DATE OF ONSET: Chronic  REFERRING PHYSICIAN: Suraj Diane MD MD Orders: Evaluate and Treat  Return MD Appointment: TBD     INITIAL ASSESSMENT:  Mr. Mead Many presents with chronic low and mid back pain which have persisted for several years but recently worsened over the past few months. His chief complaint is pain with any prolonged activity but also presents with decreased flexibility, joint mobility and overall LE strength/activity tolerance which affects his ability to perform ADL tasks without pain or difficulty. He will benefit from skilled therapy to improve his pain, strength and mobility to return to highest level of function possible. PROBLEM LIST (Impacting functional limitations):  1. Decreased Strength  2. Decreased ADL/Functional Activities  3. Decreased Ambulation Ability/Technique  4. Increased Pain  5. Decreased Activity Tolerance  6. Increased Fatigue  7. Increased Shortness of Breath  8. Decreased Flexibility/Joint Mobility  9.  Decreased [place] with Home Exercise Program INTERVENTIONS PLANNED: (Treatment may consist of any combination of the following)  1. Balance Exercise  2. Gait Training  3. Home Exercise Program (HEP)  4. Manual Therapy  5. Neuromuscular Re-education/Strengthening  6. Range of Motion (ROM)  7. Therapeutic Exercise/Strengthening     GOALS: (Goals have been discussed and agreed upon with patient.)  Discharge Goals: Time Frame: 60 days  1. Patient will be independent with home exercise program without assistance from therapist.   2. Patient will report JOSE MANUEL score to 10/50 or less to demonstrate improved functional capacity. 3. Patient will demonstrate standing for greater than 30 minutes to resume normal ADL tasks with less difficulty. 4. Patient will perform walking and squatting tasks without pain to demonstrate improved functional mobility. OUTCOME MEASURE:   Tool Used: Modified Oswestry Low Back Pain Questionnaire  Score:  Initial: 23/50 (8/25/21)  Most Recent: X/50 (Date: -- )   Interpretation of Score: Each section is scored on a 0-5 scale, 5 representing the greatest disability. The scores of each section are added together for a total score of 50. MEDICAL NECESSITY:   · Patient is expected to demonstrate progress in strength and range of motion to increase independence with ADL tasks. REASON FOR SERVICES/OTHER COMMENTS:  · Patient continues to require present interventions due to patient's inability to walk, stand or drive without pain. .  Total Duration:  PT Patient Time In/Time Out  Time In: 1300  Time Out: 1400    Rehabilitation Potential For Stated Goals: Good  Regarding Maximino Cristina's therapy, I certify that the treatment plan above will be carried out by a therapist or under their direction. Thank you for this referral,  Tiara Cárdenas. [facility]     Referring Physician Signature:  Abimbola Mcintyre MD                  PAIN/SUBJECTIVE:   Initial: Pain Intensity 1: 5 /10 Post Session:  4/10   HISTORY:   History of Injury/Illness (Reason for Referral):  Maximino presents with acute on chronic low and mid back pain. He reports pain began as far back as 1955 where he injured his back while playing sports as a teenager. He reports off and on discomfort over the years and underwent laminectomy x 2 (1985, 2005). He reports having more thoracic spine pain which originates from CT junction and wraps around to the front of his left shoulder and chest. He reports he has had multiple cardiac, shoulder and neck workups with no significant findings other than arthritis. 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 His goal is to resume normal, active lifestyle without as much pain. Past Medical History/Comorbidities:   Mr. Howard Brown  has a past medical history of Abdominal pain, Acute cervical radiculopathy, Aneurysm ([address] Arthritis, Atrial fibrillation with rapid ventricular response ([address] (6/16/2019), CAD (coronary artery disease), Cancer ([address] Cigarette nicotine dependence in remission (7/24/2019), COPD (chronic obstructive pulmonary disease) ([address] Descending thoracic aortic aneurysm ([address] (10/17/14), Diverticulitis of colon (without mention of hemorrhage)(562.11) (12/3/2013), GERD (gastroesophageal reflux disease), High cholesterol, Hypertension, Ill-defined condition, Leukopenia (11/13/2012), Mild degeneration of cervical intervertebral disc, MRSA (methicillin resistant Staphylococcus aureus), Multiple thyroid nodules (4/11/2017), Pneumatosis intestinalis (6/14/2019), Renal cyst, right (12/3/2013), Small bowel obstruction ([address] (6/14/2019), Thrombocytopenia ([address] Thyroid disease, and Ventral incisional hernia (6/14/2019). He also has no past medical history of Asthma, Chronic kidney disease, Difficult intubation, Heart failure ([address] Liver disease, Malignant hyperthermia due to anesthesia, Nausea & vomiting, Pseudocholinesterase deficiency, Psychiatric disorder, PUD (peptic ulcer disease), Seizures ([address] Sleep apnea, or Thromboembolus ([address]    Katharine Briceño  has a past surgical history that includes pr cardiac surg procedure unlist (1991); pr cardiac surg procedure unlist (9/2003); hx cholecystectomy (9/2000); neurological procedure unlisted (12/1981); hx hernia repair (09/1993); hx gi (11/2003); hx lumbar laminectomy (5/2005); hx other surgical (05/2002); hx colonoscopy (02/28/2013); hx endoscopy (02/28/2013); vascular surgery procedure unlist (1991); hx orthopaedic (1/2006); hx orthopaedic (8/2005); hx orthopaedic (2005); hx heart catheterization (04/2015); hx other surgical (11/2014); hx other surgical; hx orthopaedic (03/1955); hx thyroidectomy (08/24/2017); hx thyroidectomy (08/24/2017); hx heent; and hx small bowel resection (06/2019). Social History/Living Environment:     Lives in private setting home, no barriers to progress  Prior Level of Function/Work/Activity:  Retired     Ambulatory/Rehab [address] [place] [address] Risk Assessment   Risk Factors:       (1)  Gender [Male] Ability to Rise from Chair:       (0)  Ability to rise in a single movement   Falls Prevention Plan:       No modifications necessary   Total: (5 or greater = High Risk): 1   ©2010 [facility] [address] [place] [address] Patent #[id number]. Federal Law prohibits the replication, distribution or use without written permission from [facility]   Current Medications:       Current Outpatient Medications:     HYDROcodone-acetaminophen (Norco) 5-325 mg per tablet, Take 1 Tablet by mouth every six (6) hours as needed for Pain for up to 30 days. Max Daily Amount: 4 Tablets. , Disp: 30 Tablet, Rfl: 0    methylPREDNISolone (MEDROL) 4 mg tab, Take 1 Tablet by mouth daily (with breakfast) for 14 days, THEN 0.5 Tablets daily (with breakfast) for 14 days. , Disp: 21 Tablet, Rfl: 0    ferrous sulfate (Iron, Ferrous Sulfate,) 325 mg (65 mg iron) tablet, Take 1 Tab by mouth Daily (before breakfast). , Disp: 30 Tab, Rfl: 5    ipratropium (ATROVENT) 42 mcg (0.06 %) nasal spray, 2 Sprays four (4) times daily. , Disp: , Rfl:     ranolazine ER (Ranexa) 500 mg SR tablet, Take 1 Tab by mouth two (2) times a day., Disp: 60 Tab, Rfl: 5    levothyroxine (SYNTHROID) 150 mcg tablet, 1 tablet once a day except for 1/2 tablet on Sundays, Disp: 90 Tab, Rfl: 3    tiotropium-olodateroL (Stiolto Respimat) 2.5-2.5 mcg/actuation inhaler, Take 2 Puffs by inhalation daily. , Disp: 3 Inhaler, Rfl: 3    albuterol sulfate (ProAir RespiClick) 90 mcg/actuation breath activated inhaler, Take 2 Puffs by inhalation four (4) times daily as needed (Wheeze). , Disp: 3 Inhaler, Rfl: 3    fluticasone propionate (FLONASE) 50 mcg/actuation nasal spray, 2 Sprays by Both Nostrils route daily. Indications: inflammation of the nose due to an allergy, Disp: 3 Bottle, Rfl: 3    atorvastatin (LIPITOR) 40 mg tablet, Take 1 Tab by mouth daily. (Patient taking differently: Take 40 mg by mouth nightly.), Disp: 90 Tab, Rfl: 3    metoprolol tartrate (LOPRESSOR) 25 mg tablet, TAKE 1 TABLET BY MOUTH TWO  TIMES DAILY (Patient taking differently: daily. TAKE 1 TABLET BY MOUTH TWO  TIMES DAILY), Disp: 180 Tab, Rfl: 3    amLODIPine (NORVASC) 5 mg tablet, Take 1 Tab by mouth daily. , Disp: 90 Tab, Rfl: 3    terazosin (HYTRIN) 5 mg capsule, Take 1 Cap by mouth nightly. Indications: enlarged prostate with urination problem, Disp: 30 Cap, Rfl: 5    nitroglycerin (NITROLINGUAL) 400 mcg/spray spray, 1 [place] by SubLINGual route every five (5) minutes as needed for Chest Pain., Disp: 1 Bottle, Rfl: 3    omeprazole (PRILOSEC) 40 mg capsule, Take 20 mg by mouth daily. Can take at night if needed, Disp: , Rfl:     aspirin 81 mg chewable tablet, Take 81 mg by mouth nightly., Disp: , Rfl:     finasteride (PROSCAR) 5 mg tablet, Take 5 mg by mouth daily. , Disp: , Rfl:    Date Last Reviewed:  8/25/2021     Number of Personal Factors/Comorbidities that affect the Plan of Care: 0: LOW COMPLEXITY   EXAMINATION:   Observation/Orthostatic Postural Assessment:          Patient exhibits increased thoracic kyphosis, lumbar lordosis and forward trunk lean in static standing positions. Palpation:          Patient is tender along left periscapular musculature along spine to bilateral PSIS. He is also tender along bilateral QL and glute musculature. Motion Testing:       RIGHT LEFT    Flexion DN DN    Extension DN DN    Side Bending DN DN    Rotation DN DN    Hip motion Limited ER/IR Limited IR/ER          Strength:       RIGHT LEFT    Transverse Abdominis 3/5, poor cooridnation 3/5, poor coordination    Hip Extension 4/5 4/5    Hip Abduction 4/5 4/5          Joint Mobility:        RIGHT LEFT    Lumbar Spine Hypomobile Hypomobile          Flexibility:       RIGHT LEFT    Hamstrings ASLR <70 ASLR <70    Hip Flexor +Jaswant +Jaswant           Abbreviations: DP- dysfunctional painful, DN- dysfunctional nonpainful, FN- functional non painful, FP- functional painful     Body Structures Involved:  1. Bones  2. Joints  3. Muscles Body Functions Affected:  1. Sensory/Pain  2. Neuromusculoskeletal  3. Movement Related Activities and Participation Affected:  1. General Tasks and Demands  2. Mobility  3. Self Care  4. Domestic Life  5.  Community, Social and [place]   Number of elements (examined above) that affect the Plan of Care: 4+: HIGH COMPLEXITY   CLINICAL PRESENTATION:   Presentation: Stable and uncomplicated: LOW COMPLEXITY   CLINICAL DECISION MAKING:   Use of outcome tool(s) and clinical judgement create a POC that gives a: Clear prediction of patient's progress: LOW COMPLEXITY

## 2021-08-27 ENCOUNTER — HOSPITAL ENCOUNTER (OUTPATIENT)
Dept: PHYSICAL THERAPY | Age: 86
Discharge: HOME | End: 2021-08-27
Payer: MEDICARE

## 2021-08-27 PROCEDURE — 97110 THERAPEUTIC EXERCISES: CPT

## 2021-08-27 NOTE — PROGRESS NOTES
Maximino Carpenter  : 1935  [place]  Secondary: Darell Mera at [place] [address], [address] [facility]  [phone]   [phone]        OUTPATIENT PHYSICAL THERAPY: Daily Treatment Note 2021  Visit Count:  2    ICD-10: Treatment Diagnosis: Low back pain (M54.5) Pain in thoracic spine (M54.6) Generalized Muscle Weakness (M62.81)        Precautions/Allergies:   Diphenhydramine hcl, Ativan [lorazepam], Iodinated contrast media, and Sulfa (sulfonamide antibiotics)   TREATMENT PLAN:  Effective Dates: 2021 TO 10/24/2021 (60 days). Frequency/Duration: 2 times a week for 60 Day(s)    Pre-treatment Symptoms/Complaints:  Patient reports the left side of his low back/hips are sore this morning. Pain: Initial: Pain Intensity 1: 4 10 Post Session:  2/10   Medications Last Reviewed:  2021  Updated Objective Findings:  None Today  TREATMENT:   Therapeutic Exercise: (45 Minutes):  Exercises per grid below to improve mobility, strength and balance. Required moderate visual, verbal and manual cues to promote proper body alignment, promote proper body posture and promote proper body mechanics. Progressed resistance, range, repetitions and complexity of movement as indicated. Date:  2021     Activity/Exercise Parameters   Patient Education Plan of care, HEP   Trunk rotation 2 min   Ball rolls, rotation 2 min each   Open book 2 x 10   Sit to stand 2 x 10   Clam shell 2 x 10 each side   Seated lumbar flexion 2 x 10   Side steps 10 ft x 10   Knee to chest SL, x10 each side   Bike For ROM, 5 min       Treatment/Session Summary:    · Response to Treatment:  Maximino has good tolerance to all mobility work today and reports slight decrease in discomfort at end of session.    · Communication/Consultation:  None today  · Equipment provided today:  None today  · Recommendations/Intent for next treatment session: Next visit will focus on improving pain, strength and mobility. Total Treatment Billable Duration:  45 minutes  PT Patient Time In/Time Out  Time In: 1030  Time Out: [address]  [place]    Future Appointments   Date Time Provider Sanam Kandace   9/1/2021 11:00 AM Angela Soares [facility]   9/2/2021 11:20 AM Manuel Becker MD [facility]   9/3/2021 10:30 AM Angela Soares [facility]   9/27/2021  1:30 PM Stephany Connors MD [facility]   11/3/2021  1:20 PM Abimbola Mcintyre MD [facility]   5/9/2022  1:20 PM Ema 88 [facility] [address] [facility]   5/9/2022  1:45 PM Nohemi Rangel MD [facility]

## 2021-09-01 ENCOUNTER — HOSPITAL ENCOUNTER (OUTPATIENT)
Dept: PHYSICAL THERAPY | Age: 86
Discharge: HOME | End: 2021-09-01
Payer: MEDICARE

## 2021-09-01 PROCEDURE — 97110 THERAPEUTIC EXERCISES: CPT

## 2021-09-01 NOTE — PROGRESS NOTES
Maximino Holliday  : 1935  [place]  Secondary: Darell Mera at [facility] [address], [address] [facility]  [phone]   [phone]        OUTPATIENT PHYSICAL THERAPY: Daily Treatment Note 2021  Visit Count:  3    ICD-10: Treatment Diagnosis: Low back pain (M54.5) Pain in thoracic spine (M54.6) Generalized Muscle Weakness (M62.81)        Precautions/Allergies:   Diphenhydramine hcl, Ativan [lorazepam], Iodinated contrast media, and Sulfa (sulfonamide antibiotics)   TREATMENT PLAN:  Effective Dates: 2021 TO 10/24/2021 (60 days). Frequency/Duration: 2 times a week for 60 Day(s)    Pre-treatment Symptoms/Complaints:  Patient says he still has some lower back soreness this morning. Pain: Initial: Pain Intensity 1: 10 Post Session:  2/10   Medications Last Reviewed:  2021  Updated Objective Findings:  None Today  TREATMENT:   Therapeutic Exercise: (45 Minutes):  Exercises per grid below to improve mobility, strength and balance. Required moderate visual, verbal and manual cues to promote proper body alignment, promote proper body posture and promote proper body mechanics. Progressed resistance, range, repetitions and complexity of movement as indicated. Date:  2021     Activity/Exercise Parameters   Patient Education Plan of care, HEP   Trunk rotation 2 min   Ball rolls, rotation 2 min each   Open book 2 x 10   Sit to stand 2 x 10   Clam shell 2 x 10 each side   Seated lumbar flexion 2 x 10   Side steps 10 ft x 10   Knee to chest SL, x10 each side   Bike For ROM, 5 min   Walking 2 laps around building    Stairs Up/down 1 flight       Treatment/Session Summary:    · Response to Treatment:  Gene fatigues with increasing workload today but no increase in pain noted at end of session.    · Communication/Consultation:  None today  · Equipment provided today:  None today  · Recommendations/Intent for next treatment session: Next visit will focus on improving pain, strength and mobility. Total Treatment Billable Duration:  45 minutes  PT Patient Time In/Time Out  Time In: 1100  Time Out: [address]  [place]    Future Appointments   Date Time Provider Sanam Jeronimo   9/2/2021 11:20 AM MD JASMIN Bishop   9/3/2021 10:30 AM Oscar Che [facility]   9/27/2021  1:30 PM Mikel Connors MD [facility] UCD   11/3/2021  1:20 PM Brayden Bundy MD [facility]   5/9/2022  1:20 PM Ema 88 [facility]   5/9/2022  1:45 PM Taiwo Gómez MD [facility]

## 2021-09-02 PROCEDURE — 88305 TISSUE EXAM BY PATHOLOGIST: CPT

## 2021-09-03 ENCOUNTER — HOSPITAL ENCOUNTER (OUTPATIENT)
Dept: PHYSICAL THERAPY | Age: 86
Discharge: HOME | End: 2021-09-03
Payer: MEDICARE

## 2021-09-03 ENCOUNTER — HOSPITAL ENCOUNTER (OUTPATIENT)
Dept: LAB | Age: 86
Discharge: HOME | End: 2021-09-03

## 2021-09-03 PROCEDURE — 97110 THERAPEUTIC EXERCISES: CPT

## 2021-09-03 NOTE — PROGRESS NOTES
Maximino Yi  : 1935  [place]  Secondary: Darell Mera at [place] [address], [address] [facility]  [phone]   [phone]        OUTPATIENT PHYSICAL THERAPY: Daily Treatment Note 9/3/2021  Visit Count:  4    ICD-10: Treatment Diagnosis: Low back pain (M54.5) Pain in thoracic spine (M54.6) Generalized Muscle Weakness (M62.81)        Precautions/Allergies:   Diphenhydramine hcl, Ativan [lorazepam], Iodinated contrast media, and Sulfa (sulfonamide antibiotics)   TREATMENT PLAN:  Effective Dates: 2021 TO 10/24/2021 (60 days). Frequency/Duration: 2 times a week for 60 Day(s)    Pre-treatment Symptoms/Complaints:  Patient reports some LE soreness following dermatological procedure yesterday. Pain: Initial: Pain Intensity 1: 10 Post Session:  2/10   Medications Last Reviewed:  9/3/2021  Updated Objective Findings:  None Today  TREATMENT:   Therapeutic Exercise: (55 Minutes):  Exercises per grid below to improve mobility, strength and balance. Required moderate visual, verbal and manual cues to promote proper body alignment, promote proper body posture and promote proper body mechanics. Progressed resistance, range, repetitions and complexity of movement as indicated. Date:  9/3/2021     Activity/Exercise Parameters   Patient Education Plan of care, HEP   Trunk rotation 2 min   Ball rolls, rotation 2 min each   Open book 2 x 10   Sit to stand 2 x 10   Clam shell 2 x 10 each side   Seated lumbar flexion 2 x 10   Side steps 10 ft x 10   Knee to chest SL, x10 each side   Bike For ROM, 5 min   Walking 2 laps around building    Stairs Lateral, 1 min x 2; 6 in step   Shuttle DL- 6 cord x 2 min; SL- 4.5 cord x 1 min each side       Treatment/Session Summary:    · Response to Treatment:  Gene displays improving LE endurance today and doesn't seem limited by fatigue as much today versus prior visits. · Communication/Consultation:  None today  · Equipment provided today:  None today  · Recommendations/Intent for next treatment session: Next visit will focus on improving pain, strength and mobility. Total Treatment Billable Duration:  55 minutes  PT Patient Time In/Time Out  Time In: 1025  Time Out: [address]  [place]    Future Appointments   Date Time Provider Sanam Kandace   9/14/2021 10:20 AM Betty Vazquez MD [facility]SAE   9/23/2021  4:30 PM Tova [facility]   9/27/2021  1:30 PM Manfred Connors MD [facility] UCD   9/29/2021  1:00 PM Tova [facility]   10/1/2021  1:30 PM Tova [facility]   11/3/2021  1:20 PM Adelina Jason MD [facility]   5/9/2022  1:20 PM Ema Pickett [facility]   5/9/2022  1:45 PM Helder Kwon MD [facility]

## 2021-09-29 ENCOUNTER — APPOINTMENT (OUTPATIENT)
Dept: PHYSICAL THERAPY | Age: 86
End: 2021-09-29
Payer: MEDICARE

## 2021-10-01 ENCOUNTER — APPOINTMENT (OUTPATIENT)
Dept: PHYSICAL THERAPY | Age: 86
End: 2021-10-01

## 2021-11-03 PROBLEM — R97.20 ELEVATED PSA: Status: ACTIVE | Noted: 2018-08-01

## 2021-11-03 PROBLEM — M47.814 OSTEOARTHRITIS OF THORACIC SPINE: Status: ACTIVE | Noted: 2019-02-01

## 2021-11-03 PROBLEM — M54.6 THORACIC SPINE PAIN: Status: ACTIVE | Noted: 2019-02-01

## 2021-11-03 PROBLEM — F17.211 CIGARETTE NICOTINE DEPENDENCE IN REMISSION: Status: RESOLVED | Noted: 2019-07-24 | Resolved: 2021-11-03

## 2021-11-22 ENCOUNTER — HOSPITAL ENCOUNTER (OUTPATIENT)
Dept: GENERAL RADIOLOGY | Age: 86
Discharge: HOME | End: 2021-11-22
Payer: MEDICARE

## 2021-11-22 DIAGNOSIS — R10.30 LOWER ABDOMINAL PAIN: ICD-10-CM

## 2021-11-22 PROCEDURE — 74019 RADEX ABDOMEN 2 VIEWS: CPT

## 2021-12-08 NOTE — THERAPY DISCHARGE
Maximino Kimble  : 1935  [place]  Secondary: Darell Mera at [address], [address] [place] [address]  [phone]   [phone]          OUTPATIENT PHYSICAL THERAPY:Discontinuation Summary 21   ICD-10: Treatment Diagnosis: Low back pain (M54.5) Pain in thoracic spine (M54.6) Generalized Muscle Weakness (M62.81)      Precautions/Allergies:   Diphenhydramine hcl, Ativan [lorazepam], Iodinated contrast media, and Sulfa (sulfonamide antibiotics)   TREATMENT PLAN:  Effective Dates: 2021 TO 10/24/2021 (60 days). Frequency/Duration: 2 times a week for 60 Day(s) MEDICAL/REFERRING DIAGNOSIS:  Lumbago with sciatica, left side [M54.42]  Lumbago with sciatica, right side [M54.41]  Other chronic pain [G89.29]   DATE OF ONSET: Chronic  REFERRING PHYSICIAN: Mani Brown MD MD Orders: Evaluate and Treat  Return MD Appointment: TBD     INITIAL ASSESSMENT:  Mr. Cielo Velázquez presents with chronic low and mid back pain which have persisted for several years but recently worsened over the past few months. His chief complaint is pain with any prolonged activity but also presents with decreased flexibility, joint mobility and overall LE strength/activity tolerance which affects his ability to perform ADL tasks without pain or difficulty. He will benefit from skilled therapy to improve his pain, strength and mobility to return to highest level of function possible. PROBLEM LIST (Impacting functional limitations):  1. Decreased Strength  2. Decreased ADL/Functional Activities  3. Decreased Ambulation Ability/Technique  4. Increased Pain  5. Decreased Activity Tolerance  6. Increased Fatigue  7. Increased Shortness of Breath  8. Decreased Flexibility/Joint Mobility  9. Decreased [place] with Home Exercise Program INTERVENTIONS PLANNED: (Treatment may consist of any combination of the following)  1.  Balance Exercise  2. Gait Training  3. Home Exercise Program (HEP)  4. Manual Therapy  5. Neuromuscular Re-education/Strengthening  6. Range of Motion (ROM)  7. Therapeutic Exercise/Strengthening     GOALS: (Goals have been discussed and agreed upon with patient.)  Discharge Goals: Time Frame: 60 days  1. Patient will be independent with home exercise program without assistance from therapist.   2. Patient will report JOSE MANUEL score to 10/50 or less to demonstrate improved functional capacity. 3. Patient will demonstrate standing for greater than 30 minutes to resume normal ADL tasks with less difficulty. 4. Patient will perform walking and squatting tasks without pain to demonstrate improved functional mobility. OUTCOME MEASURE:   Tool Used: Modified Oswestry Low Back Pain Questionnaire  Score:  Initial: 23/50 (8/25/21)  Most Recent: X/50 (Date: -- )   Interpretation of Score: Each section is scored on a 0-5 scale, 5 representing the greatest disability. The scores of each section are added together for a total score of 50. Maximino Ruffin has been seen in physical therapy from 8/25/21 to 9/3/21 for 4 visits. Treatment has been discontinued at this time due to patient failing to return for additional treatment. The below goals were met prior to discontinuation. Some goals were not met due to self discharge.    Thank you for this referral.

## 2022-02-03 ENCOUNTER — HOSPITAL ENCOUNTER (OUTPATIENT)
Dept: CT IMAGING | Age: 87
Discharge: HOME | End: 2022-02-03
Attending: INTERNAL MEDICINE
Payer: MEDICARE

## 2022-02-03 DIAGNOSIS — G45.9 TIA (TRANSIENT ISCHEMIC ATTACK): ICD-10-CM

## 2022-02-03 PROCEDURE — 70450 CT HEAD/BRAIN W/O DYE: CPT

## 2022-02-10 PROBLEM — G89.29 CHRONIC LEFT-SIDED THORACIC BACK PAIN: Status: ACTIVE | Noted: 2019-02-01

## 2022-02-10 PROBLEM — G45.9 TIA (TRANSIENT ISCHEMIC ATTACK): Status: ACTIVE | Noted: 2022-02-10

## 2022-02-25 ENCOUNTER — APPOINTMENT (RX ONLY)
Dept: URBAN - METROPOLITAN AREA CLINIC 330 | Facility: CLINIC | Age: 87
Setting detail: DERMATOLOGY
End: 2022-02-25

## 2022-02-25 DIAGNOSIS — M67.4 GANGLION: ICD-10-CM

## 2022-02-25 PROBLEM — M67.441 GANGLION, RIGHT HAND: Status: ACTIVE | Noted: 2022-02-25

## 2022-02-25 PROCEDURE — ? TREATMENT REGIMEN

## 2022-02-25 PROCEDURE — 99212 OFFICE O/P EST SF 10 MIN: CPT

## 2022-02-25 PROCEDURE — ? COUNSELING

## 2022-02-25 ASSESSMENT — LOCATION SIMPLE DESCRIPTION DERM
LOCATION SIMPLE: RIGHT INDEX FINGER
LOCATION SIMPLE: RIGHT HAND

## 2022-02-25 ASSESSMENT — LOCATION ZONE DERM
LOCATION ZONE: HAND
LOCATION ZONE: FINGER

## 2022-02-25 ASSESSMENT — LOCATION DETAILED DESCRIPTION DERM
LOCATION DETAILED: RIGHT PROXIMAL DORSAL INDEX FINGER
LOCATION DETAILED: RIGHT DORSAL INDEX METACARPOPHALANGEAL JOINT

## 2022-02-25 NOTE — PROCEDURE: MIPS QUALITY
Quality 226: Preventive Care And Screening: Tobacco Use: Screening And Cessation Intervention: Patient screened for tobacco use and is an ex/non-smoker
Quality 111:Pneumonia Vaccination Status For Older Adults: Pneumococcal Vaccination Previously Received
Quality 431: Preventive Care And Screening: Unhealthy Alcohol Use - Screening: Patient not identified as an unhealthy alcohol user when screened for unhealthy alcohol use using a systematic screening method
Detail Level: Detailed
Quality 110: Preventive Care And Screening: Influenza Immunization: Influenza Immunization Administered during Influenza season
breath sounds equal/airway patent/clear to auscultation bilaterally/good air movement

## 2022-03-18 PROBLEM — G45.9 TIA (TRANSIENT ISCHEMIC ATTACK): Status: ACTIVE | Noted: 2022-02-10

## 2022-03-18 PROBLEM — R97.20 ELEVATED PSA: Status: ACTIVE | Noted: 2018-08-01

## 2022-03-18 PROBLEM — I48.0 PAROXYSMAL ATRIAL FIBRILLATION (HCC): Status: ACTIVE | Noted: 2019-06-16

## 2022-03-18 PROBLEM — R73.01 ELEVATED FASTING GLUCOSE: Status: ACTIVE | Noted: 2020-08-06

## 2022-03-19 PROBLEM — D64.9 MILD ANEMIA: Status: ACTIVE | Noted: 2021-05-13

## 2022-03-19 PROBLEM — R39.14 BENIGN PROSTATIC HYPERPLASIA WITH INCOMPLETE BLADDER EMPTYING: Status: ACTIVE | Noted: 2019-08-01

## 2022-03-19 PROBLEM — N40.1 BENIGN PROSTATIC HYPERPLASIA WITH INCOMPLETE BLADDER EMPTYING: Status: ACTIVE | Noted: 2019-08-01

## 2022-03-19 PROBLEM — D69.6 THROMBOCYTOPENIA (HCC): Status: ACTIVE | Noted: 2019-06-14

## 2022-03-19 PROBLEM — M47.814 OSTEOARTHRITIS OF THORACIC SPINE: Status: ACTIVE | Noted: 2019-02-01

## 2022-03-19 PROBLEM — M54.6 CHRONIC LEFT-SIDED THORACIC BACK PAIN: Status: ACTIVE | Noted: 2019-02-01

## 2022-03-19 PROBLEM — J44.9 CHRONIC OBSTRUCTIVE PULMONARY DISEASE (HCC): Status: ACTIVE | Noted: 2017-04-10

## 2022-03-19 PROBLEM — E89.0 POSTSURGICAL HYPOTHYROIDISM: Status: ACTIVE | Noted: 2017-04-11

## 2022-03-19 PROBLEM — M50.30 DDD (DEGENERATIVE DISC DISEASE), CERVICAL: Status: ACTIVE | Noted: 2017-03-21

## 2022-03-19 PROBLEM — R73.01 IFG (IMPAIRED FASTING GLUCOSE): Status: ACTIVE | Noted: 2020-08-06

## 2022-03-19 PROBLEM — G89.29 CHRONIC LEFT-SIDED THORACIC BACK PAIN: Status: ACTIVE | Noted: 2019-02-01

## 2022-03-20 PROBLEM — I25.708 CORONARY ARTERY DISEASE OF BYPASS GRAFT OF NATIVE HEART WITH STABLE ANGINA PECTORIS (HCC): Status: ACTIVE | Noted: 2020-06-26

## 2022-03-20 PROBLEM — G62.9 PERIPHERAL POLYNEUROPATHY: Status: ACTIVE | Noted: 2020-08-06

## 2022-05-09 ENCOUNTER — HOSPITAL ENCOUNTER (OUTPATIENT)
Dept: LAB | Age: 87
Discharge: HOME | End: 2022-05-09
Payer: MEDICARE

## 2022-05-09 DIAGNOSIS — D69.6 THROMBOCYTOPENIA (HCC): ICD-10-CM

## 2022-05-09 LAB
ALBUMIN SERPL-MCNC: 3.8 G/DL (ref 3.2–4.6)
ALBUMIN/GLOB SERPL: 1.5 {RATIO} (ref 1.2–3.5)
ALP SERPL-CCNC: 74 U/L (ref 50–136)
ALT SERPL-CCNC: 24 U/L (ref 12–65)
ANION GAP SERPL CALC-SCNC: 5 MMOL/L (ref 7–16)
AST SERPL-CCNC: 20 U/L (ref 15–37)
BASOPHILS # BLD: 0 K/UL (ref 0–0.2)
BASOPHILS NFR BLD: 1 % (ref 0–2)
BILIRUB SERPL-MCNC: 1.9 MG/DL (ref 0.2–1.1)
BUN SERPL-MCNC: 10 MG/DL (ref 8–23)
CALCIUM SERPL-MCNC: 8.8 MG/DL (ref 8.3–10.4)
CHLORIDE SERPL-SCNC: 100 MMOL/L (ref 98–107)
CO2 SERPL-SCNC: 27 MMOL/L (ref 21–32)
CREAT SERPL-MCNC: 0.9 MG/DL (ref 0.8–1.5)
DIFFERENTIAL METHOD BLD: ABNORMAL
EOSINOPHIL # BLD: 0.1 K/UL (ref 0–0.8)
EOSINOPHIL NFR BLD: 3 % (ref 0.5–7.8)
ERYTHROCYTE [DISTWIDTH] IN BLOOD BY AUTOMATED COUNT: 13.5 % (ref 11.9–14.6)
GLOBULIN SER CALC-MCNC: 2.5 G/DL (ref 2.3–3.5)
GLUCOSE SERPL-MCNC: 98 MG/DL (ref 65–100)
HCT VFR BLD AUTO: 31.1 %
HGB BLD-MCNC: 11.3 G/DL (ref 13.6–17.2)
IMM GRANULOCYTES # BLD AUTO: 0 K/UL (ref 0–0.5)
IMM GRANULOCYTES NFR BLD AUTO: 0 % (ref 0–5)
LDH SERPL L TO P-CCNC: 181 U/L (ref 110–210)
LYMPHOCYTES # BLD: 0.6 K/UL (ref 0.5–4.6)
LYMPHOCYTES NFR BLD: 24 % (ref 13–44)
MCH RBC QN AUTO: 35.1 PG (ref 26.1–32.9)
MCHC RBC AUTO-ENTMCNC: 36.3 G/DL (ref 31.4–35)
MCV RBC AUTO: 96.6 FL (ref 79.6–97.8)
MONOCYTES # BLD: 0.3 K/UL (ref 0.1–1.3)
MONOCYTES NFR BLD: 10 % (ref 4–12)
NEUTS SEG # BLD: 1.6 K/UL (ref 1.7–8.2)
NEUTS SEG NFR BLD: 62 % (ref 43–78)
NRBC # BLD: 0 K/UL (ref 0–0.2)
PLATELET # BLD AUTO: 92 K/UL (ref 150–450)
PMV BLD AUTO: 8.4 FL (ref 9.4–12.3)
POTASSIUM SERPL-SCNC: 4.4 MMOL/L (ref 3.5–5.1)
PROT SERPL-MCNC: 6.3 G/DL (ref 6.3–8.2)
RBC # BLD AUTO: 3.22 M/UL (ref 4.23–5.6)
SODIUM SERPL-SCNC: 132 MMOL/L (ref 136–145)
WBC # BLD AUTO: 2.6 K/UL (ref 4.3–11.1)

## 2022-05-09 PROCEDURE — 36415 COLL VENOUS BLD VENIPUNCTURE: CPT

## 2022-05-09 PROCEDURE — 85025 COMPLETE CBC W/AUTO DIFF WBC: CPT

## 2022-05-09 PROCEDURE — 83615 LACTATE (LD) (LDH) ENZYME: CPT

## 2022-05-09 PROCEDURE — 80053 COMPREHEN METABOLIC PANEL: CPT

## 2022-05-10 DIAGNOSIS — D69.6 THROMBOCYTOPENIA (HCC): Primary | ICD-10-CM

## 2022-07-04 PROBLEM — G45.9 TIA (TRANSIENT ISCHEMIC ATTACK): Status: ACTIVE | Noted: 2022-02-10

## 2022-07-06 ENCOUNTER — OFFICE VISIT (OUTPATIENT)
Dept: CARDIOLOGY CLINIC | Age: 87
End: 2022-07-06
Payer: MEDICARE

## 2022-07-06 VITALS
DIASTOLIC BLOOD PRESSURE: 58 MMHG | HEIGHT: 72 IN | SYSTOLIC BLOOD PRESSURE: 120 MMHG | BODY MASS INDEX: 28.77 KG/M2 | WEIGHT: 212.4 LBS

## 2022-07-06 DIAGNOSIS — I48.0 PAROXYSMAL ATRIAL FIBRILLATION (HCC): ICD-10-CM

## 2022-07-06 DIAGNOSIS — I25.810 CORONARY ARTERY DISEASE INVOLVING CORONARY BYPASS GRAFT OF NATIVE HEART WITHOUT ANGINA PECTORIS: Primary | ICD-10-CM

## 2022-07-06 DIAGNOSIS — G45.9 TIA (TRANSIENT ISCHEMIC ATTACK): ICD-10-CM

## 2022-07-06 DIAGNOSIS — I71.20 THORACIC AORTIC ANEURYSM WITHOUT RUPTURE: ICD-10-CM

## 2022-07-06 DIAGNOSIS — I10 PRIMARY HYPERTENSION: ICD-10-CM

## 2022-07-06 PROCEDURE — G8419 CALC BMI OUT NRM PARAM NOF/U: HCPCS | Performed by: INTERNAL MEDICINE

## 2022-07-06 PROCEDURE — 99214 OFFICE O/P EST MOD 30 MIN: CPT | Performed by: INTERNAL MEDICINE

## 2022-07-06 PROCEDURE — 1123F ACP DISCUSS/DSCN MKR DOCD: CPT | Performed by: INTERNAL MEDICINE

## 2022-07-06 PROCEDURE — 1036F TOBACCO NON-USER: CPT | Performed by: INTERNAL MEDICINE

## 2022-07-06 PROCEDURE — G8427 DOCREV CUR MEDS BY ELIG CLIN: HCPCS | Performed by: INTERNAL MEDICINE

## 2022-07-06 RX ORDER — AMLODIPINE BESYLATE 5 MG/1
5 TABLET ORAL DAILY
Qty: 90 TABLET | Refills: 3 | Status: SHIPPED | OUTPATIENT
Start: 2022-07-06

## 2022-07-06 RX ORDER — ATORVASTATIN CALCIUM 40 MG/1
40 TABLET, FILM COATED ORAL DAILY
Qty: 90 TABLET | Refills: 3 | Status: SHIPPED | OUTPATIENT
Start: 2022-07-06

## 2022-07-06 ASSESSMENT — ENCOUNTER SYMPTOMS: SHORTNESS OF BREATH: 0

## 2022-07-06 NOTE — PROGRESS NOTES
[address] [place], [place]  [address], [address]  PHONE: [phone]    Harvinder Mccarty  1935      SUBJECTIVE:   Harvinder Mccarty is a 82 y.o. male seen for a follow up visit regarding the following:     Chief Complaint   Patient presents with    Results     echo,carotid ultrasound and monitor       HPI:    Harvinder Mccarty presents for routine follow up. Multiple issues addressed. Coronary Artery Disease:  Patient denies any recent angina. Notes compliance with medical therapy. No recent NTG use. No intolerance to anti-platelet therapy. Hypertension:  Ambulatory BP readings have been controlled. Patient reports compliance with medical therapy without side effects. Hyperlipidemia:   Tolerating Lipitor. TIA: Next visit concerned of TIA due to left arm weakness. Echo, carotid duplex and Holter placed with no significant abnormalities. Past Medical History, Past Surgical History, Family history, Social History, and Medications were all reviewed with the patient today and updated as necessary.            Current Outpatient Medications:     zinc 50 MG CAPS, Take 50 mg by mouth, Disp: , Rfl:     albuterol sulfate (PROAIR RESPICLICK) 262 (90 Base) MCG/ACT aerosol powder inhalation, Inhale 2 puffs into the lungs 4 times daily as needed, Disp: , Rfl:     amLODIPine (NORVASC) 5 MG tablet, Take 5 mg by mouth daily, Disp: , Rfl:     aspirin 81 MG chewable tablet, Take 81 mg by mouth, Disp: , Rfl:     atorvastatin (LIPITOR) 40 MG tablet, Take 40 mg by mouth daily, Disp: , Rfl:     vitamin D 25 MCG (1000 UT) CAPS, Take by mouth daily , Disp: , Rfl:     clopidogrel (PLAVIX) 75 MG tablet, Take 75 mg by mouth daily, Disp: , Rfl:     finasteride (PROSCAR) 5 MG tablet, Take 5 mg by mouth daily, Disp: , Rfl:     fluticasone (FLONASE) 50 MCG/ACT nasal spray, 2 sprays by Nasal route daily, Disp: , Rfl:     gabapentin (NEURONTIN) 300 MG capsule, Take 900 mg by mouth., Disp: , Rfl:     ipratropium (ATROVENT) 0.06 % nasal spray, 2 sprays 4 times daily, Disp: , Rfl:     levothyroxine (SYNTHROID) 150 MCG tablet, 1 tablet once a day except for 1/2 tablet on Sundays, Disp: , Rfl:     metoprolol tartrate (LOPRESSOR) 25 MG tablet, TAKE 1 TABLET BY MOUTH TWO TIMES DAILY, Disp: , Rfl:     nitroGLYCERIN (NITROSTAT) 0.4 MG SL tablet, Place 0.4 mg under the tongue, Disp: , Rfl:     omeprazole (PRILOSEC) 40 MG delayed release capsule, Take 20 mg by mouth daily, Disp: , Rfl:     terazosin (HYTRIN) 5 MG capsule, Take 5 mg by mouth, Disp: , Rfl:     tiotropium-olodaterol (STIOLTO RESPIMAT) 2.5-2.5 MCG/ACT AERS, Inhale 2 puffs into the lungs daily, Disp: , Rfl:     celecoxib (CELEBREX) 200 MG capsule, Take 200 mg by mouth daily (Patient not taking: Reported on 7/6/2022), Disp: , Rfl:     ferrous sulfate (IRON 325) 325 (65 Fe) MG tablet, Take 325 mg by mouth every morning (before breakfast), Disp: , Rfl:     ranolazine (RANEXA) 500 MG extended release tablet, Take 500 mg by mouth 2 times daily (Patient not taking: Reported on 7/6/2022), Disp: , Rfl:      Allergies   Allergen Reactions    Diphenhydramine Other (See Comments)     Hyperactivity, anxiousness    Lorazepam Other (See Comments)     Paradoxical agitation    Sulfa Antibiotics Rash        Patient Active Problem List    Diagnosis Date Noted    TIA (transient ischemic attack) 02/10/2022     Carotid duplex (7/1/2022): Mild < 50% bilateral ICA stenosis      Mild anemia 05/13/2021    Elevated fasting glucose 08/06/2020    IFG (impaired fasting glucose) 08/06/2020    Peripheral polyneuropathy 08/06/2020    Coronary artery disease of bypass graft of native heart with stable angina pectoris ([facility] [address] 06/26/2020    Benign prostatic hyperplasia with incomplete bladder emptying 08/01/2019    Hypertension     Paroxysmal atrial fibrillation ([facility] [address] [facility] 06/16/2019     1.   Occurred post operatively after SBO and ex-lap Viky 2019.     Spontaneously converted. \"brief episode\" per review of records. Formatting of this note might be different from the original.  1.  Occurred post operatively after SBO and ex-lap June 2019. Spontaneously converted. \"brief episode\" per review of records.  Thrombocytopenia ([facility] [address] 06/14/2019    Chronic left-sided thoracic back pain 02/01/2019    Osteoarthritis of thoracic spine 02/01/2019    Elevated PSA 08/01/2018    Arthritis     GERD (gastroesophageal reflux disease)     Postsurgical hypothyroidism 04/11/2017    Chronic obstructive pulmonary disease ([address] 04/10/2017    DDD (degenerative disc disease), cervical 03/21/2017    S/P CABG (coronary artery bypass graft) 12/29/2016    Thoracic aortic aneurysm without rupture ([facility] [address] 11/09/2016     Endovascular thoracic aortic aneurysm repair 2016:  [facility]. Formatting of this note might be different from the original.  Endovascular thoracic aortic aneurysm repair 2016:  Follow [facility].  Chronic seasonal allergic rhinitis due to pollen 08/23/2016    Dyslipidemia 08/23/2016    Coronary artery disease involving coronary bypass graft of native heart 08/23/2016     1. CABG (9/2003): Transmyocardial laser revascularization to the   inferior wall of the left ventricle. Coronary artery bypass grafting x6. LIMA to LAD: Sequential SVG to ramus and obtuse marginal.  Sequential SVG   to posterolateral and right PDA, SVG to diagonal.  2.  Cardiolite( 11/2012): Diaphragmatic attenuation. No ischemia. Normal   LV systolic function. EF 63%. 3.  Stress Cardiolite (1/16/15):  EF 52%. Normal perfusion. 4.  LHC (4/13/15):  6 of 6 bypass grafts patent. Small vessel disease   involving the apical LAD. Normal LV systolic function. LVEDP 27  5. LHC (5/24/18): Severe multivessel CAD. 6 of 6 grafts patent. Small   vessel disease not amendable to PCI. EF 55-60%. 6. Lexiscan Cardiolite (9/1/20):  EF 68%. Normal perfusion. 7.  LHC (3/10/21): There are multivessel coronary disease. 6 of 6 bypass   grafts patent. Normal LV function. EF 55-60%. Small vessel disease. Only   change compared to previous images is apical LAD is now occluded but this   is a small distal vessel. Formatting of this note might be different from the original.  1.  CABG (2003): Transmyocardial laser revascularization to the   inferior wall of the left ventricle. Coronary artery bypass grafting x6. LIMA to LAD: Sequential SVG to ramus and obtuse marginal.  Sequential SVG   to posterolateral and right PDA, SVG to diagonal.  2.  Cardiolite( 2012): Diaphragmatic attenuation. No ischemia. Normal   LV systolic function. EF 63%. 3.  Stress Cardiolite (1/16/15):  EF 52%. Normal perfusion. 4.  LHC (4/13/15):  6 of 6 bypass grafts patent. Small vessel disease   involving the apical LAD. Normal LV systolic function. LVEDP 27  5. LHC (18): Severe multivessel CAD. 6 of 6 grafts patent. Small   vessel disease not amendable to PCI. EF 55-60%. 6. Lexiscan Cardiolite (20):  EF 68%. Normal perfusion. 7.  LHC (3/10/21): There are multivessel coronary disease. 6 of 6 bypass   grafts patent. Normal LV function. EF 55-60%. Small vessel disease. Only   change compared to previous images is apical LAD is now occluded but this   is a small distal vessel. 8.  Echo (2022): EF 55%. Normal RV. Mild/moderate AR. Mild MR. Ascending aorta 4.1 cm.  AAA (abdominal aortic aneurysm) ([facility] [address] 2013    DDD (degenerative disc disease), lumbar 2013        Social History     Tobacco Use    Smoking status: Former Smoker     Packs/day: 1.00     Quit date: 12/3/1987     Years since quittin.6    Smokeless tobacco: Never Used   Substance Use Topics    Alcohol use: Yes       ROS:    Review of Systems   Constitutional: Negative for malaise/fatigue. Cardiovascular: Positive for dyspnea on exertion.  Negative for chest pain. Respiratory: Negative for shortness of breath. Musculoskeletal: Positive for arthritis. Neurological: Positive for disturbances in coordination. Negative for focal weakness. Psychiatric/Behavioral: Negative for depression. PHYSICAL EXAM:  Wt Readings from Last 3 Encounters:   07/06/22 212 lb 6.4 oz (96.3 kg)   06/30/22 214 lb (97.1 kg)   05/16/22 214 lb (97.1 kg)     BP Readings from Last 3 Encounters:   07/06/22 (!) 120/58   06/30/22 (!) 168/62   05/16/22 130/70     Pulse Readings from Last 3 Encounters:   05/16/22 52   02/10/22 73   02/03/22 59       Physical Exam  Constitutional:       General: He is not in acute distress. Neck:      Vascular: No carotid bruit. Cardiovascular:      Rate and Rhythm: Normal rate and regular rhythm. Pulmonary:      Effort: No respiratory distress. Breath sounds: Normal breath sounds. Abdominal:      General: Bowel sounds are normal.      Palpations: Abdomen is soft. Musculoskeletal:         General: No swelling. Skin:     General: Skin is warm and dry. Neurological:      General: No focal deficit present. Psychiatric:         Mood and Affect: Mood normal.         Medical problems and test results were reviewed with the patient today.        Lab Results   Component Value Date    WBC 2.3 (LL) 05/10/2022    HGB 11.7 (L) 05/10/2022    HCT 32.5 (L) 05/10/2022     (H) 05/10/2022    PLT 97 (LL) 05/10/2022       Lab Results   Component Value Date/Time     05/09/2022 01:21 PM    K 4.4 05/09/2022 01:21 PM     05/09/2022 01:21 PM    CO2 27 05/09/2022 01:21 PM    BUN 10 05/09/2022 01:21 PM    CREATININE 0.90 05/09/2022 01:21 PM    GLUCOSE 98 05/09/2022 01:21 PM    CALCIUM 8.8 05/09/2022 01:21 PM        Lab Results   Component Value Date    CHOL 112 05/10/2022     Lab Results   Component Value Date    TRIG 67 05/10/2022     Lab Results   Component Value Date    HDL 48 05/10/2022     Lab Results   Component Value Date    LDLCALC 50 05/10/2022     Lab Results   Component Value Date    LABVLDL 13 04/22/2020    VLDL 14 05/10/2022     No results found for: CHOLHDLRATIO     Data from outside records/labs from outside providers have been reviewed and summarized as noted in the HPI, past history and data review sections of this note       ASSESSMENT and PLAN      1. Coronary artery disease involving coronary bypass graft of native heart without angina pectoris  Patient is doing well without any anginal symptoms. Continue Aspirin 81 mg a day, Plavix 75 mg a day and PRN NTG. We discussed the importance of continued risk factor modification. The patient is encouraged to contact my office with any worsening dyspnea or chest discomfort. 2. Primary hypertension  Blood pressure controlled. Continue metoprolol and amlodipine therapy. 3. TIA (transient ischemic attack)  No recurrence. No episodes of sustained atrial fibrillation. On aspirin/Plavix therapy. 4. Thoracic aortic aneurysm without rupture ([facility] [address]  Followed by vascular surgery. Kodak Wilcox MD  7/6/2022  11:59 AM    This note may have been dictated using speech recognition software.   As a result, error of speech recognition may have occurred

## 2022-07-07 RX ORDER — LEVOTHYROXINE SODIUM 0.15 MG/1
150 TABLET ORAL DAILY
Qty: 90 TABLET | Refills: 1 | Status: SHIPPED | OUTPATIENT
Start: 2022-07-07 | End: 2022-10-27 | Stop reason: SDUPTHER

## 2022-07-07 RX ORDER — CLOPIDOGREL BISULFATE 75 MG/1
75 TABLET ORAL DAILY
Qty: 90 TABLET | Refills: 1 | Status: SHIPPED | OUTPATIENT
Start: 2022-07-07

## 2022-07-07 NOTE — TELEPHONE ENCOUNTER
Patient requesting 90 day prescription sent to [facility] for Plavix and Levothyroxine

## 2022-07-11 NOTE — PROGRESS NOTES
CARDIOLOGY NEW PATIENT VISIT             Patient Name: Lupe Vallecillo      : 1949   MRN: [id number]     PCP: Tyson Denson MD   PCP phone: [phone]          Chief Complaint: Consultation (Pulmonary HTN (CMS/HCC), Atrial enlargement, bilateral, Acquired abnormality of inferior vena cava, Preoperative cardiovascular examination)       HISTORY OF PRESENT ILLNESS     Lupe Vallecillo is a 73 year old year old female with PMH as described below who is presenting to the clinic today for the following reason(s) or complaint(s): Consultation (Pulmonary HTN (CMS/HCC), Atrial enlargement, bilateral, Acquired abnormality of inferior vena cava, Preoperative cardiovascular examination)     Today, the patient presents to clinic accompanied by her  as a new patient for pre-operative cardiovascular risk assessment. She is planning to have a procedure for carpal tunnel and was informed she should be evaluated first because of her atrial fibrillation. She confirms having a history of elevated blood pressure and is compliant with her current medication regimen including Eliquis. She is tolerating her medications well but does note occasionally forgetting to take Lasix. She experiences shortness of breath with walking or more significant exertion and occasional palpitations. She currently uses a cane at home for ambulation or a walker when outside due to a previous leg injury and knee pain. In regards to her family history, she reports that her mother had a stroke and both brothers have atrial fibrillation. She does have orthopnea, and her  notes that she occasionally snores. She reports EtOH consumption of 1 drink 4 times a week. She also experiences some leg swelling that began following a foot surgery. Discussed limiting her salt and fluid intake in detail. Patient does not have any additional complaints at this time.       RECENT HOSPITALIZATION(S)     N/A     REVIEW OF SYSTEMS     All systems reviewed, pertinent  Skin assessment per 2 RN's - midline abd incision covered with tape, left UMER drain with bloody drainage. Buttocks and heels intact. positive as mentioned in HPI.    PAST MEDICAL HISTORY     She  has a past medical history of Anemia, Anxiety Disorder, Bowel obstruction (CMS/HCC), Cataract, senile, Cellulitis (2011), Depression, Fracture, GERD (esophageal reflux), Grade I diastolic dysfunction, Hypertension, Lymphedema, Migraine, Morbid obesity (CMS/HCC), Osteoarthrosis, unspecified whether generalized or localized, lower leg, Pneumonia, Raynaud's syndrome, Right knee pain, Use of cane as ambulatory aid, UTI (lower urinary tract infection) (3/31/15), and Wears glasses.    She has no past medical history of MRSA (methicillin resistant Staphylococcus aureus) or PONV (postoperative nausea and vomiting).     FAMILY, SOCIAL, & MEDICAL HISTORY     Family History: Her family history includes Cancer in her brother and father; Heart in her mother; Heart disease in her brother, brother, father, and mother; Hypertension in her brother and brother; Other in her father.     Social History: She  reports that she quit smoking about 28 years ago. Her smoking use included cigarettes. She has a 20.00 pack-year smoking history. She has never used smokeless tobacco. She reports current alcohol use of about 7.0 standard drinks of alcohol per week. She reports that she does not use drugs.     Medical History:   Past Medical History:   Diagnosis Date   • Anemia     chronic intermittent, iron deficiency   • Anxiety Disorder    • Bowel obstruction (CMS/HCC)    • Cataract, senile     Bilateral   • Cellulitis 2011    left lower extremity   • Depression    • Fracture     Left Knee Cap, Right ankle   • GERD (esophageal reflux)    • Grade I diastolic dysfunction    • Hypertension    • Lymphedema    • Migraine    • Morbid obesity (CMS/HCC)    • Osteoarthrosis, unspecified whether generalized or localized, lower leg     Bilateral knees, periodic steroid injections   • Pneumonia     2014   • Raynaud's syndrome    • Right knee pain    • Use of cane as ambulatory aid    • UTI  (lower urinary tract infection) 3/31/15   • Wears glasses       Family, Social, & Medical history have all been reviewed and updated.    CURRENT MEDICATIONS     Current Outpatient Medications   Medication Sig Dispense Refill   • losartan (COZAAR) 25 MG tablet Take 1 tablet by mouth daily. 90 tablet 3   • metoPROLOL succinate (TOPROL-XL) 50 MG 24 hr tablet Take 1.5 tablets by mouth daily. 135 tablet 3   • torsemide (DEMADEX) 20 MG tablet Take 1 tablet by mouth daily. 90 tablet 3   • LORazepam (ATIVAN) 1 MG tablet Take 1 tablet by mouth every 6 hours as needed for Anxiety. 90 tablet 1   • oxyCODONE-acetaminophen (Percocet) 5-325 MG per tablet Take 1 tablet by mouth daily as needed for Pain. 30 tablet 0   • apixaBAN (Eliquis) 5 MG Tab Take 1 tablet by mouth every 12 hours. 60 tablet 3   • clotrimazole-betamethasone (Lotrisone) 1-0.05 % cream Apply 1 application topically 2 times daily. 30 g 3   • nystatin (MYCOSTATIN) 478961 UNIT/GM powder Apply topically 3 times daily. 60 g 3   • sumatriptan (Imitrex) 100 MG tablet Take at onset of headache, may repeat in 2 hours 9 tablet 1   • DULoxetine (CYMBALTA) 60 MG capsule Take 1 capsule by mouth 2 times daily. 180 capsule 1   • pantoprazole (PROTONIX) 40 MG tablet Take 1 tablet by mouth daily. 90 tablet 1   • rOPINIRole (REQUIP) 1 MG tablet TAKE 1 AND 1/2 TABLETS BY MOUTH EVERY NIGHT (Patient taking differently: Take 2 mg by mouth nightly.) 135 tablet 1   • estradiol (ESTRACE) 0.1 MG/GM vaginal cream Place a small grape size amount inside the vagina using your finger (0.5 g if using the applicator) every night for 2 weeks and then every other night before bed 42.5 g 6   • guaiFENesin-codeine (GUAIFENESIN AC) 100-10 MG/5ML liquid TAKE 5 ML BY MOUTH EVERY 6 HOURS AS NEEDED FOR COUGH 240 mL 0   • abatacept (Orencia) 125 MG/ML prefilled syringe for injection Inject 125 mg into the skin 1 time.     • Boswellia Gustavo (BOSWELLIA PO) Take 1 capsule by mouth daily. Tumeric /  boswellia     • aspirin 81 MG EC tablet Take 81 mg by mouth daily as needed.     • APPLE CIDER VINEGAR PO Take by mouth daily. Indications: 1 gummie daily     • TURMERIC PO Take 1 capsule by mouth 2 (two) times a day.     • Chromium Picolinate (CHROMIUM PICOLATE PO) Take 1 tablet by mouth daily.     • diphenhydrAMINE-APAP, sleep, (EXCEDRIN PM PO) Take 2 tablets by mouth daily.     • DISPENSE Take by mouth nightly as needed. Indications: CBD WELLNESS SUPPLEMENT     • Calcium Ascorbate 500 MG Tab Take 500 mg by mouth daily.     • potassium phosphate, monobasic, (K-PHOS ORIGINAL) 500 MG tablet Take 500 mg by mouth daily.      • folic acid (FOLATE) 1 MG tablet Take 2 tablets by mouth daily. (Patient taking differently: Take 1 mg by mouth daily.) 180 tablet 2   • acetaminophen (TYLENOL) 650 MG CR tablet Take 650 mg by mouth every 8 hours as needed for Pain.     • vitamin - therapeutic multivitamins w/minerals (CENTRUM SILVER,THERA-M) Tab Take 1 tablet by mouth daily.     • Cholecalciferol (VITAMIN D) 2000 units tablet Take 4,000 Units by mouth daily.      • Ascorbic Acid (VITAMIN C) 1000 MG tablet Take 1,000 mg by mouth daily.        No current facility-administered medications for this visit.     Facility-Administered Medications Ordered in Other Visits   Medication Dose Route Frequency Provider Last Rate Last Admin   • sodium chloride (PF) 0.9 % injection 50 mL  50 mL Injection Once Tyson Denson MD            PHYSICAL EXAM     Vitals 6/13/2022 6/13/2022 6/16/2022 7/8/2022 7/11/2022   SYSTOLIC 142 140 132 120 110   DIASTOLIC 83 84 82 64 88   Pulse 123 104 114 116 84   Temp - - - - -   Resp 20 20 - - -   Weight kg - - 132.45 kg 130.092 kg 129.457 kg   Height - - 5' 1\" 5' 1\" 5' 1\"   BMI (Calculated) - - 55.17 54.19 53.93   Height - Patient Reported - - - - -   Some recent data might be hidden     General : No acute distress  HEENT: PERRL, Normocephalic/atraumatic, clear oropharynx  Neck: Supple, FROM. Trachea central.  No JVD (jugular vein distention) or carotid bruit.  CVS: S1, S2 normal. No M/R/G  Pulm: Clear to auscultation/percussion. No Wheeze/Rhonchi/Rales  Ext: No cyanosis/clubbing. 1+ edema  Skin: No rash/palpable nodules    LABS     Recent Labs   Lab 07/08/22  1358 06/16/22  1120 05/04/22  1624 02/10/22  1513 02/01/22  1617 12/15/21  1550 10/21/21  1423   WBC 5.7 6.6 5.5  --  5.9 4.4  --    RBC 4.02 3.80* 3.84*  --  3.73* 3.56*  --    HGB 12.4 11.7* 12.0  --  11.3* 10.9*  --    HCT 40.9 38.0 38.1  --  36.4 34.9*  --     270 332  --  341 329  --    Sodium 137 137  --  136  --   --   --    Potassium 4.8 4.7  --  4.5  --   --   --    Chloride 105 105  --  106  --   --   --    Carbon Dioxide 25 23  --  26  --   --   --    BUN 25* 19  --  17  --   --   --    Creatinine 0.72 0.61 0.70 0.64 0.80 0.66  --    Glomerular Filtration Rate 89 >90 >90 >90 75 90  --    BUN/ Creatinine Ratio 35* 31*  --  27*  --   --   --    TSH  --  1.405  --  0.988  --   --   --    Hemoglobin A1C  --   --   --   --   --   --  5.6   Glucose 109* 108*  --  97  --   --   --    C-Reactive Protein  --   --  1.5*  --  0.6 <0.3  --    RBC Sedimentation Rate  --   --  46*  --  66* 25*  --       Additional Labs:     Risk Scores     [id number] Score CHADSVASc Stroke Risk Score = 3      ASCVD Score The ASCVD Risk score (Adebayo GARCIA Jr., et al., 2013) failed to calculate for the following reasons:    The patient has a prior MI or stroke diagnosis      HAS-BLED Score Epic Calculated HASBLED Score = 3      Chest Pain Score      DAPT Score        Invasive/Non-Invasive Studies     Catheterization   N/A     Cardiac Stress  N/A     Echo  Echo 06/27/2022  Impression:  Decreased left ventricular chamber size.  Normal left ventricular systolic function with ejection fraction of 60 %.  Moderately increased left ventricular wall thickness.  Normal right ventricular chamber size.  Normal right ventricular systolic function.  Moderately elevated right ventricular systolic  pressure 46 mmHg.  Severely enlarged left atrial chamber size.  Left atrial volume index of 59.2 ml/m².  Dilated inferior vena cava with <50% collapse upon inspiration consistent with significantly elevated right atrial pressure, 15 mmHg.  Compared to prior study, PASP is 46 mm Hg was not mentioned in prior study and LA is severely dilated , no significant changes  otherwise.     EKG   EKG 06/14/2022  Atrial fibrillation   with rapid ventricular response   When compared with ECG of   06-NOV-2019 09:21,   Atrial fibrillation   has replaced   Sinus rhythm   Nonspecific T wave abnormality no longer evident in   Lateral leads      CT/MRI   N/A     Ultrasound   N/A     Additional Testing  N/A       ASSESSMENT     1. New onset atrial fibrillation, anticoagulated     2. Chronic diastolic heart failure: Likely fluid overload based on TTE. Does not take diuretic on regular basis.     3. Snoring: Sleep study 7 years ago negative.    4. HTN     5. Pre-op clearance: Recommend to postpone due to uncontrolled afib and fluid overload.     PLAN     Decrease salt to 1/2 tbs, fluid to 64 oz  Decrease losartan to 25 mg  Increase Toprol XL to 75 mg  Holter 48 hours   Changing lasix to torsemide 20 mg  BNP and BMP in 2 wks  F/u 3 weeks     Return in about 3 weeks (around 8/1/2022). Call our office as needed if symptoms worsen, fail to improve as anticipated, or if new symptoms develop.    On 7/11/2022, Sam MONTALVO scribed the services personally performed by Jean-Pierre Carter MD    I have reviewed and edited the visit summary above and attest that it is accurate.        Jean-Pierre Carter MD, FACC, [facility]  Interventional Cardiology  Formerly named [facility]

## 2022-08-11 DIAGNOSIS — D69.6 THROMBOCYTOPENIA (HCC): Primary | ICD-10-CM

## 2022-08-11 DIAGNOSIS — I71.20 THORACIC AORTIC ANEURYSM WITHOUT RUPTURE: ICD-10-CM

## 2022-08-11 DIAGNOSIS — I25.708 CORONARY ARTERY DISEASE OF BYPASS GRAFT OF NATIVE HEART WITH STABLE ANGINA PECTORIS (HCC): ICD-10-CM

## 2022-08-11 DIAGNOSIS — I48.0 PAROXYSMAL ATRIAL FIBRILLATION (HCC): ICD-10-CM

## 2022-08-11 DIAGNOSIS — D61.818 PANCYTOPENIA (HCC): ICD-10-CM

## 2022-08-11 DIAGNOSIS — M54.6 CHRONIC LEFT-SIDED THORACIC BACK PAIN: ICD-10-CM

## 2022-08-11 DIAGNOSIS — G89.29 CHRONIC LEFT-SIDED THORACIC BACK PAIN: ICD-10-CM

## 2022-08-19 ENCOUNTER — NURSE ONLY (OUTPATIENT)
Dept: INTERNAL MEDICINE CLINIC | Facility: CLINIC | Age: 87
End: 2022-08-19

## 2022-08-19 DIAGNOSIS — M54.6 CHRONIC LEFT-SIDED THORACIC BACK PAIN: ICD-10-CM

## 2022-08-19 DIAGNOSIS — G89.29 CHRONIC LEFT-SIDED THORACIC BACK PAIN: ICD-10-CM

## 2022-08-19 DIAGNOSIS — I48.0 PAROXYSMAL ATRIAL FIBRILLATION (HCC): ICD-10-CM

## 2022-08-19 DIAGNOSIS — D61.818 PANCYTOPENIA (HCC): ICD-10-CM

## 2022-08-19 DIAGNOSIS — D69.6 THROMBOCYTOPENIA (HCC): ICD-10-CM

## 2022-08-19 DIAGNOSIS — I71.20 THORACIC AORTIC ANEURYSM WITHOUT RUPTURE: ICD-10-CM

## 2022-08-19 DIAGNOSIS — I25.708 CORONARY ARTERY DISEASE OF BYPASS GRAFT OF NATIVE HEART WITH STABLE ANGINA PECTORIS (HCC): ICD-10-CM

## 2022-08-19 LAB
ALBUMIN SERPL-MCNC: 3.8 G/DL (ref 3.2–4.6)
ALBUMIN/GLOB SERPL: 1.7 {RATIO} (ref 1.2–3.5)
ALP SERPL-CCNC: 77 U/L (ref 50–136)
ALT SERPL-CCNC: 17 U/L (ref 12–65)
ANION GAP SERPL CALC-SCNC: 3 MMOL/L (ref 7–16)
AST SERPL-CCNC: 17 U/L (ref 15–37)
BASOPHILS # BLD: 0 K/UL (ref 0–0.2)
BASOPHILS NFR BLD: 0 % (ref 0–2)
BILIRUB SERPL-MCNC: 2 MG/DL (ref 0.2–1.1)
BUN SERPL-MCNC: 19 MG/DL (ref 8–23)
CALCIUM SERPL-MCNC: 9.1 MG/DL (ref 8.3–10.4)
CHLORIDE SERPL-SCNC: 105 MMOL/L (ref 98–107)
CHOLEST SERPL-MCNC: 108 MG/DL
CO2 SERPL-SCNC: 29 MMOL/L (ref 21–32)
CREAT SERPL-MCNC: 1 MG/DL (ref 0.8–1.5)
DIFFERENTIAL METHOD BLD: ABNORMAL
EOSINOPHIL # BLD: 0.1 K/UL (ref 0–0.8)
EOSINOPHIL NFR BLD: 4 % (ref 0.5–7.8)
ERYTHROCYTE [DISTWIDTH] IN BLOOD BY AUTOMATED COUNT: 14.1 % (ref 11.9–14.6)
GLOBULIN SER CALC-MCNC: 2.3 G/DL (ref 2.3–3.5)
GLUCOSE SERPL-MCNC: 89 MG/DL (ref 65–100)
HCT VFR BLD AUTO: 32.8 % (ref 41.1–50.3)
HDLC SERPL-MCNC: 49 MG/DL (ref 40–60)
HDLC SERPL: 2.2 {RATIO}
HGB BLD-MCNC: 11.1 G/DL (ref 13.6–17.2)
IMM GRANULOCYTES # BLD AUTO: 0 K/UL (ref 0–0.5)
IMM GRANULOCYTES NFR BLD AUTO: 0 % (ref 0–5)
LDLC SERPL CALC-MCNC: 44.4 MG/DL
LYMPHOCYTES # BLD: 0.7 K/UL (ref 0.5–4.6)
LYMPHOCYTES NFR BLD: 23 % (ref 13–44)
MCH RBC QN AUTO: 34.3 PG (ref 26.1–32.9)
MCHC RBC AUTO-ENTMCNC: 33.8 G/DL (ref 31.4–35)
MCV RBC AUTO: 101.2 FL (ref 79.6–97.8)
MONOCYTES # BLD: 0.3 K/UL (ref 0.1–1.3)
MONOCYTES NFR BLD: 8 % (ref 4–12)
NEUTS SEG # BLD: 2.1 K/UL (ref 1.7–8.2)
NEUTS SEG NFR BLD: 65 % (ref 43–78)
NRBC # BLD: 0 K/UL (ref 0–0.2)
PLATELET # BLD AUTO: 103 K/UL (ref 150–450)
PLATELET COMMENT: ADEQUATE
PMV BLD AUTO: 8.9 FL (ref 9.4–12.3)
POTASSIUM SERPL-SCNC: 4.9 MMOL/L (ref 3.5–5.1)
PROT SERPL-MCNC: 6.1 G/DL (ref 6.3–8.2)
RBC # BLD AUTO: 3.24 M/UL (ref 4.23–5.6)
RBC MORPH BLD: ABNORMAL
SODIUM SERPL-SCNC: 137 MMOL/L (ref 136–145)
TRIGL SERPL-MCNC: 73 MG/DL (ref 35–150)
TSH, 3RD GENERATION: 2.28 UIU/ML (ref 0.36–3.74)
VLDLC SERPL CALC-MCNC: 14.6 MG/DL (ref 6–23)
WBC # BLD AUTO: 3.2 K/UL (ref 4.3–11.1)
WBC MORPH BLD: ABNORMAL

## 2022-09-14 ENCOUNTER — OFFICE VISIT (OUTPATIENT)
Dept: INTERNAL MEDICINE CLINIC | Facility: CLINIC | Age: 87
End: 2022-09-14
Payer: MEDICARE

## 2022-09-14 VITALS
OXYGEN SATURATION: 99 % | WEIGHT: 210 LBS | SYSTOLIC BLOOD PRESSURE: 132 MMHG | HEIGHT: 72 IN | BODY MASS INDEX: 28.44 KG/M2 | TEMPERATURE: 97.5 F | HEART RATE: 70 BPM | DIASTOLIC BLOOD PRESSURE: 68 MMHG

## 2022-09-14 DIAGNOSIS — I48.0 PAROXYSMAL ATRIAL FIBRILLATION (HCC): Primary | ICD-10-CM

## 2022-09-14 DIAGNOSIS — R35.0 BENIGN PROSTATIC HYPERPLASIA WITH URINARY FREQUENCY: ICD-10-CM

## 2022-09-14 DIAGNOSIS — I71.40 ABDOMINAL AORTIC ANEURYSM (AAA) WITHOUT RUPTURE: ICD-10-CM

## 2022-09-14 DIAGNOSIS — N40.1 BENIGN PROSTATIC HYPERPLASIA WITH URINARY FREQUENCY: ICD-10-CM

## 2022-09-14 DIAGNOSIS — I25.708 CORONARY ARTERY DISEASE OF BYPASS GRAFT OF NATIVE HEART WITH STABLE ANGINA PECTORIS (HCC): ICD-10-CM

## 2022-09-14 DIAGNOSIS — I71.20 THORACIC AORTIC ANEURYSM WITHOUT RUPTURE: ICD-10-CM

## 2022-09-14 DIAGNOSIS — D69.6 THROMBOCYTOPENIA (HCC): ICD-10-CM

## 2022-09-14 DIAGNOSIS — J44.9 CHRONIC OBSTRUCTIVE PULMONARY DISEASE, UNSPECIFIED COPD TYPE (HCC): ICD-10-CM

## 2022-09-14 PROCEDURE — G8427 DOCREV CUR MEDS BY ELIG CLIN: HCPCS | Performed by: INTERNAL MEDICINE

## 2022-09-14 PROCEDURE — 3023F SPIROM DOC REV: CPT | Performed by: INTERNAL MEDICINE

## 2022-09-14 PROCEDURE — G8417 CALC BMI ABV UP PARAM F/U: HCPCS | Performed by: INTERNAL MEDICINE

## 2022-09-14 PROCEDURE — 1123F ACP DISCUSS/DSCN MKR DOCD: CPT | Performed by: INTERNAL MEDICINE

## 2022-09-14 PROCEDURE — 1036F TOBACCO NON-USER: CPT | Performed by: INTERNAL MEDICINE

## 2022-09-14 PROCEDURE — 99213 OFFICE O/P EST LOW 20 MIN: CPT | Performed by: INTERNAL MEDICINE

## 2022-09-14 RX ORDER — TRAMADOL HYDROCHLORIDE 50 MG/1
TABLET ORAL
COMMUNITY
Start: 2022-07-25

## 2022-09-14 NOTE — PROGRESS NOTES
ASSESSMENT/PLAN:    Check ua    Evaluation and management of the chronic condition(s) delineated. No negative side effects reported. I have reviewed all the lab results. There are some abnormalities that are either expected or not critical to the patient's health, and are discussed in the office today and are addressed. Please refer to the above assessement and plan narrative and orders and follow up plan. Medication discussed and refilled as needed. Physical exam findings are stable unless otherwise indicated and this is addressed. The most recent lab work and imaging and consultant/urgent care visits and imaging are reviewed and discussed and considered during this visit encounter. 1. Paroxysmal atrial fibrillation (HCC)  -     CBC; Future  -     Basic Metabolic Panel; Future  2. Abdominal aortic aneurysm (AAA) without rupture (HCC)  -     CBC; Future  -     Basic Metabolic Panel; Future  3. Thrombocytopenia ([facility] [address]  -     CBC; Future  -     Basic Metabolic Panel; Future  4. Chronic obstructive pulmonary disease, unspecified COPD type ([facility] [address]  -     CBC; Future  -     Basic Metabolic Panel; Future  5. Thoracic aortic aneurysm without rupture (HCC)  -     CBC; Future  -     Basic Metabolic Panel; Future  6. Coronary artery disease of bypass graft of native heart with stable angina pectoris (HCC)  -     CBC; Future  -     Basic Metabolic Panel; Future  7. Benign prostatic hyperplasia with urinary frequency  -     AMB POC URINALYSIS DIP STICK AUTO W/O MICRO  -     CBC; Future  -     Basic Metabolic Panel; Future         On this date, 9/14/22, I have spent 30 minutes reviewing previous notes, test results and face to face with the patient discussing the diagnosis and importance of compliance with the treatment plan as well as documenting on the day of the visit. An electronic signature was used to authenticate this note. -- Minus MD Jaqueline     No follow-ups on file.     SUBJECTIVE/OBJECTIVE:  Chief Complaint   Patient presents with    Coronary Artery Disease     3 month recheck    Hypertension    Cholesterol Problem    Discuss Labs       HPI:   Harvinder Saenz (: 1935 is a 82 y.o. male, here for evaluation of the following chief complaint(s):   Chief Complaint   Patient presents with    Coronary Artery Disease     3 month recheck    Hypertension    Cholesterol Problem    Discuss Labs       Patient is here for follow-up and management of chronic medical conditions, review of recent labs, review of any imaging completed since our last office visit and discuss any consultants opinions or management changes. Labs reviewed and discussed and medication refilled as needed for chronic medications during ov or adjusted based on lab results and/or our discussion as appropriate. See discussion. The patient's available records and electronic chart records are reviewed. The PMH, PSH, medications, allergies, medications, FH, health maintenance and vaccination status are all reviewed and updated as appropriate. Records from outside providers have been reviewed, summarized, and considered as noted in the history of present illness, past medical history, and objective data of this note and encounter.           Health Maintenance   Topic Date Due    Prostate Specific Antigen (PSA) Screening or Monitoring  2022    Depression Screen  2023    Annual Wellness Visit (AWV)  2023    Lipids  2023    DTaP/Tdap/Td vaccine (3 - Td or Tdap) 10/05/2031    Flu vaccine  Completed    Shingles vaccine  Completed    Pneumococcal 65+ years Vaccine  Completed    COVID-19 Vaccine  Completed    Hepatitis A vaccine  Aged Out    Hepatitis B vaccine  Aged Out    Hib vaccine  Aged Out    Meningococcal (ACWY) vaccine  Aged Out     Patient Active Problem List   Diagnosis    AAA (abdominal aortic aneurysm) (HCC)    Elevated PSA    TIA (transient ischemic attack)    Elevated fasting glucose    Paroxysmal atrial fibrillation (HCC)    Mild anemia    Chronic left-sided thoracic back pain    Thrombocytopenia (HCC)    Benign prostatic hyperplasia with incomplete bladder emptying    Osteoarthritis of thoracic spine    DDD (degenerative disc disease), lumbar    Chronic seasonal allergic rhinitis due to pollen    Dyslipidemia    DDD (degenerative disc disease), cervical    Chronic obstructive pulmonary disease (HCC)    IFG (impaired fasting glucose)    Thoracic aortic aneurysm without rupture (HCC)    Postsurgical hypothyroidism    Arthritis    Hypertension    GERD (gastroesophageal reflux disease)    S/P CABG (coronary artery bypass graft)    Coronary artery disease involving coronary bypass graft of native heart    Coronary artery disease of bypass graft of native heart with stable angina pectoris (HCC)    Peripheral polyneuropathy       Review of Systems   Genitourinary:  Positive for frequency.      Lab Results   Component Value Date/Time    WBC 3.2 08/19/2022 01:43 PM    HGB 11.1 08/19/2022 01:43 PM    HCT 32.8 08/19/2022 01:43 PM    .2 08/19/2022 01:43 PM    RDW 14.1 08/19/2022 01:43 PM     08/19/2022 01:43 PM    NEUTOPHILPCT 60 05/10/2022 09:45 AM    LYMPHOPCT 23 08/19/2022 01:43 PM    LYMPHOPCT 27 05/10/2022 09:45 AM    MONOPCT 8 08/19/2022 01:43 PM    MONOPCT 8 05/10/2022 09:45 AM    EOSRELPCT 4 08/19/2022 01:43 PM    BASOPCT 0 08/19/2022 01:43 PM    BASOPCT 1 05/10/2022 09:45 AM    LYMPHSABS 0.7 08/19/2022 01:43 PM    LYMPHSABS 0.6 05/10/2022 09:45 AM    MONOSABS 0.3 08/19/2022 01:43 PM    MONOSABS 0.2 05/10/2022 09:45 AM    EOSABS 0.1 08/19/2022 01:43 PM    EOSABS 0.1 05/10/2022 09:45 AM    BASOSABS 0.0 08/19/2022 01:43 PM    IMMGRAN 0 08/19/2022 01:43 PM    GRANULOCYTEABSOLUTECOUNT 0.0 05/10/2022 09:45 AM       Lab Results   Component Value Date/Time     08/19/2022 01:43 PM    K 4.9 08/19/2022 01:43 PM     08/19/2022 01:43 PM    CO2 29 08/19/2022 01:43 PM    ANIONGAP 3 08/19/2022 01:43 PM    GLUCOSE 89 08/19/2022 01:43 PM    BUN 19 08/19/2022 01:43 PM    CREATININE 1.00 08/19/2022 01:43 PM    GFRAA >60 08/19/2022 01:43 PM    LABGLOM >60 08/19/2022 01:43 PM    CALCIUM 9.1 08/19/2022 01:43 PM    BILITOT 2.0 08/19/2022 01:43 PM    ALT 17 08/19/2022 01:43 PM    AST 17 08/19/2022 01:43 PM    ALKPHOS 77 08/19/2022 01:43 PM    ALKPHOS 74 05/09/2022 01:21 PM    PROT 6.1 08/19/2022 01:43 PM    LABALBU 3.8 08/19/2022 01:43 PM    GLOB 2.3 08/19/2022 01:43 PM    ALBUMIN 1.7 08/19/2022 01:43 PM       Lab Results   Component Value Date/Time    CHOL 108 08/19/2022 01:43 PM    HDL 49 08/19/2022 01:43 PM    TRIG 73 08/19/2022 01:43 PM    LDLCALC 44.4 08/19/2022 01:43 PM    VLDL 14 05/10/2022 09:45 AM       Lab Results   Component Value Date/Time    LABA1C 5.0 05/10/2022 09:45 AM    LABA1C 5.1 05/07/2021 10:12 AM    LABA1C 5.0 08/27/2020 10:58 AM       Lab Results   Component Value Date/Time    TSH 2.050 05/10/2022 09:45 AM    TSH 1.830 11/03/2021 02:01 PM    TSH 2.030 05/07/2021 10:12 AM       Lab Results   Component Value Date/Time    PSA 6.2 11/03/2021 02:01 PM    PSA 5.7 02/19/2021 11:51 AM    PSA 5.4 04/22/2020 11:41 AM    PSA 5.2 08/08/2019 07:48 AM       Lab Results   Component Value Date/Time    SPECGRAV 1.018 05/07/2021 10:12 AM    PHUR 6.0 05/07/2021 10:12 AM    COLORU Yellow 05/07/2021 10:12 AM    CLARITYU Clear 05/07/2021 10:12 AM    LEUKOCYTESUR Negative 05/07/2021 10:12 AM    PROTEINU Negative 05/07/2021 10:12 AM    GLUCOSEU Negative 05/07/2021 10:12 AM    KETUA Negative 05/07/2021 10:12 AM    BLOODU Negative 05/07/2021 10:12 AM    BILIRUBINUR Negative 05/07/2021 10:12 AM    UROBILINOGEN 0.2 05/07/2021 10:12 AM    NITRU Negative 05/07/2021 10:12 AM    LABMICR Comment 05/07/2021 10:12 AM           Vitals:    09/14/22 0843   BP: 132/68   Site: Left Upper Arm   Position: Sitting   Cuff Size: Small Adult   Pulse: 70   Temp: 97.5 °F (36.4 °C)   TempSrc: Temporal   SpO2: 99%   Weight: 210 lb (95.3 kg) Height: 6' (1.829 m)     Wt Readings from Last 3 Encounters:   09/14/22 210 lb (95.3 kg)   07/06/22 212 lb 6.4 oz (96.3 kg)   06/30/22 214 lb (97.1 kg)     BP Readings from Last 3 Encounters:   09/14/22 132/68   07/06/22 (!) 120/58   06/30/22 (!) 168/62     Physical Exam  Vitals and nursing note reviewed. Constitutional:       Appearance: Normal appearance. He is not ill-appearing. HENT:      Head: Normocephalic and atraumatic. Eyes:      Extraocular Movements: Extraocular movements intact. Conjunctiva/sclera: Conjunctivae normal.   Cardiovascular:      Rate and Rhythm: Normal rate and regular rhythm. Pulmonary:      Effort: Pulmonary effort is normal.      Breath sounds: Normal breath sounds. Abdominal:      Hernia: There is no hernia in the left inguinal area or right inguinal area. Lymphadenopathy:      Lower Body: No right inguinal adenopathy. No left inguinal adenopathy. Neurological:      General: No focal deficit present. Mental Status: He is alert and oriented to person, place, and time. Mental status is at baseline.    Psychiatric:         Mood and Affect: Mood normal.         Behavior: Behavior normal.

## 2022-09-23 ENCOUNTER — TELEPHONE (OUTPATIENT)
Dept: INTERNAL MEDICINE CLINIC | Facility: CLINIC | Age: 87
End: 2022-09-23

## 2022-09-23 NOTE — TELEPHONE ENCOUNTER
----- Message from Almaz Williamson sent at 9/22/2022  3:31 PM EDT -----  Subject: Referral Request    Reason for referral request? Pt called and wanted to have a referral to   [facility] [address] that is in your building  Provider patient wants to be referred to(if known):     Provider Phone Number(if known):     Additional Information for Provider?   ---------------------------------------------------------------------------  --------------  [id number] [employer]    [phone]; OK to leave message on voicemail  ---------------------------------------------------------------------------  --------------

## 2022-09-29 ENCOUNTER — APPOINTMENT (RX ONLY)
Dept: URBAN - METROPOLITAN AREA CLINIC 330 | Facility: CLINIC | Age: 87
Setting detail: DERMATOLOGY
End: 2022-09-29

## 2022-09-29 DIAGNOSIS — Z85.828 PERSONAL HISTORY OF OTHER MALIGNANT NEOPLASM OF SKIN: ICD-10-CM

## 2022-09-29 PROBLEM — D48.5 NEOPLASM OF UNCERTAIN BEHAVIOR OF SKIN: Status: ACTIVE | Noted: 2022-09-29

## 2022-09-29 PROCEDURE — ? BIOPSY BY SHAVE METHOD

## 2022-09-29 PROCEDURE — ? COUNSELING

## 2022-09-29 PROCEDURE — 11102 TANGNTL BX SKIN SINGLE LES: CPT

## 2022-09-29 PROCEDURE — 11103 TANGNTL BX SKIN EA SEP/ADDL: CPT

## 2022-09-29 PROCEDURE — ? ADDITIONAL NOTES

## 2022-09-29 NOTE — PROCEDURE: MIPS QUALITY
Quality 226: Preventive Care And Screening: Tobacco Use: Screening And Cessation Intervention: Patient screened for tobacco use and is an ex/non-smoker
Quality 130: Documentation Of Current Medications In The Medical Record: Current Medications Documented
Quality 402: Tobacco Use And Help With Quitting Among Adolescents: Patient screened for tobacco and never smoked
Quality 431: Preventive Care And Screening: Unhealthy Alcohol Use - Screening: Patient not identified as an unhealthy alcohol user when screened for unhealthy alcohol use using a systematic screening method
Detail Level: Detailed
Quality 110: Preventive Care And Screening: Influenza Immunization: Influenza Immunization Administered during Influenza season
Quality 47: Advance Care Plan: Advance care planning not documented, reason not otherwise specified.
Quality 111:Pneumonia Vaccination Status For Older Adults: Pneumococcal vaccine administered on or after patient’s 60th birthday and before the end of the measurement period

## 2022-10-11 ENCOUNTER — APPOINTMENT (RX ONLY)
Dept: URBAN - METROPOLITAN AREA CLINIC 330 | Facility: CLINIC | Age: 87
Setting detail: DERMATOLOGY
End: 2022-10-11

## 2022-10-11 PROBLEM — C44.629 SQUAMOUS CELL CARCINOMA OF SKIN OF LEFT UPPER LIMB, INCLUDING SHOULDER: Status: ACTIVE | Noted: 2022-10-11

## 2022-10-11 PROCEDURE — 12032 INTMD RPR S/A/T/EXT 2.6-7.5: CPT

## 2022-10-11 PROCEDURE — ? EXCISION

## 2022-10-11 PROCEDURE — 11603 EXC TR-EXT MAL+MARG 2.1-3 CM: CPT

## 2022-10-11 NOTE — PROCEDURE: EXCISION
Fusiform Excision Additional Text (Leave Blank If You Do Not Want): The margin was drawn around the clinically apparent lesion.  A fusiform shape was then drawn on the skin incorporating the lesion and margins.  Incisions were then made along these lines to the appropriate tissue plane and the lesion was extirpated. not applicable

## 2022-10-11 NOTE — PROCEDURE: EXCISION
Path Notes (To The Dermatopathologist): Please check margins.  Previous accession number is [id number]……Notch was placed at the ulnar proximal….. Notch is at the ulnar proximal Path Notes (To The Dermatopathologist): Please check margins.  Previous accession number is [id number]……Notch was placed at the ulnar proximal….. Notch is at the ulnar proximal

## 2022-10-26 ENCOUNTER — APPOINTMENT (RX ONLY)
Dept: URBAN - METROPOLITAN AREA CLINIC 330 | Facility: CLINIC | Age: 87
Setting detail: DERMATOLOGY
End: 2022-10-26

## 2022-10-26 DIAGNOSIS — L57.0 ACTINIC KERATOSIS: ICD-10-CM

## 2022-10-26 DIAGNOSIS — Z48.02 ENCOUNTER FOR REMOVAL OF SUTURES: ICD-10-CM

## 2022-10-26 PROCEDURE — ? COUNSELING

## 2022-10-26 PROCEDURE — 17000 DESTRUCT PREMALG LESION: CPT | Mod: 79

## 2022-10-26 PROCEDURE — 99024 POSTOP FOLLOW-UP VISIT: CPT

## 2022-10-26 PROCEDURE — ? SUTURE REMOVAL (GLOBAL PERIOD)

## 2022-10-26 PROCEDURE — ? LIQUID NITROGEN

## 2022-10-26 ASSESSMENT — LOCATION ZONE DERM: LOCATION ZONE: ARM

## 2022-10-26 ASSESSMENT — LOCATION DETAILED DESCRIPTION DERM
LOCATION DETAILED: LEFT PROXIMAL ULNAR DORSAL FOREARM
LOCATION DETAILED: LEFT DISTAL DORSAL FOREARM

## 2022-10-26 ASSESSMENT — LOCATION SIMPLE DESCRIPTION DERM: LOCATION SIMPLE: LEFT FOREARM

## 2022-10-26 NOTE — PROCEDURE: LIQUID NITROGEN
Render Note In Bullet Format When Appropriate: No
Consent: The patient's consent was obtained including but not limited to risks of crusting, scabbing, blistering, scarring, darker or lighter pigmentary change, recurrence, incomplete removal and infection.
Number Of Freeze-Thaw Cycles: 1 freeze-thaw cycle
Duration Of Freeze Thaw-Cycle (Seconds): 0
Post-Care Instructions: I reviewed with the patient in detail post-care instructions. Patient is to wear sunprotection, and avoid picking at any of the treated lesions. Pt may apply Vaseline to crusted or scabbing areas.
Show Aperture Variable?: Yes
Detail Level: Detailed
Statement Selected

## 2022-10-26 NOTE — PROCEDURE: SUTURE REMOVAL (GLOBAL PERIOD)
Detail Level: Detailed
Add 05476 Cpt? (Important Note: In 2017 The Use Of 16642 Is Being Tracked By Cms To Determine Future Global Period Reimbursement For Global Periods): yes

## 2022-10-27 RX ORDER — LEVOTHYROXINE SODIUM 0.15 MG/1
150 TABLET ORAL DAILY
Qty: 90 TABLET | Refills: 1 | Status: SHIPPED | OUTPATIENT
Start: 2022-10-27

## 2022-12-30 DIAGNOSIS — N40.1 BENIGN PROSTATIC HYPERPLASIA WITH URINARY FREQUENCY: ICD-10-CM

## 2022-12-30 DIAGNOSIS — I48.0 PAROXYSMAL ATRIAL FIBRILLATION (HCC): ICD-10-CM

## 2022-12-30 DIAGNOSIS — I25.708 CORONARY ARTERY DISEASE OF BYPASS GRAFT OF NATIVE HEART WITH STABLE ANGINA PECTORIS (HCC): ICD-10-CM

## 2022-12-30 DIAGNOSIS — I71.40 ABDOMINAL AORTIC ANEURYSM (AAA) WITHOUT RUPTURE: ICD-10-CM

## 2022-12-30 DIAGNOSIS — D69.6 THROMBOCYTOPENIA (HCC): ICD-10-CM

## 2022-12-30 DIAGNOSIS — I71.20 THORACIC AORTIC ANEURYSM WITHOUT RUPTURE: ICD-10-CM

## 2022-12-30 DIAGNOSIS — R35.0 BENIGN PROSTATIC HYPERPLASIA WITH URINARY FREQUENCY: ICD-10-CM

## 2022-12-30 DIAGNOSIS — J44.9 CHRONIC OBSTRUCTIVE PULMONARY DISEASE, UNSPECIFIED COPD TYPE (HCC): ICD-10-CM

## 2022-12-30 LAB
ANION GAP SERPL CALC-SCNC: 4 MMOL/L (ref 2–11)
BUN SERPL-MCNC: 20 MG/DL (ref 8–23)
CALCIUM SERPL-MCNC: 8.9 MG/DL (ref 8.3–10.4)
CHLORIDE SERPL-SCNC: 106 MMOL/L (ref 101–110)
CO2 SERPL-SCNC: 29 MMOL/L (ref 21–32)
CREAT SERPL-MCNC: 1 MG/DL (ref 0.8–1.5)
ERYTHROCYTE [DISTWIDTH] IN BLOOD BY AUTOMATED COUNT: 13.3 % (ref 11.9–14.6)
GLUCOSE SERPL-MCNC: 108 MG/DL (ref 65–100)
HCT VFR BLD AUTO: 32 % (ref 41.1–50.3)
HGB BLD-MCNC: 11.1 G/DL (ref 13.6–17.2)
MCH RBC QN AUTO: 34.3 PG (ref 26.1–32.9)
MCHC RBC AUTO-ENTMCNC: 34.7 G/DL (ref 31.4–35)
MCV RBC AUTO: 98.8 FL (ref 82–102)
NRBC # BLD: 0 K/UL (ref 0–0.2)
PLATELET # BLD AUTO: 89 K/UL (ref 150–450)
PMV BLD AUTO: 9.1 FL (ref 9.4–12.3)
POTASSIUM SERPL-SCNC: 4 MMOL/L (ref 3.5–5.1)
RBC # BLD AUTO: 3.24 M/UL (ref 4.23–5.6)
SODIUM SERPL-SCNC: 139 MMOL/L (ref 133–143)
WBC # BLD AUTO: 2.6 K/UL (ref 4.3–11.1)

## 2023-01-03 ENCOUNTER — OFFICE VISIT (OUTPATIENT)
Dept: INTERNAL MEDICINE CLINIC | Facility: CLINIC | Age: 88
End: 2023-01-03
Payer: MEDICARE

## 2023-01-03 ENCOUNTER — HOSPITAL ENCOUNTER (OUTPATIENT)
Dept: GENERAL RADIOLOGY | Age: 88
Discharge: HOME | End: 2023-01-06
Payer: MEDICARE

## 2023-01-03 VITALS
DIASTOLIC BLOOD PRESSURE: 64 MMHG | WEIGHT: 215 LBS | OXYGEN SATURATION: 99 % | HEART RATE: 78 BPM | TEMPERATURE: 97.5 F | HEIGHT: 72 IN | SYSTOLIC BLOOD PRESSURE: 112 MMHG | BODY MASS INDEX: 29.12 KG/M2

## 2023-01-03 DIAGNOSIS — I71.20 THORACIC AORTIC ANEURYSM WITHOUT RUPTURE, UNSPECIFIED PART: ICD-10-CM

## 2023-01-03 DIAGNOSIS — R39.14 BENIGN PROSTATIC HYPERPLASIA WITH INCOMPLETE BLADDER EMPTYING: ICD-10-CM

## 2023-01-03 DIAGNOSIS — J44.9 CHRONIC OBSTRUCTIVE PULMONARY DISEASE, UNSPECIFIED COPD TYPE (HCC): ICD-10-CM

## 2023-01-03 DIAGNOSIS — D61.818 OTHER PANCYTOPENIA (HCC): ICD-10-CM

## 2023-01-03 DIAGNOSIS — M47.24 OSTEOARTHRITIS OF SPINE WITH RADICULOPATHY, THORACIC REGION: ICD-10-CM

## 2023-01-03 DIAGNOSIS — E89.0 POSTSURGICAL HYPOTHYROIDISM: ICD-10-CM

## 2023-01-03 DIAGNOSIS — I25.708 CORONARY ARTERY DISEASE OF BYPASS GRAFT OF NATIVE HEART WITH STABLE ANGINA PECTORIS (HCC): Primary | ICD-10-CM

## 2023-01-03 DIAGNOSIS — N40.1 BENIGN PROSTATIC HYPERPLASIA WITH INCOMPLETE BLADDER EMPTYING: ICD-10-CM

## 2023-01-03 DIAGNOSIS — I48.0 PAROXYSMAL ATRIAL FIBRILLATION (HCC): ICD-10-CM

## 2023-01-03 DIAGNOSIS — R30.0 DYSURIA: ICD-10-CM

## 2023-01-03 DIAGNOSIS — D69.6 THROMBOCYTOPENIA (HCC): ICD-10-CM

## 2023-01-03 DIAGNOSIS — J30.1 CHRONIC SEASONAL ALLERGIC RHINITIS DUE TO POLLEN: ICD-10-CM

## 2023-01-03 LAB
BILIRUBIN, URINE, POC: NEGATIVE
BLOOD URINE, POC: NORMAL
GLUCOSE URINE, POC: NEGATIVE
KETONES, URINE, POC: NEGATIVE
LEUKOCYTE ESTERASE, URINE, POC: NEGATIVE
NITRITE, URINE, POC: NEGATIVE
PH, URINE, POC: 7 (ref 4.6–8)
PROTEIN,URINE, POC: NEGATIVE
SPECIFIC GRAVITY, URINE, POC: 1.01 (ref 1–1.03)
URINALYSIS CLARITY, POC: CLEAR
URINALYSIS COLOR, POC: YELLOW
UROBILINOGEN, POC: 1

## 2023-01-03 PROCEDURE — 72100 X-RAY EXAM L-S SPINE 2/3 VWS: CPT

## 2023-01-03 PROCEDURE — 81003 URINALYSIS AUTO W/O SCOPE: CPT | Performed by: INTERNAL MEDICINE

## 2023-01-03 PROCEDURE — 1036F TOBACCO NON-USER: CPT | Performed by: INTERNAL MEDICINE

## 2023-01-03 PROCEDURE — 1123F ACP DISCUSS/DSCN MKR DOCD: CPT | Performed by: INTERNAL MEDICINE

## 2023-01-03 PROCEDURE — G8484 FLU IMMUNIZE NO ADMIN: HCPCS | Performed by: INTERNAL MEDICINE

## 2023-01-03 PROCEDURE — 3023F SPIROM DOC REV: CPT | Performed by: INTERNAL MEDICINE

## 2023-01-03 PROCEDURE — 3074F SYST BP LT 130 MM HG: CPT | Performed by: INTERNAL MEDICINE

## 2023-01-03 PROCEDURE — G8417 CALC BMI ABV UP PARAM F/U: HCPCS | Performed by: INTERNAL MEDICINE

## 2023-01-03 PROCEDURE — 71046 X-RAY EXAM CHEST 2 VIEWS: CPT

## 2023-01-03 PROCEDURE — 99214 OFFICE O/P EST MOD 30 MIN: CPT | Performed by: INTERNAL MEDICINE

## 2023-01-03 PROCEDURE — G8427 DOCREV CUR MEDS BY ELIG CLIN: HCPCS | Performed by: INTERNAL MEDICINE

## 2023-01-03 PROCEDURE — 3078F DIAST BP <80 MM HG: CPT | Performed by: INTERNAL MEDICINE

## 2023-01-03 RX ORDER — FLUTICASONE PROPIONATE 50 MCG
2 SPRAY, SUSPENSION (ML) NASAL DAILY
Qty: 16 G | Refills: 3 | Status: SHIPPED | OUTPATIENT
Start: 2023-01-03

## 2023-01-03 RX ORDER — CLOPIDOGREL BISULFATE 75 MG/1
75 TABLET ORAL DAILY
Qty: 90 TABLET | Refills: 3 | Status: SHIPPED | OUTPATIENT
Start: 2023-01-03

## 2023-01-03 RX ORDER — LEVOTHYROXINE SODIUM 0.15 MG/1
150 TABLET ORAL DAILY
Qty: 90 TABLET | Refills: 1 | Status: SHIPPED | OUTPATIENT
Start: 2023-01-03

## 2023-01-03 RX ORDER — TERAZOSIN 10 MG/1
CAPSULE ORAL
COMMUNITY
Start: 2022-12-01 | End: 2023-01-03

## 2023-01-03 RX ORDER — GABAPENTIN 400 MG/1
400 CAPSULE ORAL 2 TIMES DAILY
COMMUNITY
Start: 2022-12-16

## 2023-01-03 RX ORDER — FINASTERIDE 5 MG/1
5 TABLET, FILM COATED ORAL DAILY
Qty: 90 TABLET | Refills: 3 | Status: SHIPPED | OUTPATIENT
Start: 2023-01-03

## 2023-01-03 ASSESSMENT — ENCOUNTER SYMPTOMS
COUGH: 0
SHORTNESS OF BREATH: 1
BACK PAIN: 1

## 2023-01-03 NOTE — PROGRESS NOTES
ASSESSMENT/PLAN:    Pancytopenia- stable. Oncology did not advocate for any further work-up at this point, even if there were some developing myelodysplasia dysplasia (which was not really seen on [facility] in 2011) he would not need treatment currently. Continued observation with yearly hematology visit. Check SPEP with MOOSE     CAD- Continue ASA and Statin. BP stable. Lipids controlled. No cp or new sob. Chronic fatigue. Thoracic Aortic Aneurysm- Monitor by [facility]. Chronic back pain and DDD and OA. -- per pain management. Continue current medications as prescribed. BPH and elevated PSA monitored by urology with Dr. Amanda Landon. Check ua- neg leuk, trace heme     CXR and L spine xray. Fall precautions. Therapy discussed. Prefers to wait for now. Evaluation and management of the chronic condition(s) delineated. No negative side effects reported. I have reviewed all the lab results. There are some abnormalities that are either expected or not critical to the patient's health, and are discussed in the office today and are addressed. Please refer to the above assessement and plan narrative and orders and follow up plan. Medication discussed and refilled as needed. Physical exam findings are stable unless otherwise indicated and this is addressed. The most recent lab work and imaging and consultant/urgent care visits and imaging are reviewed and discussed and considered during this visit encounter. 1. Coronary artery disease of bypass graft of native heart with stable angina pectoris (HCC)  -     clopidogrel (PLAVIX) 75 MG tablet; Take 1 tablet by mouth daily, Disp-90 tablet, R-3Normal  -     CBC; Future  -     Comprehensive Metabolic Panel; Future  -     Lipid Panel; Future  2. Postsurgical hypothyroidism  -     levothyroxine (SYNTHROID) 150 MCG tablet;  Take 1 tablet by mouth Daily 1 tablet once a day except for 1/2 tablet on Sundays, Disp-90 tablet, R-1Normal  - CBC; Future  -     Comprehensive Metabolic Panel; Future  -     Lipid Panel; Future  3. Benign prostatic hyperplasia with incomplete bladder emptying  -     finasteride (PROSCAR) 5 MG tablet; Take 1 tablet by mouth daily, Disp-90 tablet, R-3Normal  -     CBC; Future  -     Comprehensive Metabolic Panel; Future  -     Lipid Panel; Future  4. Chronic seasonal allergic rhinitis due to pollen  -     fluticasone (FLONASE) 50 MCG/ACT nasal spray; 2 sprays by Nasal route daily, Disp-16 g, R-3Normal  -     CBC; Future  -     Comprehensive Metabolic Panel; Future  -     Lipid Panel; Future  5. Chronic obstructive pulmonary disease, unspecified COPD type ([facility]  Assessment & Plan:   Monitored by specialist- no acute findings meriting change in the plan    Orders:  -     CBC; Future  -     Comprehensive Metabolic Panel; Future  -     Lipid Panel; Future  -     XR CHEST PA LAT (2 VIEWS); Future  6. Paroxysmal atrial fibrillation (HCC)  Assessment & Plan:   Monitored by specialist- no acute findings meriting change in the plan    Orders:  -     CBC; Future  -     Comprehensive Metabolic Panel; Future  -     Lipid Panel; Future  7. Thrombocytopenia ([facility]  Assessment & Plan:   Monitored by specialist- no acute findings meriting change in the plan    Orders:  -     CBC; Future  -     Comprehensive Metabolic Panel; Future  -     Lipid Panel; Future  8. Other pancytopenia ([facility]  -     CBC; Future  -     Comprehensive Metabolic Panel; Future  -     Lipid Panel; Future  -     MOOSE and PE, Serum; Future  9. Thoracic aortic aneurysm without rupture, unspecified part  Assessment & Plan:   Monitored by specialist- no acute findings meriting change in the plan    Orders:  -     CBC; Future  -     Comprehensive Metabolic Panel; Future  -     Lipid Panel; Future  10. Dysuria  -     AMB POC URINALYSIS DIP STICK AUTO W/O MICRO  -     CBC; Future  -     Comprehensive Metabolic Panel; Future  -     Lipid Panel; Future  11.  Osteoarthritis of spine with radiculopathy, thoracic region  -     XR LUMBAR SPINE (2-3 VIEWS); Future  -     XR CHEST PA LAT (2 VIEWS); Future         On this date, 1/3/23, I have spent 30 minutes reviewing previous notes, test results and face to face with the patient discussing the diagnosis and importance of compliance with the treatment plan as well as documenting on the day of the visit. An electronic signature was used to authenticate this note. -- Magali De La Torre MD     Return in about 6 months (around 7/3/2023). SUBJECTIVE/OBJECTIVE:  Chief Complaint   Patient presents with    Coronary Artery Disease     3 month recheck    Anemia    Discuss Labs       HPI:   Harvinder Dan (: 1935 is a 82 y.o. male, here for evaluation of the following chief complaint(s):   Chief Complaint   Patient presents with    Coronary Artery Disease     3 month recheck    Anemia    Discuss Labs       Patient is here for follow-up and management of chronic medical conditions, review of recent labs, review of any imaging completed since our last office visit and discuss any consultants opinions or management changes. 82y.o. -year-old male with a complex medical hx, CAD, prior history of bowel obstruction requiring surgical intervention, history of thrombocytopenia, aortic aneurysm, atrial fibrillation, COPD, aortic aneurysm, and chronic low back pain. CAD followed by Dr. Lux Maravilla. BP reviewed. Stable. No cp or new sob. No uri. No new cough or new wheezing issues. Some back pain in the upper left back when lying down. He has mentioned this before in our visits. Poor balance. Chronic left leg weak. Using cane. Seeing pain management. Chronic back pain. Has annual follow-up with Dr. Radha Saab. Pancytopenia. Hypersplenism. No further work up. Labs stable. Plts and WBC stable. Hgb stable. Chronic back pain. Seeing pain management.   He has been taking some gabapentin twice daily as well as tramadol 50 mg twice daily as needed. Chronically bothersome. Dr. Ghislaine Hahn follows his PSA. Urology. BPH and urinary urgency/overactive bladder symptoms. No dysuria. No TIA issues recently. Labs reviewed and discussed and medication refilled as needed for chronic medications during ov or adjusted based on lab results and/or our discussion as appropriate. See discussion. The patient's available records and electronic chart records are reviewed. The PMH, PSH, medications, allergies, medications, FH, health maintenance and vaccination status are all reviewed and updated as appropriate. Records from outside providers have been reviewed, summarized, and considered as noted in the history of present illness, past medical history, and objective data of this note and encounter.           Health Maintenance   Topic Date Due    Depression Screen  05/16/2023    Annual Wellness Visit (AWV)  05/17/2023    Lipids  08/19/2023    DTaP/Tdap/Td vaccine (3 - Td or Tdap) 10/05/2031    Flu vaccine  Completed    Shingles vaccine  Completed    Pneumococcal 65+ years Vaccine  Completed    COVID-19 Vaccine  Completed    Hepatitis A vaccine  Aged Out    Hib vaccine  Aged Out    Meningococcal (ACWY) vaccine  Aged Out    Prostate Specific Antigen (PSA) Screening or Monitoring  Discontinued     Patient Active Problem List   Diagnosis    AAA (abdominal aortic aneurysm)    Elevated PSA    TIA (transient ischemic attack)    Elevated fasting glucose    Paroxysmal atrial fibrillation (HCC)    Mild anemia    Chronic left-sided thoracic back pain    Thrombocytopenia (HCC)    Benign prostatic hyperplasia with incomplete bladder emptying    Osteoarthritis of thoracic spine    DDD (degenerative disc disease), lumbar    Chronic seasonal allergic rhinitis due to pollen    Dyslipidemia    DDD (degenerative disc disease), cervical    Chronic obstructive pulmonary disease (HCC)    IFG (impaired fasting glucose)    Thoracic aortic aneurysm without rupture, unspecified part    Postsurgical hypothyroidism    Arthritis    Hypertension    GERD (gastroesophageal reflux disease)    S/P CABG (coronary artery bypass graft)    Coronary artery disease involving coronary bypass graft of native heart    Coronary artery disease of bypass graft of native heart with stable angina pectoris (HCC)    Peripheral polyneuropathy       Review of Systems   Constitutional:  Positive for fatigue. Respiratory:  Positive for shortness of breath. Negative for cough. Genitourinary:  Positive for urgency. Musculoskeletal:  Positive for back pain and gait problem. Neurological:  Positive for numbness (feet. ). Neuropathy numbness in feet and toes.        Results for orders placed or performed in visit on 01/03/23 (from the past 2160 hour(s))   AMB POC URINALYSIS DIP STICK AUTO W/O MICRO   Result Value Ref Range    Color, Urine, POC yellow     Clarity, Urine, POC clear     Glucose, Urine, POC Negative Negative    Bilirubin, Urine, POC Negative Negative    Ketones, Urine, POC Negative Negative    Specific Gravity, Urine, POC 1.015 1.001 - 1.035    Blood, Urine, POC trace Negative    pH, Urine, POC 7.0 4.6 - 8.0    Protein, Urine, POC Negative Negative    Urobilinogen, POC 1.0     Nitrate, Urine, POC negative Negative    Leukocyte Esterase, Urine, POC Negative Negative   Results for orders placed or performed in visit on 12/30/22 (from the past 2160 hour(s))   Basic Metabolic Panel   Result Value Ref Range    Sodium 139 133 - 143 mmol/L    Potassium 4.0 3.5 - 5.1 mmol/L    Chloride 106 101 - 110 mmol/L    CO2 29 21 - 32 mmol/L    Anion Gap 4 2 - 11 mmol/L    Glucose 108 (H) 65 - 100 mg/dL    BUN 20 8 - 23 MG/DL    Creatinine 1.00 0.8 - 1.5 MG/DL    Est, Glom Filt Rate >60 >60 ml/min/1.73m2    Calcium 8.9 8.3 - 10.4 MG/DL   CBC   Result Value Ref Range    WBC 2.6 (L) 4.3 - 11.1 K/uL    RBC 3.24 (L) 4.23 - 5.6 M/uL    Hemoglobin 11.1 (L) 13.6 - 17.2 g/dL    Hematocrit 32.0 (L) 41.1 - 50.3 %    MCV 98.8 82 - 102 FL    MCH 34.3 (H) 26.1 - 32.9 PG    MCHC 34.7 31.4 - 35.0 g/dL    RDW 13.3 11.9 - 14.6 %    Platelets 89 (L) 948 - 450 K/uL    MPV 9.1 (L) 9.4 - 12.3 FL    nRBC 0.00 0.0 - 0.2 K/uL     CAT Scan Result (most recent):  CT HEAD WO CONTRAST 02/03/2022    Narrative  EXAMINATION: CT HEAD WO CONT 2/3/2022 3:31 PM    ACCESSION NUMBER: [id number]    COMPARISON: None available    INDICATION: Transit ischemic attack    TECHNIQUE: Multiple-row detector helical CT examination of the head without  intravenous contrast.    Radiation dose reduction techniques were used for this study. Our CT scanners  use one or all of the following: Automated exposure control, adjustment of the  mA and/or kV according to patient size, iterative reconstruction. FINDINGS:  BRAIN: No intracranial hemorrhage. No mass effect or herniation. Small remote  appearing lacunar infarct within the right basal ganglia. The grey-white matter  differentiation is maintained. Severe patchy and confluent areas of white matter  hypoattenuation, suggestive of chronic small vessel ischemic changes. VENTRICLES/EXTRA-AXIAL: No hydrocephalus or extra-axial fluid collection. BONES: No acute fracture. SOFT TISSUES: Right sphenoid sinus mucosal thickening and fluid. Impression  1. No evidence of acute ischemia, hemorrhage, or mass effect. 2.  Remote lacunar infarct in the right basal ganglia. 3.  Severe white matter disease, likely sequela of chronic small vessel  ischemia. 4.  Acute right sphenoid sinus disease.     Procedure Note    Mini Salazar MD - 09/24/2021   Formatting of this note might be different from the original.     EXAM:  CHEST CTA THORACIC AORTA 9/22/2021     HISTORY: I71.2 Thoracic aortic aneurysm, without rupture I10;     COMPARISON:  8/5/2020      CONTRAST: 83 mL Omnipaque 350     TECHNIQUE:  ECG gated, thin section volume acquisition of the aorta was obtained from the thoracic inlet to the upper abdomen both before and during the intravenous administration of non-ionic contrast with arterial phase acquisition. 3D volume rendered, curved planar and multiplanar reformatted MIP angiographic images were reconstructed on an independent computerized workstation and reviewed on PACS. Delay images were also obtained. CTA FINDINGS:      Left aortic arch with normal anatomic branching. Origins of great vessels are patent. Redemonstrated arch and descending thoracic aorta graft appears unchanged. No evidence of endoleak. Aorta measurements as follows:    Sinus of Valsalva: 4.1 cm (03936, 57), unchanged    Residual aneurysm sac of descending thoracic aorta: 5.6 cm (7, 35), unchanged based on remeasurement. CT FINDINGS:     CHEST:   . Lungs and Pleura: Unchanged scattered pleural and parenchymal opacities. No enlarging nodule or developing infiltrate or effusion. . Mediastinum/fernanda: No adenopathy or masses. Barnetta Quintanilla Heart: Normal heart size. No pericardial effusion. Extensive coronary calcifications. Appears to have little calcification, aortic valve leaflet which can be associated with valve stenosis and/or insufficiency. . Chest wall/upper abdomen: Postoperative changes. No acute abnormality. Partially imaged cyst upper pole right kidney.      IMPRESSION:   1. Stable exam.     Signed by: 9/24/2021 10:36 AM: Oralee Sample  Lab Results   Component Value Date/Time    WBC 2.6 12/30/2022 10:52 AM    HGB 11.1 12/30/2022 10:52 AM    HCT 32.0 12/30/2022 10:52 AM    MCV 98.8 12/30/2022 10:52 AM    RDW 13.3 12/30/2022 10:52 AM    PLT 89 12/30/2022 10:52 AM    NEUTOPHILPCT 60 05/10/2022 09:45 AM    LYMPHOPCT 23 08/19/2022 01:43 PM    LYMPHOPCT 27 05/10/2022 09:45 AM    MONOPCT 8 08/19/2022 01:43 PM    MONOPCT 8 05/10/2022 09:45 AM    EOSRELPCT 4 08/19/2022 01:43 PM    BASOPCT 0 08/19/2022 01:43 PM    BASOPCT 1 05/10/2022 09:45 AM    LYMPHSABS 0.7 08/19/2022 01:43 PM    LYMPHSABS 0.6 05/10/2022 09:45 AM    MONOSABS 0.3 08/19/2022 01:43 PM    MONOSABS 0.2 05/10/2022 09:45 AM    EOSABS 0.1 08/19/2022 01:43 PM    EOSABS 0.1 05/10/2022 09:45 AM    BASOSABS 0.0 08/19/2022 01:43 PM    IMMGRAN 0 08/19/2022 01:43 PM    GRANULOCYTEABSOLUTECOUNT 0.0 05/10/2022 09:45 AM       Lab Results   Component Value Date/Time     12/30/2022 10:52 AM    K 4.0 12/30/2022 10:52 AM     12/30/2022 10:52 AM    CO2 29 12/30/2022 10:52 AM    ANIONGAP 4 12/30/2022 10:52 AM    GLUCOSE 108 12/30/2022 10:52 AM    BUN 20 12/30/2022 10:52 AM    CREATININE 1.00 12/30/2022 10:52 AM    GFRAA >60 08/19/2022 01:43 PM    LABGLOM >60 12/30/2022 10:52 AM    CALCIUM 8.9 12/30/2022 10:52 AM    BILITOT 2.0 08/19/2022 01:43 PM    ALT 17 08/19/2022 01:43 PM    AST 17 08/19/2022 01:43 PM    ALKPHOS 77 08/19/2022 01:43 PM    ALKPHOS 74 05/09/2022 01:21 PM    PROT 6.1 08/19/2022 01:43 PM    LABALBU 3.8 08/19/2022 01:43 PM    GLOB 2.3 08/19/2022 01:43 PM    ALBUMIN 1.7 08/19/2022 01:43 PM       Lab Results   Component Value Date/Time    CHOL 108 08/19/2022 01:43 PM    HDL 49 08/19/2022 01:43 PM    TRIG 73 08/19/2022 01:43 PM    LDLCALC 44.4 08/19/2022 01:43 PM    VLDL 14 05/10/2022 09:45 AM       Lab Results   Component Value Date/Time    LABA1C 5.0 05/10/2022 09:45 AM    LABA1C 5.1 05/07/2021 10:12 AM    LABA1C 5.0 08/27/2020 10:58 AM       Lab Results   Component Value Date/Time    TSH 2.050 05/10/2022 09:45 AM    TSH 1.830 11/03/2021 02:01 PM    TSH 2.030 05/07/2021 10:12 AM       Lab Results   Component Value Date/Time    PSA 6.2 11/03/2021 02:01 PM    PSA 5.7 02/19/2021 11:51 AM    PSA 5.4 04/22/2020 11:41 AM    PSA 5.2 08/08/2019 07:48 AM       Lab Results   Component Value Date/Time    SPECGRAV 1.018 05/07/2021 10:12 AM    PHUR 6.0 05/07/2021 10:12 AM    COLORU Yellow 05/07/2021 10:12 AM    CLARITYU Clear 05/07/2021 10:12 AM    LEUKOCYTESUR Negative 05/07/2021 10:12 AM    PROTEINU Negative 05/07/2021 10:12 AM    GLUCOSEU Negative 05/07/2021 10:12 AM    KETUA Negative 05/07/2021 10:12 AM    BLOODU Negative 05/07/2021 10:12 AM    BILIRUBINUR Negative 05/07/2021 10:12 AM    UROBILINOGEN 0.2 05/07/2021 10:12 AM    NITRU Negative 05/07/2021 10:12 AM    LABMICR Comment 05/07/2021 10:12 AM           Vitals:    01/03/23 1337   BP: 112/64   Site: Left Upper Arm   Position: Sitting   Cuff Size: Small Adult   Pulse: 78   Temp: 97.5 °F (36.4 °C)   TempSrc: Skin   SpO2: 99%   Weight: 215 lb (97.5 kg)   Height: 6' (1.829 m)     Wt Readings from Last 3 Encounters:   01/03/23 215 lb (97.5 kg)   09/14/22 210 lb (95.3 kg)   07/06/22 212 lb 6.4 oz (96.3 kg)     BP Readings from Last 3 Encounters:   01/03/23 112/64   09/14/22 132/68   07/06/22 (!) 120/58     Physical Exam  Vitals and nursing note reviewed. Constitutional:       Appearance: Normal appearance. He is not ill-appearing. HENT:      Head: Normocephalic and atraumatic. Eyes:      Extraocular Movements: Extraocular movements intact. Conjunctiva/sclera: Conjunctivae normal.   Cardiovascular:      Rate and Rhythm: Normal rate and regular rhythm. Pulmonary:      Effort: Pulmonary effort is normal.      Breath sounds: Normal breath sounds. Abdominal:      Hernia: There is no hernia in the left inguinal area or right inguinal area. Lymphadenopathy:      Lower Body: No right inguinal adenopathy. No left inguinal adenopathy. Neurological:      General: No focal deficit present. Mental Status: He is alert and oriented to person, place, and time. Mental status is at baseline.    Psychiatric:         Mood and Affect: Mood normal.         Behavior: Behavior normal.

## 2023-01-09 ENCOUNTER — OFFICE VISIT (OUTPATIENT)
Dept: CARDIOLOGY CLINIC | Age: 88
End: 2023-01-09
Payer: MEDICARE

## 2023-01-09 VITALS
BODY MASS INDEX: 28.58 KG/M2 | HEIGHT: 72 IN | HEART RATE: 60 BPM | SYSTOLIC BLOOD PRESSURE: 138 MMHG | WEIGHT: 211 LBS | DIASTOLIC BLOOD PRESSURE: 64 MMHG

## 2023-01-09 DIAGNOSIS — I71.20 THORACIC AORTIC ANEURYSM WITHOUT RUPTURE, UNSPECIFIED PART: Primary | ICD-10-CM

## 2023-01-09 DIAGNOSIS — I25.810 CORONARY ARTERY DISEASE INVOLVING CORONARY BYPASS GRAFT OF NATIVE HEART WITHOUT ANGINA PECTORIS: ICD-10-CM

## 2023-01-09 DIAGNOSIS — E78.5 DYSLIPIDEMIA: ICD-10-CM

## 2023-01-09 DIAGNOSIS — I10 PRIMARY HYPERTENSION: ICD-10-CM

## 2023-01-09 PROBLEM — I25.708 CORONARY ARTERY DISEASE OF BYPASS GRAFT OF NATIVE HEART WITH STABLE ANGINA PECTORIS (HCC): Status: RESOLVED | Noted: 2020-06-26 | Resolved: 2023-01-09

## 2023-01-09 LAB
ALBUMIN SERPL ELPH-MCNC: 4 G/DL (ref 2.9–4.4)
ALBUMIN/GLOB SERPL: 2.1 (ref 0.7–1.7)
ALPHA1 GLOB SERPL ELPH-MCNC: 0.3 G/DL (ref 0–0.4)
ALPHA2 GLOB SERPL ELPH-MCNC: 0.4 G/DL (ref 0.4–1)
B-GLOBULIN SERPL ELPH-MCNC: 0.7 G/DL (ref 0.7–1.3)
GAMMA GLOB SERPL ELPH-MCNC: 0.6 G/DL (ref 0.4–1.8)
GLOBULIN SER-MCNC: 2 G/DL (ref 2.2–3.9)
IGA SERPL-MCNC: 172 MG/DL (ref 61–437)
IGG SERPL-MCNC: 574 MG/DL (ref 603–1613)
IGM SERPL-MCNC: 109 MG/DL (ref 15–143)
INTERPRETATION SERPL IEP-IMP: ABNORMAL
M PROTEIN SERPL ELPH-MCNC: ABNORMAL G/DL
PROT SERPL-MCNC: 6 G/DL (ref 6–8.5)

## 2023-01-09 PROCEDURE — 1123F ACP DISCUSS/DSCN MKR DOCD: CPT | Performed by: INTERNAL MEDICINE

## 2023-01-09 PROCEDURE — G8484 FLU IMMUNIZE NO ADMIN: HCPCS | Performed by: INTERNAL MEDICINE

## 2023-01-09 PROCEDURE — G8417 CALC BMI ABV UP PARAM F/U: HCPCS | Performed by: INTERNAL MEDICINE

## 2023-01-09 PROCEDURE — 3078F DIAST BP <80 MM HG: CPT | Performed by: INTERNAL MEDICINE

## 2023-01-09 PROCEDURE — 1036F TOBACCO NON-USER: CPT | Performed by: INTERNAL MEDICINE

## 2023-01-09 PROCEDURE — G8427 DOCREV CUR MEDS BY ELIG CLIN: HCPCS | Performed by: INTERNAL MEDICINE

## 2023-01-09 PROCEDURE — 99214 OFFICE O/P EST MOD 30 MIN: CPT | Performed by: INTERNAL MEDICINE

## 2023-01-09 PROCEDURE — 3075F SYST BP GE 130 - 139MM HG: CPT | Performed by: INTERNAL MEDICINE

## 2023-01-09 ASSESSMENT — ENCOUNTER SYMPTOMS
BACK PAIN: 1
SHORTNESS OF BREATH: 0

## 2023-01-09 NOTE — PROGRESS NOTES
[address], [address] [place] [address]  PHONE: [phone]    Harvinder Briceño  1935      SUBJECTIVE:   Harvinder Briceño is a 82 y.o. male seen for a follow up visit regarding the following:     Chief Complaint   Patient presents with    Coronary Artery Disease    Hypertension       HPI:    Harvinder Briceño presents for routine follow up for known Coronary Artery Disease. Multiple issues addressed as outlined below:     Coronary Artery Disease:  Patient denies any recent angina. Notes compliance with medical therapy. No recent NTG use. No intolerance to anti-platelet therapy. Hypertension:  Ambulatory BP readings have been controlled. Patient reports compliance with medical therapy without side effects. Hyperlipidemia:   Tolerating Lipitor. Cholesterol, total 100 - 199 mg/dL 112    Triglyceride 0 - 149 mg/dL 67    HDL Cholesterol >39 mg/dL 48    VLDL, calculated 5 - 40 mg/dL 14    LDL, calculated 0 - 99 mg/dL 50        Exercise:Limited to back pain. Has neuropathy. Past Medical History, Past Surgical History, Family history, Social History, and Medications were all reviewed with the patient today and updated as necessary. Current Outpatient Medications:     gabapentin (NEURONTIN) 400 MG capsule, Take 400 mg by mouth 2 times daily. , Disp: , Rfl:     clopidogrel (PLAVIX) 75 MG tablet, Take 1 tablet by mouth daily, Disp: 90 tablet, Rfl: 3    levothyroxine (SYNTHROID) 150 MCG tablet, Take 1 tablet by mouth Daily 1 tablet once a day except for 1/2 tablet on Sundays, Disp: 90 tablet, Rfl: 1    finasteride (PROSCAR) 5 MG tablet, Take 1 tablet by mouth daily, Disp: 90 tablet, Rfl: 3    fluticasone (FLONASE) 50 MCG/ACT nasal spray, 2 sprays by Nasal route daily, Disp: 16 g, Rfl: 3    traMADol (ULTRAM) 50 MG tablet, , Disp: , Rfl:     zinc 50 MG CAPS, Take 50 mg by mouth, Disp: , Rfl:     amLODIPine (NORVASC) 5 MG tablet, Take 1 tablet by mouth daily, Disp: 90 tablet, Rfl: 3    metoprolol tartrate (LOPRESSOR) 25 MG tablet, Take 1 tablet by mouth 2 times daily TAKE 1 TABLET BY MOUTH TWO TIMES DAILY, Disp: 180 tablet, Rfl: 3    atorvastatin (LIPITOR) 40 MG tablet, Take 1 tablet by mouth daily, Disp: 90 tablet, Rfl: 3    albuterol sulfate (PROAIR RESPICLICK) 941 (90 Base) MCG/ACT aerosol powder inhalation, Inhale 2 puffs into the lungs 4 times daily as needed, Disp: , Rfl:     aspirin 81 MG chewable tablet, Take 81 mg by mouth, Disp: , Rfl:     vitamin D 25 MCG (1000 UT) CAPS, Take by mouth daily , Disp: , Rfl:     ipratropium (ATROVENT) 0.06 % nasal spray, 2 sprays 4 times daily, Disp: , Rfl:     nitroGLYCERIN (NITROSTAT) 0.4 MG SL tablet, Place 0.4 mg under the tongue, Disp: , Rfl:     omeprazole (PRILOSEC) 40 MG delayed release capsule, Take 20 mg by mouth daily, Disp: , Rfl:     terazosin (HYTRIN) 5 MG capsule, Take 5 mg by mouth, Disp: , Rfl:     tiotropium-olodaterol (STIOLTO) 2.5-2.5 MCG/ACT AERS, Inhale 2 puffs into the lungs daily, Disp: , Rfl:      Allergies   Allergen Reactions    Diphenhydramine Other (See Comments)     Hyperactivity, anxiousness    Lorazepam Other (See Comments)     Paradoxical agitation    Sulfa Antibiotics Rash        Patient Active Problem List    Diagnosis Date Noted    Thoracic aortic aneurysm without rupture, unspecified part      Priority: Low     Endovascular thoracic aortic aneurysm repair 2016:  [facility]. Formatting of this note might be different from the original.  Endovascular thoracic aortic aneurysm repair 2016:  Follow by [facility].         Mild anemia 05/13/2021     Priority: Low    Elevated fasting glucose 08/06/2020     Priority: Low    IFG (impaired fasting glucose) 08/06/2020     Priority: Low    Peripheral polyneuropathy 08/06/2020     Priority: Low    Benign prostatic hyperplasia with incomplete bladder emptying 08/01/2019     Priority: Low    Hypertension Priority: Low    Thrombocytopenia ([facility] [address] 06/14/2019     Priority: Low    Chronic left-sided thoracic back pain 02/01/2019     Priority: Low    Osteoarthritis of thoracic spine 02/01/2019     Priority: Low    Elevated PSA 08/01/2018     Priority: Low    Arthritis      Priority: Low    GERD (gastroesophageal reflux disease)      Priority: Low    Postsurgical hypothyroidism 04/11/2017     Priority: Low    Chronic obstructive pulmonary disease ([facility] [address] 04/10/2017     Priority: Low    DDD (degenerative disc disease), cervical 03/21/2017     Priority: Low    S/P CABG (coronary artery bypass graft) 12/29/2016     Priority: Low    Chronic seasonal allergic rhinitis due to pollen 08/23/2016     Priority: Low    Dyslipidemia 08/23/2016     Priority: Low    AAA (abdominal aortic aneurysm) 12/03/2013     Priority: Low    DDD (degenerative disc disease), lumbar 12/03/2013     Priority: Low    TIA (transient ischemic attack) 02/10/2022     Carotid duplex (7/1/2022): Mild < 50% bilateral ICA stenosis      Paroxysmal atrial fibrillation ([facility] [address] 06/16/2019     1. Occurred post operatively after SBO and ex-lap June 2019. Spontaneously converted. \"brief episode\" per review of records. Formatting of this note might be different from the original.  2.  Two week event monitor (3/30/22): Sinus rhythm. Occasional PACs and rare PVCs. Occasional runs of nonsustained SVT longest lasting 21 seconds. No sustained atrial fibrillation. Coronary artery disease involving coronary bypass graft of native heart 08/23/2016     1. CABG (9/2003): Transmyocardial laser revascularization to the   inferior wall of the left ventricle. Coronary artery bypass grafting x6. LIMA to LAD: Sequential SVG to ramus and obtuse marginal.  Sequential SVG   to posterolateral and right PDA, SVG to diagonal.  2.  Cardiolite( 11/2012): Diaphragmatic attenuation. No ischemia. Normal   LV systolic function. EF 63%.   3.  Stress Cardiolite (1/16/15):  EF 52%.  Normal perfusion. 4.  LHC (4/13/15):  6 of 6 bypass grafts patent. Small vessel disease   involving the apical LAD. Normal LV systolic function. LVEDP 27  5. LHC (18): Severe multivessel CAD. 6 of 6 grafts patent. Small   vessel disease not amendable to PCI. EF 55-60%. 6. Lexiscan Cardiolite (20):  EF 68%. Normal perfusion. 7.  LHC (3/10/21): There are multivessel coronary disease. 6 of 6 bypass   grafts patent. Normal LV function. EF 55-60%. Small vessel disease. Only   change compared to previous images is apical LAD is now occluded but this   is a small distal vessel. Social History     Tobacco Use    Smoking status: Former     Packs/day: 1.00     Types: Cigarettes     Quit date: 12/3/1987     Years since quittin.1    Smokeless tobacco: Never   Substance Use Topics    Alcohol use: Yes       ROS:    Review of Systems   Constitutional: Negative for malaise/fatigue. Cardiovascular:  Negative for chest pain. Respiratory:  Negative for shortness of breath. Musculoskeletal:  Positive for arthritis and back pain. Negative for falls. Neurological:  Negative for focal weakness. Psychiatric/Behavioral:  Negative for depression. PHYSICAL EXAM:  Wt Readings from Last 3 Encounters:   23 211 lb (95.7 kg)   23 215 lb (97.5 kg)   22 210 lb (95.3 kg)     BP Readings from Last 3 Encounters:   23 138/64   23 112/64   22 132/68     Pulse Readings from Last 3 Encounters:   23 60   23 78   22 70       Physical Exam  Constitutional:       General: He is not in acute distress. Neck:      Vascular: No carotid bruit. Cardiovascular:      Rate and Rhythm: Normal rate and regular rhythm. Pulmonary:      Effort: No respiratory distress. Breath sounds: Normal breath sounds. Abdominal:      General: Bowel sounds are normal.      Palpations: Abdomen is soft. Musculoskeletal:         General: No swelling. Skin:     General: Skin is warm and dry. Neurological:      General: No focal deficit present. Psychiatric:         Mood and Affect: Mood normal.       Medical problems and test results were reviewed with the patient today. Lab Results   Component Value Date    WBC 2.6 (L) 12/30/2022    HGB 11.1 (L) 12/30/2022    HCT 32.0 (L) 12/30/2022    MCV 98.8 12/30/2022    PLT 89 (L) 12/30/2022       Lab Results   Component Value Date/Time     12/30/2022 10:52 AM    K 4.0 12/30/2022 10:52 AM     12/30/2022 10:52 AM    CO2 29 12/30/2022 10:52 AM    BUN 20 12/30/2022 10:52 AM    CREATININE 1.00 12/30/2022 10:52 AM    GLUCOSE 108 12/30/2022 10:52 AM    CALCIUM 8.9 12/30/2022 10:52 AM        Lab Results   Component Value Date    CHOL 108 08/19/2022     Lab Results   Component Value Date    TRIG 73 08/19/2022     Lab Results   Component Value Date    HDL 49 08/19/2022     Lab Results   Component Value Date    LDLCALC 44.4 08/19/2022     Lab Results   Component Value Date    LABVLDL 14.6 08/19/2022    VLDL 14 05/10/2022     Lab Results   Component Value Date    CHOLHDLRATIO 2.2 08/19/2022        Data from outside records/labs from outside providers have been reviewed and summarized as noted in the HPI, past history and data review sections of this note       ASSESSMENT and PLAN      1. Coronary artery disease involving coronary bypass graft of native heart without angina pectoris  Patient is doing well without any anginal symptoms. Continue Aspirin 81 mg a day, Plavix 75 mg a day and PRN NTG. We discussed the importance of continued risk factor modification. The patient is encouraged to contact my office with any worsening dyspnea or chest discomfort. 2. Thoracic aortic aneurysm without rupture, unspecified part  Followed by Vascular    3. Dyslipidemia  Lipids are at goal.  Continue Lipitor. Will continue current medical regimen and monitor for any side effects or complications of treatment. Discussed importance of healthy diet. 4. Primary hypertension  Currently BP appears to be under acceptable control on current therapy. Continue current medications including Lopressor. Discussed monitoring ambulatory BP readings to ensure continued optimal management. If BP becomes elevated, patient is encouraged to contact my office for further titration of therapy. Return in about 6 months (around 7/9/2023). Moises Landa MD  1/9/2023  2:00 PM    This note may have been dictated using speech recognition software.   As a result, error of speech recognition may have occurred

## 2023-02-08 ENCOUNTER — APPOINTMENT (RX ONLY)
Dept: URBAN - METROPOLITAN AREA CLINIC 330 | Facility: CLINIC | Age: 88
Setting detail: DERMATOLOGY
End: 2023-02-08

## 2023-02-08 DIAGNOSIS — L57.0 ACTINIC KERATOSIS: ICD-10-CM

## 2023-02-08 DIAGNOSIS — L82.1 OTHER SEBORRHEIC KERATOSIS: ICD-10-CM | Status: STABLE

## 2023-02-08 DIAGNOSIS — M71 OTHER BURSOPATHIES: ICD-10-CM

## 2023-02-08 DIAGNOSIS — Z85.828 PERSONAL HISTORY OF OTHER MALIGNANT NEOPLASM OF SKIN: ICD-10-CM

## 2023-02-08 DIAGNOSIS — D22 MELANOCYTIC NEVI: ICD-10-CM | Status: STABLE

## 2023-02-08 DIAGNOSIS — D18.0 HEMANGIOMA: ICD-10-CM | Status: STABLE

## 2023-02-08 DIAGNOSIS — L57.8 OTHER SKIN CHANGES DUE TO CHRONIC EXPOSURE TO NONIONIZING RADIATION: ICD-10-CM | Status: STABLE

## 2023-02-08 DIAGNOSIS — L81.4 OTHER MELANIN HYPERPIGMENTATION: ICD-10-CM | Status: STABLE

## 2023-02-08 PROBLEM — D18.01 HEMANGIOMA OF SKIN AND SUBCUTANEOUS TISSUE: Status: ACTIVE | Noted: 2023-02-08

## 2023-02-08 PROBLEM — M71.341 OTHER BURSAL CYST, RIGHT HAND: Status: ACTIVE | Noted: 2023-02-08

## 2023-02-08 PROBLEM — D48.5 NEOPLASM OF UNCERTAIN BEHAVIOR OF SKIN: Status: ACTIVE | Noted: 2023-02-08

## 2023-02-08 PROBLEM — D22.5 MELANOCYTIC NEVI OF TRUNK: Status: ACTIVE | Noted: 2023-02-08

## 2023-02-08 PROCEDURE — ? COUNSELING

## 2023-02-08 PROCEDURE — 17003 DESTRUCT PREMALG LES 2-14: CPT

## 2023-02-08 PROCEDURE — 99213 OFFICE O/P EST LOW 20 MIN: CPT | Mod: 25

## 2023-02-08 PROCEDURE — ? FULL BODY SKIN EXAM - DECLINED

## 2023-02-08 PROCEDURE — ? FULL BODY SKIN EXAM

## 2023-02-08 PROCEDURE — ? SUNSCREEN RECOMMENDATIONS

## 2023-02-08 PROCEDURE — ? ADDITIONAL NOTES

## 2023-02-08 PROCEDURE — ? TREATMENT REGIMEN

## 2023-02-08 PROCEDURE — ? DEFER

## 2023-02-08 PROCEDURE — 11102 TANGNTL BX SKIN SINGLE LES: CPT

## 2023-02-08 PROCEDURE — 17000 DESTRUCT PREMALG LESION: CPT | Mod: 59

## 2023-02-08 PROCEDURE — ? LIQUID NITROGEN

## 2023-02-08 PROCEDURE — ? BIOPSY BY SHAVE METHOD

## 2023-02-08 ASSESSMENT — LOCATION DETAILED DESCRIPTION DERM
LOCATION DETAILED: LEFT MEDIAL SUPERIOR CHEST
LOCATION DETAILED: LEFT DISTAL DORSAL FOREARM
LOCATION DETAILED: LEFT PROXIMAL DORSAL FOREARM
LOCATION DETAILED: LEFT FOREHEAD
LOCATION DETAILED: PERIUMBILICAL SKIN
LOCATION DETAILED: RIGHT MID DORSAL INDEX FINGER
LOCATION DETAILED: RIGHT SUPERIOR LATERAL UPPER BACK
LOCATION DETAILED: LEFT SUPERIOR CRUS OF ANTIHELIX
LOCATION DETAILED: NASAL ROOT
LOCATION DETAILED: LEFT MID-UPPER BACK
LOCATION DETAILED: LEFT SUPERIOR HELIX

## 2023-02-08 ASSESSMENT — LOCATION ZONE DERM
LOCATION ZONE: ARM
LOCATION ZONE: NOSE
LOCATION ZONE: EAR
LOCATION ZONE: FACE
LOCATION ZONE: TRUNK
LOCATION ZONE: FINGER

## 2023-02-08 ASSESSMENT — LOCATION SIMPLE DESCRIPTION DERM
LOCATION SIMPLE: LEFT FOREHEAD
LOCATION SIMPLE: RIGHT INDEX FINGER
LOCATION SIMPLE: RIGHT UPPER BACK
LOCATION SIMPLE: LEFT EAR
LOCATION SIMPLE: NOSE
LOCATION SIMPLE: CHEST
LOCATION SIMPLE: LEFT UPPER BACK
LOCATION SIMPLE: ABDOMEN
LOCATION SIMPLE: LEFT FOREARM

## 2023-02-08 NOTE — PROCEDURE: DEFER
X Size Of Lesion In Cm (Optional): 0
Detail Level: Detailed
Introduction Text (Please End With A Colon): The following procedure was deferred:
Reason To Defer Override: refer to hand center

## 2023-02-08 NOTE — PROCEDURE: ADDITIONAL NOTES
Render Risk Assessment In Note?: no
Additional Notes: Advised patient to contact primary care to be referred to the hand center for removal
Detail Level: Simple

## 2023-02-08 NOTE — PROCEDURE: MIPS QUALITY
Quality 431: Preventive Care And Screening: Unhealthy Alcohol Use - Screening: Patient not identified as an unhealthy alcohol user when screened for unhealthy alcohol use using a systematic screening method
Quality 130: Documentation Of Current Medications In The Medical Record: Current Medications Documented
Quality 110: Preventive Care And Screening: Influenza Immunization: Influenza Immunization Administered during Influenza season
Quality 47: Advance Care Plan: Advance care planning not documented, reason not otherwise specified.
Detail Level: Detailed
Quality 226: Preventive Care And Screening: Tobacco Use: Screening And Cessation Intervention: Patient screened for tobacco use and is an ex/non-smoker
Quality 402: Tobacco Use And Help With Quitting Among Adolescents: Patient screened for tobacco and never smoked

## 2023-02-27 DIAGNOSIS — J30.1 CHRONIC SEASONAL ALLERGIC RHINITIS DUE TO POLLEN: ICD-10-CM

## 2023-02-27 NOTE — TELEPHONE ENCOUNTER
Pt is requesting a refill for flonase. Pt is requesting a 90 day supply. Please send to [facility].

## 2023-02-28 RX ORDER — FLUTICASONE PROPIONATE 50 MCG
2 SPRAY, SUSPENSION (ML) NASAL DAILY
Qty: 3 EACH | Refills: 3 | Status: SHIPPED | OUTPATIENT
Start: 2023-02-28

## 2023-03-21 ENCOUNTER — APPOINTMENT (RX ONLY)
Dept: URBAN - METROPOLITAN AREA CLINIC 330 | Facility: CLINIC | Age: 88
Setting detail: DERMATOLOGY
End: 2023-03-21

## 2023-03-21 PROBLEM — C44.41 BASAL CELL CARCINOMA OF SKIN OF SCALP AND NECK: Status: ACTIVE | Noted: 2023-03-21

## 2023-03-21 PROCEDURE — ? MOHS SURGERY

## 2023-03-21 PROCEDURE — 17311 MOHS 1 STAGE H/N/HF/G: CPT

## 2023-03-21 PROCEDURE — 12042 INTMD RPR N-HF/GENIT2.6-7.5: CPT

## 2023-03-22 NOTE — PROGRESS NOTES
Interdisciplinary team rounds were held 6/19/2019 with the following team members:Care Management, Nursing, Nutrition, Pharmacy, Physical Therapy and Physician. Plan of care discussed. See clinical pathway and/or care plan for interventions and desired outcomes. O-Z Plasty Text: The defect edges were debeveled with a #15 scalpel blade.  Given the location of the defect, shape of the defect and the proximity to free margins an O-Z plasty (double transposition flap) was deemed most appropriate.  Using a sterile surgical marker, the appropriate transposition flaps were drawn incorporating the defect and placing the expected incisions within the relaxed skin tension lines where possible.    The area thus outlined was incised deep to adipose tissue with a #15 scalpel blade.  The skin margins were undermined to an appropriate distance in all directions utilizing iris scissors.  Hemostasis was achieved with electrocautery.  The flaps were then transposed into place, one clockwise and the other counterclockwise, and anchored with interrupted buried subcutaneous sutures.

## 2023-03-29 ENCOUNTER — APPOINTMENT (RX ONLY)
Dept: URBAN - METROPOLITAN AREA CLINIC 330 | Facility: CLINIC | Age: 88
Setting detail: DERMATOLOGY
End: 2023-03-29

## 2023-03-29 DIAGNOSIS — Z48.02 ENCOUNTER FOR REMOVAL OF SUTURES: ICD-10-CM

## 2023-03-29 PROCEDURE — ? SUTURE REMOVAL (GLOBAL PERIOD)

## 2023-03-29 PROCEDURE — 99024 POSTOP FOLLOW-UP VISIT: CPT

## 2023-03-29 ASSESSMENT — LOCATION ZONE DERM
LOCATION ZONE: NECK
LOCATION ZONE: NECK

## 2023-03-29 ASSESSMENT — LOCATION DETAILED DESCRIPTION DERM
LOCATION DETAILED: RIGHT POSTERIOR NECK
LOCATION DETAILED: RIGHT POSTERIOR NECK

## 2023-03-29 ASSESSMENT — LOCATION SIMPLE DESCRIPTION DERM
LOCATION SIMPLE: POSTERIOR NECK
LOCATION SIMPLE: POSTERIOR NECK

## 2023-03-29 NOTE — PROCEDURE: SUTURE REMOVAL (GLOBAL PERIOD)
Detail Level: Detailed
Add 24714 Cpt? (Important Note: In 2017 The Use Of 39414 Is Being Tracked By Cms To Determine Future Global Period Reimbursement For Global Periods): yes
Add 1585x Cpt? (Do Not Bill If You Billed For The Procedure Placing The Sutures. This Is An Add-On Code That Must Be Billed With An E/M Visit Code): No

## 2023-04-27 ENCOUNTER — TELEPHONE (OUTPATIENT)
Dept: ONCOLOGY | Age: 88
End: 2023-04-27

## 2023-04-27 NOTE — TELEPHONE ENCOUNTER
Anticoagulation form received from Dr Anais York office. Call to [facility] @ [phone] and LVM to return the call.

## 2023-04-28 NOTE — TELEPHONE ENCOUNTER
Msg and form given to [facility] with Dr Manolo Sarmiento today. Patient approved for procedure from Hematology stand point. We do not manage the Plavix. Form sent to [facility] via Fax # [phone]  with confirmation.

## 2023-05-15 ENCOUNTER — HOSPITAL ENCOUNTER (OUTPATIENT)
Dept: LAB | Age: 88
Discharge: HOME | End: 2023-05-18
Payer: MEDICARE

## 2023-05-15 ENCOUNTER — OFFICE VISIT (OUTPATIENT)
Dept: ONCOLOGY | Age: 88
End: 2023-05-15
Payer: MEDICARE

## 2023-05-15 VITALS
HEART RATE: 50 BPM | BODY MASS INDEX: 28.44 KG/M2 | OXYGEN SATURATION: 98 % | HEIGHT: 72 IN | RESPIRATION RATE: 16 BRPM | WEIGHT: 210 LBS | DIASTOLIC BLOOD PRESSURE: 65 MMHG | TEMPERATURE: 98.7 F | SYSTOLIC BLOOD PRESSURE: 140 MMHG

## 2023-05-15 DIAGNOSIS — D69.6 THROMBOCYTOPENIA, UNSPECIFIED (HCC): Primary | ICD-10-CM

## 2023-05-15 DIAGNOSIS — D69.6 THROMBOCYTOPENIA (HCC): ICD-10-CM

## 2023-05-15 LAB
ALBUMIN SERPL-MCNC: 3.7 G/DL (ref 3.2–4.6)
ALBUMIN/GLOB SERPL: 1.4 (ref 0.4–1.6)
ALP SERPL-CCNC: 71 U/L (ref 50–136)
ALT SERPL-CCNC: 23 U/L (ref 12–65)
ANION GAP SERPL CALC-SCNC: 5 MMOL/L (ref 2–11)
AST SERPL-CCNC: 21 U/L (ref 15–37)
BASOPHILS # BLD: 0 K/UL (ref 0–0.2)
BASOPHILS NFR BLD: 1 % (ref 0–2)
BILIRUB SERPL-MCNC: 1.5 MG/DL (ref 0.2–1.1)
BUN SERPL-MCNC: 23 MG/DL (ref 8–23)
CALCIUM SERPL-MCNC: 9 MG/DL (ref 8.3–10.4)
CHLORIDE SERPL-SCNC: 104 MMOL/L (ref 101–110)
CO2 SERPL-SCNC: 29 MMOL/L (ref 21–32)
CREAT SERPL-MCNC: 1.2 MG/DL (ref 0.8–1.5)
DIFFERENTIAL METHOD BLD: ABNORMAL
EOSINOPHIL # BLD: 0.1 K/UL (ref 0–0.8)
EOSINOPHIL NFR BLD: 2 % (ref 0.5–7.8)
ERYTHROCYTE [DISTWIDTH] IN BLOOD BY AUTOMATED COUNT: 14 % (ref 11.9–14.6)
GLOBULIN SER CALC-MCNC: 2.7 G/DL (ref 2.8–4.5)
GLUCOSE SERPL-MCNC: 110 MG/DL (ref 65–100)
HCT VFR BLD AUTO: 32.7 % (ref 41.1–50.3)
HGB BLD-MCNC: 11.4 G/DL (ref 13.6–17.2)
IMM GRANULOCYTES # BLD AUTO: 0 K/UL (ref 0–0.5)
IMM GRANULOCYTES NFR BLD AUTO: 0 % (ref 0–5)
LDH SERPL L TO P-CCNC: 184 U/L (ref 110–210)
LYMPHOCYTES # BLD: 0.7 K/UL (ref 0.5–4.6)
LYMPHOCYTES NFR BLD: 20 % (ref 13–44)
MCH RBC QN AUTO: 33.8 PG (ref 26.1–32.9)
MCHC RBC AUTO-ENTMCNC: 34.9 G/DL (ref 31.4–35)
MCV RBC AUTO: 97 FL (ref 82–102)
MONOCYTES # BLD: 0.3 K/UL (ref 0.1–1.3)
MONOCYTES NFR BLD: 8 % (ref 4–12)
NEUTS SEG # BLD: 2.6 K/UL (ref 1.7–8.2)
NEUTS SEG NFR BLD: 69 % (ref 43–78)
NRBC # BLD: 0 K/UL (ref 0–0.2)
PLATELET # BLD AUTO: 107 K/UL (ref 150–450)
PMV BLD AUTO: 8.1 FL (ref 9.4–12.3)
POTASSIUM SERPL-SCNC: 5.2 MMOL/L (ref 3.5–5.1)
PROT SERPL-MCNC: 6.4 G/DL (ref 6.3–8.2)
RBC # BLD AUTO: 3.37 M/UL (ref 4.23–5.6)
SODIUM SERPL-SCNC: 138 MMOL/L (ref 133–143)
WBC # BLD AUTO: 3.7 K/UL (ref 4.3–11.1)

## 2023-05-15 PROCEDURE — 1123F ACP DISCUSS/DSCN MKR DOCD: CPT | Performed by: INTERNAL MEDICINE

## 2023-05-15 PROCEDURE — 3078F DIAST BP <80 MM HG: CPT | Performed by: INTERNAL MEDICINE

## 2023-05-15 PROCEDURE — G8427 DOCREV CUR MEDS BY ELIG CLIN: HCPCS | Performed by: INTERNAL MEDICINE

## 2023-05-15 PROCEDURE — 36415 COLL VENOUS BLD VENIPUNCTURE: CPT

## 2023-05-15 PROCEDURE — 1036F TOBACCO NON-USER: CPT | Performed by: INTERNAL MEDICINE

## 2023-05-15 PROCEDURE — 80053 COMPREHEN METABOLIC PANEL: CPT

## 2023-05-15 PROCEDURE — 99213 OFFICE O/P EST LOW 20 MIN: CPT | Performed by: INTERNAL MEDICINE

## 2023-05-15 PROCEDURE — 85025 COMPLETE CBC W/AUTO DIFF WBC: CPT

## 2023-05-15 PROCEDURE — 83615 LACTATE (LD) (LDH) ENZYME: CPT

## 2023-05-15 PROCEDURE — G8417 CALC BMI ABV UP PARAM F/U: HCPCS | Performed by: INTERNAL MEDICINE

## 2023-05-15 PROCEDURE — 3077F SYST BP >= 140 MM HG: CPT | Performed by: INTERNAL MEDICINE

## 2023-05-15 ASSESSMENT — PATIENT HEALTH QUESTIONNAIRE - PHQ9
1. LITTLE INTEREST OR PLEASURE IN DOING THINGS: 0
SUM OF ALL RESPONSES TO PHQ QUESTIONS 1-9: 0
SUM OF ALL RESPONSES TO PHQ QUESTIONS 1-9: 0
SUM OF ALL RESPONSES TO PHQ9 QUESTIONS 1 & 2: 0
SUM OF ALL RESPONSES TO PHQ QUESTIONS 1-9: 0
2. FEELING DOWN, DEPRESSED OR HOPELESS: 0
SUM OF ALL RESPONSES TO PHQ QUESTIONS 1-9: 0

## 2023-05-15 NOTE — PATIENT INSTRUCTIONS
Patient Instructions from Today's Visit    Reason for Visit:  Follow up-Thrombocytopenia    Diagnosis Information:  [email or web address]      Plan: Your labs look stable at this time. Your platelets are up to 143 at this time.   Follow Up:  Dr Radha Oconnell in 1 year    Recent Lab Results:  Hospital Outpatient Visit on 05/15/2023   Component Date Value Ref Range Status    WBC 05/15/2023 3.7 (L)  4.3 - 11.1 K/uL Final    RBC 05/15/2023 3.37 (L)  4.23 - 5.6 M/uL Final    Hemoglobin 05/15/2023 11.4 (L)  13.6 - 17.2 g/dL Final    Hematocrit 05/15/2023 32.7 (L)  41.1 - 50.3 % Final    MCV 05/15/2023 97.0  82.0 - 102.0 FL Final    MCH 05/15/2023 33.8 (H)  26.1 - 32.9 PG Final    MCHC 05/15/2023 34.9  31.4 - 35.0 g/dL Final    RDW 05/15/2023 14.0  11.9 - 14.6 % Final    Platelets 17/03/9377 107 (L)  150 - 450 K/uL Final    MPV 05/15/2023 8.1 (L)  9.4 - 12.3 FL Final    nRBC 05/15/2023 0.00  0.0 - 0.2 K/uL Final    **Note: Absolute NRBC parameter is now reported with Hemogram**    Differential Type 05/15/2023 AUTOMATED    Final    Neutrophils % 05/15/2023 69  43 - 78 % Final    Lymphocytes % 05/15/2023 20  13 - 44 % Final    Monocytes % 05/15/2023 8  4.0 - 12.0 % Final    Eosinophils % 05/15/2023 2  0.5 - 7.8 % Final    Basophils % 05/15/2023 1  0.0 - 2.0 % Final    Immature Granulocytes 05/15/2023 0  0.0 - 5.0 % Final    Neutrophils Absolute 05/15/2023 2.6  1.7 - 8.2 K/UL Final    Lymphocytes Absolute 05/15/2023 0.7  0.5 - 4.6 K/UL Final    Monocytes Absolute 05/15/2023 0.3  0.1 - 1.3 K/UL Final    Eosinophils Absolute 05/15/2023 0.1  0.0 - 0.8 K/UL Final    Basophils Absolute 05/15/2023 0.0  0.0 - 0.2 K/UL Final    Absolute Immature Granulocyte 05/15/2023 0.0  0.0 - 0.5 K/UL Final    Sodium 05/15/2023 138  133 - 143 mmol/L Final    Potassium 05/15/2023 5.2 (H)  3.5 - 5.1 mmol/L Final    Chloride 05/15/2023 104  101 - 110 mmol/L Final    CO2 05/15/2023

## 2023-05-15 NOTE — PROGRESS NOTES
6 bypass surgeries    Sun 50    AAA    SMALL INTESTINE SURGERY  2019    Ischemic small bowel resection and hernia repair without mesh    THYROIDECTOMY  2017    Dr. Liliana Arriaga  2017    Dr Bob Miller  2013    1 biopsy    VASCULAR SURGERY      abd aortic aneurysm repair     Family History   Problem Relation Age of Onset    Heart Disease Father     Cancer Mother         colon    Cancer Sister     Cancer Maternal Aunt     Cancer Paternal Grandmother     Diabetes Paternal Grandmother     Thyroid Disease Neg Hx     Thyroid Cancer Neg Hx      Social History     Socioeconomic History    Marital status:      Spouse name: Not on file    Number of children: Not on file    Years of education: Not on file    Highest education level: Not on file   Occupational History    Not on file   Tobacco Use    Smoking status: Former     Packs/day: 1.00     Types: Cigarettes     Quit date: 12/3/1987     Years since quittin.4    Smokeless tobacco: Never   Vaping Use    Vaping Use: Never used   Substance and Sexual Activity    Alcohol use: Yes    Drug use: No    Sexual activity: Not on file   Other Topics Concern    Not on file   Social History Narrative    Not on file     Social Determinants of Health     Financial Resource Strain: Not on file   Food Insecurity: Not on file   Transportation Needs: Not on file   Physical Activity: Not on file   Stress: Not on file   Social Connections: Not on file   Intimate Partner Violence: Not on file   Housing Stability: Not on file     Current Outpatient Medications   Medication Sig Dispense Refill    fluticasone (FLONASE) 50 MCG/ACT nasal spray 2 sprays by Nasal route daily 3 each 3    gabapentin (NEURONTIN) 400 MG capsule Take 1 capsule by mouth 2 times daily.       clopidogrel (PLAVIX) 75 MG tablet Take 1 tablet by mouth daily 90 tablet 3    levothyroxine (SYNTHROID) 150 MCG

## 2023-06-26 ENCOUNTER — HOSPITAL ENCOUNTER (OUTPATIENT)
Dept: LAB | Age: 88
Discharge: HOME | End: 2023-06-29

## 2023-06-26 DIAGNOSIS — J30.1 CHRONIC SEASONAL ALLERGIC RHINITIS DUE TO POLLEN: ICD-10-CM

## 2023-06-26 DIAGNOSIS — I25.708 CORONARY ARTERY DISEASE OF BYPASS GRAFT OF NATIVE HEART WITH STABLE ANGINA PECTORIS (HCC): ICD-10-CM

## 2023-06-26 DIAGNOSIS — I48.0 PAROXYSMAL ATRIAL FIBRILLATION (HCC): ICD-10-CM

## 2023-06-26 DIAGNOSIS — D61.818 OTHER PANCYTOPENIA (HCC): ICD-10-CM

## 2023-06-26 DIAGNOSIS — I71.20 THORACIC AORTIC ANEURYSM WITHOUT RUPTURE, UNSPECIFIED PART (HCC): ICD-10-CM

## 2023-06-26 DIAGNOSIS — D69.6 THROMBOCYTOPENIA (HCC): ICD-10-CM

## 2023-06-26 DIAGNOSIS — J44.9 CHRONIC OBSTRUCTIVE PULMONARY DISEASE, UNSPECIFIED COPD TYPE (HCC): ICD-10-CM

## 2023-06-26 DIAGNOSIS — N40.1 BENIGN PROSTATIC HYPERPLASIA WITH INCOMPLETE BLADDER EMPTYING: ICD-10-CM

## 2023-06-26 DIAGNOSIS — R30.0 DYSURIA: ICD-10-CM

## 2023-06-26 DIAGNOSIS — E89.0 POSTSURGICAL HYPOTHYROIDISM: ICD-10-CM

## 2023-06-26 DIAGNOSIS — R39.14 BENIGN PROSTATIC HYPERPLASIA WITH INCOMPLETE BLADDER EMPTYING: ICD-10-CM

## 2023-06-26 LAB
ALBUMIN SERPL-MCNC: 3.9 G/DL (ref 3.2–4.6)
ALBUMIN/GLOB SERPL: 1.9 (ref 0.4–1.6)
ALP SERPL-CCNC: 66 U/L (ref 50–136)
ALT SERPL-CCNC: 23 U/L (ref 12–65)
ANION GAP SERPL CALC-SCNC: 4 MMOL/L (ref 2–11)
AST SERPL-CCNC: 18 U/L (ref 15–37)
BILIRUB SERPL-MCNC: 2.4 MG/DL (ref 0.2–1.1)
BUN SERPL-MCNC: 18 MG/DL (ref 8–23)
CALCIUM SERPL-MCNC: 9.1 MG/DL (ref 8.3–10.4)
CHLORIDE SERPL-SCNC: 98 MMOL/L (ref 101–110)
CHOLEST SERPL-MCNC: 119 MG/DL
CO2 SERPL-SCNC: 29 MMOL/L (ref 21–32)
CREAT SERPL-MCNC: 0.9 MG/DL (ref 0.8–1.5)
ERYTHROCYTE [DISTWIDTH] IN BLOOD BY AUTOMATED COUNT: 14.3 % (ref 11.9–14.6)
GLOBULIN SER CALC-MCNC: 2.1 G/DL (ref 2.8–4.5)
GLUCOSE SERPL-MCNC: 103 MG/DL (ref 65–100)
HCT VFR BLD AUTO: 33.4 % (ref 41.1–50.3)
HDLC SERPL-MCNC: 68 MG/DL (ref 40–60)
HDLC SERPL: 1.8
HGB BLD-MCNC: 11.8 G/DL (ref 13.6–17.2)
LDLC SERPL CALC-MCNC: 39.2 MG/DL
MCH RBC QN AUTO: 34.8 PG (ref 26.1–32.9)
MCHC RBC AUTO-ENTMCNC: 35.3 G/DL (ref 31.4–35)
MCV RBC AUTO: 98.5 FL (ref 82–102)
NRBC # BLD: 0 K/UL (ref 0–0.2)
PLATELET # BLD AUTO: 111 K/UL (ref 150–450)
PMV BLD AUTO: 8.9 FL (ref 9.4–12.3)
POTASSIUM SERPL-SCNC: 4.6 MMOL/L (ref 3.5–5.1)
PROT SERPL-MCNC: 6 G/DL (ref 6.3–8.2)
RBC # BLD AUTO: 3.39 M/UL (ref 4.23–5.6)
SODIUM SERPL-SCNC: 131 MMOL/L (ref 133–143)
TRIGL SERPL-MCNC: 59 MG/DL (ref 35–150)
VLDLC SERPL CALC-MCNC: 11.8 MG/DL (ref 6–23)
WBC # BLD AUTO: 3.6 K/UL (ref 4.3–11.1)

## 2023-07-01 DIAGNOSIS — E89.0 POSTSURGICAL HYPOTHYROIDISM: ICD-10-CM

## 2023-07-03 ENCOUNTER — OFFICE VISIT (OUTPATIENT)
Dept: INTERNAL MEDICINE CLINIC | Facility: CLINIC | Age: 88
End: 2023-07-03
Payer: MEDICARE

## 2023-07-03 VITALS
HEIGHT: 72 IN | DIASTOLIC BLOOD PRESSURE: 65 MMHG | WEIGHT: 213 LBS | OXYGEN SATURATION: 99 % | SYSTOLIC BLOOD PRESSURE: 135 MMHG | HEART RATE: 66 BPM | TEMPERATURE: 99.7 F | BODY MASS INDEX: 28.85 KG/M2

## 2023-07-03 DIAGNOSIS — I67.9 CEREBROVASCULAR DISEASE: ICD-10-CM

## 2023-07-03 DIAGNOSIS — R39.14 BENIGN PROSTATIC HYPERPLASIA WITH INCOMPLETE BLADDER EMPTYING: ICD-10-CM

## 2023-07-03 DIAGNOSIS — Z00.00 MEDICARE ANNUAL WELLNESS VISIT, SUBSEQUENT: Primary | ICD-10-CM

## 2023-07-03 DIAGNOSIS — N40.1 BENIGN PROSTATIC HYPERPLASIA WITH INCOMPLETE BLADDER EMPTYING: ICD-10-CM

## 2023-07-03 DIAGNOSIS — I10 PRIMARY HYPERTENSION: ICD-10-CM

## 2023-07-03 DIAGNOSIS — E89.0 POSTSURGICAL HYPOTHYROIDISM: ICD-10-CM

## 2023-07-03 DIAGNOSIS — R26.89 BALANCE PROBLEM: ICD-10-CM

## 2023-07-03 LAB — PSA SERPL-MCNC: 7.4 NG/ML

## 2023-07-03 PROCEDURE — G8417 CALC BMI ABV UP PARAM F/U: HCPCS | Performed by: INTERNAL MEDICINE

## 2023-07-03 PROCEDURE — 99214 OFFICE O/P EST MOD 30 MIN: CPT | Performed by: INTERNAL MEDICINE

## 2023-07-03 PROCEDURE — 3078F DIAST BP <80 MM HG: CPT | Performed by: INTERNAL MEDICINE

## 2023-07-03 PROCEDURE — G8427 DOCREV CUR MEDS BY ELIG CLIN: HCPCS | Performed by: INTERNAL MEDICINE

## 2023-07-03 PROCEDURE — 1123F ACP DISCUSS/DSCN MKR DOCD: CPT | Performed by: INTERNAL MEDICINE

## 2023-07-03 PROCEDURE — 3075F SYST BP GE 130 - 139MM HG: CPT | Performed by: INTERNAL MEDICINE

## 2023-07-03 PROCEDURE — 1036F TOBACCO NON-USER: CPT | Performed by: INTERNAL MEDICINE

## 2023-07-03 PROCEDURE — G0439 PPPS, SUBSEQ VISIT: HCPCS | Performed by: INTERNAL MEDICINE

## 2023-07-03 RX ORDER — LEVOTHYROXINE SODIUM 0.15 MG/1
TABLET ORAL
Qty: 85 TABLET | Refills: 3 | OUTPATIENT
Start: 2023-07-03

## 2023-07-03 RX ORDER — ATORVASTATIN CALCIUM 40 MG/1
40 TABLET, FILM COATED ORAL DAILY
Qty: 90 TABLET | Refills: 3 | Status: SHIPPED | OUTPATIENT
Start: 2023-07-03

## 2023-07-03 RX ORDER — LEVOTHYROXINE SODIUM 0.15 MG/1
150 TABLET ORAL DAILY
Qty: 90 TABLET | Refills: 1 | Status: SHIPPED | OUTPATIENT
Start: 2023-07-03

## 2023-07-03 SDOH — ECONOMIC STABILITY: INCOME INSECURITY: HOW HARD IS IT FOR YOU TO PAY FOR THE VERY BASICS LIKE FOOD, HOUSING, MEDICAL CARE, AND HEATING?: NOT HARD AT ALL

## 2023-07-03 SDOH — ECONOMIC STABILITY: FOOD INSECURITY: WITHIN THE PAST 12 MONTHS, THE FOOD YOU BOUGHT JUST DIDN'T LAST AND YOU DIDN'T HAVE MONEY TO GET MORE.: NEVER TRUE

## 2023-07-03 SDOH — ECONOMIC STABILITY: HOUSING INSECURITY
IN THE LAST 12 MONTHS, WAS THERE A TIME WHEN YOU DID NOT HAVE A STEADY PLACE TO SLEEP OR SLEPT IN A SHELTER (INCLUDING NOW)?: NO

## 2023-07-03 SDOH — ECONOMIC STABILITY: FOOD INSECURITY: WITHIN THE PAST 12 MONTHS, YOU WORRIED THAT YOUR FOOD WOULD RUN OUT BEFORE YOU GOT MONEY TO BUY MORE.: NEVER TRUE

## 2023-07-03 ASSESSMENT — PATIENT HEALTH QUESTIONNAIRE - PHQ9
SUM OF ALL RESPONSES TO PHQ QUESTIONS 1-9: 0
SUM OF ALL RESPONSES TO PHQ QUESTIONS 1-9: 0
1. LITTLE INTEREST OR PLEASURE IN DOING THINGS: 0
2. FEELING DOWN, DEPRESSED OR HOPELESS: 0
SUM OF ALL RESPONSES TO PHQ QUESTIONS 1-9: 0
SUM OF ALL RESPONSES TO PHQ QUESTIONS 1-9: 0
SUM OF ALL RESPONSES TO PHQ9 QUESTIONS 1 & 2: 0

## 2023-07-03 ASSESSMENT — LIFESTYLE VARIABLES
HOW MANY STANDARD DRINKS CONTAINING ALCOHOL DO YOU HAVE ON A TYPICAL DAY: PATIENT DOES NOT DRINK
HOW OFTEN DO YOU HAVE A DRINK CONTAINING ALCOHOL: NEVER

## 2023-07-03 ASSESSMENT — ENCOUNTER SYMPTOMS
SHORTNESS OF BREATH: 1
COUGH: 0
BACK PAIN: 1

## 2023-07-03 NOTE — PATIENT INSTRUCTIONS
PSA lab ordered to complete. Balance problems. Consider physical therapy. MRI Brain ordered. Follow up in 3 months. Jenn Sanchez MD       Preventing Falls: Care Instructions    Talk to your doctor about the medicines you take. Ask if any of them increase the risk of falls and whether they can be changed or stopped. Try to exercise regularly. It can help improve your strength and balance. This can help lower your risk of falling. Practice fall safety and prevention. Wear low-heeled shoes that fit well and give your feet good support. Talk to your doctor if you have foot problems that make this hard. Carry a cellphone or wear a medical alert device that you can use to call for help. Use stepladders instead of chairs to reach high objects. Don't climb if you're at risk for falls. Ask for help, if needed. Wear the correct eyeglasses, if you need them. Make your home safer. Remove rugs, cords, clutter, and furniture from walkways. Keep your house well lit. Use night-lights in hallways and bathrooms. Install and use sturdy handrails on stairways. Wear nonskid footwear, even inside. Don't walk barefoot or in socks without shoes. Be safe outside. Use handrails, curb cuts, and ramps whenever possible. Keep your hands free by using a shoulder bag or backpack. Try to walk in well-lit areas. Watch out for uneven ground, changes in pavement, and debris. Be careful in the winter. Walk on the grass or gravel when sidewalks are slippery. Use de-icer on steps and walkways. Add non-slip devices to shoes. Put grab bars and nonskid mats in your shower or tub and near the toilet. Try to use a shower chair or bath bench when bathing. Get into a tub or shower by putting in your weaker leg first. Get out with your strong side first. Have a phone or medical alert device in the bathroom with you. Where can you learn more?   Go to [email or web address].[email or web address]/ and enter

## 2023-07-03 NOTE — TELEPHONE ENCOUNTER
Prescription sent to pharmacy[email or web address]  Requested Prescriptions     Signed Prescriptions Disp Refills    atorvastatin (LIPITOR) 40 MG tablet 90 tablet 3     Sig: TAKE 1 TABLET BY MOUTH  DAILY     Authorizing Provider: Marilyn Corrales     Ordering User: David Kingston

## 2023-07-05 ENCOUNTER — OFFICE VISIT (OUTPATIENT)
Age: 88
End: 2023-07-05
Payer: MEDICARE

## 2023-07-05 VITALS
HEART RATE: 55 BPM | BODY MASS INDEX: 29.23 KG/M2 | WEIGHT: 215.8 LBS | HEIGHT: 72 IN | DIASTOLIC BLOOD PRESSURE: 60 MMHG | SYSTOLIC BLOOD PRESSURE: 138 MMHG

## 2023-07-05 DIAGNOSIS — I45.10 RBBB: ICD-10-CM

## 2023-07-05 DIAGNOSIS — I48.0 PAROXYSMAL ATRIAL FIBRILLATION (HCC): Primary | ICD-10-CM

## 2023-07-05 DIAGNOSIS — E78.5 DYSLIPIDEMIA: ICD-10-CM

## 2023-07-05 DIAGNOSIS — I10 PRIMARY HYPERTENSION: ICD-10-CM

## 2023-07-05 DIAGNOSIS — I25.810 CORONARY ARTERY DISEASE INVOLVING CORONARY BYPASS GRAFT OF NATIVE HEART WITHOUT ANGINA PECTORIS: ICD-10-CM

## 2023-07-05 PROCEDURE — G8417 CALC BMI ABV UP PARAM F/U: HCPCS | Performed by: INTERNAL MEDICINE

## 2023-07-05 PROCEDURE — 3078F DIAST BP <80 MM HG: CPT | Performed by: INTERNAL MEDICINE

## 2023-07-05 PROCEDURE — G8427 DOCREV CUR MEDS BY ELIG CLIN: HCPCS | Performed by: INTERNAL MEDICINE

## 2023-07-05 PROCEDURE — 1036F TOBACCO NON-USER: CPT | Performed by: INTERNAL MEDICINE

## 2023-07-05 PROCEDURE — 1123F ACP DISCUSS/DSCN MKR DOCD: CPT | Performed by: INTERNAL MEDICINE

## 2023-07-05 PROCEDURE — 93000 ELECTROCARDIOGRAM COMPLETE: CPT | Performed by: INTERNAL MEDICINE

## 2023-07-05 PROCEDURE — 99214 OFFICE O/P EST MOD 30 MIN: CPT | Performed by: INTERNAL MEDICINE

## 2023-07-05 PROCEDURE — 3075F SYST BP GE 130 - 139MM HG: CPT | Performed by: INTERNAL MEDICINE

## 2023-07-05 ASSESSMENT — ENCOUNTER SYMPTOMS
SHORTNESS OF BREATH: 0
BACK PAIN: 1

## 2023-07-05 NOTE — PROGRESS NOTES
[facility]  [address] [place] [address]  PHONE: [phone]      Harvinder Guillermo  1935    SUBJECTIVE:   Harvinder Guillermo is a 82 y.o. male seen for a consultation visit regarding the following:     Chief Complaint   Patient presents with    Coronary Artery Disease    Hypertension    Hyperlipidemia            HPI:  Consultation is requested by [unfilled] for evaluation of Coronary Artery Disease, Hypertension, and Hyperlipidemia   . Harvinder Guillermo presents for routine follow up for known Coronary Artery Disease. Multiple issues addressed as outlined below:     Coronary Artery Disease:  No chest pain. Stable ALVARADO. NO change. Overall inactive. Hypertension:  Ambulatory BP readings have been controlled. Patient reports compliance with medical therapy without side effects. History of atrial fibrillation:  No events. No palpitations or tachycardia. Hyperlipidemia:      Latest Reference Range & Units Most Recent   Cholesterol, Total <200 MG/  23 09:02   HDL Cholesterol 40 - 60 MG/DL 68 (H)  23 09:02   LDL Calculated <100 MG/DL 39.2  23 09:02   LDL/HDL Ratio 0.0 - 3.6 ratio 1.0  21 10:12   Triglycerides 35 - 150 MG/DL 59  23 09:02   (H): Data is abnormally high    Back pain: Biggest complaint and limitation    Wife  in March. RBBB;  No dizziness or syncope. Past Medical History, Past Surgical History, Family history, Social History, and Medications were all reviewed with the patient today and updated as necessary.        Current Outpatient Medications:     atorvastatin (LIPITOR) 40 MG tablet, TAKE 1 TABLET BY MOUTH  DAILY, Disp: 90 tablet, Rfl: 3    levothyroxine (SYNTHROID) 150 MCG tablet, Take 1 tablet by mouth Daily 1 tablet once a day except for 1/2 tablet on Sundays, Disp: 90 tablet, Rfl: 1    fluticasone (FLONASE) 50 MCG/ACT nasal spray, 2 sprays by Nasal route daily, Disp: 3 each, Rfl: 3    gabapentin

## 2023-07-05 NOTE — RESULT ENCOUNTER NOTE
PSA slightly higher when compared to about a yr ago. Recommend he follow up with his urologist annually as directed. He sees [facility] with Dr. Octavio Sanders.

## 2023-07-12 ENCOUNTER — APPOINTMENT (RX ONLY)
Dept: URBAN - METROPOLITAN AREA CLINIC 329 | Facility: CLINIC | Age: 88
Setting detail: DERMATOLOGY
End: 2023-07-12

## 2023-07-12 DIAGNOSIS — Z85.828 PERSONAL HISTORY OF OTHER MALIGNANT NEOPLASM OF SKIN: ICD-10-CM

## 2023-07-12 DIAGNOSIS — L82.1 OTHER SEBORRHEIC KERATOSIS: ICD-10-CM

## 2023-07-12 DIAGNOSIS — L57.8 OTHER SKIN CHANGES DUE TO CHRONIC EXPOSURE TO NONIONIZING RADIATION: ICD-10-CM

## 2023-07-12 PROBLEM — D48.5 NEOPLASM OF UNCERTAIN BEHAVIOR OF SKIN: Status: ACTIVE | Noted: 2023-07-12

## 2023-07-12 PROCEDURE — 11102 TANGNTL BX SKIN SINGLE LES: CPT

## 2023-07-12 PROCEDURE — ? SUNSCREEN RECOMMENDATIONS

## 2023-07-12 PROCEDURE — ? BIOPSY BY SHAVE METHOD

## 2023-07-12 PROCEDURE — ? COUNSELING

## 2023-07-12 PROCEDURE — 99213 OFFICE O/P EST LOW 20 MIN: CPT | Mod: 25

## 2023-07-12 ASSESSMENT — LOCATION DETAILED DESCRIPTION DERM
LOCATION DETAILED: LEFT DISTAL DORSAL FOREARM
LOCATION DETAILED: LEFT ULNAR DORSAL HAND
LOCATION DETAILED: LEFT PROXIMAL DORSAL FOREARM
LOCATION DETAILED: LEFT ULNAR DORSAL HAND
LOCATION DETAILED: RIGHT MEDIAL FOREHEAD
LOCATION DETAILED: LEFT CENTRAL MALAR CHEEK
LOCATION DETAILED: LEFT RADIAL DORSAL HAND
LOCATION DETAILED: LEFT INFERIOR CENTRAL MALAR CHEEK
LOCATION DETAILED: LEFT DISTAL DORSAL FOREARM
LOCATION DETAILED: LEFT SUPERIOR FOREHEAD
LOCATION DETAILED: LEFT FOREHEAD
LOCATION DETAILED: LEFT CENTRAL MALAR CHEEK

## 2023-07-12 ASSESSMENT — LOCATION SIMPLE DESCRIPTION DERM
LOCATION SIMPLE: LEFT CHEEK
LOCATION SIMPLE: LEFT FOREARM
LOCATION SIMPLE: LEFT FOREHEAD
LOCATION SIMPLE: RIGHT FOREHEAD
LOCATION SIMPLE: LEFT FOREHEAD
LOCATION SIMPLE: LEFT HAND
LOCATION SIMPLE: LEFT CHEEK
LOCATION SIMPLE: LEFT FOREARM
LOCATION SIMPLE: LEFT HAND

## 2023-07-12 ASSESSMENT — LOCATION ZONE DERM
LOCATION ZONE: HAND
LOCATION ZONE: FACE
LOCATION ZONE: ARM
LOCATION ZONE: HAND
LOCATION ZONE: ARM
LOCATION ZONE: FACE

## 2023-07-15 ENCOUNTER — HOSPITAL ENCOUNTER (OUTPATIENT)
Dept: MRI IMAGING | Age: 88
End: 2023-07-15
Attending: INTERNAL MEDICINE
Payer: MEDICARE

## 2023-07-15 DIAGNOSIS — I10 PRIMARY HYPERTENSION: ICD-10-CM

## 2023-07-15 DIAGNOSIS — R26.89 BALANCE PROBLEM: ICD-10-CM

## 2023-07-15 DIAGNOSIS — I67.9 CEREBROVASCULAR DISEASE: ICD-10-CM

## 2023-07-15 PROCEDURE — 70551 MRI BRAIN STEM W/O DYE: CPT

## 2023-07-17 NOTE — RESULT ENCOUNTER NOTE
No acute new findings. No obvious stroke. Age related findings consistent with chronic medical conditions and age in my opinion.       Sandoval Eller MD

## 2023-09-06 ENCOUNTER — APPOINTMENT (RX ONLY)
Dept: URBAN - METROPOLITAN AREA CLINIC 330 | Facility: CLINIC | Age: 88
Setting detail: DERMATOLOGY
End: 2023-09-06

## 2023-09-06 DIAGNOSIS — I83.9 ASYMPTOMATIC VARICOSE VEINS OF LOWER EXTREMITIES: ICD-10-CM | Status: STABLE

## 2023-09-06 DIAGNOSIS — D18.0 HEMANGIOMA: ICD-10-CM | Status: STABLE

## 2023-09-06 DIAGNOSIS — Z85.828 PERSONAL HISTORY OF OTHER MALIGNANT NEOPLASM OF SKIN: ICD-10-CM

## 2023-09-06 DIAGNOSIS — L91.0 HYPERTROPHIC SCAR: ICD-10-CM | Status: STABLE

## 2023-09-06 DIAGNOSIS — D22 MELANOCYTIC NEVI: ICD-10-CM | Status: STABLE

## 2023-09-06 DIAGNOSIS — L57.0 ACTINIC KERATOSIS: ICD-10-CM

## 2023-09-06 DIAGNOSIS — L82.0 INFLAMED SEBORRHEIC KERATOSIS: ICD-10-CM

## 2023-09-06 DIAGNOSIS — L82.1 OTHER SEBORRHEIC KERATOSIS: ICD-10-CM | Status: STABLE

## 2023-09-06 PROBLEM — I83.93 ASYMPTOMATIC VARICOSE VEINS OF BILATERAL LOWER EXTREMITIES: Status: ACTIVE | Noted: 2023-09-06

## 2023-09-06 PROBLEM — D18.01 HEMANGIOMA OF SKIN AND SUBCUTANEOUS TISSUE: Status: ACTIVE | Noted: 2023-09-06

## 2023-09-06 PROBLEM — D22.71 MELANOCYTIC NEVI OF RIGHT LOWER LIMB, INCLUDING HIP: Status: ACTIVE | Noted: 2023-09-06

## 2023-09-06 PROBLEM — D22.5 MELANOCYTIC NEVI OF TRUNK: Status: ACTIVE | Noted: 2023-09-06

## 2023-09-06 PROCEDURE — 99213 OFFICE O/P EST LOW 20 MIN: CPT | Mod: 25

## 2023-09-06 PROCEDURE — 17003 DESTRUCT PREMALG LES 2-14: CPT | Mod: 59

## 2023-09-06 PROCEDURE — ? SUNSCREEN RECOMMENDATIONS

## 2023-09-06 PROCEDURE — 17000 DESTRUCT PREMALG LESION: CPT | Mod: 59

## 2023-09-06 PROCEDURE — ? COUNSELING

## 2023-09-06 PROCEDURE — ? ADDITIONAL NOTES

## 2023-09-06 PROCEDURE — ? LIQUID NITROGEN

## 2023-09-06 PROCEDURE — 17110 DESTRUCTION B9 LES UP TO 14: CPT

## 2023-09-06 PROCEDURE — ? FULL BODY SKIN EXAM

## 2023-09-06 ASSESSMENT — LOCATION DETAILED DESCRIPTION DERM
LOCATION DETAILED: LEFT PROXIMAL PRETIBIAL REGION
LOCATION DETAILED: RIGHT SUPERIOR LATERAL MALAR CHEEK
LOCATION DETAILED: LEFT LATERAL FOREHEAD
LOCATION DETAILED: SUPERIOR THORACIC SPINE
LOCATION DETAILED: RIGHT PROXIMAL PRETIBIAL REGION
LOCATION DETAILED: PERIUMBILICAL SKIN
LOCATION DETAILED: RIGHT INFERIOR UPPER BACK
LOCATION DETAILED: RIGHT SUPERIOR LATERAL MALAR CHEEK
LOCATION DETAILED: RIGHT LATERAL FOREHEAD
LOCATION DETAILED: LEFT POPLITEAL SKIN
LOCATION DETAILED: LEFT INFERIOR LATERAL FOREHEAD
LOCATION DETAILED: LEFT SUPERIOR MEDIAL MIDBACK
LOCATION DETAILED: RIGHT DISTAL POSTERIOR THIGH
LOCATION DETAILED: RIGHT ANTERIOR DISTAL THIGH
LOCATION DETAILED: LEFT FOREHEAD
LOCATION DETAILED: LEFT MID TEMPLE
LOCATION DETAILED: RIGHT INFERIOR LATERAL FOREHEAD

## 2023-09-06 ASSESSMENT — LOCATION SIMPLE DESCRIPTION DERM
LOCATION SIMPLE: ABDOMEN
LOCATION SIMPLE: LEFT POPLITEAL SKIN
LOCATION SIMPLE: RIGHT CHEEK
LOCATION SIMPLE: UPPER BACK
LOCATION SIMPLE: RIGHT POSTERIOR THIGH
LOCATION SIMPLE: RIGHT FOREHEAD
LOCATION SIMPLE: LEFT TEMPLE
LOCATION SIMPLE: LEFT LOWER BACK
LOCATION SIMPLE: LEFT FOREHEAD
LOCATION SIMPLE: RIGHT UPPER BACK
LOCATION SIMPLE: RIGHT PRETIBIAL REGION
LOCATION SIMPLE: LEFT PRETIBIAL REGION
LOCATION SIMPLE: RIGHT THIGH
LOCATION SIMPLE: RIGHT CHEEK

## 2023-09-06 ASSESSMENT — LOCATION ZONE DERM
LOCATION ZONE: LEG
LOCATION ZONE: FACE
LOCATION ZONE: TRUNK
LOCATION ZONE: FACE

## 2023-09-06 NOTE — HPI: EVALUATION OF SKIN LESION(S)
Hpi Title: Evaluation of Skin Lesions
How Severe Are Your Spot(S)?: mild
Additional History: Pt is here for a skin check. He has a few sore lesions of concern. He denies any bleeding. Pt has a history of nmsc.

## 2023-09-06 NOTE — PROCEDURE: ADDITIONAL NOTES
Render Risk Assessment In Note?: no
Additional Notes: Pt had lesion biopsied in July - showed benign SK. Pt states site . Will recheck in 3 months. Pt to call if lesion is growing/changing sooner.
Detail Level: Simple

## 2023-09-06 NOTE — PROCEDURE: LIQUID NITROGEN
Show Aperture Variable?: Yes
Render Note In Bullet Format When Appropriate: No
Detail Level: Detailed
Spray Paint Text: The liquid nitrogen was applied to the skin utilizing a spray paint frosting technique.
Post-Care Instructions: I reviewed with the patient in detail post-care instructions. Patient is to wear sunprotection, and avoid picking at any of the treated lesions. Pt may apply Vaseline to crusted or scabbing areas.
Medical Necessity Information: It is in your best interest to select a reason for this procedure from the list below. All of these items fulfill various CMS LCD requirements except the new and changing color options.
Consent: The patient's consent was obtained including but not limited to risks of crusting, scabbing, blistering, scarring, darker or lighter pigmentary change, recurrence, incomplete removal and infection.
Number Of Freeze-Thaw Cycles: 1 freeze-thaw cycle
Medical Necessity Clause: This procedure was medically necessary because the lesions that were treated were:
Duration Of Freeze Thaw-Cycle (Seconds): 0

## 2023-10-12 ENCOUNTER — TELEPHONE (OUTPATIENT)
Age: 88
End: 2023-10-12

## 2023-10-12 RX ORDER — AMLODIPINE BESYLATE 5 MG/1
5 TABLET ORAL DAILY
Qty: 90 TABLET | Refills: 3 | Status: SHIPPED | OUTPATIENT
Start: 2023-10-12

## 2023-10-12 NOTE — TELEPHONE ENCOUNTER
Prescriptions sent to [facility]  Requested Prescriptions     Signed Prescriptions Disp Refills    metoprolol tartrate (LOPRESSOR) 25 MG tablet 180 tablet 3     Sig: Take 1 tablet by mouth 2 times daily TAKE 1 TABLET BY MOUTH TWO TIMES DAILY     Authorizing Provider: Chuck Palomares User: DARIN Lamb    amLODIPine (NORVASC) 5 MG tablet 90 tablet 3     Sig: Take 1 tablet by mouth daily     Authorizing Provider: Chuck Palomares User: Delmon Frankel

## 2023-10-12 NOTE — TELEPHONE ENCOUNTER
MEDICATION REFILL REQUEST      Name of Medication:  Metoprolol  Tartrate  Dose:  25 mg  Frequency:  BID  Quantity:  180  Days' supply:  90      Pharmacy Name/Location:  [address] [place] [address] REFILL REQUEST      Name of Medication:  Amlodipine  Dose:  5 mg  Frequency:  QD  Quantity:  90  Days' supply:  90 with refills      Pharmacy Name/Location:  [facility]

## 2023-10-19 ENCOUNTER — OFFICE VISIT (OUTPATIENT)
Dept: INTERNAL MEDICINE CLINIC | Facility: CLINIC | Age: 88
End: 2023-10-19
Payer: MEDICARE

## 2023-10-19 VITALS
BODY MASS INDEX: 30.07 KG/M2 | HEART RATE: 59 BPM | SYSTOLIC BLOOD PRESSURE: 128 MMHG | HEIGHT: 72 IN | WEIGHT: 222 LBS | OXYGEN SATURATION: 97 % | TEMPERATURE: 98.5 F | DIASTOLIC BLOOD PRESSURE: 62 MMHG

## 2023-10-19 DIAGNOSIS — N40.1 BENIGN PROSTATIC HYPERPLASIA WITH INCOMPLETE BLADDER EMPTYING: ICD-10-CM

## 2023-10-19 DIAGNOSIS — M79.10 MYALGIA: ICD-10-CM

## 2023-10-19 DIAGNOSIS — M51.36 DDD (DEGENERATIVE DISC DISEASE), LUMBAR: ICD-10-CM

## 2023-10-19 DIAGNOSIS — R26.89 BALANCE PROBLEM: ICD-10-CM

## 2023-10-19 DIAGNOSIS — E89.0 POSTSURGICAL HYPOTHYROIDISM: ICD-10-CM

## 2023-10-19 DIAGNOSIS — R39.14 BENIGN PROSTATIC HYPERPLASIA WITH INCOMPLETE BLADDER EMPTYING: ICD-10-CM

## 2023-10-19 DIAGNOSIS — J30.1 CHRONIC SEASONAL ALLERGIC RHINITIS DUE TO POLLEN: ICD-10-CM

## 2023-10-19 DIAGNOSIS — M47.24 OSTEOARTHRITIS OF SPINE WITH RADICULOPATHY, THORACIC REGION: Primary | ICD-10-CM

## 2023-10-19 DIAGNOSIS — I25.708 CORONARY ARTERY DISEASE OF BYPASS GRAFT OF NATIVE HEART WITH STABLE ANGINA PECTORIS (HCC): ICD-10-CM

## 2023-10-19 DIAGNOSIS — M50.30 DDD (DEGENERATIVE DISC DISEASE), CERVICAL: ICD-10-CM

## 2023-10-19 DIAGNOSIS — G62.9 PERIPHERAL POLYNEUROPATHY: ICD-10-CM

## 2023-10-19 LAB
BASOPHILS # BLD: 0 K/UL (ref 0–0.2)
BASOPHILS NFR BLD: 1 % (ref 0–2)
DIFFERENTIAL METHOD BLD: ABNORMAL
EOSINOPHIL # BLD: 0.1 K/UL (ref 0–0.8)
EOSINOPHIL NFR BLD: 4 % (ref 0.5–7.8)
ERYTHROCYTE [DISTWIDTH] IN BLOOD BY AUTOMATED COUNT: 14 % (ref 11.9–14.6)
ERYTHROCYTE [SEDIMENTATION RATE] IN BLOOD: <1 MM/HR
HCT VFR BLD AUTO: 32 % (ref 41.1–50.3)
HGB BLD-MCNC: 11.3 G/DL (ref 13.6–17.2)
IMM GRANULOCYTES # BLD AUTO: 0 K/UL (ref 0–0.5)
IMM GRANULOCYTES NFR BLD AUTO: 0 % (ref 0–5)
LYMPHOCYTES # BLD: 0.6 K/UL (ref 0.5–4.6)
LYMPHOCYTES NFR BLD: 18 % (ref 13–44)
MCH RBC QN AUTO: 35 PG (ref 26.1–32.9)
MCHC RBC AUTO-ENTMCNC: 35.3 G/DL (ref 31.4–35)
MCV RBC AUTO: 99.1 FL (ref 82–102)
MONOCYTES # BLD: 0.3 K/UL (ref 0.1–1.3)
MONOCYTES NFR BLD: 9 % (ref 4–12)
NEUTS SEG # BLD: 2.2 K/UL (ref 1.7–8.2)
NEUTS SEG NFR BLD: 68 % (ref 43–78)
NRBC # BLD: 0 K/UL (ref 0–0.2)
PLATELET # BLD AUTO: 130 K/UL (ref 150–450)
PMV BLD AUTO: 8.8 FL (ref 9.4–12.3)
RBC # BLD AUTO: 3.23 M/UL (ref 4.23–5.6)
WBC # BLD AUTO: 3.2 K/UL (ref 4.3–11.1)

## 2023-10-19 PROCEDURE — G8484 FLU IMMUNIZE NO ADMIN: HCPCS | Performed by: INTERNAL MEDICINE

## 2023-10-19 PROCEDURE — 99214 OFFICE O/P EST MOD 30 MIN: CPT | Performed by: INTERNAL MEDICINE

## 2023-10-19 PROCEDURE — 1123F ACP DISCUSS/DSCN MKR DOCD: CPT | Performed by: INTERNAL MEDICINE

## 2023-10-19 PROCEDURE — G8417 CALC BMI ABV UP PARAM F/U: HCPCS | Performed by: INTERNAL MEDICINE

## 2023-10-19 PROCEDURE — 1036F TOBACCO NON-USER: CPT | Performed by: INTERNAL MEDICINE

## 2023-10-19 PROCEDURE — G8427 DOCREV CUR MEDS BY ELIG CLIN: HCPCS | Performed by: INTERNAL MEDICINE

## 2023-10-19 RX ORDER — CLOPIDOGREL BISULFATE 75 MG/1
75 TABLET ORAL DAILY
Qty: 90 TABLET | Refills: 3 | Status: SHIPPED | OUTPATIENT
Start: 2023-10-19

## 2023-10-19 RX ORDER — LEVOTHYROXINE SODIUM 0.15 MG/1
150 TABLET ORAL DAILY
Qty: 90 TABLET | Refills: 1 | Status: SHIPPED | OUTPATIENT
Start: 2023-10-19

## 2023-10-19 RX ORDER — FLUTICASONE PROPIONATE 50 MCG
2 SPRAY, SUSPENSION (ML) NASAL DAILY
Qty: 3 EACH | Refills: 3 | Status: SHIPPED | OUTPATIENT
Start: 2023-10-19

## 2023-10-19 ASSESSMENT — ENCOUNTER SYMPTOMS
COUGH: 0
BACK PAIN: 1
SHORTNESS OF BREATH: 1

## 2023-10-19 NOTE — PROGRESS NOTES
ASSESSMENT/PLAN:    ->> MRI brain ordered.-->> neg. Consider PT for balance. Fall precautions. Therapy discussed. Prefers to wait for now. Probably should consider JORJE or being closer to family. He has thought about some he says. Pancytopenia- stable. Oncology did not advocate for any further work-up at this point, even if there were some developing myelodysplasia dysplasia (which was not really seen on [facility] in 2011) he would not need treatment currently. Continued observation with yearly hematology visit. Check SPEP with MOOSE     CAD- Continue ASA and Statin. BP stable. Lipids controlled. No cp or new sob. Chronic fatigue. Thoracic Aortic Aneurysm- Monitor by [employer] vascular. Chronic back pain and DDD and OA. -- per pain management. Continue current medications as prescribed. Seeing pain management. He has been referred to NS. He did not receive any benefit with the recent facet injections, and only received very short-term benefit with the previous transforaminal epidural steroid injections. He is ready to proceed with surgical evaluation. BPH and elevated PSA monitored by urology with Dr. Sintia Black. Check ua- neg leuk, trace heme-  ->> He requests to have his PSA done today. 10/19/23    Statin myalgia? Stop Lipitor for now. Check labs below. Evaluation and management of the chronic condition(s) delineated. No negative side effects reported. I have reviewed all the lab results. There are some abnormalities that are either expected or not critical to the patient's health, and are discussed in the office today and are addressed. Please refer to the above assessement and plan narrative and orders and follow up plan. Medication discussed and refilled as needed. Physical exam findings are stable unless otherwise indicated and this is addressed.   The most recent lab work and imaging and consultant/urgent care visits and imaging are reviewed and

## 2023-10-19 NOTE — PATIENT INSTRUCTIONS
Stop Lipitor for now. Check labs below.       Orders Placed This Encounter    CK    Myositis Panel 3    C-Reactive Protein    Sedimentation Rate    CBC with Auto Differential    HMG-CoA Reductase Ab, IgG    Vitamin B12    MOOSE and PE, Serum    REHABILITATION [facility] - Physical Therapy, [address] [place] [address] Internal Clinics     Referral Priority:   Routine     Referral Type:   Eval and Treat     Referral Reason:   Physical Therapy     Requested Specialty:   Physical Therapist     Number of Visits Requested:   1    clopidogrel (PLAVIX) 75 MG tablet     Sig: Take 1 tablet by mouth daily     Dispense:  90 tablet     Refill:  3    fluticasone (FLONASE) 50 MCG/ACT nasal spray     Si sprays by Nasal route daily     Dispense:  3 each     Refill:  3    levothyroxine (SYNTHROID) 150 MCG tablet     Sig: Take 1 tablet by mouth Daily 1 tablet once a day except for 1/2 tablet on Sundays     Dispense:  90 tablet     Refill:  1

## 2023-10-20 LAB
CK SERPL-CCNC: 103 U/L (ref 21–215)
CRP SERPL-MCNC: <0.3 MG/DL (ref 0–0.9)
VIT B12 SERPL-MCNC: 560 PG/ML (ref 193–986)

## 2023-10-25 LAB
ALBUMIN SERPL ELPH-MCNC: 4 G/DL (ref 2.9–4.4)
ALBUMIN/GLOB SERPL: 1.8 (ref 0.7–1.7)
ALPHA1 GLOB SERPL ELPH-MCNC: 0.3 G/DL (ref 0–0.4)
ALPHA2 GLOB SERPL ELPH-MCNC: 0.5 G/DL (ref 0.4–1)
B-GLOBULIN SERPL ELPH-MCNC: 0.8 G/DL (ref 0.7–1.3)
GAMMA GLOB SERPL ELPH-MCNC: 0.7 G/DL (ref 0.4–1.8)
GLOBULIN SER-MCNC: 2.3 G/DL (ref 2.2–3.9)
IGA SERPL-MCNC: 166 MG/DL (ref 61–437)
IGG SERPL-MCNC: 606 MG/DL (ref 603–1613)
IGM SERPL-MCNC: 107 MG/DL (ref 15–143)
INTERPRETATION SERPL IEP-IMP: ABNORMAL
M PROTEIN SERPL ELPH-MCNC: ABNORMAL G/DL
PROT SERPL-MCNC: 6.3 G/DL (ref 6–8.5)

## 2023-10-26 LAB — HMGCR AB SERPL IA-ACNC: <20 UNITS

## 2023-10-27 LAB
ENA JO1 AB SER QL: <20 UNITS
ENA SSA 52KD AB: <20 UNITS
MDA5 ANTIBODY: <20 UNITS
NXP-2 ANTIBODY: <20 UNITS
PM-SCL ANTIBODY: <20 UNITS
TIF1-GAMMA ANTIBODY: <20 UNITS
U-1 SN RNP ANTIBODIES: <20 UNITS

## 2023-11-01 ENCOUNTER — TELEPHONE (OUTPATIENT)
Dept: INTERNAL MEDICINE CLINIC | Facility: CLINIC | Age: 88
End: 2023-11-01

## 2023-11-01 LAB
EJ AB SER QL: NEGATIVE
ENA JO1 AB SER QL: <20 UNITS
ENA SSA 52KD AB: <20 UNITS
KU AB SER QL: NEGATIVE
MDA5 ANTIBODY: <20 UNITS
MI2 AB SER QL: NEGATIVE
NXP-2 ANTIBODY: <20 UNITS
OJ AB SER QL: NEGATIVE
PL12 AB SER QL: NEGATIVE
PL7 AB SER QL: NEGATIVE
PM-SCL ANTIBODY: <20 UNITS
SRP AB SERPL QL: NEGATIVE
TIF1-GAMMA ANTIBODY: <20 UNITS
U-1 SN RNP ANTIBODIES: <20 UNITS
U2 SMALL NUCLEAR RNP AB: NEGATIVE
U3, RNP AB, IGG: NEGATIVE

## 2023-11-06 ENCOUNTER — HOSPITAL ENCOUNTER (OUTPATIENT)
Dept: PHYSICAL THERAPY | Age: 88
Setting detail: RECURRING SERIES
Discharge: HOME | End: 2023-11-09
Attending: INTERNAL MEDICINE
Payer: MEDICARE

## 2023-11-06 DIAGNOSIS — M62.81 MUSCLE WEAKNESS (GENERALIZED): ICD-10-CM

## 2023-11-06 DIAGNOSIS — R26.2 DIFFICULTY IN WALKING, NOT ELSEWHERE CLASSIFIED: Primary | ICD-10-CM

## 2023-11-06 DIAGNOSIS — M54.59 OTHER LOW BACK PAIN: ICD-10-CM

## 2023-11-06 PROCEDURE — 97162 PT EVAL MOD COMPLEX 30 MIN: CPT

## 2023-11-06 PROCEDURE — 97110 THERAPEUTIC EXERCISES: CPT

## 2023-11-06 ASSESSMENT — PAIN SCALES - GENERAL: PAINLEVEL_OUTOF10: 2

## 2023-11-06 ASSESSMENT — PAIN DESCRIPTION - LOCATION: LOCATION: BACK

## 2023-11-06 ASSESSMENT — PAIN DESCRIPTION - DESCRIPTORS: DESCRIPTORS: ACHING

## 2023-11-06 NOTE — PROGRESS NOTES
Harvinder Muñoz  : 1935  Primary: Medicare Part A And B (Medicare)  Secondary: Cleo Hand @ [place] [id number]  Phone: [phone]  Fax: [phone] Plan Frequency: 1-2 times a week for 90 days. Plan of Care/Certification Expiration Date: 24      >PT Visit Info:  Plan Frequency: 1-2 times a week for 90 days. Plan of Care/Certification Expiration Date: 24  Total # of Visits to Date: 1      Visit Count:  1    OUTPATIENT PHYSICAL THERAPY: Treatment Note 2023       Episode  }Appt Desk             Treatment Diagnosis:    Difficulty in walking, not elsewhere classified  Muscle weakness (generalized)  Other low back pain  Medical/Referring Diagnosis:    Osteoarthritis of spine with radiculopathy, thoracic region  DDD (degenerative disc disease), lumbar  DDD (degenerative disc disease), cervical  Peripheral polyneuropathy  Balance problem  Referring Physician:  Shawnee Miller MD MD Orders:  PT Eval and Treat   Date of Onset:  Onset Date:  (Chronic)     Allergies:   Diphenhydramine, Lorazepam, Iodinated contrast media, and Sulfa antibiotics  Restrictions/Precautions:  No data recordedNo data recorded     Interventions Planned (Treatment may consist of any combination of the following):    Current Treatment Recommendations: Strengthening; Balance training; Endurance training; Gait training; Neuromuscular re-education; Home exercise program; Manual; Modalities     >Subjective Comments:  Patient complains of imbalance, weakness, and difficulty walking. >Initial:   Back 210>Post Session:     Back 10  Medications Last Reviewed:  2023  Updated Objective Findings:  See Evaluation Note from today  Treatment   THERAPEUTIC EXERCISE: (8 minutes):    Exercises per grid below to improve mobility and strength. Required minimal verbal cues to promote proper body alignment.   Progressed

## 2023-11-06 NOTE — THERAPY EVALUATION
Harvinder Maldonado Music  : 1935  Primary: Medicare Part A And B (Medicare)  Secondary: Cleo Hand @ [place] [id number]  Phone: [phone]  Fax: [phone] Plan Frequency: 1-2 times a week for 90 days. Plan of Care/Certification Expiration Date: 24      PT Visit Info:  Plan Frequency: 1-2 times a week for 90 days. Plan of Care/Certification Expiration Date: 24  Total # of Visits to Date: 1      Visit Count:  1                OUTPATIENT PHYSICAL THERAPY:             Initial Assessment 2023               Episode (PT-Imbalance) Appt Desk         Treatment Diagnosis:    Difficulty in walking, not elsewhere classified  Muscle weakness (generalized)  Other low back pain  Medical/Referring Diagnosis:    Osteoarthritis of spine with radiculopathy, thoracic region  DDD (degenerative disc disease), lumbar  DDD (degenerative disc disease), cervical  Peripheral polyneuropathy  Balance problem  Referring Physician:  Dimitry Arango MD MD Orders:  PT Eval and Treat   Return MD Appt:  Unknown   Date of Onset:  Onset Date:  (Chronic)      Allergies:  Diphenhydramine, Lorazepam, Iodinated contrast media, and Sulfa antibiotics  Restrictions/Precautions:           Medications Last Reviewed:  2023     SUBJECTIVE   History of Injury/Illness (Reason for Referral):  Patient complains of imbalance, weakness, and difficulty walking. Patient has had no falls. Patient uses rollator walker in the community, but no assistive device in his home. Patient reports left leg feels weaker than right leg. Patient reports having bowel obstruction surgery in 2019 and never feeling strong after that. Patient reports having no endurance. Patient has difficulty doing yard work due to weakness and endurance. Patient Stated Goal(s): \"To get a little more strength and stamina\".   Initial:   Back 2/10 Post Session:

## 2023-11-10 ENCOUNTER — HOSPITAL ENCOUNTER (OUTPATIENT)
Dept: PHYSICAL THERAPY | Age: 88
Setting detail: RECURRING SERIES
Discharge: HOME | End: 2023-11-13
Attending: INTERNAL MEDICINE
Payer: MEDICARE

## 2023-11-10 PROCEDURE — 97112 NEUROMUSCULAR REEDUCATION: CPT

## 2023-11-10 PROCEDURE — 97110 THERAPEUTIC EXERCISES: CPT

## 2023-11-10 ASSESSMENT — PAIN DESCRIPTION - LOCATION: LOCATION: BACK

## 2023-11-10 ASSESSMENT — PAIN DESCRIPTION - DESCRIPTORS: DESCRIPTORS: ACHING

## 2023-11-10 ASSESSMENT — PAIN SCALES - GENERAL: PAINLEVEL_OUTOF10: 2

## 2023-11-10 NOTE — PROGRESS NOTES
Harvinder Yoder  : 1935  Primary: Medicare Part A And B (Medicare)  Secondary: Cleo Hand @ [place] [id number]  Phone: [phone]  Fax: [phone] Plan Frequency: 1-2 times a week for 90 days. Plan of Care/Certification Expiration Date: 24      >PT Visit Info:  Plan Frequency: 1-2 times a week for 90 days. Plan of Care/Certification Expiration Date: 24  Total # of Visits to Date: 2      Visit Count:  2    OUTPATIENT PHYSICAL THERAPY: Treatment Note 11/10/2023       Episode  }Appt Desk             Treatment Diagnosis:    Difficulty in walking, not elsewhere classified  Muscle weakness (generalized)  Other low back pain  Medical/Referring Diagnosis:    Osteoarthritis of spine with radiculopathy, thoracic region  DDD (degenerative disc disease), lumbar  DDD (degenerative disc disease), cervical  Peripheral polyneuropathy  Balance problem  Referring Physician:  Doreen Castro MD MD Orders:  PT Eval and Treat   Date of Onset:  Onset Date:  (Chronic)     Allergies:   Diphenhydramine, Lorazepam, Iodinated contrast media, and Sulfa antibiotics  Restrictions/Precautions:  No data recordedNo data recorded     Interventions Planned (Treatment may consist of any combination of the following):    Current Treatment Recommendations: Strengthening; Balance training; Endurance training; Gait training; Neuromuscular re-education; Home exercise program; Manual; Modalities     >Subjective Comments:  Patient reports he is doing okay. >Initial:   Back 10>Post Session:     Back 10  Medications Last Reviewed:  11/10/2023  Updated Objective Findings:  None Today  Treatment   THERAPEUTIC EXERCISE: (25 minutes):    Exercises per grid below to improve mobility and strength. Required minimal verbal cues to promote proper body alignment. Progressed repetitions as indicated.    Date:  2023

## 2023-11-14 ENCOUNTER — HOSPITAL ENCOUNTER (OUTPATIENT)
Dept: PHYSICAL THERAPY | Age: 88
Setting detail: RECURRING SERIES
End: 2023-11-14
Attending: INTERNAL MEDICINE
Payer: MEDICARE

## 2023-11-14 NOTE — PROGRESS NOTES
Harvinder Major  : 1935  Primary: Medicare Part A And B  Secondary: [address] @ [address] [address]  Phone: [phone]  Fax: [phone]    PT Visit Info: Total # of Visits to Date: 2  Canceled Appointment: 1 (2023)     OT Visit Info:  No data recorded    OUTPATIENT THERAPY: 2023  Episode  Appt Desk        [address] cancelled his appointment for today due to unknown reasons. Will plan to follow up during next appointment.   Thank you,  Anand Cat, PT    Future Appointments   Date Time Provider [address]   2023  1:45 PM Anand Cat, PT [facility] SFE   2023  1:00 PM Evelia Costa, PT [facility] SFE   2023  1:00 PM Anand Cat, PT [facility] E   2024  2:00 PM Yvonne Matthews MD [facility]   2024  3:20 PM Kimber Marquez MD [facility] AMB   5/15/2024 12:40 PM [facility]   5/15/2024  1:15 PM Jaye Shah MD [facility] AMB

## 2023-11-16 ENCOUNTER — HOSPITAL ENCOUNTER (OUTPATIENT)
Dept: PHYSICAL THERAPY | Age: 88
Setting detail: RECURRING SERIES
Discharge: HOME | End: 2023-11-19
Attending: INTERNAL MEDICINE
Payer: MEDICARE

## 2023-11-16 PROCEDURE — 97112 NEUROMUSCULAR REEDUCATION: CPT

## 2023-11-16 PROCEDURE — 97110 THERAPEUTIC EXERCISES: CPT

## 2023-11-16 ASSESSMENT — PAIN SCALES - GENERAL: PAINLEVEL_OUTOF10: 2

## 2023-11-16 ASSESSMENT — PAIN DESCRIPTION - LOCATION: LOCATION: BACK

## 2023-11-16 ASSESSMENT — PAIN DESCRIPTION - DESCRIPTORS: DESCRIPTORS: ACHING

## 2023-11-16 NOTE — PROGRESS NOTES
Harvinder Allen  : 1935  Primary: Medicare Part A And B (Medicare)  Secondary: Cleo Hand @ [place] [address]  Phone: [phone]  Fax: [phone] Plan Frequency: 1-2 times a week for 90 days. Plan of Care/Certification Expiration Date: 24      >PT Visit Info:  Plan Frequency: 1-2 times a week for 90 days. Plan of Care/Certification Expiration Date: 24  Total # of Visits to Date: 3  Canceled Appointment: 1 (2023)      Visit Count:  3    OUTPATIENT PHYSICAL THERAPY: Treatment Note 2023       Episode  }Appt Desk             Treatment Diagnosis:    Difficulty in walking, not elsewhere classified  Muscle weakness (generalized)  Other low back pain  Medical/Referring Diagnosis:    Osteoarthritis of spine with radiculopathy, thoracic region  DDD (degenerative disc disease), lumbar  DDD (degenerative disc disease), cervical  Peripheral polyneuropathy  Balance problem  Referring Physician:  Krystina Cabrera MD MD Orders:  PT Eval and Treat   Date of Onset:  Onset Date:  (Chronic)     Allergies:   Diphenhydramine, Lorazepam, Iodinated contrast media, and Sulfa antibiotics  Restrictions/Precautions:  No data recordedNo data recorded     Interventions Planned (Treatment may consist of any combination of the following):    Current Treatment Recommendations: Strengthening; Balance training; Endurance training; Gait training; Neuromuscular re-education; Home exercise program; Manual; Modalities     >Subjective Comments:  Patient reports he is feeling better than he was on Tuesday. \"I am still a little weak\". >Initial:   Back 2/10>Post Session:     Back 2/10  Medications Last Reviewed:  2023  Updated Objective Findings:  None Today  Treatment   THERAPEUTIC EXERCISE: (25 minutes):    Exercises per grid below to improve mobility and strength.   Required minimal verbal cues to promote

## 2023-12-05 ENCOUNTER — HOSPITAL ENCOUNTER (OUTPATIENT)
Dept: PHYSICAL THERAPY | Age: 88
Setting detail: RECURRING SERIES
Discharge: HOME | End: 2023-12-08
Attending: INTERNAL MEDICINE
Payer: MEDICARE

## 2023-12-05 PROCEDURE — 97110 THERAPEUTIC EXERCISES: CPT

## 2023-12-05 PROCEDURE — 97112 NEUROMUSCULAR REEDUCATION: CPT

## 2023-12-05 ASSESSMENT — PAIN SCALES - GENERAL: PAINLEVEL_OUTOF10: 2

## 2023-12-05 ASSESSMENT — PAIN DESCRIPTION - LOCATION: LOCATION: BACK

## 2023-12-05 ASSESSMENT — PAIN DESCRIPTION - DESCRIPTORS: DESCRIPTORS: ACHING

## 2023-12-05 NOTE — PROGRESS NOTES
exercises             Hallpike-Hallstead testing for BPPV (Benign Paroxysmal Positional Vertigo)             Acuña-Daroff exercises           Canalith Repositioning treatment/Epley Maneuver  for BPPV (Benign Paroxysmal Positional Vertigo)           Smart Equitest Training: See scanned report. Treatment/Session Summary:    >Treatment Assessment:  Patient tolerated treatment with less rest breaks today demonstrating improvement. Communication/Consultation:  None today  Equipment provided today:  None  Recommendations/Intent for next treatment session: Next visit will focus on balance exercises, strengthening exercises, and gait.     >Total Treatment Billable Duration:  40 minutes   Time In: 1300  Time Out: 1340    ALEIDA PACKER, PT       Charge Capture  }Post Session Pain  PT Visit Info  [employer] Portal  MD Guidelines  Scanned Media  Benefits  MyChart    Future Appointments   Date Time Provider [address]   12/7/2023  1:00 PM Garett Haas, PT [facility] SFE   12/11/2023  1:45 PM Viktor Packer, PT [facility] SFE   12/14/2023  1:45 PM Viktor Packer, PT [facility] SFE   12/18/2023  1:45 PM Viktor Packer, PT [facility] SFE   1/5/2024  2:00 PM Hermelindo Matthews MD [facility] AMB   2/6/2024  3:20 PM Wojciech Rosa MD [facility] AMB   5/15/2024 12:40 PM [facility]   5/15/2024  1:15 PM Patricia Yañez MD [facility] AMB

## 2023-12-07 ENCOUNTER — HOSPITAL ENCOUNTER (OUTPATIENT)
Dept: PHYSICAL THERAPY | Age: 88
Setting detail: RECURRING SERIES
End: 2023-12-07
Attending: INTERNAL MEDICINE
Payer: MEDICARE

## 2023-12-07 NOTE — PROGRESS NOTES
Harvinder Paige  : 1935  Primary: Medicare Part A And B  Secondary: [employer] HE[employer] C[employer] MEDICARE SUPP [facility] @ [address]  [place]  [facility] [address]  Phone: [phone]  Fax: [phone]    PT Visit Info:    Total # of Visits to Date: 4  Canceled Appointment: 2 (2023)     OT Visit Info:  No data recorded    OUTPATIENT THERAPY: 2023  Episode  Appt Desk        Harvinder Paige cancelled his appointment for today due to  personal reasons .  Will plan to follow up next during next appointment.  Thank you,  RACHEL PACKER, PT    Future Appointments   Date Time Provider Department Center   2023  1:45 PM Rachel Packer, PT [facility] SFE   2023  1:45 PM Rachel Packer, PT [facility] SFE   2023  1:45 PM Rachel Packer, PT [facility] SFE   2024  2:00 PM Benigno Matthews MD [facility] AMB   2024  3:20 PM Cassius Blanco MD [facility] AMB   5/15/2024 12:40 PM [facility]   5/15/2024  1:15 PM Lucien Ruiz MD [facility] G[facility] AMB

## 2023-12-11 ENCOUNTER — HOSPITAL ENCOUNTER (OUTPATIENT)
Dept: PHYSICAL THERAPY | Age: 88
Setting detail: RECURRING SERIES
Discharge: HOME | End: 2023-12-14
Attending: INTERNAL MEDICINE
Payer: MEDICARE

## 2023-12-11 PROCEDURE — 97112 NEUROMUSCULAR REEDUCATION: CPT

## 2023-12-11 PROCEDURE — 97110 THERAPEUTIC EXERCISES: CPT

## 2023-12-11 ASSESSMENT — PAIN DESCRIPTION - DESCRIPTORS: DESCRIPTORS: ACHING

## 2023-12-11 ASSESSMENT — PAIN DESCRIPTION - LOCATION: LOCATION: BACK

## 2023-12-11 ASSESSMENT — PAIN SCALES - GENERAL: PAINLEVEL_OUTOF10: 2

## 2023-12-11 NOTE — PROGRESS NOTES
Harvinder Diggs  : 1935  Primary: Medicare Part A And B (Medicare)  Secondary: Cleo Hand @ [place] [id number]  Phone: [phone]  Fax: [phone] Plan Frequency: 1-2 times a week for 90 days. Plan of Care/Certification Expiration Date: 24      >PT Visit Info:  Plan Frequency: 1-2 times a week for 90 days. Plan of Care/Certification Expiration Date: 24  Total # of Visits to Date: 5  Canceled Appointment: 2 (2023)      Visit Count:  5    OUTPATIENT PHYSICAL THERAPY: Treatment Note 2023       Episode  }Appt Desk             Treatment Diagnosis:    Difficulty in walking, not elsewhere classified  Muscle weakness (generalized)  Other low back pain  Medical/Referring Diagnosis:    Osteoarthritis of spine with radiculopathy, thoracic region  DDD (degenerative disc disease), lumbar  DDD (degenerative disc disease), cervical  Peripheral polyneuropathy  Balance problem  Referring Physician:  Aby Naqvi MD MD Orders:  PT Eval and Treat   Date of Onset:  Onset Date:  (Chronic)     Allergies:   Diphenhydramine, Lorazepam, Iodinated contrast media, and Sulfa antibiotics  Restrictions/Precautions:  No data recordedNo data recorded     Interventions Planned (Treatment may consist of any combination of the following):    Current Treatment Recommendations: Strengthening; Balance training; Endurance training; Gait training; Neuromuscular re-education; Home exercise program; Manual; Modalities     >Subjective Comments:  Patient reports his back was bothering him yesterday. >Initial:   Back 10>Post Session:     Back 2/10  Medications Last Reviewed:  2023  Updated Objective Findings:  None Today  Treatment   THERAPEUTIC EXERCISE: (10 minutes):    Exercises per grid below to improve mobility and strength. Required minimal verbal cues to promote proper body alignment.   Progressed

## 2024-01-02 ENCOUNTER — HOSPITAL ENCOUNTER (OUTPATIENT)
Dept: PHYSICAL THERAPY | Age: 89
Setting detail: RECURRING SERIES
Discharge: HOME | End: 2024-01-05
Attending: INTERNAL MEDICINE
Payer: MEDICARE

## 2024-01-02 PROCEDURE — 97110 THERAPEUTIC EXERCISES: CPT

## 2024-01-02 PROCEDURE — 97112 NEUROMUSCULAR REEDUCATION: CPT

## 2024-01-02 ASSESSMENT — PAIN DESCRIPTION - DESCRIPTORS: DESCRIPTORS: ACHING

## 2024-01-02 ASSESSMENT — PAIN SCALES - GENERAL: PAINLEVEL_OUTOF10: 2

## 2024-01-02 ASSESSMENT — PAIN DESCRIPTION - LOCATION: LOCATION: BACK

## 2024-01-02 NOTE — PROGRESS NOTES
Harvinder Paige  : 1935  Primary: Medicare Part A And B (Medicare)  Secondary: [employer] HE[employer] C[employer] MEDICARE SUPP [facility] @ [address]  [facility] [address]  Phone: [phone]  Fax: [phone] Plan Frequency: 1-2 times a week for 90 days.    Plan of Care/Certification Expiration Date: 24      >PT Visit Info:  Plan Frequency: 1-2 times a week for 90 days.  Plan of Care/Certification Expiration Date: 24  Total # of Visits to Date: 8  Canceled Appointment: 2 (2023)      Visit Count:  8    OUTPATIENT PHYSICAL THERAPY: Treatment Note 2024       Episode  }Appt Desk             Treatment Diagnosis:    Difficulty in walking, not elsewhere classified  Muscle weakness (generalized)  Other low back pain  Medical/Referring Diagnosis:    Osteoarthritis of spine with radiculopathy, thoracic region  DDD (degenerative disc disease), lumbar  DDD (degenerative disc disease), cervical  Peripheral polyneuropathy  Balance problem  Referring Physician:  Cassius Blanco MD MD Orders:  PT Eval and Treat   Date of Onset:  Onset Date:  (Chronic)     Allergies:   Diphenhydramine, Lorazepam, Iodinated contrast media, and Sulfa antibiotics  Restrictions/Precautions:  No data recordedNo data recorded     Interventions Planned (Treatment may consist of any combination of the following):    Current Treatment Recommendations: Strengthening; Balance training; Endurance training; Gait training; Neuromuscular re-education; Home exercise program; Manual; Modalities     >Subjective Comments:  Patient reports today is a  bad day.  >Initial:   Back 2/10>Post Session:     Back 2/10  Medications Last Reviewed:  2024  Updated Objective Findings:  None Today  Treatment   THERAPEUTIC EXERCISE: (10 minutes):    Exercises per grid below to improve mobility and strength.  Required minimal verbal cues to promote proper body alignment.  Progressed repetitions as

## 2024-01-04 ENCOUNTER — HOSPITAL ENCOUNTER (OUTPATIENT)
Dept: PHYSICAL THERAPY | Age: 89
Setting detail: RECURRING SERIES
Discharge: HOME | End: 2024-01-07
Attending: INTERNAL MEDICINE
Payer: MEDICARE

## 2024-01-04 PROCEDURE — 97110 THERAPEUTIC EXERCISES: CPT

## 2024-01-04 PROCEDURE — 97112 NEUROMUSCULAR REEDUCATION: CPT

## 2024-01-04 ASSESSMENT — PAIN DESCRIPTION - DESCRIPTORS: DESCRIPTORS: ACHING

## 2024-01-04 ASSESSMENT — PAIN SCALES - GENERAL: PAINLEVEL_OUTOF10: 3

## 2024-01-04 ASSESSMENT — PAIN DESCRIPTION - LOCATION: LOCATION: BACK

## 2024-01-04 NOTE — PROGRESS NOTES
Spirals           Weight shifting:    Left & Right     Sanddune eyes open with head turns   Sanddune eyes open with head turns and eyes closed Sanddune eyes open with head turns and eyes closed Sanddune eyes open with head turns and eyes closed X   Weight shifting:  Forward & Backward              Static Standing Balance             Standing with feet apart             Standing with feet together             Standing with feet semitandem   Blue foam eyes open and eyes closed        Standing with feet tandem           Single leg stance           X1/X2 Viewing exercises             Hallpike-Breeden testing for BPPV (Benign Paroxysmal Positional Vertigo)             Acuña-Daroff exercises           Canalith Repositioning treatment/Epley Maneuver  for BPPV (Benign Paroxysmal Positional Vertigo)           Smart Equitest Training: See scanned report.                Treatment/Session Summary:    >Treatment Assessment:   Patient demonstrates increased shortness of breath during exercises today.  Patient needs more rest breaks due to shortness of breath.  Communication/Consultation:  None today  Equipment provided today:  None  Recommendations/Intent for next treatment session: Next visit will focus on balance exercises, strengthening exercises, and gait.    >Total Treatment Billable Duration:  43 minutes   Time In: 1345  Time Out: 1428    RACHEL PACKER, PT       Charge Capture  }Post Session Pain  PT Visit Info  [employer] Portal  MD Guidelines  Scanned Media  Benefits  MyChart    Future Appointments   Date Time Provider Department Center   1/5/2024  2:00 PM Benigno Matthews MD [facility] AMB   1/9/2024  1:45 PM Rachel Packer, PT [facility] SFE   1/11/2024  1:45 PM Rachel Packer, PT [facility] SFE   1/16/2024  1:45 PM Rachel Packer, PT [facility] SFE   1/18/2024  1:45 PM Rachel Packer, PT [facility] SFE   1/23/2024  1:45 PM Rachel Packer, PT [facility] SFE   1/25/2024  1:45 PM Rachel Packer, PT [facility] SFE   2/6/2024

## 2024-01-05 ENCOUNTER — OFFICE VISIT (OUTPATIENT)
Age: 89
End: 2024-01-05
Payer: MEDICARE

## 2024-01-05 VITALS
SYSTOLIC BLOOD PRESSURE: 112 MMHG | WEIGHT: 222.6 LBS | HEIGHT: 72 IN | DIASTOLIC BLOOD PRESSURE: 60 MMHG | HEART RATE: 62 BPM | BODY MASS INDEX: 30.15 KG/M2

## 2024-01-05 DIAGNOSIS — E78.5 DYSLIPIDEMIA: ICD-10-CM

## 2024-01-05 DIAGNOSIS — I10 PRIMARY HYPERTENSION: ICD-10-CM

## 2024-01-05 DIAGNOSIS — I45.10 RBBB: ICD-10-CM

## 2024-01-05 DIAGNOSIS — I25.709 CORONARY ARTERY DISEASE INVOLVING CORONARY BYPASS GRAFT OF NATIVE HEART WITH ANGINA PECTORIS (HCC): Primary | ICD-10-CM

## 2024-01-05 DIAGNOSIS — I48.0 PAROXYSMAL ATRIAL FIBRILLATION (HCC): ICD-10-CM

## 2024-01-05 DIAGNOSIS — G45.9 TIA (TRANSIENT ISCHEMIC ATTACK): ICD-10-CM

## 2024-01-05 PROCEDURE — G8417 CALC BMI ABV UP PARAM F/U: HCPCS | Performed by: INTERNAL MEDICINE

## 2024-01-05 PROCEDURE — 1123F ACP DISCUSS/DSCN MKR DOCD: CPT | Performed by: INTERNAL MEDICINE

## 2024-01-05 PROCEDURE — 99214 OFFICE O/P EST MOD 30 MIN: CPT | Performed by: INTERNAL MEDICINE

## 2024-01-05 PROCEDURE — G8427 DOCREV CUR MEDS BY ELIG CLIN: HCPCS | Performed by: INTERNAL MEDICINE

## 2024-01-05 PROCEDURE — G8484 FLU IMMUNIZE NO ADMIN: HCPCS | Performed by: INTERNAL MEDICINE

## 2024-01-05 PROCEDURE — 1036F TOBACCO NON-USER: CPT | Performed by: INTERNAL MEDICINE

## 2024-01-05 RX ORDER — ACETAMINOPHEN AND CODEINE PHOSPHATE 300; 60 MG/1; MG/1
TABLET ORAL
COMMUNITY
Start: 2023-11-07

## 2024-01-05 ASSESSMENT — ENCOUNTER SYMPTOMS
SHORTNESS OF BREATH: 0
BACK PAIN: 1

## 2024-01-05 NOTE — PROGRESS NOTES
evaluation.  Continue aspirin.  - Nuclear stress test with myocardial perfusion; Future    2. Paroxysmal atrial fibrillation (HCC)  No recurrent symptoms.  Occurred during acute illness.  Monitor for recurrence.    3. Primary hypertension  Blood pressure controlled.  Continue amlodipine and metoprolol therapy.    4. RBBB  Patient with EKG consistent with conduction system disease.  Currently asymptomatic.  Patient to call if develops symptoms of dizziness, syncope or change in exercise tolerance.       5. TIA (transient ischemic attack)  Increased risk of bleeding on dual antiplatelet therapy.  Stop Plavix.  Continue aspirin.    6. Dyslipidemia  Continue Lipitor.             Return in about 6 months (around 7/5/2024).       Benigno Matthews MD  1/5/2024  2:28 PM    This note may have been dictated using speech recognition software.  As a result, error of speech recognition may have occurred

## 2024-01-09 ENCOUNTER — HOSPITAL ENCOUNTER (OUTPATIENT)
Dept: PHYSICAL THERAPY | Age: 89
Setting detail: RECURRING SERIES
End: 2024-01-09
Attending: INTERNAL MEDICINE
Payer: MEDICARE

## 2024-01-09 NOTE — PROGRESS NOTES
Harvinder Paige  : 1935  Primary: Medicare Part A And B  Secondary: AARP HE[employer] CARE MEDICARE SUPP [facility] @ [address]  [place] [address]  Phone: [phone]  Fax: [phone]    PT Visit Info:    Total # of Visits to Date: 9  Canceled Appointment: 3 (2024)     OT Visit Info:  No data recorded    OUTPATIENT THERAPY: 2024  Episode  Appt Desk        Harvinder Paige cancelled his appointment for today due to weather concerns.  Will plan to follow up next during next appointment.  Thank you,  RACHEL PACKER, PT    Future Appointments   Date Time Provider Department Center   2024  1:45 PM Rachel Packer, PT [facility] SFE   2024  1:45 PM Rachel Packer, PT [facility] SFE   2024  1:45 PM Rachel Packer, PT [facility] SFE   2024  1:45 PM Rachel Packer, PT [facility] SFE   2024  1:45 PM Rachel Packer, PT [facility] SFE   2024  1:45 PM Rachel Packer, PT [facility] SFE   2024 10:30 AM [facility] NUCLEAR STRESS DG GVL AMB   2024  3:20 PM Cassius Blanco MD [facility] AMB   5/15/2024 12:40 PM [facility]   5/15/2024  1:15 PM Lucien Ruiz MD [facility] AMB   2024  2:30 PM Benigno Matthews MD [facility] AM[facility]

## 2024-01-11 ENCOUNTER — HOSPITAL ENCOUNTER (OUTPATIENT)
Dept: PHYSICAL THERAPY | Age: 89
Setting detail: RECURRING SERIES
Discharge: HOME | End: 2024-01-14
Attending: INTERNAL MEDICINE
Payer: MEDICARE

## 2024-01-11 PROCEDURE — 97112 NEUROMUSCULAR REEDUCATION: CPT

## 2024-01-11 PROCEDURE — 97110 THERAPEUTIC EXERCISES: CPT

## 2024-01-11 ASSESSMENT — PAIN DESCRIPTION - DESCRIPTORS: DESCRIPTORS: ACHING

## 2024-01-11 ASSESSMENT — PAIN SCALES - GENERAL: PAINLEVEL_OUTOF10: 4

## 2024-01-11 ASSESSMENT — PAIN DESCRIPTION - LOCATION: LOCATION: BACK

## 2024-01-11 NOTE — PROGRESS NOTES
Harvinder Paige  : 1935  Primary: Medicare Part A And B (Medicare)  Secondary: [employer] MEDICARE SUPP [facility] @ [address]  [facility] [address]  Phone: [phone]  Fax: [phone] Plan Frequency: 1-2 times a week for 90 days.    Plan of Care/Certification Expiration Date: 24      >PT Visit Info:  Plan Frequency: 1-2 times a week for 90 days.  Plan of Care/Certification Expiration Date: 24  Total # of Visits to Date: 10  Canceled Appointment: 3 (2024)      Visit Count:  10    OUTPATIENT PHYSICAL THERAPY: Treatment Note 2024       Episode  }Appt Desk             Treatment Diagnosis:    Difficulty in walking, not elsewhere classified  Muscle weakness (generalized)  Other low back pain  Medical/Referring Diagnosis:    Osteoarthritis of spine with radiculopathy, thoracic region  DDD (degenerative disc disease), lumbar  DDD (degenerative disc disease), cervical  Peripheral polyneuropathy  Balance problem  Referring Physician:  Cassius Blanco MD MD Orders:  PT Eval and Treat   Date of Onset:  Onset Date:  (Chronic)     Allergies:   Diphenhydramine, Lorazepam, Iodinated contrast media, and Sulfa antibiotics  Restrictions/Precautions:  No data recordedNo data recorded     Interventions Planned (Treatment may consist of any combination of the following):    Current Treatment Recommendations: Strengthening; Balance training; Endurance training; Gait training; Neuromuscular re-education; Home exercise program; Manual; Modalities     >Subjective Comments:  \"My back has been giving me fits.  I don't have a comfortable chair\".  >Initial:   Back 4/10>Post Session:     Back 4/10  Medications Last Reviewed:  2024  Updated Objective Findings:  None Today  Treatment   THERAPEUTIC EXERCISE: (10 minutes):    Exercises per grid below to improve mobility and strength.  Required minimal verbal cues to promote proper body

## 2024-01-16 ENCOUNTER — HOSPITAL ENCOUNTER (OUTPATIENT)
Dept: PHYSICAL THERAPY | Age: 89
Setting detail: RECURRING SERIES
Discharge: HOME | End: 2024-01-19
Attending: INTERNAL MEDICINE
Payer: MEDICARE

## 2024-01-16 PROCEDURE — 97112 NEUROMUSCULAR REEDUCATION: CPT

## 2024-01-16 PROCEDURE — 97110 THERAPEUTIC EXERCISES: CPT

## 2024-01-16 ASSESSMENT — PAIN DESCRIPTION - DESCRIPTORS: DESCRIPTORS: ACHING

## 2024-01-16 ASSESSMENT — PAIN SCALES - GENERAL: PAINLEVEL_OUTOF10: 3

## 2024-01-16 ASSESSMENT — PAIN DESCRIPTION - LOCATION: LOCATION: BACK

## 2024-01-16 NOTE — PROGRESS NOTES
indicated.   Date:  1/11/2024 Date:  1/16/2024   Date:  1/4/2024   Activity/Exercise Parameters Parameters Parameters   Nustep  Level 4 x 10 minutes Level 4 x 10 minutes Level 4 x 10 minutes   Standing hip flexion, hip abduction, hamstring curls, heel raises X X X   Sit to stand from hilo mat X X X                               NEUROMUSCULAR RE-EDUCATION: (35 minutes):    Exercise/activities per grid below to improve balance and coordination.  Required minimal verbal cues to promote dynamic balance in standing.  Activity   Date  1/2/2024 Date  1/4/2024 Date  1/11/2024 Date  1/16/24 Date  12/14/23 Date  12/18/23   Activity/Exercise   Sets/reps/equipment Sets/reps/  equipment Sets/reps/  equipment Sets/reps/  equipment Sets/reps/  equipment Sets/reps/  equipment   Walking with head turns             Walking with head up & down             Step ups     6 inch  15 reps 6 inch  15 reps 8 inch  2x10 reps 6 inch  15 reps 6 inch  15 reps 6 inch  15 reps   Step taps     6 inch  10 reps  Crosstaps  10 reps  6 inch  10 reps  Crosstaps  10 reps 8 inch  10 reps  Crosstaps  10 reps  6 inch  10 reps  Crosstaps  10 reps  6 inch  15 reps  Crosstaps  15 reps  6 inch  15 reps  Crosstaps  15 reps   Marching   4 laps 4 laps 4 laps 4 laps 4 laps 4 laps   Sidestepping   4 laps 4 laps 4 laps 4 laps 4 laps 4 laps   Crossovers           [place]           Walking  backwards             Tandem walking           Weaving in/out of cones     4 laps 4 laps 4 laps 4 laps 4 laps 4 laps   Picking up cones             Stepping over half foam Forward 10 reps each  Lateral 10 reps each Forward 10 reps each  Lateral 10 reps each Forward 10 reps each  Lateral 10 reps each Forward 10 reps each  Lateral 10 reps each Forward 10 reps each  Lateral 10 reps each Forward 10 reps each  Lateral 10 reps each   Ball toss           Kick ball           Figure 8s            Circles right/left           Walking with 360 degree turns           Spirals           Weight

## 2024-01-18 ENCOUNTER — HOSPITAL ENCOUNTER (OUTPATIENT)
Dept: PHYSICAL THERAPY | Age: 89
Setting detail: RECURRING SERIES
Discharge: HOME | End: 2024-01-21
Attending: INTERNAL MEDICINE
Payer: MEDICARE

## 2024-01-18 PROCEDURE — 97112 NEUROMUSCULAR REEDUCATION: CPT

## 2024-01-18 PROCEDURE — 97110 THERAPEUTIC EXERCISES: CPT

## 2024-01-18 ASSESSMENT — PAIN DESCRIPTION - DESCRIPTORS: DESCRIPTORS: ACHING

## 2024-01-18 ASSESSMENT — PAIN DESCRIPTION - LOCATION: LOCATION: BACK

## 2024-01-18 ASSESSMENT — PAIN SCALES - GENERAL: PAINLEVEL_OUTOF10: 3

## 2024-01-18 NOTE — PROGRESS NOTES
Harvinder Paige  : 1935  Primary: Medicare Part A And B (Medicare)  Secondary: [employer] HE[employer] MEDICARE SUPP [facility] @ [address]  [facility] [address]  Phone: [phone]  Fax: [phone] Plan Frequency: 1-2 times a week for 90 days.    Plan of Care/Certification Expiration Date: 24      >PT Visit Info:  Plan Frequency: 1-2 times a week for 90 days.  Plan of Care/Certification Expiration Date: 24  Total # of Visits to Date: 12  Canceled Appointment: 3 (2024)      Visit Count:  12    OUTPATIENT PHYSICAL THERAPY: Treatment Note 2024       Episode  }Appt Desk             Treatment Diagnosis:    Difficulty in walking, not elsewhere classified  Muscle weakness (generalized)  Other low back pain  Medical/Referring Diagnosis:    Osteoarthritis of spine with radiculopathy, thoracic region  DDD (degenerative disc disease), lumbar  DDD (degenerative disc disease), cervical  Peripheral polyneuropathy  Balance problem  Referring Physician:  Cassius Blanco MD MD Orders:  PT Eval and Treat   Date of Onset:  Onset Date:  (Chronic)     Allergies:   Diphenhydramine, Lorazepam, Iodinated contrast media, and Sulfa antibiotics  Restrictions/Precautions:  No data recordedNo data recorded     Interventions Planned (Treatment may consist of any combination of the following):    Current Treatment Recommendations: Strengthening; Balance training; Endurance training; Gait training; Neuromuscular re-education; Home exercise program; Manual; Modalities     >Subjective Comments:  \"I feel weak in my legs\".  >Initial:   Back 3/10>Post Session:     Back 3/10  Medications Last Reviewed:  2024  Updated Objective Findings:  None Today  Treatment   THERAPEUTIC EXERCISE: (10 minutes):    Exercises per grid below to improve mobility and strength.  Required minimal verbal cues to promote proper body alignment.  Progressed repetitions as indicated.

## 2024-01-23 ENCOUNTER — HOSPITAL ENCOUNTER (OUTPATIENT)
Dept: PHYSICAL THERAPY | Age: 89
Setting detail: RECURRING SERIES
Discharge: HOME | End: 2024-01-26
Attending: INTERNAL MEDICINE
Payer: MEDICARE

## 2024-01-23 PROCEDURE — 97112 NEUROMUSCULAR REEDUCATION: CPT

## 2024-01-23 PROCEDURE — 97110 THERAPEUTIC EXERCISES: CPT

## 2024-01-23 ASSESSMENT — PAIN SCALES - GENERAL: PAINLEVEL_OUTOF10: 3

## 2024-01-23 ASSESSMENT — PAIN DESCRIPTION - LOCATION: LOCATION: BACK

## 2024-01-23 NOTE — PROGRESS NOTES
Harvinder Paige  : 1935  Primary: Medicare Part A And B (Medicare)  Secondary: [employer] MEDICARE SUPP [facility] @ [address]  [facility] [address]  Phone: [phone]  Fax: [phone] Plan Frequency: 1-2 times a week for 90 days.    Plan of Care/Certification Expiration Date: 24      >PT Visit Info:  Plan Frequency: 1-2 times a week for 90 days.  Plan of Care/Certification Expiration Date: 24  Total # of Visits to Date: 13  Canceled Appointment: 3 (2024)      Visit Count:  13    OUTPATIENT PHYSICAL THERAPY: Treatment Note 2024       Episode  }Appt Desk             Treatment Diagnosis:    Difficulty in walking, not elsewhere classified  Muscle weakness (generalized)  Other low back pain  Medical/Referring Diagnosis:    Osteoarthritis of spine with radiculopathy, thoracic region  DDD (degenerative disc disease), lumbar  DDD (degenerative disc disease), cervical  Peripheral polyneuropathy  Balance problem  Referring Physician:  Cassius Blanco MD MD Orders:  PT Eval and Treat   Date of Onset:  Onset Date:  (Chronic)     Allergies:   Diphenhydramine, Lorazepam, Iodinated contrast media, and Sulfa antibiotics  Restrictions/Precautions:  No data recordedNo data recorded     Interventions Planned (Treatment may consist of any combination of the following):    Current Treatment Recommendations: Strengthening; Balance training; Endurance training; Gait training; Neuromuscular re-education; Home exercise program; Manual; Modalities     >Subjective Comments:  Patient reports he was sore after last time.  >Initial:   Back 3/10>Post Session:     Back 3/10  Medications Last Reviewed:  2024  Updated Objective Findings:  None Today  Treatment   THERAPEUTIC EXERCISE: (10 minutes):    Exercises per grid below to improve mobility and strength.  Required minimal verbal cues to promote proper body alignment.  Progressed repetitions

## 2024-01-25 ENCOUNTER — HOSPITAL ENCOUNTER (OUTPATIENT)
Dept: PHYSICAL THERAPY | Age: 89
Setting detail: RECURRING SERIES
Discharge: HOME | End: 2024-01-28
Attending: INTERNAL MEDICINE
Payer: MEDICARE

## 2024-01-25 PROCEDURE — 97112 NEUROMUSCULAR REEDUCATION: CPT

## 2024-01-25 PROCEDURE — 97110 THERAPEUTIC EXERCISES: CPT

## 2024-01-25 ASSESSMENT — PAIN SCALES - GENERAL: PAINLEVEL_OUTOF10: 3

## 2024-01-25 ASSESSMENT — PAIN DESCRIPTION - LOCATION: LOCATION: BACK

## 2024-01-25 NOTE — PROGRESS NOTES
Harvinder Paige  : 1935  Primary: Medicare Part A And B (Medicare)  Secondary: [employer] MEDICARE SUPP [facility] @ [address]  [facility] [address]  Phone: [phone]  Fax: [phone] Plan Frequency: 1-2 times a week for 90 days.    Plan of Care/Certification Expiration Date: 24      >PT Visit Info:  Plan Frequency: 1-2 times a week for 90 days.  Plan of Care/Certification Expiration Date: 24  Total # of Visits to Date: 14  Canceled Appointment: 3 (2024)      Visit Count:  14    OUTPATIENT PHYSICAL THERAPY: Treatment Note 2024       Episode  }Appt Desk             Treatment Diagnosis:    Difficulty in walking, not elsewhere classified  Muscle weakness (generalized)  Other low back pain  Medical/Referring Diagnosis:    Osteoarthritis of spine with radiculopathy, thoracic region  DDD (degenerative disc disease), lumbar  DDD (degenerative disc disease), cervical  Peripheral polyneuropathy  Balance problem  Referring Physician:  Cassius Blanco MD MD Orders:  PT Eval and Treat   Date of Onset:  Onset Date:  (Chronic)     Allergies:   Diphenhydramine, Lorazepam, Iodinated contrast media, and Sulfa antibiotics  Restrictions/Precautions:  No data recordedNo data recorded     Interventions Planned (Treatment may consist of any combination of the following):    Current Treatment Recommendations: Strengthening; Balance training; Endurance training; Gait training; Neuromuscular re-education; Home exercise program; Manual; Modalities     >Subjective Comments:  Patient reports he is going to see the cardiologist on Monday.  >Initial:   Back 3/10>Post Session:     Back 3/10  Medications Last Reviewed:  2024  Updated Objective Findings:  None Today  Treatment   THERAPEUTIC EXERCISE: (30 minutes):    Exercises per grid below to improve mobility and strength.  Required minimal verbal cues to promote proper body alignment.

## 2024-01-31 ENCOUNTER — TELEPHONE (OUTPATIENT)
Age: 89
End: 2024-01-31

## 2024-01-31 NOTE — TELEPHONE ENCOUNTER
Patient called with results, voiced understanding//socrates    ----- Message from Benigno Matthews MD sent at 1/30/2024  5:00 PM EST -----  Please call patient.  Stress test showed overall good heart perfusion.  Fixed defect at the apex of his heart which was small and consistent with his known coronary artery disease.  No new areas that would require intervention.  Overall heart function was normal with an ejection fraction of 61%.  Thank you

## 2024-02-06 ENCOUNTER — OFFICE VISIT (OUTPATIENT)
Dept: INTERNAL MEDICINE CLINIC | Facility: CLINIC | Age: 89
End: 2024-02-06
Payer: MEDICARE

## 2024-02-06 VITALS
TEMPERATURE: 98.1 F | BODY MASS INDEX: 29.66 KG/M2 | HEIGHT: 72 IN | HEART RATE: 58 BPM | WEIGHT: 219 LBS | DIASTOLIC BLOOD PRESSURE: 80 MMHG | OXYGEN SATURATION: 96 % | SYSTOLIC BLOOD PRESSURE: 128 MMHG

## 2024-02-06 DIAGNOSIS — D61.818 OTHER PANCYTOPENIA (HCC): ICD-10-CM

## 2024-02-06 DIAGNOSIS — E89.0 POSTSURGICAL HYPOTHYROIDISM: ICD-10-CM

## 2024-02-06 DIAGNOSIS — I10 PRIMARY HYPERTENSION: Primary | ICD-10-CM

## 2024-02-06 DIAGNOSIS — J44.9 CHRONIC OBSTRUCTIVE PULMONARY DISEASE, UNSPECIFIED COPD TYPE (HCC): ICD-10-CM

## 2024-02-06 DIAGNOSIS — R39.14 BENIGN PROSTATIC HYPERPLASIA WITH INCOMPLETE BLADDER EMPTYING: ICD-10-CM

## 2024-02-06 DIAGNOSIS — D69.6 THROMBOCYTOPENIA, UNSPECIFIED (HCC): ICD-10-CM

## 2024-02-06 DIAGNOSIS — N40.1 BENIGN PROSTATIC HYPERPLASIA WITH INCOMPLETE BLADDER EMPTYING: ICD-10-CM

## 2024-02-06 DIAGNOSIS — I71.20 THORACIC AORTIC ANEURYSM WITHOUT RUPTURE, UNSPECIFIED PART (HCC): ICD-10-CM

## 2024-02-06 DIAGNOSIS — J30.1 CHRONIC SEASONAL ALLERGIC RHINITIS DUE TO POLLEN: ICD-10-CM

## 2024-02-06 PROCEDURE — 1036F TOBACCO NON-USER: CPT | Performed by: INTERNAL MEDICINE

## 2024-02-06 PROCEDURE — G8417 CALC BMI ABV UP PARAM F/U: HCPCS | Performed by: INTERNAL MEDICINE

## 2024-02-06 PROCEDURE — 1123F ACP DISCUSS/DSCN MKR DOCD: CPT | Performed by: INTERNAL MEDICINE

## 2024-02-06 PROCEDURE — 99213 OFFICE O/P EST LOW 20 MIN: CPT | Performed by: INTERNAL MEDICINE

## 2024-02-06 PROCEDURE — 3023F SPIROM DOC REV: CPT | Performed by: INTERNAL MEDICINE

## 2024-02-06 PROCEDURE — G8427 DOCREV CUR MEDS BY ELIG CLIN: HCPCS | Performed by: INTERNAL MEDICINE

## 2024-02-06 PROCEDURE — G8484 FLU IMMUNIZE NO ADMIN: HCPCS | Performed by: INTERNAL MEDICINE

## 2024-02-06 RX ORDER — LEVOTHYROXINE SODIUM 0.15 MG/1
150 TABLET ORAL DAILY
Qty: 90 TABLET | Refills: 1 | Status: SHIPPED | OUTPATIENT
Start: 2024-02-06 | End: 2024-02-07 | Stop reason: SDUPTHER

## 2024-02-06 RX ORDER — FLUTICASONE PROPIONATE 50 MCG
2 SPRAY, SUSPENSION (ML) NASAL DAILY
Qty: 3 EACH | Refills: 3 | Status: SHIPPED | OUTPATIENT
Start: 2024-02-06 | End: 2024-02-07 | Stop reason: SDUPTHER

## 2024-02-06 RX ORDER — FINASTERIDE 5 MG/1
5 TABLET, FILM COATED ORAL DAILY
Qty: 90 TABLET | Refills: 3 | Status: SHIPPED | OUTPATIENT
Start: 2024-02-06 | End: 2024-02-07 | Stop reason: SDUPTHER

## 2024-02-06 RX ORDER — HYDROCODONE BITARTRATE AND ACETAMINOPHEN 5; 325 MG/1; MG/1
TABLET ORAL
COMMUNITY
Start: 2024-01-24

## 2024-02-06 ASSESSMENT — ENCOUNTER SYMPTOMS
BACK PAIN: 1
COUGH: 0
SHORTNESS OF BREATH: 1

## 2024-02-06 NOTE — PATIENT INSTRUCTIONS
Resume Atorvastatin 40 mg daily since holding this medication didn't help pain issues.      Nuclear Stress test looks good.  Reviewed.      Refer to urology due to insurance issues with [facility].      Follow up with pain management for chronic pain issues.      The medication list included in this document is our record of what you are currently taking, including any changes that were made at today's visit.  If you find any differences when compared to your medications at home, or have any questions that were not answered at your visit, please contact the office.    Follow up in 3 months with labs prior to visit.  See orders.     Cassius Blanco MD            [UNKNOWN]

## 2024-02-06 NOTE — PROGRESS NOTES
Trent follows his PSA.  Urology.  BPH and urinary urgency/overactive bladder symptoms.  No dysuria.  Component Value Date   PSA 5.61 (H) 03/21/2022   PSA 6.168 (H) 05/28/2021   PSA 6.349 (H) 05/26/2020   PSA 4.66 (H) 04/12/2019   PSA 7.60 (H) 02/06/2019   PSA 3.31 05/03/2016         Labs reviewed and discussed and medication refilled as needed for chronic medications during ov or adjusted based on lab results and/or our discussion as appropriate.  See discussion.    The patient's available records and electronic chart records are reviewed.  The PMH, PSH, medications, allergies, medications, FH, health maintenance and vaccination status are all reviewed and updated as appropriate.    Records from outside providers have been reviewed, summarized, and considered as noted in the history of present illness, past medical history, and objective data of this note and encounter.          Health Maintenance   Topic Date Due    Respiratory Syncytial Virus (RSV) Pregnant or age 60 yrs+ (1 - 1-dose 60+ series) Never done    COVID-19 Vaccine (9 - 2023-24 season) 11/30/2023    Lipids  06/26/2024    Depression Screen  07/03/2024    Annual Wellness Visit (Medicare)  07/03/2024    DTaP/Tdap/Td vaccine (3 - Td or Tdap) 10/05/2031    Flu vaccine  Completed    Shingles vaccine  Completed    Pneumococcal 65+ years Vaccine  Completed    Hepatitis A vaccine  Aged Out    Hepatitis B vaccine  Aged Out    Hib vaccine  Aged Out    Polio vaccine  Aged Out    Meningococcal (ACWY) vaccine  Aged Out    Prostate Specific Antigen (PSA) Screening or Monitoring  Discontinued     Patient Active Problem List   Diagnosis    AAA (abdominal aortic aneurysm) (HCC)    Elevated PSA    TIA (transient ischemic attack)    Elevated fasting glucose    Paroxysmal atrial fibrillation (HCC)    Other pancytopenia (HCC)    Chronic left-sided thoracic back pain    Thrombocytopenia (HCC)    Benign prostatic hyperplasia with incomplete bladder emptying

## 2024-02-07 ENCOUNTER — TELEPHONE (OUTPATIENT)
Dept: INTERNAL MEDICINE CLINIC | Facility: CLINIC | Age: 89
End: 2024-02-07

## 2024-02-07 DIAGNOSIS — R39.14 BENIGN PROSTATIC HYPERPLASIA WITH INCOMPLETE BLADDER EMPTYING: ICD-10-CM

## 2024-02-07 DIAGNOSIS — N40.1 BENIGN PROSTATIC HYPERPLASIA WITH INCOMPLETE BLADDER EMPTYING: ICD-10-CM

## 2024-02-07 DIAGNOSIS — E89.0 POSTSURGICAL HYPOTHYROIDISM: ICD-10-CM

## 2024-02-07 DIAGNOSIS — J30.1 CHRONIC SEASONAL ALLERGIC RHINITIS DUE TO POLLEN: ICD-10-CM

## 2024-02-07 RX ORDER — LEVOTHYROXINE SODIUM 0.15 MG/1
150 TABLET ORAL DAILY
Qty: 90 TABLET | Refills: 1 | Status: SHIPPED | OUTPATIENT
Start: 2024-02-07

## 2024-02-07 RX ORDER — FINASTERIDE 5 MG/1
5 TABLET, FILM COATED ORAL DAILY
Qty: 90 TABLET | Refills: 3 | Status: SHIPPED | OUTPATIENT
Start: 2024-02-07

## 2024-02-07 RX ORDER — FLUTICASONE PROPIONATE 50 MCG
2 SPRAY, SUSPENSION (ML) NASAL DAILY
Qty: 3 EACH | Refills: 3 | Status: SHIPPED | OUTPATIENT
Start: 2024-02-07

## 2024-02-07 NOTE — TELEPHONE ENCOUNTER
Pt came by the office to request his prescriptions of finasteride, fluticasone, and levothyroxine be sent to [facility] mail order instead of [facility] on [address]. Pt states they receive a 90 supply at a time from [facility] deliver.

## 2024-03-01 DIAGNOSIS — R39.14 BENIGN PROSTATIC HYPERPLASIA WITH INCOMPLETE BLADDER EMPTYING: Primary | ICD-10-CM

## 2024-03-01 DIAGNOSIS — N40.1 BENIGN PROSTATIC HYPERPLASIA WITH INCOMPLETE BLADDER EMPTYING: Primary | ICD-10-CM

## 2024-03-04 NOTE — HPI: PROCEDURE (MOHS)
Patient states she has enough medication to get her through until next visit.      Has The Growth Been Previously Biopsied?: has been previously biopsied

## 2024-03-05 RX ORDER — TERAZOSIN 5 MG/1
5 CAPSULE ORAL NIGHTLY
Qty: 30 CAPSULE | Refills: 5 | Status: SHIPPED | OUTPATIENT
Start: 2024-03-05

## 2024-03-06 ENCOUNTER — APPOINTMENT (RX ONLY)
Dept: URBAN - METROPOLITAN AREA CLINIC 330 | Facility: CLINIC | Age: 89
Setting detail: DERMATOLOGY
End: 2024-03-06

## 2024-03-06 DIAGNOSIS — D69.2 OTHER NONTHROMBOCYTOPENIC PURPURA: ICD-10-CM

## 2024-03-06 DIAGNOSIS — L82.1 OTHER SEBORRHEIC KERATOSIS: ICD-10-CM

## 2024-03-06 DIAGNOSIS — L72.8 OTHER FOLLICULAR CYSTS OF THE SKIN AND SUBCUTANEOUS TISSUE: ICD-10-CM

## 2024-03-06 PROCEDURE — ? COUNSELING

## 2024-03-06 PROCEDURE — ? ADDITIONAL NOTES

## 2024-03-06 PROCEDURE — 99212 OFFICE O/P EST SF 10 MIN: CPT

## 2024-03-06 ASSESSMENT — LOCATION ZONE DERM
LOCATION ZONE: FINGER
LOCATION ZONE: SCALP
LOCATION ZONE: LEG

## 2024-03-06 ASSESSMENT — LOCATION DETAILED DESCRIPTION DERM
LOCATION DETAILED: LEFT DISTAL PRETIBIAL REGION
LOCATION DETAILED: LEFT PROXIMAL PRETIBIAL REGION
LOCATION DETAILED: RIGHT PROXIMAL DORSAL INDEX FINGER
LOCATION DETAILED: RIGHT MEDIAL FRONTAL SCALP
LOCATION DETAILED: RIGHT DISTAL PRETIBIAL REGION
LOCATION DETAILED: RIGHT PROXIMAL PRETIBIAL REGION

## 2024-03-06 ASSESSMENT — LOCATION SIMPLE DESCRIPTION DERM
LOCATION SIMPLE: LEFT PRETIBIAL REGION
LOCATION SIMPLE: RIGHT INDEX FINGER
LOCATION SIMPLE: RIGHT SCALP
LOCATION SIMPLE: RIGHT PRETIBIAL REGION

## 2024-03-06 NOTE — PROCEDURE: MIPS QUALITY
Detail Level: Detailed
Quality 47: Advance Care Plan: Advance care planning not documented, reason not otherwise specified.
Quality 226: Preventive Care And Screening: Tobacco Use: Screening And Cessation Intervention: Patient screened for tobacco use and is an ex/non-smoker
Quality 431: Preventive Care And Screening: Unhealthy Alcohol Use - Screening: Patient not identified as an unhealthy alcohol user when screened for unhealthy alcohol use using a systematic screening method
Quality 130: Documentation Of Current Medications In The Medical Record: Current Medications Documented
Quality 402: Tobacco Use And Help With Quitting Among Adolescents: Patient screened for tobacco and never smoked

## 2024-03-06 NOTE — PROCEDURE: ADDITIONAL NOTES
Additional Notes: Recommended hand surgeon or ortho
Render Risk Assessment In Note?: no
Detail Level: Detailed

## 2024-04-16 ENCOUNTER — OFFICE VISIT (OUTPATIENT)
Dept: UROLOGY | Age: 89
End: 2024-04-16
Payer: MEDICARE

## 2024-04-16 DIAGNOSIS — N40.1 BENIGN PROSTATIC HYPERPLASIA WITH INCOMPLETE BLADDER EMPTYING: Primary | ICD-10-CM

## 2024-04-16 DIAGNOSIS — R39.14 BENIGN PROSTATIC HYPERPLASIA WITH INCOMPLETE BLADDER EMPTYING: Primary | ICD-10-CM

## 2024-04-16 LAB
BILIRUBIN, URINE, POC: NORMAL
BLOOD URINE, POC: NORMAL
GLUCOSE URINE, POC: NEGATIVE
KETONES, URINE, POC: NEGATIVE
LEUKOCYTE ESTERASE, URINE, POC: NEGATIVE
NITRITE, URINE, POC: NEGATIVE
PH, URINE, POC: 6 (ref 4.6–8)
PROTEIN,URINE, POC: NEGATIVE
SPECIFIC GRAVITY, URINE, POC: 1.02 (ref 1–1.03)
URINALYSIS CLARITY, POC: NORMAL
URINALYSIS COLOR, POC: NORMAL
UROBILINOGEN, POC: NORMAL

## 2024-04-16 PROCEDURE — 99203 OFFICE O/P NEW LOW 30 MIN: CPT | Performed by: UROLOGY

## 2024-04-16 PROCEDURE — 1123F ACP DISCUSS/DSCN MKR DOCD: CPT | Performed by: UROLOGY

## 2024-04-16 PROCEDURE — G8417 CALC BMI ABV UP PARAM F/U: HCPCS | Performed by: UROLOGY

## 2024-04-16 PROCEDURE — G8427 DOCREV CUR MEDS BY ELIG CLIN: HCPCS | Performed by: UROLOGY

## 2024-04-16 PROCEDURE — 81003 URINALYSIS AUTO W/O SCOPE: CPT | Performed by: UROLOGY

## 2024-04-16 PROCEDURE — 1036F TOBACCO NON-USER: CPT | Performed by: UROLOGY

## 2024-04-16 RX ORDER — FINASTERIDE 5 MG/1
5 TABLET, FILM COATED ORAL DAILY
Qty: 90 TABLET | Refills: 3 | Status: SHIPPED | OUTPATIENT
Start: 2024-04-16

## 2024-04-16 RX ORDER — TERAZOSIN 10 MG/1
10 CAPSULE ORAL NIGHTLY
Qty: 90 CAPSULE | Refills: 3 | Status: SHIPPED | OUTPATIENT
Start: 2024-04-16

## 2024-04-16 ASSESSMENT — ENCOUNTER SYMPTOMS
ABDOMINAL PAIN: 0
CONSTIPATION: 0
NAUSEA: 0
EYE PAIN: 0
VOMITING: 0
SHORTNESS OF BREATH: 0
HEARTBURN: 0
BACK PAIN: 1
BLOOD IN STOOL: 0
COUGH: 0
SKIN LESIONS: 0
EYE DISCHARGE: 0
DIARRHEA: 0
WHEEZING: 1
INDIGESTION: 0

## 2024-04-16 NOTE — PROGRESS NOTES
01/03/23   AMB POC URINALYSIS DIP STICK AUTO W/O MICRO   Result Value Ref Range    Color, Urine, POC yellow     Clarity, Urine, POC clear     Glucose, Urine, POC Negative Negative    Bilirubin, Urine, POC Negative Negative    Ketones, Urine, POC Negative Negative    Specific Gravity, Urine, POC 1.015 1.001 - 1.035    Blood, Urine, POC trace Negative    pH, Urine, POC 7.0 4.6 - 8.0    Protein, Urine, POC Negative Negative    Urobilinogen, POC 1.0     Nitrite, Urine, POC negative Negative    Leukocyte Esterase, Urine, POC Negative Negative       There were no vitals taken for this visit.     GENERAL: NAD, ALERT, A&O x 3, GAIT NORMAL  LUNGS: easy work of breathing  ABDOMEN: soft, non tender  NEUROLOGIC: cranial nerves 2-12 grossly intact   : no testicular abnormality  MARVIN: 1 + no nodule        Assessment and Plan    ICD-10-CM    1. Benign prostatic hyperplasia with incomplete bladder emptying  N40.1 AMB POC URINALYSIS DIP STICK AUTO W/O MICRO    R39.14         He will continue finasteride 5 mg and go back to terazosin 10 mg.  I will see him in 9/24 wit psa prior and then yearly with psa.

## 2024-04-25 ENCOUNTER — TELEPHONE (OUTPATIENT)
Dept: PULMONOLOGY | Age: 89
End: 2024-04-25

## 2024-04-26 ENCOUNTER — NURSE ONLY (OUTPATIENT)
Dept: INTERNAL MEDICINE CLINIC | Facility: CLINIC | Age: 89
End: 2024-04-26

## 2024-04-26 DIAGNOSIS — D69.6 THROMBOCYTOPENIA, UNSPECIFIED (HCC): ICD-10-CM

## 2024-04-26 DIAGNOSIS — I10 PRIMARY HYPERTENSION: ICD-10-CM

## 2024-04-26 DIAGNOSIS — N40.1 BENIGN PROSTATIC HYPERPLASIA WITH INCOMPLETE BLADDER EMPTYING: ICD-10-CM

## 2024-04-26 DIAGNOSIS — D61.818 OTHER PANCYTOPENIA (HCC): ICD-10-CM

## 2024-04-26 DIAGNOSIS — J30.1 CHRONIC SEASONAL ALLERGIC RHINITIS DUE TO POLLEN: ICD-10-CM

## 2024-04-26 DIAGNOSIS — I71.20 THORACIC AORTIC ANEURYSM WITHOUT RUPTURE, UNSPECIFIED PART (HCC): ICD-10-CM

## 2024-04-26 DIAGNOSIS — R39.14 BENIGN PROSTATIC HYPERPLASIA WITH INCOMPLETE BLADDER EMPTYING: ICD-10-CM

## 2024-04-26 DIAGNOSIS — E89.0 POSTSURGICAL HYPOTHYROIDISM: ICD-10-CM

## 2024-04-26 DIAGNOSIS — J44.9 CHRONIC OBSTRUCTIVE PULMONARY DISEASE, UNSPECIFIED COPD TYPE (HCC): ICD-10-CM

## 2024-04-26 LAB
ALT SERPL-CCNC: 14 U/L (ref 12–65)
ANION GAP SERPL CALC-SCNC: 10 MMOL/L (ref 9–18)
AST SERPL-CCNC: 23 U/L (ref 15–37)
BUN SERPL-MCNC: 16 MG/DL (ref 8–23)
CALCIUM SERPL-MCNC: 9.3 MG/DL (ref 8.8–10.2)
CHLORIDE SERPL-SCNC: 100 MMOL/L (ref 98–107)
CHOLEST SERPL-MCNC: 103 MG/DL (ref 0–200)
CO2 SERPL-SCNC: 26 MMOL/L (ref 20–28)
CREAT SERPL-MCNC: 0.93 MG/DL (ref 0.8–1.3)
ERYTHROCYTE [DISTWIDTH] IN BLOOD BY AUTOMATED COUNT: 13.4 % (ref 11.9–14.6)
GLUCOSE SERPL-MCNC: 100 MG/DL (ref 70–99)
HCT VFR BLD AUTO: 32.7 % (ref 41.1–50.3)
HDLC SERPL-MCNC: 46 MG/DL (ref 40–60)
HDLC SERPL: 2.2 (ref 0–5)
HGB BLD-MCNC: 11.4 G/DL (ref 13.6–17.2)
LDLC SERPL CALC-MCNC: 42 MG/DL (ref 0–100)
MCH RBC QN AUTO: 34.4 PG (ref 26.1–32.9)
MCHC RBC AUTO-ENTMCNC: 34.9 G/DL (ref 31.4–35)
MCV RBC AUTO: 98.8 FL (ref 82–102)
NRBC # BLD: 0 K/UL (ref 0–0.2)
PLATELET # BLD AUTO: 97 K/UL (ref 150–450)
PMV BLD AUTO: 9.4 FL (ref 9.4–12.3)
POTASSIUM SERPL-SCNC: 4.6 MMOL/L (ref 3.5–5.1)
RBC # BLD AUTO: 3.31 M/UL (ref 4.23–5.6)
SODIUM SERPL-SCNC: 136 MMOL/L (ref 136–145)
T4 FREE SERPL-MCNC: 1.4 NG/DL (ref 0.9–1.7)
TRIGL SERPL-MCNC: 78 MG/DL (ref 0–150)
TSH, 3RD GENERATION: 3.9 UIU/ML (ref 0.27–4.2)
VLDLC SERPL CALC-MCNC: 16 MG/DL (ref 6–23)
WBC # BLD AUTO: 2.8 K/UL (ref 4.3–11.1)

## 2024-05-13 RX ORDER — ATORVASTATIN CALCIUM 40 MG/1
40 TABLET, FILM COATED ORAL DAILY
Qty: 90 TABLET | Refills: 3 | Status: SHIPPED | OUTPATIENT
Start: 2024-05-13

## 2024-05-13 NOTE — TELEPHONE ENCOUNTER
Prescription sent to pharmacy//brendab  Requested Prescriptions     Signed Prescriptions Disp Refills    atorvastatin (LIPITOR) 40 MG tablet 90 tablet 3     Sig: TAKE 1 TABLET BY MOUTH DAILY     Authorizing Provider: VANIA TARIQ     Ordering User: DARIN HASSAN

## 2024-05-15 ENCOUNTER — HOSPITAL ENCOUNTER (OUTPATIENT)
Dept: LAB | Age: 89
Discharge: HOME | End: 2024-05-18
Payer: MEDICARE

## 2024-05-15 ENCOUNTER — OFFICE VISIT (OUTPATIENT)
Dept: ONCOLOGY | Age: 89
End: 2024-05-15
Payer: MEDICARE

## 2024-05-15 VITALS
OXYGEN SATURATION: 97 % | BODY MASS INDEX: 28.85 KG/M2 | HEIGHT: 72 IN | HEART RATE: 50 BPM | TEMPERATURE: 98.1 F | WEIGHT: 213 LBS | SYSTOLIC BLOOD PRESSURE: 132 MMHG | RESPIRATION RATE: 18 BRPM | DIASTOLIC BLOOD PRESSURE: 62 MMHG

## 2024-05-15 DIAGNOSIS — D69.6 THROMBOCYTOPENIA, UNSPECIFIED (HCC): ICD-10-CM

## 2024-05-15 DIAGNOSIS — D69.6 THROMBOCYTOPENIA, UNSPECIFIED (HCC): Primary | ICD-10-CM

## 2024-05-15 LAB
ALBUMIN SERPL-MCNC: 3.9 G/DL (ref 3.2–4.6)
ALBUMIN/GLOB SERPL: 1.8 (ref 1–1.9)
ALP SERPL-CCNC: 65 U/L (ref 40–129)
ALT SERPL-CCNC: 16 U/L (ref 12–65)
ANION GAP SERPL CALC-SCNC: 8 MMOL/L (ref 9–18)
AST SERPL-CCNC: 24 U/L (ref 15–37)
BASOPHILS # BLD: 0 K/UL (ref 0–0.2)
BASOPHILS NFR BLD: 1 % (ref 0–2)
BILIRUB SERPL-MCNC: 1.7 MG/DL (ref 0–1.2)
BUN SERPL-MCNC: 17 MG/DL (ref 8–23)
CALCIUM SERPL-MCNC: 9.1 MG/DL (ref 8.8–10.2)
CHLORIDE SERPL-SCNC: 101 MMOL/L (ref 98–107)
CO2 SERPL-SCNC: 28 MMOL/L (ref 20–28)
CREAT SERPL-MCNC: 1.02 MG/DL (ref 0.8–1.3)
DIFFERENTIAL METHOD BLD: ABNORMAL
EOSINOPHIL # BLD: 0.1 K/UL (ref 0–0.8)
EOSINOPHIL NFR BLD: 3 % (ref 0.5–7.8)
ERYTHROCYTE [DISTWIDTH] IN BLOOD BY AUTOMATED COUNT: 13.4 % (ref 11.9–14.6)
GLOBULIN SER CALC-MCNC: 2.2 G/DL (ref 2.3–3.5)
GLUCOSE SERPL-MCNC: 91 MG/DL (ref 70–99)
HCT VFR BLD AUTO: 31.3 % (ref 41.1–50.3)
HGB BLD-MCNC: 11.4 G/DL (ref 13.6–17.2)
IMM GRANULOCYTES # BLD AUTO: 0 K/UL (ref 0–0.5)
IMM GRANULOCYTES NFR BLD AUTO: 0 % (ref 0–5)
LDH SERPL L TO P-CCNC: 174 U/L (ref 127–281)
LYMPHOCYTES # BLD: 0.8 K/UL (ref 0.5–4.6)
LYMPHOCYTES NFR BLD: 26 % (ref 13–44)
MCH RBC QN AUTO: 34.7 PG (ref 26.1–32.9)
MCHC RBC AUTO-ENTMCNC: 36.4 G/DL (ref 31.4–35)
MCV RBC AUTO: 95.1 FL (ref 82–102)
MONOCYTES # BLD: 0.3 K/UL (ref 0.1–1.3)
MONOCYTES NFR BLD: 9 % (ref 4–12)
NEUTS SEG # BLD: 1.9 K/UL (ref 1.7–8.2)
NEUTS SEG NFR BLD: 61 % (ref 43–78)
NRBC # BLD: 0 K/UL (ref 0–0.2)
PLATELET # BLD AUTO: 92 K/UL (ref 150–450)
PMV BLD AUTO: 8.5 FL (ref 9.4–12.3)
POTASSIUM SERPL-SCNC: 4.9 MMOL/L (ref 3.5–5.1)
PROT SERPL-MCNC: 6.1 G/DL (ref 6.3–8.2)
RBC # BLD AUTO: 3.29 M/UL (ref 4.23–5.6)
SODIUM SERPL-SCNC: 137 MMOL/L (ref 136–145)
WBC # BLD AUTO: 3.1 K/UL (ref 4.3–11.1)

## 2024-05-15 PROCEDURE — 99214 OFFICE O/P EST MOD 30 MIN: CPT | Performed by: INTERNAL MEDICINE

## 2024-05-15 PROCEDURE — 83615 LACTATE (LD) (LDH) ENZYME: CPT

## 2024-05-15 PROCEDURE — 85025 COMPLETE CBC W/AUTO DIFF WBC: CPT

## 2024-05-15 PROCEDURE — 1123F ACP DISCUSS/DSCN MKR DOCD: CPT | Performed by: INTERNAL MEDICINE

## 2024-05-15 PROCEDURE — 36415 COLL VENOUS BLD VENIPUNCTURE: CPT

## 2024-05-15 PROCEDURE — 80053 COMPREHEN METABOLIC PANEL: CPT

## 2024-05-15 PROCEDURE — G8428 CUR MEDS NOT DOCUMENT: HCPCS | Performed by: INTERNAL MEDICINE

## 2024-05-15 PROCEDURE — 1036F TOBACCO NON-USER: CPT | Performed by: INTERNAL MEDICINE

## 2024-05-15 PROCEDURE — G8417 CALC BMI ABV UP PARAM F/U: HCPCS | Performed by: INTERNAL MEDICINE

## 2024-05-15 ASSESSMENT — PATIENT HEALTH QUESTIONNAIRE - PHQ9
SUM OF ALL RESPONSES TO PHQ QUESTIONS 1-9: 0
SUM OF ALL RESPONSES TO PHQ9 QUESTIONS 1 & 2: 0
1. LITTLE INTEREST OR PLEASURE IN DOING THINGS: NOT AT ALL
SUM OF ALL RESPONSES TO PHQ QUESTIONS 1-9: 0
2. FEELING DOWN, DEPRESSED OR HOPELESS: NOT AT ALL
SUM OF ALL RESPONSES TO PHQ QUESTIONS 1-9: 0
SUM OF ALL RESPONSES TO PHQ QUESTIONS 1-9: 0

## 2024-05-15 NOTE — PATIENT INSTRUCTIONS
Patient Information from Today's Visit    The members of your [facility] are listed below:    Physician Provider: Lucien Ruiz, Medical Oncologist  Advanced Practice Clinician: Allison Danielle NP  Registered Nurse: Bernardo TRIMBLE RN  Navigator: N/A  Medical Assistant: Lynne VIDAL MA  : Karla SOLIS   Supportive Care Services: Harjeet RIVERS LMSW    Follow Up Instructions: 1 year      Treatment Summary has been discussed and given to patient:N/A      Current Labs:   Hospital Outpatient Visit on 05/15/2024   Component Date Value Ref Range Status    LD 05/15/2024 174  127 - 281 U/L Final    Sodium 05/15/2024 137  136 - 145 mmol/L Final    Potassium 05/15/2024 4.9  3.5 - 5.1 mmol/L Final    Chloride 05/15/2024 101  98 - 107 mmol/L Final    CO2 05/15/2024 28  20 - 28 mmol/L Final    Anion Gap 05/15/2024 8 (L)  9 - 18 mmol/L Final    Glucose 05/15/2024 91  70 - 99 mg/dL Final    Comment: <70 mg/dL Consistent with, but not fully diagnostic of hypoglycemia.  100 - 125 mg/dL Impaired fasting glucose/consistent with pre-diabetes mellitus.  > 126 mg/dl Fasting glucose consistent with overt diabetes mellitus      BUN 05/15/2024 17  8 - 23 MG/DL Final    Creatinine 05/15/2024 1.02  0.80 - 1.30 MG/DL Final    Est, Glom Filt Rate 05/15/2024 72  >60 ml/min/1.73m2 Final    Comment:    Pediatric calculator link: https://www.kidney.org/professionals/kdoqi/gfr_calculatorped     These results are not intended for use in patients <18 years of age.     eGFR results are calculated without a race factor using  the 2021 CKD-EPI equation. Careful clinical correlation is recommended, particularly when comparing to results calculated using previous equations.  The CKD-EPI equation is less accurate in patients with extremes of muscle mass, extra-renal metabolism of creatinine, excessive creatine ingestion, or following therapy that affects renal tubular secretion.      Calcium 05/15/2024 9.1  8.8 - 10.2 MG/DL Final    Total Bilirubin

## 2024-05-15 NOTE — PROGRESS NOTES
with yearly hematology visit.  He is comfortable with the plan.  All questions were asked and answered to the best of my ability.               Lucien Ruiz MD  [facility]  [address] [place] [address]  Office : [phone]  Fax : [phone]

## 2024-05-23 ENCOUNTER — TELEPHONE (OUTPATIENT)
Age: 89
End: 2024-05-23

## 2024-05-23 NOTE — TELEPHONE ENCOUNTER
Patient called stating that he is still having the back pain that was discussed before with Dr. Matthews. Patient states that he has been packing, cleaning and working around the house getting it ready to sell as he is moving to [place] near his daughter soon, Patient advised to go to Urgent care to be evaluated. Patient would also like to know if there is a cancellation for see Dr. Matthews before he moves. Patient informed that I call him if there is a cancellation. Patient thanked me//socrates

## 2024-05-23 NOTE — TELEPHONE ENCOUNTER
Pt is in the process of moving to [place], [place] to be closer to his daughter.    Upper left back. Most concentration under back shoulder blade and then the pain bothers his arm. Seems to hurt all the time, no weakness. Says he doesn't think it is a 911 situation as  knows this from history but wants him to know it is getting worse.  May be coming from working on the house.      Also, so winded and legs giving out breathing hard like it was before his surgery in 2002.  Was hoping to get one more checkup before he leaves before his July apt.

## 2024-06-03 ENCOUNTER — OFFICE VISIT (OUTPATIENT)
Dept: INTERNAL MEDICINE CLINIC | Facility: CLINIC | Age: 89
End: 2024-06-03
Payer: MEDICARE

## 2024-06-03 VITALS
BODY MASS INDEX: 28.58 KG/M2 | WEIGHT: 211 LBS | SYSTOLIC BLOOD PRESSURE: 146 MMHG | DIASTOLIC BLOOD PRESSURE: 86 MMHG | TEMPERATURE: 98.4 F | HEART RATE: 62 BPM | OXYGEN SATURATION: 97 % | HEIGHT: 72 IN

## 2024-06-03 DIAGNOSIS — I10 PRIMARY HYPERTENSION: Primary | ICD-10-CM

## 2024-06-03 DIAGNOSIS — G62.9 PERIPHERAL POLYNEUROPATHY: ICD-10-CM

## 2024-06-03 DIAGNOSIS — I71.40 ABDOMINAL AORTIC ANEURYSM (AAA) WITHOUT RUPTURE, UNSPECIFIED PART (HCC): ICD-10-CM

## 2024-06-03 DIAGNOSIS — D69.6 THROMBOCYTOPENIA (HCC): ICD-10-CM

## 2024-06-03 DIAGNOSIS — I25.810 CORONARY ARTERY DISEASE INVOLVING CORONARY BYPASS GRAFT OF NATIVE HEART WITHOUT ANGINA PECTORIS: ICD-10-CM

## 2024-06-03 DIAGNOSIS — R39.14 BENIGN PROSTATIC HYPERPLASIA WITH INCOMPLETE BLADDER EMPTYING: ICD-10-CM

## 2024-06-03 DIAGNOSIS — J44.9 CHRONIC OBSTRUCTIVE PULMONARY DISEASE, UNSPECIFIED COPD TYPE (HCC): ICD-10-CM

## 2024-06-03 DIAGNOSIS — I48.0 PAROXYSMAL ATRIAL FIBRILLATION (HCC): ICD-10-CM

## 2024-06-03 DIAGNOSIS — N40.1 BENIGN PROSTATIC HYPERPLASIA WITH INCOMPLETE BLADDER EMPTYING: ICD-10-CM

## 2024-06-03 DIAGNOSIS — M51.36 DDD (DEGENERATIVE DISC DISEASE), LUMBAR: ICD-10-CM

## 2024-06-03 DIAGNOSIS — D61.818 OTHER PANCYTOPENIA (HCC): ICD-10-CM

## 2024-06-03 DIAGNOSIS — I71.20 THORACIC AORTIC ANEURYSM WITHOUT RUPTURE, UNSPECIFIED PART (HCC): ICD-10-CM

## 2024-06-03 DIAGNOSIS — E89.0 POSTSURGICAL HYPOTHYROIDISM: ICD-10-CM

## 2024-06-03 PROBLEM — G45.9 TIA (TRANSIENT ISCHEMIC ATTACK): Status: RESOLVED | Noted: 2022-02-10 | Resolved: 2024-06-03

## 2024-06-03 PROCEDURE — G8417 CALC BMI ABV UP PARAM F/U: HCPCS | Performed by: INTERNAL MEDICINE

## 2024-06-03 PROCEDURE — G8427 DOCREV CUR MEDS BY ELIG CLIN: HCPCS | Performed by: INTERNAL MEDICINE

## 2024-06-03 PROCEDURE — 1036F TOBACCO NON-USER: CPT | Performed by: INTERNAL MEDICINE

## 2024-06-03 PROCEDURE — 3023F SPIROM DOC REV: CPT | Performed by: INTERNAL MEDICINE

## 2024-06-03 PROCEDURE — 1123F ACP DISCUSS/DSCN MKR DOCD: CPT | Performed by: INTERNAL MEDICINE

## 2024-06-03 PROCEDURE — 99214 OFFICE O/P EST MOD 30 MIN: CPT | Performed by: INTERNAL MEDICINE

## 2024-06-03 PROCEDURE — G2211 COMPLEX E/M VISIT ADD ON: HCPCS | Performed by: INTERNAL MEDICINE

## 2024-06-03 RX ORDER — AMLODIPINE BESYLATE 10 MG/1
10 TABLET ORAL DAILY
Qty: 90 TABLET | Refills: 3 | Status: SHIPPED | OUTPATIENT
Start: 2024-06-03

## 2024-06-03 RX ORDER — NITROGLYCERIN 0.4 MG/1
0.4 TABLET SUBLINGUAL EVERY 5 MIN PRN
Qty: 25 TABLET | Refills: 0 | Status: SHIPPED | OUTPATIENT
Start: 2024-06-03

## 2024-06-03 RX ORDER — LEVOTHYROXINE SODIUM 0.15 MG/1
150 TABLET ORAL DAILY
Qty: 90 TABLET | Refills: 3 | Status: SHIPPED | OUTPATIENT
Start: 2024-06-03

## 2024-06-03 ASSESSMENT — ENCOUNTER SYMPTOMS
COUGH: 0
BACK PAIN: 1
SHORTNESS OF BREATH: 1

## 2024-06-03 NOTE — ASSESSMENT & PLAN NOTE
Now seeing Dr. Jenkins.  Notes reviewed.  MARVIN stable.    Lab Results   Component Value Date    PSA 7.4 (H) 07/03/2023    PSA 6.2 (H) 11/03/2021    PSA 5.7 (H) 02/19/2021    PSA 5.4 (H) 04/22/2020    PSA 5.2 (H) 08/08/2019

## 2024-06-03 NOTE — ASSESSMENT & PLAN NOTE
Monitored by specialist- no acute findings meriting change in the plan  Thoracic Aortic Aneurysm- Monitor by [facility].

## 2024-06-03 NOTE — ASSESSMENT & PLAN NOTE
Lab Results   Component Value Date    TSH 2.050 05/10/2022    [id number] 3.900 04/26/2024     Treatment regimen is effective.

## 2024-06-03 NOTE — ASSESSMENT & PLAN NOTE
CAD- Continue ASA and Statin.  BP stable.  Lipids controlled.  No cp or new sob.  Chronic fatigue.

## 2024-06-03 NOTE — ASSESSMENT & PLAN NOTE
Chronic back pain and DDD and OA.-- per pain management.    Continue current medications as prescribed.  Seeing pain management.  He was referred to NS.  He did not receive any benefit with the recent facet injections, and only received very short-term benefit with the previous transforaminal epidural steroid injections. He is ready to proceed with surgical evaluation.    Now being evaluated for pain stimulator device due to chronic pain

## 2024-06-03 NOTE — ASSESSMENT & PLAN NOTE
Monitored by specialist- no acute findings meriting change in the plan  Pancytopenia- stable.  Oncology did not advocate for any further work-up at this point, even if there were some developing myelodysplasia dysplasia (which was not really seen on BMBx in 2011) he would not need treatment currently.  Continued observation with yearly hematology visit.   SPEP neg.      Lab Results   Component Value Date    WBC 3.1 (L) 05/15/2024    HGB 11.4 (L) 05/15/2024    HCT 31.3 (L) 05/15/2024    MCV 95.1 05/15/2024    PLT 92 (L) 05/15/2024

## 2024-06-03 NOTE — PATIENT INSTRUCTIONS
The medication list included in this document is our record of what you are currently taking, including any changes that were made at today's visit.  If you find any differences when compared to your medications at home, or have any questions that were not answered at your visit, please contact the office.    Increase Amlodipine to 10 mg daily.    Monitor blood pressure.  Call if elevated.     Follow up in 3 months with labs prior.  See orders.       Return in about 3 months (around 9/3/2024), or if symptoms worsen or fail to improve.

## 2024-06-03 NOTE — ASSESSMENT & PLAN NOTE
Increase Amlodipine to 10 mg daily    Lab Results   Component Value Date/Time     05/15/2024 12:37 PM    K 4.9 05/15/2024 12:37 PM     05/15/2024 12:37 PM    CO2 28 05/15/2024 12:37 PM    BUN 17 05/15/2024 12:37 PM    CREATININE 1.02 05/15/2024 12:37 PM    GLUCOSE 91 05/15/2024 12:37 PM    CALCIUM 9.1 05/15/2024 12:37 PM    LABGLOM 72 05/15/2024 12:37 PM    LABGLOM 80 04/26/2024 09:29 AM      BP Readings from Last 3 Encounters:   06/03/24 (Abnormal) 146/86   05/15/24 132/62   02/06/24 128/80     Hypertension Medications       Calcium Channel Blockers       amLODIPine (NORVASC) 10 MG tablet Take 1 tablet by mouth daily      Antiadrenergic Antihypertensives       terazosin (HYTRIN) 10 MG capsule Take 1 capsule by mouth nightly      Beta Blockers Cardio-Selective       metoprolol tartrate (LOPRESSOR) 25 MG tablet Take 1 tablet by mouth 2 times daily TAKE 1 TABLET BY MOUTH TWO TIMES DAILY

## 2024-06-03 NOTE — PROGRESS NOTES
Cholesterol, Total 103 0 - 200 MG/DL    Triglycerides 78 0 - 150 MG/DL    HDL 46 40 - 60 MG/DL    LDL Calculated 42 0 - 100 MG/DL    VLDL Cholesterol Calculated 16 6 - 23 MG/DL    Chol/HDL Ratio 2.2 0.0 - 5.0     Results for orders placed or performed in visit on 04/16/24 (from the past 2160 hour(s))   AMB POC URINALYSIS DIP STICK AUTO W/O MICRO   Result Value Ref Range    Color (UA POC)      Clarity (UA POC)      Glucose, Urine, POC Negative     Bilirubin, Urine, POC Small     KETONES, Urine, POC Negative     Specific Gravity, Urine, POC 1.020 1.001 - 1.035    Blood (UA POC) Trace     pH, Urine, POC 6.0 4.6 - 8.0    Protein, Urine, POC Negative     Urobilinogen, POC 1 mg/dL     Nitrite, Urine, POC Negative     Leukocyte Esterase, Urine, POC Negative        CAT Scan Result (most recent):  CTA CHEST W WO CONTRAST 10/25/2023    Narrative  EXAM: CT ANGIOGRAM CHEST W WO CONTRAST, 10/25/2023 1:48 PM    INDICATION: I71.20 Thoracic aortic aneurysm, without rupture, unspecified (HCC) I10;    COMPARISON: 9/28/2022    TECHNIQUE: Spiral noncontrast images of the chest were obtained from the thoracic inlets through the diaphragms.  The exam was repeated during the arterial phase of 80 mL Omnipaque 350 IV contrast administration. Delayed images were obtained. Thin slice axial images and 3-D reformats were performed and reviewed.    FINDINGS:  Vascular:  The aortic root at the level of the sinuses of Valsalva is mildly dilated at 41 mm, unchanged.  The great vessel origins are widely patent.  The patient has undergone TEVAR.  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  I see no endoleak.  The endograft is patent.    CT chest:  Coronary atherosclerotic changes are present.  The patient is status post CABG.  The heart is otherwise unremarkable.  I see no hilar or mediastinal lymphadenopathy by size criteria.  Scattered pleural and parenchymal scarring are again noted.  I see no evidence

## 2024-06-03 NOTE — ASSESSMENT & PLAN NOTE
Consider PT for balance.  Fall precautions.  Therapy discussed.  Declines today  Probably should consider JORJE or being closer to family.  He has thought about some he says.

## 2024-06-18 ASSESSMENT — ENCOUNTER SYMPTOMS: SHORTNESS OF BREATH: 0

## 2024-06-19 ENCOUNTER — OFFICE VISIT (OUTPATIENT)
Age: 89
End: 2024-06-19
Payer: MEDICARE

## 2024-06-19 VITALS
WEIGHT: 209.2 LBS | HEIGHT: 72 IN | HEART RATE: 59 BPM | SYSTOLIC BLOOD PRESSURE: 136 MMHG | DIASTOLIC BLOOD PRESSURE: 62 MMHG | BODY MASS INDEX: 28.33 KG/M2

## 2024-06-19 DIAGNOSIS — I45.10 RBBB: ICD-10-CM

## 2024-06-19 DIAGNOSIS — I10 PRIMARY HYPERTENSION: ICD-10-CM

## 2024-06-19 DIAGNOSIS — I48.0 PAROXYSMAL ATRIAL FIBRILLATION (HCC): ICD-10-CM

## 2024-06-19 DIAGNOSIS — I25.810 CORONARY ARTERY DISEASE INVOLVING CORONARY BYPASS GRAFT OF NATIVE HEART WITHOUT ANGINA PECTORIS: Primary | ICD-10-CM

## 2024-06-19 DIAGNOSIS — E78.5 DYSLIPIDEMIA: ICD-10-CM

## 2024-06-19 DIAGNOSIS — Z01.810 PREOP CARDIOVASCULAR EXAM: ICD-10-CM

## 2024-06-19 PROCEDURE — 1123F ACP DISCUSS/DSCN MKR DOCD: CPT | Performed by: INTERNAL MEDICINE

## 2024-06-19 PROCEDURE — 93000 ELECTROCARDIOGRAM COMPLETE: CPT | Performed by: INTERNAL MEDICINE

## 2024-06-19 PROCEDURE — G8417 CALC BMI ABV UP PARAM F/U: HCPCS | Performed by: INTERNAL MEDICINE

## 2024-06-19 PROCEDURE — G8427 DOCREV CUR MEDS BY ELIG CLIN: HCPCS | Performed by: INTERNAL MEDICINE

## 2024-06-19 PROCEDURE — 99214 OFFICE O/P EST MOD 30 MIN: CPT | Performed by: INTERNAL MEDICINE

## 2024-06-19 PROCEDURE — 1036F TOBACCO NON-USER: CPT | Performed by: INTERNAL MEDICINE

## 2024-06-19 ASSESSMENT — ENCOUNTER SYMPTOMS: BACK PAIN: 1

## 2024-06-19 NOTE — PROGRESS NOTES
[facility] CARDIOLOGY, [address], [address]  [place] [address]  PHONE: [phone]    Harvinder Paige  1935      SUBJECTIVE:   Harvinder Paige is a 88 y.o. male seen for a follow up visit regarding the following:     Chief Complaint   Patient presents with    Coronary Artery Disease    Atrial Fibrillation    Other     Moving to [place]       HPI:    Harvinder Paige presents for routine follow up. Multiple issues addressed.     Coronary Artery Disease:  Chest pain at last visit and stress test done:  Lexiscan Cardiolite (1/29/2024): Fixed apical defect.  No reversible ischemia.  EF 61%.      Hypertension:  Ambulatory BP readings have been controlled.  Patient reports compliance with medical therapy without side effects.      Hyperlipidemia:      Latest Reference Range & Units 04/26/24 09:29   Cholesterol, Total 0 - 200 MG/   HDL Cholesterol 40 - 60 MG/DL 46   LDL Cholesterol 0 - 100 MG/DL 42   Triglycerides 0 - 150 MG/DL 78       RBBB: No dizziness or syncope      Atrial fibrillation: Occurred during an acute illness.  No recurrence.  No palpitations or tachycardia.     Preop Clearance:  Needs spinal stimulator in July.         Past Medical History, Past Surgical History, Family history, Social History, and Medications were all reviewed with the patient today and updated as necessary.           Current Outpatient Medications:     metoprolol tartrate (LOPRESSOR) 25 MG tablet, Take 1 tablet by mouth 2 times daily TAKE 1 TABLET BY MOUTH TWO TIMES DAILY, Disp: 180 tablet, Rfl: 1    levothyroxine (SYNTHROID) 150 MCG tablet, Take 1 tablet by mouth Daily 1 tablet once a day except for 1/2 tablet on Sundays, Disp: 90 tablet, Rfl: 3    nitroGLYCERIN (NITROSTAT) 0.4 MG SL tablet, Place 1 tablet under the tongue every 5 minutes as needed for Chest pain, Disp: 25 tablet, Rfl: 0    amLODIPine (NORVASC) 10 MG tablet, Take 1 tablet by mouth daily, Disp: 90 tablet, Rfl: 3

## 2024-09-09 ENCOUNTER — OFFICE VISIT (OUTPATIENT)
Dept: INTERNAL MEDICINE CLINIC | Facility: CLINIC | Age: 89
End: 2024-09-09

## 2024-09-09 DIAGNOSIS — Z53.8 APPOINTMENT CANCELED BY HOSPITAL: Primary | ICD-10-CM

## 2024-11-15 DIAGNOSIS — I25.708 CORONARY ARTERY DISEASE OF BYPASS GRAFT OF NATIVE HEART WITH STABLE ANGINA PECTORIS (HCC): ICD-10-CM

## 2024-11-15 RX ORDER — CLOPIDOGREL BISULFATE 75 MG/1
75 TABLET ORAL DAILY
Qty: 90 TABLET | Refills: 3 | OUTPATIENT
Start: 2024-11-15

## 2024-11-18 ENCOUNTER — TELEPHONE (OUTPATIENT)
Dept: INTERNAL MEDICINE CLINIC | Facility: CLINIC | Age: 89
End: 2024-11-18

## 2024-11-18 NOTE — TELEPHONE ENCOUNTER
change in the synthroid from amneal to lupin    Is this ok? Please call back [phone] with Reference # [id number]

## 2024-11-19 ENCOUNTER — TELEPHONE (OUTPATIENT)
Dept: INTERNAL MEDICINE CLINIC | Facility: CLINIC | Age: 89
End: 2024-11-19

## 2024-11-19 NOTE — TELEPHONE ENCOUNTER
Delbert from [facility] called, states  approval for levothyroxine is needed from Dr. Blanco.    [phone]

## 2024-11-20 NOTE — TELEPHONE ENCOUNTER
Called [facility] and spoke to Thu,Pharmacist, notifying her that it is ok to dispense Levothyroxine with new  Lupin instead of Amneal.

## 2025-02-18 DIAGNOSIS — J30.1 CHRONIC SEASONAL ALLERGIC RHINITIS DUE TO POLLEN: ICD-10-CM

## 2025-02-18 DIAGNOSIS — I25.810 CORONARY ARTERY DISEASE INVOLVING CORONARY BYPASS GRAFT OF NATIVE HEART WITHOUT ANGINA PECTORIS: ICD-10-CM

## 2025-02-18 DIAGNOSIS — I25.708 CORONARY ARTERY DISEASE OF BYPASS GRAFT OF NATIVE HEART WITH STABLE ANGINA PECTORIS: ICD-10-CM

## 2025-02-18 RX ORDER — CLOPIDOGREL BISULFATE 75 MG/1
75 TABLET ORAL DAILY
Qty: 90 TABLET | Refills: 3 | OUTPATIENT
Start: 2025-02-18

## 2025-02-18 RX ORDER — NITROGLYCERIN 0.4 MG/1
TABLET SUBLINGUAL
Qty: 25 TABLET | Refills: 0 | OUTPATIENT
Start: 2025-02-18

## 2025-02-18 RX ORDER — FLUTICASONE PROPIONATE 50 MCG
2 SPRAY, SUSPENSION (ML) NASAL DAILY
Qty: 48 G | OUTPATIENT
Start: 2025-02-18

## (undated) DEVICE — SUTURE VCRL SZ 3-0 L27IN ABSRB UD L26MM SH 1/2 CIR J416H

## (undated) DEVICE — SUTURE PERMAHAND SZ 3-0 L18IN NONABSORBABLE BLK SILK BRAID A184H

## (undated) DEVICE — Device

## (undated) DEVICE — STAPLER INT L75MM CUT LN L73MM STPL LN L77MM BLU B FRM 8

## (undated) DEVICE — SUTURE PERMAHAND SZ 2-0 L12X18IN NONABSORBABLE BLK SILK A185H

## (undated) DEVICE — MEDI-VAC YANKAUER SUCTION HANDLE W/BULBOUS TIP: Brand: CARDINAL HEALTH

## (undated) DEVICE — CURVED, LARGE JAW, OPEN SEALER/DIVIDER NANO-COATED: Brand: LIGASURE IMPACT

## (undated) DEVICE — SURGICAL PROCEDURE PACK BASIC ST FRANCIS

## (undated) DEVICE — SUTURE PERMA HND SZ 2 0 L18IN NONABSORBABLE BLK L19MM PS 2 583H

## (undated) DEVICE — SPONGE LAP 18X18IN STRL -- 5/PK

## (undated) DEVICE — HEX-LOCKING BLADE ELECTRODE: Brand: EDGE

## (undated) DEVICE — REM POLYHESIVE ADULT PATIENT RETURN ELECTRODE: Brand: VALLEYLAB

## (undated) DEVICE — SOLUTION IV 1000ML 0.9% SOD CHL

## (undated) DEVICE — SINGLE BASIN: Brand: CARDINAL HEALTH

## (undated) DEVICE — KENDALL SCD EXPRESS SLEEVES, KNEE LENGTH, MEDIUM: Brand: KENDALL SCD

## (undated) DEVICE — TRAY CATH 16F DRN BG LTX -- CONVERT TO ITEM 363158

## (undated) DEVICE — 2000CC GUARDIAN II: Brand: GUARDIAN

## (undated) DEVICE — BLADE ELECTRODE: Brand: VALLEYLAB

## (undated) DEVICE — DRAPE TWL SURG 16X26IN BLU ORB04] ALLCARE INC]

## (undated) DEVICE — TRAY PREP DRY W/ PREM GLV 2 APPL 6 SPNG 2 UNDPD 1 OVERWRAP

## (undated) DEVICE — SLIM BODY SKIN STAPLER: Brand: APPOSE ULC

## (undated) DEVICE — POOLE SUCTION HANDLE: Brand: CARDINAL HEALTH

## (undated) DEVICE — DRAPE,TOP,102X53,STERILE: Brand: MEDLINE

## (undated) DEVICE — SUTURE VCRL SZ 3-0 L18IN ABSRB UD L26MM SH 1/2 CIR J864D

## (undated) DEVICE — SHEET, T, LAPAROTOMY, STERILE: Brand: MEDLINE

## (undated) DEVICE — SUTURE PERMAHAND SZ 3-0 L18IN NONABSORBABLE BLK L26MM SH C013D

## (undated) DEVICE — SUTURE PDS II SZ 1 L96IN ABSRB VLT TP-1 L65MM 1/2 CIR Z880G

## (undated) DEVICE — RELOAD STPL L75MM OPN H3.8MM CLS 1.5MM WIRE DIA0.2MM REG